# Patient Record
Sex: FEMALE | Race: WHITE | NOT HISPANIC OR LATINO | Employment: FULL TIME | URBAN - METROPOLITAN AREA
[De-identification: names, ages, dates, MRNs, and addresses within clinical notes are randomized per-mention and may not be internally consistent; named-entity substitution may affect disease eponyms.]

---

## 2018-12-20 ENCOUNTER — TRANSCRIBE ORDERS (OUTPATIENT)
Dept: ADMINISTRATIVE | Facility: HOSPITAL | Age: 48
End: 2018-12-20

## 2018-12-20 DIAGNOSIS — M54.5 LOW BACK PAIN, UNSPECIFIED BACK PAIN LATERALITY, UNSPECIFIED CHRONICITY, WITH SCIATICA PRESENCE UNSPECIFIED: Primary | ICD-10-CM

## 2018-12-25 ENCOUNTER — HOSPITAL ENCOUNTER (OUTPATIENT)
Facility: HOSPITAL | Age: 48
Setting detail: OBSERVATION
Discharge: HOME | End: 2018-12-26
Attending: EMERGENCY MEDICINE | Admitting: EMERGENCY MEDICINE

## 2018-12-25 ENCOUNTER — APPOINTMENT (OUTPATIENT)
Dept: GENERAL RADIOLOGY | Facility: HOSPITAL | Age: 48
End: 2018-12-25

## 2018-12-25 DIAGNOSIS — R07.2 PRECORDIAL CHEST PAIN: Primary | ICD-10-CM

## 2018-12-25 PROBLEM — R07.9 CHEST PAIN: Status: ACTIVE | Noted: 2018-12-25

## 2018-12-25 LAB
ALBUMIN SERPL-MCNC: 4.4 G/DL (ref 3.5–5.2)
ALBUMIN/GLOB SERPL: 1.2 G/DL
ALP SERPL-CCNC: 87 U/L (ref 40–129)
ALT SERPL W P-5'-P-CCNC: 24 U/L (ref 5–33)
ANION GAP SERPL CALCULATED.3IONS-SCNC: 14.3 MMOL/L
APTT PPP: 30.8 SECONDS (ref 24.3–38.1)
AST SERPL-CCNC: 21 U/L (ref 5–32)
BASOPHILS # BLD AUTO: 0.04 10*3/MM3 (ref 0–0.2)
BASOPHILS NFR BLD AUTO: 0.7 % (ref 0–2)
BILIRUB SERPL-MCNC: 0.2 MG/DL (ref 0.2–1.2)
BUN BLD-MCNC: 14 MG/DL (ref 6–20)
BUN/CREAT SERPL: 14.9 (ref 7–25)
CALCIUM SPEC-SCNC: 9.6 MG/DL (ref 8.6–10.5)
CHLORIDE SERPL-SCNC: 103 MMOL/L (ref 98–107)
CO2 SERPL-SCNC: 24.7 MMOL/L (ref 22–29)
CREAT BLD-MCNC: 0.94 MG/DL (ref 0.57–1)
CRP SERPL-MCNC: 0.19 MG/DL (ref 0–0.5)
DEPRECATED RDW RBC AUTO: 43.2 FL (ref 37–54)
EOSINOPHIL # BLD AUTO: 0.11 10*3/MM3 (ref 0.1–0.3)
EOSINOPHIL NFR BLD AUTO: 1.9 % (ref 0–4)
ERYTHROCYTE [DISTWIDTH] IN BLOOD BY AUTOMATED COUNT: 13.7 % (ref 11.5–14.5)
ERYTHROCYTE [SEDIMENTATION RATE] IN BLOOD: 25 MM/HR (ref 0–20)
GFR SERPL CREATININE-BSD FRML MDRD: 77 ML/MIN/1.73
GLOBULIN UR ELPH-MCNC: 3.8 GM/DL
GLUCOSE BLD-MCNC: 83 MG/DL (ref 65–99)
HCT VFR BLD AUTO: 39.6 % (ref 37–47)
HGB BLD-MCNC: 12.7 G/DL (ref 12–16)
IMM GRANULOCYTES # BLD AUTO: 0.02 10*3/MM3 (ref 0–0.03)
IMM GRANULOCYTES NFR BLD AUTO: 0.3 % (ref 0–0.5)
INR PPP: 0.92 (ref 0.9–1.1)
LIPASE SERPL-CCNC: 21 U/L (ref 13–60)
LYMPHOCYTES # BLD AUTO: 2.33 10*3/MM3 (ref 0.6–4.8)
LYMPHOCYTES NFR BLD AUTO: 40.7 % (ref 20–45)
MCH RBC QN AUTO: 27.7 PG (ref 27–31)
MCHC RBC AUTO-ENTMCNC: 32.1 G/DL (ref 31–37)
MCV RBC AUTO: 86.5 FL (ref 81–99)
MONOCYTES # BLD AUTO: 0.67 10*3/MM3 (ref 0–1)
MONOCYTES NFR BLD AUTO: 11.7 % (ref 3–8)
NEUTROPHILS # BLD AUTO: 2.55 10*3/MM3 (ref 1.5–8.3)
NEUTROPHILS NFR BLD AUTO: 44.7 % (ref 45–70)
NRBC BLD AUTO-RTO: 0 /100 WBC (ref 0–0)
PLATELET # BLD AUTO: 271 10*3/MM3 (ref 140–500)
PMV BLD AUTO: 10.1 FL (ref 7.4–10.4)
POTASSIUM BLD-SCNC: 4 MMOL/L (ref 3.5–5.2)
PROT SERPL-MCNC: 8.2 G/DL (ref 6–8.5)
PROTHROMBIN TIME: 12.3 SECONDS (ref 12.1–15)
RBC # BLD AUTO: 4.58 10*6/MM3 (ref 4.2–5.4)
SODIUM BLD-SCNC: 142 MMOL/L (ref 136–145)
TROPONIN T SERPL-MCNC: 0.04 NG/ML (ref 0–0.03)
TROPONIN T SERPL-MCNC: 0.05 NG/ML (ref 0–0.03)
WBC NRBC COR # BLD: 5.72 10*3/MM3 (ref 4.8–10.8)

## 2018-12-25 PROCEDURE — G0378 HOSPITAL OBSERVATION PER HR: HCPCS

## 2018-12-25 PROCEDURE — 96374 THER/PROPH/DIAG INJ IV PUSH: CPT

## 2018-12-25 PROCEDURE — 86140 C-REACTIVE PROTEIN: CPT | Performed by: INTERNAL MEDICINE

## 2018-12-25 PROCEDURE — 80053 COMPREHEN METABOLIC PANEL: CPT | Performed by: EMERGENCY MEDICINE

## 2018-12-25 PROCEDURE — 96372 THER/PROPH/DIAG INJ SC/IM: CPT

## 2018-12-25 PROCEDURE — 83690 ASSAY OF LIPASE: CPT | Performed by: EMERGENCY MEDICINE

## 2018-12-25 PROCEDURE — 71046 X-RAY EXAM CHEST 2 VIEWS: CPT

## 2018-12-25 PROCEDURE — 96375 TX/PRO/DX INJ NEW DRUG ADDON: CPT

## 2018-12-25 PROCEDURE — 25010000002 ENOXAPARIN PER 10 MG: Performed by: EMERGENCY MEDICINE

## 2018-12-25 PROCEDURE — 99284 EMERGENCY DEPT VISIT MOD MDM: CPT

## 2018-12-25 PROCEDURE — 25010000002 LORAZEPAM PER 2 MG: Performed by: EMERGENCY MEDICINE

## 2018-12-25 PROCEDURE — 85610 PROTHROMBIN TIME: CPT | Performed by: EMERGENCY MEDICINE

## 2018-12-25 PROCEDURE — 85730 THROMBOPLASTIN TIME PARTIAL: CPT | Performed by: EMERGENCY MEDICINE

## 2018-12-25 PROCEDURE — 93010 ELECTROCARDIOGRAM REPORT: CPT | Performed by: INTERNAL MEDICINE

## 2018-12-25 PROCEDURE — 99285 EMERGENCY DEPT VISIT HI MDM: CPT | Performed by: EMERGENCY MEDICINE

## 2018-12-25 PROCEDURE — 84484 ASSAY OF TROPONIN QUANT: CPT | Performed by: EMERGENCY MEDICINE

## 2018-12-25 PROCEDURE — 85652 RBC SED RATE AUTOMATED: CPT | Performed by: INTERNAL MEDICINE

## 2018-12-25 PROCEDURE — 85025 COMPLETE CBC W/AUTO DIFF WBC: CPT | Performed by: EMERGENCY MEDICINE

## 2018-12-25 PROCEDURE — 25010000002 MORPHINE PER 10 MG: Performed by: EMERGENCY MEDICINE

## 2018-12-25 PROCEDURE — 93005 ELECTROCARDIOGRAM TRACING: CPT | Performed by: EMERGENCY MEDICINE

## 2018-12-25 PROCEDURE — 99220 PR INITIAL OBSERVATION CARE/DAY 70 MINUTES: CPT | Performed by: INTERNAL MEDICINE

## 2018-12-25 RX ORDER — ONDANSETRON 4 MG/1
4 TABLET, FILM COATED ORAL EVERY 6 HOURS PRN
Status: DISCONTINUED | OUTPATIENT
Start: 2018-12-25 | End: 2018-12-26

## 2018-12-25 RX ORDER — ASPIRIN 325 MG
325 TABLET ORAL ONCE
Status: COMPLETED | OUTPATIENT
Start: 2018-12-25 | End: 2018-12-25

## 2018-12-25 RX ORDER — ONDANSETRON 2 MG/ML
4 INJECTION INTRAMUSCULAR; INTRAVENOUS EVERY 6 HOURS PRN
Status: DISCONTINUED | OUTPATIENT
Start: 2018-12-25 | End: 2018-12-26

## 2018-12-25 RX ORDER — SODIUM CHLORIDE 9 MG/ML
75 INJECTION, SOLUTION INTRAVENOUS CONTINUOUS
Status: DISCONTINUED | OUTPATIENT
Start: 2018-12-26 | End: 2018-12-26

## 2018-12-25 RX ORDER — SENNA AND DOCUSATE SODIUM 50; 8.6 MG/1; MG/1
2 TABLET, FILM COATED ORAL NIGHTLY PRN
Status: DISCONTINUED | OUTPATIENT
Start: 2018-12-25 | End: 2018-12-26

## 2018-12-25 RX ORDER — ONDANSETRON 4 MG/1
4 TABLET, ORALLY DISINTEGRATING ORAL EVERY 6 HOURS PRN
Status: DISCONTINUED | OUTPATIENT
Start: 2018-12-25 | End: 2018-12-26

## 2018-12-25 RX ORDER — SODIUM CHLORIDE 0.9 % (FLUSH) 0.9 %
10 SYRINGE (ML) INJECTION AS NEEDED
Status: DISCONTINUED | OUTPATIENT
Start: 2018-12-25 | End: 2018-12-26

## 2018-12-25 RX ORDER — CHOLECALCIFEROL (VITAMIN D3) 125 MCG
5 CAPSULE ORAL NIGHTLY PRN
Status: DISCONTINUED | OUTPATIENT
Start: 2018-12-25 | End: 2018-12-26

## 2018-12-25 RX ORDER — ACETAMINOPHEN 325 MG/1
650 TABLET ORAL EVERY 4 HOURS PRN
Status: DISCONTINUED | OUTPATIENT
Start: 2018-12-25 | End: 2018-12-26

## 2018-12-25 RX ORDER — CALCIUM CARBONATE 200(500)MG
1 TABLET,CHEWABLE ORAL 2 TIMES DAILY PRN
Status: DISCONTINUED | OUTPATIENT
Start: 2018-12-25 | End: 2018-12-26

## 2018-12-25 RX ORDER — NALOXONE HCL 0.4 MG/ML
0.4 VIAL (ML) INJECTION
Status: DISCONTINUED | OUTPATIENT
Start: 2018-12-25 | End: 2018-12-26

## 2018-12-25 RX ORDER — LORAZEPAM 2 MG/ML
0.5 INJECTION INTRAMUSCULAR ONCE
Status: COMPLETED | OUTPATIENT
Start: 2018-12-25 | End: 2018-12-25

## 2018-12-25 RX ADMIN — MORPHINE SULFATE 2 MG: 4 INJECTION INTRAVENOUS at 20:06

## 2018-12-25 RX ADMIN — ENOXAPARIN SODIUM 80 MG: 80 INJECTION SUBCUTANEOUS at 20:38

## 2018-12-25 RX ADMIN — NITROGLYCERIN 0.5 INCH: 20 OINTMENT TOPICAL at 20:03

## 2018-12-25 RX ADMIN — ASPIRIN 325 MG: 325 TABLET, COATED ORAL at 20:03

## 2018-12-25 RX ADMIN — LORAZEPAM 0.5 MG: 2 INJECTION, SOLUTION INTRAMUSCULAR; INTRAVENOUS at 19:17

## 2018-12-26 ENCOUNTER — APPOINTMENT (OUTPATIENT)
Dept: MRI IMAGING | Facility: HOSPITAL | Age: 48
End: 2018-12-26

## 2018-12-26 ENCOUNTER — HOSPITAL ENCOUNTER (OUTPATIENT)
Facility: HOSPITAL | Age: 48
Discharge: HOME | End: 2018-12-27
Attending: INTERNAL MEDICINE | Admitting: INTERNAL MEDICINE

## 2018-12-26 ENCOUNTER — APPOINTMENT (OUTPATIENT)
Dept: CARDIOLOGY | Facility: HOSPITAL | Age: 48
End: 2018-12-26
Attending: INTERNAL MEDICINE

## 2018-12-26 VITALS
HEIGHT: 63 IN | HEART RATE: 82 BPM | WEIGHT: 185 LBS | OXYGEN SATURATION: 100 % | SYSTOLIC BLOOD PRESSURE: 127 MMHG | DIASTOLIC BLOOD PRESSURE: 89 MMHG | TEMPERATURE: 97.8 F | BODY MASS INDEX: 32.78 KG/M2 | RESPIRATION RATE: 18 BRPM

## 2018-12-26 DIAGNOSIS — I20.8 ANGINA AT REST (HCC): Primary | ICD-10-CM

## 2018-12-26 DIAGNOSIS — I20.8 ANGINA AT REST (HCC): ICD-10-CM

## 2018-12-26 PROBLEM — I20.89 ANGINA AT REST: Status: ACTIVE | Noted: 2018-12-26

## 2018-12-26 PROBLEM — I21.4 NSTEMI (NON-ST ELEVATED MYOCARDIAL INFARCTION): Status: ACTIVE | Noted: 2018-12-26

## 2018-12-26 LAB
ANION GAP SERPL CALCULATED.3IONS-SCNC: 11.2 MMOL/L
AORTIC DIMENSIONLESS INDEX: 0.8 (DI)
BASOPHILS # BLD AUTO: 0.03 10*3/MM3 (ref 0–0.2)
BASOPHILS NFR BLD AUTO: 0.5 % (ref 0–2)
BH CV ECHO MEAS - ACS: 1.6 CM
BH CV ECHO MEAS - AO MAX PG (FULL): 1.8 MMHG
BH CV ECHO MEAS - AO MAX PG: 9.6 MMHG
BH CV ECHO MEAS - AO MEAN PG (FULL): 1 MMHG
BH CV ECHO MEAS - AO MEAN PG: 5 MMHG
BH CV ECHO MEAS - AO ROOT AREA (BSA CORRECTED): 1.5
BH CV ECHO MEAS - AO ROOT AREA: 5.7 CM^2
BH CV ECHO MEAS - AO ROOT DIAM: 2.7 CM
BH CV ECHO MEAS - AO V2 MAX: 155 CM/SEC
BH CV ECHO MEAS - AO V2 MEAN: 108 CM/SEC
BH CV ECHO MEAS - AO V2 VTI: 36.3 CM
BH CV ECHO MEAS - AVA(I,A): 2.5 CM^2
BH CV ECHO MEAS - AVA(I,D): 2.5 CM^2
BH CV ECHO MEAS - AVA(V,A): 2.3 CM^2
BH CV ECHO MEAS - AVA(V,D): 2.3 CM^2
BH CV ECHO MEAS - BSA(HAYCOCK): 1.9 M^2
BH CV ECHO MEAS - BSA: 1.8 M^2
BH CV ECHO MEAS - BZI_BMI: 30.1 KILOGRAMS/M^2
BH CV ECHO MEAS - BZI_METRIC_HEIGHT: 160 CM
BH CV ECHO MEAS - BZI_METRIC_WEIGHT: 77.1 KG
BH CV ECHO MEAS - EDV(CUBED): 73.6 ML
BH CV ECHO MEAS - EDV(MOD-SP2): 71.5 ML
BH CV ECHO MEAS - EDV(MOD-SP4): 85.2 ML
BH CV ECHO MEAS - EDV(TEICH): 78.1 ML
BH CV ECHO MEAS - EF(CUBED): 67.9 %
BH CV ECHO MEAS - EF(MOD-BP): 69 %
BH CV ECHO MEAS - EF(MOD-SP2): 66.4 %
BH CV ECHO MEAS - EF(MOD-SP4): 70.4 %
BH CV ECHO MEAS - EF(TEICH): 59.8 %
BH CV ECHO MEAS - ESV(CUBED): 23.6 ML
BH CV ECHO MEAS - ESV(MOD-SP2): 24 ML
BH CV ECHO MEAS - ESV(MOD-SP4): 25.2 ML
BH CV ECHO MEAS - ESV(TEICH): 31.4 ML
BH CV ECHO MEAS - FS: 31.5 %
BH CV ECHO MEAS - IVS/LVPW: 0.75
BH CV ECHO MEAS - IVSD: 0.93 CM
BH CV ECHO MEAS - LA DIMENSION: 3.3 CM
BH CV ECHO MEAS - LA/AO: 1.2
BH CV ECHO MEAS - LAT PEAK E' VEL: 10 CM/SEC
BH CV ECHO MEAS - LV DIASTOLIC VOL/BSA (35-75): 47.2 ML/M^2
BH CV ECHO MEAS - LV MASS(C)D: 153.8 GRAMS
BH CV ECHO MEAS - LV MASS(C)DI: 85.2 GRAMS/M^2
BH CV ECHO MEAS - LV MAX PG: 7.8 MMHG
BH CV ECHO MEAS - LV MEAN PG: 4 MMHG
BH CV ECHO MEAS - LV SYSTOLIC VOL/BSA (12-30): 14 ML/M^2
BH CV ECHO MEAS - LV V1 MAX: 139.5 CM/SEC
BH CV ECHO MEAS - LV V1 MEAN: 91.8 CM/SEC
BH CV ECHO MEAS - LV V1 VTI: 35.6 CM
BH CV ECHO MEAS - LVIDD: 4.2 CM
BH CV ECHO MEAS - LVIDS: 2.9 CM
BH CV ECHO MEAS - LVLD AP2: 7.5 CM
BH CV ECHO MEAS - LVLD AP4: 7.6 CM
BH CV ECHO MEAS - LVLS AP2: 6.7 CM
BH CV ECHO MEAS - LVLS AP4: 6.5 CM
BH CV ECHO MEAS - LVOT AREA (M): 2.5 CM^2
BH CV ECHO MEAS - LVOT AREA: 2.5 CM^2
BH CV ECHO MEAS - LVOT DIAM: 1.8 CM
BH CV ECHO MEAS - LVPWD: 1.2 CM
BH CV ECHO MEAS - MED PEAK E' VEL: 7 CM/SEC
BH CV ECHO MEAS - MR MAX PG: 75.6 MMHG
BH CV ECHO MEAS - MR MAX VEL: 420.3 CM/SEC
BH CV ECHO MEAS - MV A DUR: 0.16 SEC
BH CV ECHO MEAS - MV A MAX VEL: 108 CM/SEC
BH CV ECHO MEAS - MV DEC SLOPE: 398 CM/SEC^2
BH CV ECHO MEAS - MV DEC TIME: 0.2 SEC
BH CV ECHO MEAS - MV E MAX VEL: 114 CM/SEC
BH CV ECHO MEAS - MV E/A: 1.1
BH CV ECHO MEAS - MV MAX PG: 5.4 MMHG
BH CV ECHO MEAS - MV MEAN PG: 2 MMHG
BH CV ECHO MEAS - MV P1/2T MAX VEL: 115 CM/SEC
BH CV ECHO MEAS - MV P1/2T: 84.6 MSEC
BH CV ECHO MEAS - MV V2 MAX: 116 CM/SEC
BH CV ECHO MEAS - MV V2 MEAN: 64.9 CM/SEC
BH CV ECHO MEAS - MV V2 VTI: 33 CM
BH CV ECHO MEAS - MVA P1/2T LCG: 1.9 CM^2
BH CV ECHO MEAS - MVA(P1/2T): 2.6 CM^2
BH CV ECHO MEAS - MVA(VTI): 2.7 CM^2
BH CV ECHO MEAS - PA ACC TIME: 0.1 SEC
BH CV ECHO MEAS - PA MAX PG (FULL): 1.3 MMHG
BH CV ECHO MEAS - PA MAX PG: 4.2 MMHG
BH CV ECHO MEAS - PA PR(ACCEL): 36.3 MMHG
BH CV ECHO MEAS - PA V2 MAX: 102 CM/SEC
BH CV ECHO MEAS - PVA(V,A): 4.1 CM^2
BH CV ECHO MEAS - PVA(V,D): 4.1 CM^2
BH CV ECHO MEAS - QP/QS: 1.2
BH CV ECHO MEAS - RAP SYSTOLE: 3 MMHG
BH CV ECHO MEAS - RV MAX PG: 2.9 MMHG
BH CV ECHO MEAS - RV MEAN PG: 1 MMHG
BH CV ECHO MEAS - RV V1 MAX: 85 CM/SEC
BH CV ECHO MEAS - RV V1 MEAN: 51 CM/SEC
BH CV ECHO MEAS - RV V1 VTI: 22.3 CM
BH CV ECHO MEAS - RVOT AREA: 4.9 CM^2
BH CV ECHO MEAS - RVOT DIAM: 2.5 CM
BH CV ECHO MEAS - RVSP: 21.4 MMHG
BH CV ECHO MEAS - SI(AO): 115.2 ML/M^2
BH CV ECHO MEAS - SI(CUBED): 27.7 ML/M^2
BH CV ECHO MEAS - SI(LVOT): 50.1 ML/M^2
BH CV ECHO MEAS - SI(MOD-SP2): 26.3 ML/M^2
BH CV ECHO MEAS - SI(MOD-SP4): 33.2 ML/M^2
BH CV ECHO MEAS - SI(TEICH): 25.9 ML/M^2
BH CV ECHO MEAS - SV(AO): 207.8 ML
BH CV ECHO MEAS - SV(CUBED): 49.9 ML
BH CV ECHO MEAS - SV(LVOT): 90.5 ML
BH CV ECHO MEAS - SV(MOD-SP2): 47.5 ML
BH CV ECHO MEAS - SV(MOD-SP4): 60 ML
BH CV ECHO MEAS - SV(RVOT): 109.9 ML
BH CV ECHO MEAS - SV(TEICH): 46.7 ML
BH CV ECHO MEAS - TAPSE (>1.6): 1.8 CM2
BH CV ECHO MEAS - TR MAX VEL: 214.3 CM/SEC
BH CV ECHO MEASUREMENTS AVERAGE E/E' RATIO: 13.41
BH CV VAS BP RIGHT ARM: NORMAL MMHG
BH CV XLRA - RV BASE: 2.8 CM
BH CV XLRA - TDI S': 14 CM/SEC
BUN BLD-MCNC: 16 MG/DL (ref 6–20)
BUN/CREAT SERPL: 19.3 (ref 7–25)
CALCIUM SPEC-SCNC: 8.8 MG/DL (ref 8.6–10.5)
CHLORIDE SERPL-SCNC: 107 MMOL/L (ref 98–107)
CO2 SERPL-SCNC: 23.8 MMOL/L (ref 22–29)
CREAT BLD-MCNC: 0.83 MG/DL (ref 0.57–1)
DEPRECATED RDW RBC AUTO: 43.5 FL (ref 37–54)
EOSINOPHIL # BLD AUTO: 0.11 10*3/MM3 (ref 0.1–0.3)
EOSINOPHIL NFR BLD AUTO: 2 % (ref 0–4)
ERYTHROCYTE [DISTWIDTH] IN BLOOD BY AUTOMATED COUNT: 13.8 % (ref 11.5–14.5)
GFR SERPL CREATININE-BSD FRML MDRD: 89 ML/MIN/1.73
GLUCOSE BLD-MCNC: 104 MG/DL (ref 65–99)
HCT VFR BLD AUTO: 35.8 % (ref 37–47)
HGB BLD-MCNC: 11.4 G/DL (ref 12–16)
IMM GRANULOCYTES # BLD AUTO: 0.02 10*3/MM3 (ref 0–0.03)
IMM GRANULOCYTES NFR BLD AUTO: 0.4 % (ref 0–0.5)
LEFT ATRIUM VOLUME INDEX: 24 ML/M2
LV EF 2D ECHO EST: 69 %
LYMPHOCYTES # BLD AUTO: 3.22 10*3/MM3 (ref 0.6–4.8)
LYMPHOCYTES NFR BLD AUTO: 57.8 % (ref 20–45)
MAXIMAL PREDICTED HEART RATE: 172 BPM
MCH RBC QN AUTO: 27.7 PG (ref 27–31)
MCHC RBC AUTO-ENTMCNC: 31.8 G/DL (ref 31–37)
MCV RBC AUTO: 86.9 FL (ref 81–99)
MONOCYTES # BLD AUTO: 0.53 10*3/MM3 (ref 0–1)
MONOCYTES NFR BLD AUTO: 9.5 % (ref 3–8)
NEUTROPHILS # BLD AUTO: 1.66 10*3/MM3 (ref 1.5–8.3)
NEUTROPHILS NFR BLD AUTO: 29.8 % (ref 45–70)
NRBC BLD AUTO-RTO: 0 /100 WBC (ref 0–0)
PLAT MORPH BLD: NORMAL
PLATELET # BLD AUTO: 255 10*3/MM3 (ref 140–500)
PMV BLD AUTO: 10.2 FL (ref 7.4–10.4)
POTASSIUM BLD-SCNC: 4.3 MMOL/L (ref 3.5–5.2)
RBC # BLD AUTO: 4.12 10*6/MM3 (ref 4.2–5.4)
RBC MORPH BLD: NORMAL
SODIUM BLD-SCNC: 142 MMOL/L (ref 136–145)
STRESS TARGET HR: 146 BPM
TROPONIN T SERPL-MCNC: 0.07 NG/ML (ref 0–0.03)
TROPONIN T SERPL-MCNC: 0.08 NG/ML (ref 0–0.03)
WBC MORPH BLD: NORMAL
WBC NRBC COR # BLD: 5.57 10*3/MM3 (ref 4.8–10.8)

## 2018-12-26 PROCEDURE — 85347 COAGULATION TIME ACTIVATED: CPT

## 2018-12-26 PROCEDURE — C1887 CATHETER, GUIDING: HCPCS | Performed by: INTERNAL MEDICINE

## 2018-12-26 PROCEDURE — 25010000002 FENTANYL CITRATE (PF) 100 MCG/2ML SOLUTION: Performed by: INTERNAL MEDICINE

## 2018-12-26 PROCEDURE — 84484 ASSAY OF TROPONIN QUANT: CPT | Performed by: INTERNAL MEDICINE

## 2018-12-26 PROCEDURE — C1894 INTRO/SHEATH, NON-LASER: HCPCS | Performed by: INTERNAL MEDICINE

## 2018-12-26 PROCEDURE — G0378 HOSPITAL OBSERVATION PER HR: HCPCS

## 2018-12-26 PROCEDURE — 93010 ELECTROCARDIOGRAM REPORT: CPT | Performed by: INTERNAL MEDICINE

## 2018-12-26 PROCEDURE — C1725 CATH, TRANSLUMIN NON-LASER: HCPCS | Performed by: INTERNAL MEDICINE

## 2018-12-26 PROCEDURE — 92928 PRQ TCAT PLMT NTRAC ST 1 LES: CPT | Performed by: INTERNAL MEDICINE

## 2018-12-26 PROCEDURE — C1874 STENT, COATED/COV W/DEL SYS: HCPCS | Performed by: INTERNAL MEDICINE

## 2018-12-26 PROCEDURE — 0 IOPAMIDOL PER 1 ML: Performed by: INTERNAL MEDICINE

## 2018-12-26 PROCEDURE — 93005 ELECTROCARDIOGRAM TRACING: CPT | Performed by: INTERNAL MEDICINE

## 2018-12-26 PROCEDURE — 25010000002 HEPARIN (PORCINE) PER 1000 UNITS: Performed by: INTERNAL MEDICINE

## 2018-12-26 PROCEDURE — C9600 PERC DRUG-EL COR STENT SING: HCPCS | Performed by: INTERNAL MEDICINE

## 2018-12-26 PROCEDURE — C1769 GUIDE WIRE: HCPCS | Performed by: INTERNAL MEDICINE

## 2018-12-26 PROCEDURE — 99217 PR OBSERVATION CARE DISCHARGE MANAGEMENT: CPT | Performed by: HOSPITALIST

## 2018-12-26 PROCEDURE — 93306 TTE W/DOPPLER COMPLETE: CPT | Performed by: INTERNAL MEDICINE

## 2018-12-26 PROCEDURE — 93458 L HRT ARTERY/VENTRICLE ANGIO: CPT | Performed by: INTERNAL MEDICINE

## 2018-12-26 PROCEDURE — 96376 TX/PRO/DX INJ SAME DRUG ADON: CPT

## 2018-12-26 PROCEDURE — 99152 MOD SED SAME PHYS/QHP 5/>YRS: CPT | Performed by: INTERNAL MEDICINE

## 2018-12-26 PROCEDURE — 80048 BASIC METABOLIC PNL TOTAL CA: CPT | Performed by: INTERNAL MEDICINE

## 2018-12-26 PROCEDURE — 99153 MOD SED SAME PHYS/QHP EA: CPT | Performed by: INTERNAL MEDICINE

## 2018-12-26 PROCEDURE — 25010000002 MIDAZOLAM PER 1 MG: Performed by: INTERNAL MEDICINE

## 2018-12-26 PROCEDURE — 85007 BL SMEAR W/DIFF WBC COUNT: CPT | Performed by: INTERNAL MEDICINE

## 2018-12-26 PROCEDURE — 99244 OFF/OP CNSLTJ NEW/EST MOD 40: CPT | Performed by: INTERNAL MEDICINE

## 2018-12-26 PROCEDURE — 25010000002 MORPHINE PER 10 MG: Performed by: INTERNAL MEDICINE

## 2018-12-26 PROCEDURE — 93306 TTE W/DOPPLER COMPLETE: CPT

## 2018-12-26 PROCEDURE — 85025 COMPLETE CBC W/AUTO DIFF WBC: CPT | Performed by: INTERNAL MEDICINE

## 2018-12-26 DEVICE — XIENCE SIERRA™ EVEROLIMUS ELUTING CORONARY STENT SYSTEM 3.50 MM X 28 MM / RAPID-EXCHANGE
Type: IMPLANTABLE DEVICE | Status: FUNCTIONAL
Brand: XIENCE SIERRA™

## 2018-12-26 RX ORDER — SODIUM CHLORIDE 0.9 % (FLUSH) 0.9 %
3 SYRINGE (ML) INJECTION EVERY 12 HOURS SCHEDULED
Status: DISCONTINUED | OUTPATIENT
Start: 2018-12-26 | End: 2018-12-26

## 2018-12-26 RX ORDER — SODIUM CHLORIDE 9 MG/ML
75 INJECTION, SOLUTION INTRAVENOUS CONTINUOUS
Status: DISCONTINUED | OUTPATIENT
Start: 2018-12-26 | End: 2018-12-26

## 2018-12-26 RX ORDER — ONDANSETRON 2 MG/ML
4 INJECTION INTRAMUSCULAR; INTRAVENOUS EVERY 6 HOURS PRN
Status: DISCONTINUED | OUTPATIENT
Start: 2018-12-26 | End: 2018-12-27 | Stop reason: HOSPADM

## 2018-12-26 RX ORDER — HEPARIN SODIUM 1000 [USP'U]/ML
INJECTION, SOLUTION INTRAVENOUS; SUBCUTANEOUS AS NEEDED
Status: DISCONTINUED | OUTPATIENT
Start: 2018-12-26 | End: 2018-12-26 | Stop reason: HOSPADM

## 2018-12-26 RX ORDER — CALCIUM CARBONATE 200(500)MG
1 TABLET,CHEWABLE ORAL 2 TIMES DAILY PRN
Status: DISCONTINUED | OUTPATIENT
Start: 2018-12-26 | End: 2018-12-27 | Stop reason: HOSPADM

## 2018-12-26 RX ORDER — ASPIRIN 325 MG
325 TABLET, DELAYED RELEASE (ENTERIC COATED) ORAL DAILY
Status: DISCONTINUED | OUTPATIENT
Start: 2018-12-27 | End: 2018-12-26 | Stop reason: HOSPADM

## 2018-12-26 RX ORDER — LIDOCAINE HYDROCHLORIDE 20 MG/ML
INJECTION, SOLUTION INFILTRATION; PERINEURAL AS NEEDED
Status: DISCONTINUED | OUTPATIENT
Start: 2018-12-26 | End: 2018-12-26 | Stop reason: HOSPADM

## 2018-12-26 RX ORDER — ASPIRIN 325 MG
325 TABLET, DELAYED RELEASE (ENTERIC COATED) ORAL ONCE
Status: DISCONTINUED | OUTPATIENT
Start: 2018-12-26 | End: 2018-12-26

## 2018-12-26 RX ORDER — ASPIRIN 325 MG
325 TABLET, DELAYED RELEASE (ENTERIC COATED) ORAL ONCE
Status: DISCONTINUED | OUTPATIENT
Start: 2018-12-26 | End: 2018-12-27 | Stop reason: HOSPADM

## 2018-12-26 RX ORDER — SODIUM CHLORIDE 0.9 % (FLUSH) 0.9 %
10 SYRINGE (ML) INJECTION AS NEEDED
Status: DISCONTINUED | OUTPATIENT
Start: 2018-12-26 | End: 2018-12-27 | Stop reason: HOSPADM

## 2018-12-26 RX ORDER — SODIUM CHLORIDE 9 MG/ML
100 INJECTION, SOLUTION INTRAVENOUS CONTINUOUS
Status: DISCONTINUED | OUTPATIENT
Start: 2018-12-26 | End: 2018-12-27 | Stop reason: HOSPADM

## 2018-12-26 RX ORDER — ONDANSETRON 4 MG/1
4 TABLET, FILM COATED ORAL EVERY 6 HOURS PRN
Status: DISCONTINUED | OUTPATIENT
Start: 2018-12-26 | End: 2018-12-27 | Stop reason: HOSPADM

## 2018-12-26 RX ORDER — ONDANSETRON 4 MG/1
4 TABLET, ORALLY DISINTEGRATING ORAL EVERY 6 HOURS PRN
Status: DISCONTINUED | OUTPATIENT
Start: 2018-12-26 | End: 2018-12-27 | Stop reason: HOSPADM

## 2018-12-26 RX ORDER — ACETAMINOPHEN 325 MG/1
650 TABLET ORAL EVERY 4 HOURS PRN
Status: CANCELLED | OUTPATIENT
Start: 2018-12-26

## 2018-12-26 RX ORDER — SODIUM CHLORIDE 0.9 % (FLUSH) 0.9 %
10 SYRINGE (ML) INJECTION AS NEEDED
Status: CANCELLED | OUTPATIENT
Start: 2018-12-26

## 2018-12-26 RX ORDER — ASPIRIN 325 MG
325 TABLET, DELAYED RELEASE (ENTERIC COATED) ORAL ONCE
Status: CANCELLED | OUTPATIENT
Start: 2018-12-26

## 2018-12-26 RX ORDER — NALOXONE HCL 0.4 MG/ML
0.4 VIAL (ML) INJECTION
Status: CANCELLED | OUTPATIENT
Start: 2018-12-26

## 2018-12-26 RX ORDER — NITROGLYCERIN 5 MG/ML
INJECTION, SOLUTION INTRAVENOUS AS NEEDED
Status: DISCONTINUED | OUTPATIENT
Start: 2018-12-26 | End: 2018-12-26 | Stop reason: HOSPADM

## 2018-12-26 RX ORDER — FENTANYL CITRATE 50 UG/ML
INJECTION, SOLUTION INTRAMUSCULAR; INTRAVENOUS AS NEEDED
Status: DISCONTINUED | OUTPATIENT
Start: 2018-12-26 | End: 2018-12-26 | Stop reason: HOSPADM

## 2018-12-26 RX ORDER — ASPIRIN 81 MG/1
81 TABLET, CHEWABLE ORAL DAILY
Status: DISCONTINUED | OUTPATIENT
Start: 2018-12-26 | End: 2018-12-27 | Stop reason: HOSPADM

## 2018-12-26 RX ORDER — SENNA AND DOCUSATE SODIUM 50; 8.6 MG/1; MG/1
2 TABLET, FILM COATED ORAL NIGHTLY PRN
Status: CANCELLED | OUTPATIENT
Start: 2018-12-26

## 2018-12-26 RX ORDER — ACETAMINOPHEN 325 MG/1
650 TABLET ORAL EVERY 4 HOURS PRN
Status: DISCONTINUED | OUTPATIENT
Start: 2018-12-26 | End: 2018-12-27 | Stop reason: HOSPADM

## 2018-12-26 RX ORDER — SODIUM CHLORIDE 9 MG/ML
75 INJECTION, SOLUTION INTRAVENOUS CONTINUOUS
Status: CANCELLED | OUTPATIENT
Start: 2018-12-26 | End: 2018-12-26

## 2018-12-26 RX ORDER — ASPIRIN 325 MG
325 TABLET, DELAYED RELEASE (ENTERIC COATED) ORAL DAILY
Status: CANCELLED | OUTPATIENT
Start: 2018-12-27

## 2018-12-26 RX ORDER — LIDOCAINE HYDROCHLORIDE 10 MG/ML
0.1 INJECTION, SOLUTION EPIDURAL; INFILTRATION; INTRACAUDAL; PERINEURAL ONCE AS NEEDED
Status: DISCONTINUED | OUTPATIENT
Start: 2018-12-26 | End: 2018-12-26

## 2018-12-26 RX ORDER — MIDAZOLAM HYDROCHLORIDE 1 MG/ML
INJECTION INTRAMUSCULAR; INTRAVENOUS AS NEEDED
Status: DISCONTINUED | OUTPATIENT
Start: 2018-12-26 | End: 2018-12-26 | Stop reason: HOSPADM

## 2018-12-26 RX ORDER — NALOXONE HCL 0.4 MG/ML
0.4 VIAL (ML) INJECTION
Status: DISCONTINUED | OUTPATIENT
Start: 2018-12-26 | End: 2018-12-27 | Stop reason: HOSPADM

## 2018-12-26 RX ORDER — TEMAZEPAM 15 MG/1
15 CAPSULE ORAL NIGHTLY PRN
Status: DISCONTINUED | OUTPATIENT
Start: 2018-12-26 | End: 2018-12-27 | Stop reason: HOSPADM

## 2018-12-26 RX ORDER — CHOLECALCIFEROL (VITAMIN D3) 125 MCG
5 CAPSULE ORAL NIGHTLY PRN
Status: CANCELLED | OUTPATIENT
Start: 2018-12-26

## 2018-12-26 RX ORDER — ONDANSETRON 4 MG/1
4 TABLET, FILM COATED ORAL EVERY 6 HOURS PRN
Status: CANCELLED | OUTPATIENT
Start: 2018-12-26

## 2018-12-26 RX ORDER — CALCIUM CARBONATE 200(500)MG
1 TABLET,CHEWABLE ORAL 2 TIMES DAILY PRN
Status: CANCELLED | OUTPATIENT
Start: 2018-12-26

## 2018-12-26 RX ORDER — ONDANSETRON 4 MG/1
4 TABLET, ORALLY DISINTEGRATING ORAL EVERY 6 HOURS PRN
Status: CANCELLED | OUTPATIENT
Start: 2018-12-26

## 2018-12-26 RX ORDER — ASPIRIN 325 MG
325 TABLET, DELAYED RELEASE (ENTERIC COATED) ORAL DAILY
Status: DISCONTINUED | OUTPATIENT
Start: 2018-12-27 | End: 2018-12-26

## 2018-12-26 RX ORDER — ONDANSETRON 2 MG/ML
4 INJECTION INTRAMUSCULAR; INTRAVENOUS EVERY 6 HOURS PRN
Status: CANCELLED | OUTPATIENT
Start: 2018-12-26

## 2018-12-26 RX ORDER — SENNA AND DOCUSATE SODIUM 50; 8.6 MG/1; MG/1
2 TABLET, FILM COATED ORAL NIGHTLY PRN
Status: DISCONTINUED | OUTPATIENT
Start: 2018-12-26 | End: 2018-12-27 | Stop reason: HOSPADM

## 2018-12-26 RX ORDER — MORPHINE SULFATE 2 MG/ML
2 INJECTION, SOLUTION INTRAMUSCULAR; INTRAVENOUS EVERY 4 HOURS PRN
Status: DISCONTINUED | OUTPATIENT
Start: 2018-12-26 | End: 2018-12-27 | Stop reason: HOSPADM

## 2018-12-26 RX ORDER — SODIUM CHLORIDE 0.9 % (FLUSH) 0.9 %
3-10 SYRINGE (ML) INJECTION AS NEEDED
Status: DISCONTINUED | OUTPATIENT
Start: 2018-12-26 | End: 2018-12-26

## 2018-12-26 RX ORDER — MORPHINE SULFATE 2 MG/ML
1 INJECTION, SOLUTION INTRAMUSCULAR; INTRAVENOUS EVERY 4 HOURS PRN
Status: DISCONTINUED | OUTPATIENT
Start: 2018-12-26 | End: 2018-12-27 | Stop reason: HOSPADM

## 2018-12-26 RX ORDER — ATORVASTATIN CALCIUM 20 MG/1
40 TABLET, FILM COATED ORAL NIGHTLY
Status: DISCONTINUED | OUTPATIENT
Start: 2018-12-26 | End: 2018-12-27 | Stop reason: HOSPADM

## 2018-12-26 RX ORDER — HYDROCODONE BITARTRATE AND ACETAMINOPHEN 5; 325 MG/1; MG/1
1 TABLET ORAL EVERY 4 HOURS PRN
Status: DISCONTINUED | OUTPATIENT
Start: 2018-12-26 | End: 2018-12-27 | Stop reason: HOSPADM

## 2018-12-26 RX ORDER — CHOLECALCIFEROL (VITAMIN D3) 125 MCG
5 CAPSULE ORAL NIGHTLY PRN
Status: DISCONTINUED | OUTPATIENT
Start: 2018-12-26 | End: 2018-12-27 | Stop reason: HOSPADM

## 2018-12-26 RX ADMIN — TICAGRELOR 90 MG: 90 TABLET ORAL at 20:49

## 2018-12-26 RX ADMIN — ACETAMINOPHEN 650 MG: 325 TABLET, FILM COATED ORAL at 15:15

## 2018-12-26 RX ADMIN — SODIUM CHLORIDE 100 ML/HR: 9 INJECTION, SOLUTION INTRAVENOUS at 17:55

## 2018-12-26 RX ADMIN — MORPHINE SULFATE 2 MG: 4 INJECTION, SOLUTION INTRAMUSCULAR; INTRAVENOUS at 10:23

## 2018-12-26 RX ADMIN — HYDROCODONE BITARTRATE AND ACETAMINOPHEN 1 TABLET: 5; 325 TABLET ORAL at 19:37

## 2018-12-26 RX ADMIN — NITROGLYCERIN 0.5 INCH: 20 OINTMENT TOPICAL at 11:25

## 2018-12-26 RX ADMIN — ATORVASTATIN CALCIUM 40 MG: 20 TABLET, FILM COATED ORAL at 20:49

## 2018-12-26 RX ADMIN — SODIUM CHLORIDE 75 ML/HR: 9 INJECTION, SOLUTION INTRAVENOUS at 12:55

## 2018-12-26 RX ADMIN — ASPIRIN 325 MG: 325 TABLET, DELAYED RELEASE ORAL at 10:31

## 2018-12-26 RX ADMIN — SODIUM CHLORIDE 75 ML/HR: 9 INJECTION, SOLUTION INTRAVENOUS at 00:30

## 2018-12-26 RX ADMIN — MORPHINE SULFATE 2 MG: 4 INJECTION, SOLUTION INTRAMUSCULAR; INTRAVENOUS at 00:45

## 2018-12-26 NOTE — H&P (VIEW-ONLY)
Patient Name: Ortiz Salamanca  :1970  48 y.o.    Date of Admission: 2018  Date of Consultation:  18  Encounter Provider: Ag Santiago III, MD  Place of Service: [facility] CARDIOLOGY  Referring Provider: Myron Murphy MD  Patient Care Team:  Morris Chandler MD as PCP - General (General Practice)      Chief complaint: chest pain    Consulted for: chest pain    History of Present Illness:   Ortiz Salamanca is a 49 y/o who has a significant family h/o CAD-one brother  in his 40s of a MI and another has stents. She has a history of hyperlipidemia and is a daily smoker. She presented to the ED last night with c/o radiating chest pain x 2 days. Labs showed a troponin of 0.040 initially. Additional labs were unremarkable. EKG showed NSR. She was admitted for further observation.    We have been asked to see for chest pain. Repeat troponin's have been 0.046 and now 0.072.     She has been having fairly persistent pain for about 3 days.  Sometimes it is worse and sometimes better.  It seems to be worse when she is laying down.  She sits up it does seem to be somewhat better.  At times the discomfort will get worse and will radiate into her left arm.  The point of maximal discomfort is left inframammary.  She has not had any chills or fevers.  No chest wall trauma.  She has had a little bit of shortness of breath.  She doesn't notice that it particularly changes with a deep breath or cough except sometimes with a deep breath it does seem a little bit heavier.  There is no sharp component to the pain.  She has not had any nausea or vomiting.    Initially she thought it was indigestion and first took Tums and then Nexium that did not do anything to help.  In the emergency room they gave her nitroglycerin and it seemed that might help the pain a little bit.    She had an echocardiogram performed this morning that I reviewed personally.  This shows normal biventricular  "size and function, no wall motion abnormalties, but no pericardial effusion at all was present.    We obtained another EKG this morning that does not show any change from EKG of yesterday.        Past Medical History:   Diagnosis Date   • Hyperlipidemia    • Migraine headache        Past Surgical History:   Procedure Laterality Date   •  SECTION     • CYST REMOVAL Left     knee   • HYSTERECTOMY           Prior to Admission medications    Medication Sig Start Date End Date Taking? Authorizing Provider   NON FORMULARY 1 tablet Daily As Needed (for migraine). unk name/dose of migraine medication   Yes Provider, Historical, MD       Allergies   Allergen Reactions   • Keflex [Cephalexin] Anaphylaxis       Social History     Socioeconomic History   • Marital status:      Spouse name: Not on file   • Number of children: Not on file   • Years of education: Not on file   • Highest education level: Not on file   Tobacco Use   • Smoking status: Former Smoker     Last attempt to quit: 2018     Years since quittin.0   • Smokeless tobacco: Never Used   Substance and Sexual Activity   • Alcohol use: Yes     Comment: rare   • Drug use: No   • Sexual activity: Defer       Family History   Problem Relation Age of Onset   • Cancer Mother    • Heart disease Brother    • Heart disease Maternal Grandmother        REVIEW OF SYSTEMS:   All systems reviewed.  Pertinent positives identified in HPI.  All other systems are negative.      Objective:     Vitals:    18 0045 18 0317 18 0635 18 0837   BP: 104/71 113/75 127/76 127/76   BP Location: Left arm Left arm Left arm    Patient Position: Lying Lying Lying    Pulse: 89 79 78 78   Resp: 16 18 18    Temp:  98.2 °F (36.8 °C) 97.8 °F (36.6 °C)    TempSrc:  Oral Oral    SpO2:  98% 98%    Weight:    83.9 kg (185 lb)   Height:    160 cm (63\")     Body mass index is 32.77 kg/m².    Physical Exam:  General Appearance:    Alert, cooperative, in no acute " distress   Head:    Normocephalic, without obvious abnormality, atraumatic   Eyes:            Lids and lashes normal, conjunctivae and sclerae normal, no   icterus, no pallor, corneas clear, PERRLA   Ears:    Ears appear intact with no abnormalities noted   Throat:   No oral lesions, no thrush, oral mucosa moist   Neck:   No adenopathy, supple, trachea midline, no thyromegaly, no   carotid bruit, no JVD   Back:     No kyphosis present, no scoliosis present, no skin lesions, erythema or scars, no tenderness to percussion or palpation, range of motion normal   Lungs:     Clear to auscultation,respirations regular, even and unlabored    Heart:    Regular rhythm and normal rate, normal S1 and S2, no murmur, no gallop, no rub, no click   Chest Wall:    No abnormalities observed   Abdomen:     Normal bowel sounds, no masses, no organomegaly, soft        non-tender, non-distended, no guarding, no rebound  tenderness   Extremities:   Moves all extremities well, no edema, no cyanosis, no redness   Pulses:   Pulses palpable and equal bilaterally. Normal radial, carotid, femoral, dorsalis pedis and posterior tibial pulses bilaterally. Normal abdominal aorta   Skin:  Psychiatric:   No bleeding, bruising or rash    Alert and oriented x 3, normal mood and affect         Lab Review:     Results from last 7 days   Lab Units  12/26/18   0327  12/25/18   1905   SODIUM mmol/L  142  142   POTASSIUM mmol/L  4.3  4.0   CHLORIDE mmol/L  107  103   CO2 mmol/L  23.8  24.7   BUN mg/dL  16  14   CREATININE mg/dL  0.83  0.94   CALCIUM mg/dL  8.8  9.6   BILIRUBIN mg/dL   --   0.2   ALK PHOS U/L   --   87   ALT (SGPT) U/L   --   24   AST (SGOT) U/L   --   21   GLUCOSE mg/dL  104*  83     Results from last 7 days   Lab Units  12/26/18   0848  12/26/18   0327  12/25/18   2107   TROPONIN T ng/mL  0.083*  0.072*  0.046*     Results from last 7 days   Lab Units  12/26/18   0327   WBC 10*3/mm3  5.57   HEMOGLOBIN g/dL  11.4*   HEMATOCRIT %  35.8*      PLATELETS 10*3/mm3  255     Results from last 7 days   Lab Units  12/25/18   1905   INR   0.92   APTT seconds  30.8                        I personally viewed and interpreted the patient's EKG/Telemetry data.      Current Facility-Administered Medications:   •  acetaminophen (TYLENOL) tablet 650 mg, 650 mg, Oral, Q4H PRN, Myron Murphy MD  •  aspirin EC tablet 325 mg, 325 mg, Oral, Once, Ag Santiago III, MD  •  calcium carbonate (TUMS) chewable tablet 500 mg (200 mg elemental), 1 tablet, Oral, BID PRN, Myron Murphy MD  •  melatonin tablet 5 mg, 5 mg, Oral, Nightly PRN, Myron Murphy MD  •  morphine injection 2 mg, 2 mg, Intravenous, Q4H PRN, 2 mg at 12/26/18 0045 **AND** naloxone (NARCAN) injection 0.4 mg, 0.4 mg, Intravenous, Q5 Min PRN, Myron Murphy MD  •  ondansetron (ZOFRAN) tablet 4 mg, 4 mg, Oral, Q6H PRN **OR** ondansetron ODT (ZOFRAN-ODT) disintegrating tablet 4 mg, 4 mg, Oral, Q6H PRN **OR** ondansetron (ZOFRAN) injection 4 mg, 4 mg, Intravenous, Q6H PRN, Myron Murphy MD  •  sennosides-docusate sodium (SENOKOT-S) 8.6-50 MG tablet 2 tablet, 2 tablet, Oral, Nightly PRN, Myron Murphy MD  •  [COMPLETED] Insert peripheral IV, , , Once **AND** sodium chloride 0.9 % flush 10 mL, 10 mL, Intravenous, PRN, Joe Joyce MD  •  Sodium chloride 0.9 % infusion, 75 mL/hr, Intravenous, Continuous, Myron Murphy MD, Last Rate: 75 mL/hr at 12/26/18 0457, 75 mL/hr at 12/26/18 0457    Assessment and Plan:       Active Hospital Problems    Diagnosis Date Noted   • Chest pain [R07.9] 12/25/2018      Resolved Hospital Problems   No resolved problems to display.     1.  Chest discomfort–the patient has discomfort this been fairly persistent, which is somewhat worse supine and feels better sitting up, so that is suggestive of pericarditis.  However, she has no pericardial friction rub, no diagnostic changes on serial EKGs, no evidence of pericardial effusion  on her echocardiogram, and her troponin level is increasing.  Given her cardiac risk factors including siblings with premature CAD, we will proceed with a diagnostic cardiac catheterization.  Further evaluation and treatment will then be predicated on the results.    Thank you very much for allowing us to dissipate in the care of this very pleasant woman.    Ag Santiago III, MD  12/26/18  10:00 AM

## 2018-12-26 NOTE — NURSING NOTE
Case Management Discharge Note         Destination      No service has been selected for the patient.      Durable Medical Equipment      No service has been selected for the patient.      Dialysis/Infusion      No service has been selected for the patient.      Home Medical Care      No service has been selected for the patient.      Community Resources      No service has been selected for the patient.             Final Discharge Disposition Code: 02 - [facility]

## 2018-12-26 NOTE — PLAN OF CARE
Problem: Patient Care Overview  Goal: Plan of Care Review  Outcome: Ongoing (interventions implemented as appropriate)   12/26/18 2169   Coping/Psychosocial   Plan of Care Reviewed With patient   Plan of Care Review   Progress no change   OTHER   Outcome Summary New admit, VSS, c/o chest pain over night, Morphine given, quickly relieved, NPO awaiting [facility] consult this AM      Goal: Individualization and Mutuality  Outcome: Ongoing (interventions implemented as appropriate)      Problem: Cardiac: ACS (Acute Coronary Syndrome) (Adult)  Goal: Signs and Symptoms of Listed Potential Problems Will be Absent, Minimized or Managed (Cardiac: ACS)  Outcome: Ongoing (interventions implemented as appropriate)

## 2018-12-26 NOTE — CONSULTS
Patient Name: Ortiz Salamanca  :1970  48 y.o.    Date of Admission: 2018  Date of Consultation:  18  Encounter Provider: Ag Santiago III, MD  Place of Service: [facility] CARDIOLOGY  Referring Provider: Myron Murphy MD  Patient Care Team:  Morris Chandler MD as PCP - General (General Practice)      Chief complaint: chest pain    Consulted for: chest pain    History of Present Illness:   Ortiz Salamanca is a 47 y/o who has a significant family h/o CAD-one brother  in his 40s of a MI and another has stents. She has a history of hyperlipidemia and is a daily smoker. She presented to the ED last night with c/o radiating chest pain x 2 days. Labs showed a troponin of 0.040 initially. Additional labs were unremarkable. EKG showed NSR. She was admitted for further observation.    We have been asked to see for chest pain. Repeat troponin's have been 0.046 and now 0.072.     She has been having fairly persistent pain for about 3 days.  Sometimes it is worse and sometimes better.  It seems to be worse when she is laying down.  She sits up it does seem to be somewhat better.  At times the discomfort will get worse and will radiate into her left arm.  The point of maximal discomfort is left inframammary.  She has not had any chills or fevers.  No chest wall trauma.  She has had a little bit of shortness of breath.  She doesn't notice that it particularly changes with a deep breath or cough except sometimes with a deep breath it does seem a little bit heavier.  There is no sharp component to the pain.  She has not had any nausea or vomiting.    Initially she thought it was indigestion and first took Tums and then Nexium that did not do anything to help.  In the emergency room they gave her nitroglycerin and it seemed that might help the pain a little bit.    She had an echocardiogram performed this morning that I reviewed personally.  This shows normal biventricular  "size and function, no wall motion abnormalties, but no pericardial effusion at all was present.    We obtained another EKG this morning that does not show any change from EKG of yesterday.        Past Medical History:   Diagnosis Date   • Hyperlipidemia    • Migraine headache        Past Surgical History:   Procedure Laterality Date   •  SECTION     • CYST REMOVAL Left     knee   • HYSTERECTOMY           Prior to Admission medications    Medication Sig Start Date End Date Taking? Authorizing Provider   NON FORMULARY 1 tablet Daily As Needed (for migraine). unk name/dose of migraine medication   Yes Provider, Historical, MD       Allergies   Allergen Reactions   • Keflex [Cephalexin] Anaphylaxis       Social History     Socioeconomic History   • Marital status:      Spouse name: Not on file   • Number of children: Not on file   • Years of education: Not on file   • Highest education level: Not on file   Tobacco Use   • Smoking status: Former Smoker     Last attempt to quit: 2018     Years since quittin.0   • Smokeless tobacco: Never Used   Substance and Sexual Activity   • Alcohol use: Yes     Comment: rare   • Drug use: No   • Sexual activity: Defer       Family History   Problem Relation Age of Onset   • Cancer Mother    • Heart disease Brother    • Heart disease Maternal Grandmother        REVIEW OF SYSTEMS:   All systems reviewed.  Pertinent positives identified in HPI.  All other systems are negative.      Objective:     Vitals:    18 0045 18 0317 18 0635 18 0837   BP: 104/71 113/75 127/76 127/76   BP Location: Left arm Left arm Left arm    Patient Position: Lying Lying Lying    Pulse: 89 79 78 78   Resp: 16 18 18    Temp:  98.2 °F (36.8 °C) 97.8 °F (36.6 °C)    TempSrc:  Oral Oral    SpO2:  98% 98%    Weight:    83.9 kg (185 lb)   Height:    160 cm (63\")     Body mass index is 32.77 kg/m².    Physical Exam:  General Appearance:    Alert, cooperative, in no acute " distress   Head:    Normocephalic, without obvious abnormality, atraumatic   Eyes:            Lids and lashes normal, conjunctivae and sclerae normal, no   icterus, no pallor, corneas clear, PERRLA   Ears:    Ears appear intact with no abnormalities noted   Throat:   No oral lesions, no thrush, oral mucosa moist   Neck:   No adenopathy, supple, trachea midline, no thyromegaly, no   carotid bruit, no JVD   Back:     No kyphosis present, no scoliosis present, no skin lesions, erythema or scars, no tenderness to percussion or palpation, range of motion normal   Lungs:     Clear to auscultation,respirations regular, even and unlabored    Heart:    Regular rhythm and normal rate, normal S1 and S2, no murmur, no gallop, no rub, no click   Chest Wall:    No abnormalities observed   Abdomen:     Normal bowel sounds, no masses, no organomegaly, soft        non-tender, non-distended, no guarding, no rebound  tenderness   Extremities:   Moves all extremities well, no edema, no cyanosis, no redness   Pulses:   Pulses palpable and equal bilaterally. Normal radial, carotid, femoral, dorsalis pedis and posterior tibial pulses bilaterally. Normal abdominal aorta   Skin:  Psychiatric:   No bleeding, bruising or rash    Alert and oriented x 3, normal mood and affect         Lab Review:     Results from last 7 days   Lab Units  12/26/18   0327  12/25/18   1905   SODIUM mmol/L  142  142   POTASSIUM mmol/L  4.3  4.0   CHLORIDE mmol/L  107  103   CO2 mmol/L  23.8  24.7   BUN mg/dL  16  14   CREATININE mg/dL  0.83  0.94   CALCIUM mg/dL  8.8  9.6   BILIRUBIN mg/dL   --   0.2   ALK PHOS U/L   --   87   ALT (SGPT) U/L   --   24   AST (SGOT) U/L   --   21   GLUCOSE mg/dL  104*  83     Results from last 7 days   Lab Units  12/26/18   0848  12/26/18   0327  12/25/18   2107   TROPONIN T ng/mL  0.083*  0.072*  0.046*     Results from last 7 days   Lab Units  12/26/18   0327   WBC 10*3/mm3  5.57   HEMOGLOBIN g/dL  11.4*   HEMATOCRIT %  35.8*    PLATELETS 10*3/mm3  255     Results from last 7 days   Lab Units  12/25/18   1905   INR   0.92   APTT seconds  30.8                        I personally viewed and interpreted the patient's EKG/Telemetry data.      Current Facility-Administered Medications:   •  acetaminophen (TYLENOL) tablet 650 mg, 650 mg, Oral, Q4H PRN, Myron Murphy MD  •  aspirin EC tablet 325 mg, 325 mg, Oral, Once, Ag Santiago III, MD  •  calcium carbonate (TUMS) chewable tablet 500 mg (200 mg elemental), 1 tablet, Oral, BID PRN, Myron Murphy MD  •  melatonin tablet 5 mg, 5 mg, Oral, Nightly PRN, Myron Murphy MD  •  morphine injection 2 mg, 2 mg, Intravenous, Q4H PRN, 2 mg at 12/26/18 0045 **AND** naloxone (NARCAN) injection 0.4 mg, 0.4 mg, Intravenous, Q5 Min PRN, Myron Murphy MD  •  ondansetron (ZOFRAN) tablet 4 mg, 4 mg, Oral, Q6H PRN **OR** ondansetron ODT (ZOFRAN-ODT) disintegrating tablet 4 mg, 4 mg, Oral, Q6H PRN **OR** ondansetron (ZOFRAN) injection 4 mg, 4 mg, Intravenous, Q6H PRN, Myron Murphy MD  •  sennosides-docusate sodium (SENOKOT-S) 8.6-50 MG tablet 2 tablet, 2 tablet, Oral, Nightly PRN, Myron Murphy MD  •  [COMPLETED] Insert peripheral IV, , , Once **AND** sodium chloride 0.9 % flush 10 mL, 10 mL, Intravenous, PRN, Joe Joyce MD  •  Sodium chloride 0.9 % infusion, 75 mL/hr, Intravenous, Continuous, Myron Murphy MD, Last Rate: 75 mL/hr at 12/26/18 0457, 75 mL/hr at 12/26/18 0457    Assessment and Plan:       Active Hospital Problems    Diagnosis Date Noted   • Chest pain [R07.9] 12/25/2018      Resolved Hospital Problems   No resolved problems to display.     1.  Chest discomfort-the patient has discomfort this been fairly persistent, which is somewhat worse supine and feels better sitting up, so that is suggestive of pericarditis.  However, she has no pericardial friction rub, no diagnostic changes on serial EKGs, no evidence of pericardial effusion  on her echocardiogram, and her troponin level is increasing.  Given her cardiac risk factors including siblings with premature CAD, we will proceed with a diagnostic cardiac catheterization.  Further evaluation and treatment will then be predicated on the results.    Thank you very much for allowing us to participate in the care of this very pleasant woman.    Ag Santiago III, MD  12/26/18  10:00 AM

## 2018-12-26 NOTE — H&P
[facility] HOSPITALIST     Morris Chandler MD    CHIEF COMPLAINT: Chest Pain    HISTORY OF PRESENT ILLNESS:    47 yo AAF w/ PMH of HLD and migraines, who p/w Left side chest pressure and tightness that has been slowly worsening over the past three days, with intermittent sudden episodes of worse pain that are associated with nausea and diaphoresis that happen at random and are not associated with exertion. Pain and tightness also worse with laying flat and pt has been needing to sleep upright for the last few nights, though the pain is worsened by leaning forward. No association with palpitations or abnormal heart beats.     Pain was mostly relieved in the ER 'by that shot they gave me.' Was either Nitro ointment, Asprin or morphine, though had gotten Ativan about an hour earlier in the ER, and LOvenox about 20 minutes after the nitro and morphine.     Has had two brothers who begun treatment for heart disease in their early 40's and had MI's in their late 40's.     ROS positive for Sciatica worsened by pt's job standing on her feet working at the [employer]. Denies other joint pain or swelling.     Pt states that her activity has been limited recently by her back pain, but her son says that she has been moving about normal for her. Which is decent activity.       Past Medical History:   Diagnosis Date   • Hyperlipidemia    • Migraine headache      Past Surgical History:   Procedure Laterality Date   •  SECTION     • CYST REMOVAL Left     knee   • HYSTERECTOMY       Family History   Problem Relation Age of Onset   • Cancer Mother    • Heart disease Brother    • Heart disease Maternal Grandmother      Social History     Tobacco Use   • Smoking status: Former Smoker     Last attempt to quit: 2018     Years since quittin.0   • Smokeless tobacco: Never Used   Substance Use Topics   • Alcohol use: Yes     Comment: rare   • Drug use: No     Medications Prior to Admission  "  Medication Sig Dispense Refill Last Dose   • NON FORMULARY 1 tablet Daily As Needed (for migraine). unk name/dose of migraine medication   Past Month at Unknown time     Allergies:  Keflex [cephalexin]  Debilities: As per HPI and Physical Exam.     REVIEW OF SYSTEMS:  Please see the above history of present illness for pertinent positives and negatives.  The remainder of the patient's systems have been reviewed and are negative.    Vital Signs  Temp:  [97.6 °F (36.4 °C)-97.9 °F (36.6 °C)] 97.9 °F (36.6 °C)  Heart Rate:  [81-91] 81  Resp:  [20] 20  BP: (115-125)/(79-82) 115/82    Flowsheet Rows      First Filed Value   Admission Height  160 cm (63\") Documented at 12/25/2018 1843   Admission Weight  77.1 kg (170 lb) Documented at 12/25/2018 1843           Physical Exam:  Physical Exam   Constitutional: She is oriented to person, place, and time. She appears well-developed and well-nourished. No distress.   HENT:   Head: Normocephalic and atraumatic.   Right Ear: External ear normal.   Left Ear: External ear normal.   Nose: Nose normal.   Mouth/Throat: Oropharynx is clear and moist. No oropharyngeal exudate.   Eyes: Conjunctivae and EOM are normal. Pupils are equal, round, and reactive to light. No scleral icterus.   Neck: Normal range of motion. Neck supple. No JVD present. No tracheal deviation present.   Cardiovascular: Normal rate, regular rhythm and normal heart sounds. Exam reveals no friction rub.   No murmur heard.  Pulmonary/Chest: Effort normal and breath sounds normal. No stridor. No respiratory distress. She has no wheezes. She has no rales.   Abdominal: Soft. Bowel sounds are normal. She exhibits no distension. There is no tenderness. There is no guarding.   Musculoskeletal: Normal range of motion. She exhibits no edema, tenderness or deformity.   Lymphadenopathy:     She has no cervical adenopathy.   Neurological: She is alert and oriented to person, place, and time. She displays normal reflexes. No " cranial nerve deficit. She exhibits normal muscle tone.   SLR b/l Neg   Skin: Skin is warm and dry. No rash noted. She is not diaphoretic. No erythema.   Psychiatric: She has a normal mood and affect. Her behavior is normal.   Nursing note and vitals reviewed.       Results Review:    I reviewed the patient's new clinical results.  Lab Results (most recent)     Procedure Component Value Units Date/Time    Troponin [[id number]]  (Abnormal) Collected:  12/25/18 2107    Specimen:  Blood Updated:  12/25/18 2141     Troponin T 0.046 ng/mL     Narrative:       Troponin T Reference Ranges:  Less than 0.03 ng/mL:    Negative for AMI  0.03 to 0.09 ng/mL:      Indeterminant for AMI  Greater than 0.09 ng/mL: Positive for AMI    Troponin [[id number]]  (Abnormal) Collected:  12/25/18 1905    Specimen:  Blood Updated:  12/25/18 1931     Troponin T 0.040 ng/mL     Narrative:       Troponin T Reference Ranges:  Less than 0.03 ng/mL:    Negative for AMI  0.03 to 0.09 ng/mL:      Indeterminant for AMI  Greater than 0.09 ng/mL: Positive for AMI    Comprehensive Metabolic Panel [[id number]] Collected:  12/25/18 1905    Specimen:  Blood Updated:  12/25/18 1929     Glucose 83 mg/dL      BUN 14 mg/dL      Creatinine 0.94 mg/dL      Sodium 142 mmol/L      Potassium 4.0 mmol/L      Chloride 103 mmol/L      CO2 24.7 mmol/L      Calcium 9.6 mg/dL      Total Protein 8.2 g/dL      Albumin 4.40 g/dL      ALT (SGPT) 24 U/L      AST (SGOT) 21 U/L      Alkaline Phosphatase 87 U/L      Total Bilirubin 0.2 mg/dL      eGFR   Amer 77 mL/min/1.73      Globulin 3.8 gm/dL      A/G Ratio 1.2 g/dL      BUN/Creatinine Ratio 14.9     Anion Gap 14.3 mmol/L     Lipase [[id number]]  (Normal) Collected:  12/25/18 1905    Specimen:  Blood Updated:  12/25/18 1929     Lipase 21 U/L     aPTT [[id number]]  (Normal) Collected:  12/25/18 1905    Specimen:  Blood Updated:  12/25/18 1923     PTT 30.8 seconds     Narrative:       PTT = The equivalent PTT values for the  therapeutic range of heparin levels at 0.1 to 0.7 U/ml are 53 to 110 seconds.    Protime-INR [[id number]]  (Normal) Collected:  12/25/18 1905    Specimen:  Blood Updated:  12/25/18 1923     Protime 12.3 Seconds      INR 0.92    Narrative:       Therapeutic Ranges for INR: 2.0-3.0 (PT 20-30)                              2.5-3.5 (PT 25-34)    CBC & Differential [[id number]] Collected:  12/25/18 1905    Specimen:  Blood Updated:  12/25/18 1912    Narrative:       The following orders were created for panel order CBC & Differential.  Procedure                               Abnormality         Status                     ---------                               -----------         ------                     CBC Auto Differential[[id number]]        Abnormal            Final result                 Please view results for these tests on the individual orders.    CBC Auto Differential [[id number]]  (Abnormal) Collected:  12/25/18 1905    Specimen:  Blood Updated:  12/25/18 1912     WBC 5.72 10*3/mm3      RBC 4.58 10*6/mm3      Hemoglobin 12.7 g/dL      Hematocrit 39.6 %      MCV 86.5 fL      MCH 27.7 pg      MCHC 32.1 g/dL      RDW 13.7 %      RDW-SD 43.2 fl      MPV 10.1 fL      Platelets 271 10*3/mm3      Neutrophil % 44.7 %      Lymphocyte % 40.7 %      Monocyte % 11.7 %      Eosinophil % 1.9 %      Basophil % 0.7 %      Immature Grans % 0.3 %      Neutrophils, Absolute 2.55 10*3/mm3      Lymphocytes, Absolute 2.33 10*3/mm3      Monocytes, Absolute 0.67 10*3/mm3      Eosinophils, Absolute 0.11 10*3/mm3      Basophils, Absolute 0.04 10*3/mm3      Immature Grans, Absolute 0.02 10*3/mm3      nRBC 0.0 /100 WBC           Imaging Results (most recent)     Procedure Component Value Units Date/Time    XR Chest 2 View [[id number]] Resulted:  12/25/18 1959     Updated:  12/25/18 1959        Reviewed  Independent read of CXR image: Clear, no signs of acute disease    ECG/EMG Results (most recent)     Procedure Component Value Units  Date/Time    ECG 12 Lead [[id number]] Collected:  12/25/18 1843     Updated:  12/25/18 1846        Reviewed: VT depressions of 1mm evident in II, III. Not seen in other leads. No obvious signs of ischemia    Assessment/Plan     Chest Pain with Mostly typical features, but with some features of Pericardial pain  - Trop borderline, will trend  - VT depressions in 2 contiguous leads, Check sed and CRP, no other symptoms of rheumatologic disease    - EKG prn with pain  - Got shot of therapeutic Lovenox in ER, will not reorder in case intervention is undertaken in AM   - NPO at midnight  - Echo  - Consult Cardiology    Sciatica  - Has an otpt MRI scheduled for tomorrow at 15:45.   - SLR b/l Negative    I discussed the patients findings and my recommendations with patient.     Myron Murphy MD  12/25/18  10:05 PM

## 2018-12-26 NOTE — PLAN OF CARE
Problem: Patient Care Overview  Goal: Plan of Care Review  Outcome: Ongoing (interventions implemented as appropriate)   12/26/18 5926   Coping/Psychosocial   Plan of Care Reviewed With patient   Plan of Care Review   Progress improving   OTHER   Outcome Summary Pt R radial heart cath - stent to RCA. NS @75ml/hr. VSS. Will continue to monitor.

## 2018-12-26 NOTE — ED PROVIDER NOTES
Subjective   History of Present Illness  History of Present Illness    Chief complaint: Left-sided chest pain    Location: Left mid chest with radiation to left shoulder    Quality/Severity:  Moderate    Timing/Duration: Symptoms began 2 days ago    Modifying Factors: Antacids and Pepto-Bismol not helping    Associated Symptoms: ache in left shoulder and arm along with shortness of breath    Narrative: The patient is a 48-year-old, black female who presents as noted above.  The patient relates that 2 days ago she had the rather abrupt onset of what she thought was heartburn.  The patient has been taking antacids and Pepto-Bismol without relief.  The patient is in the left central chest with radiation to the left shoulder and left upper arm.  No personal history of coronary artery disease but the patient does state that one of her brothers did die in his 40s of a myocardial infarction and a another brother has stents.    Review of Systems   Constitutional: Negative for activity change, appetite change, fatigue and fever.   HENT: Negative for congestion.    Respiratory: Positive for shortness of breath. Negative for cough and wheezing.    Cardiovascular: Positive for chest pain. Negative for palpitations and leg swelling.   Gastrointestinal: Negative for abdominal pain, diarrhea, nausea and vomiting.   Endocrine: Negative for polydipsia.   Genitourinary: Negative for difficulty urinating, dysuria, flank pain, frequency and urgency.   Musculoskeletal: Negative for back pain.   Skin: Negative for rash.   Neurological: Negative for dizziness, weakness and headaches.   Psychiatric/Behavioral: Negative for confusion.       Past Medical History:   Diagnosis Date   • Hyperlipidemia    • Migraine headache        Allergies   Allergen Reactions   • Keflex [Cephalexin] Anaphylaxis       Past Surgical History:   Procedure Laterality Date   •  SECTION     • CYST REMOVAL Left     knee   • HYSTERECTOMY         History  reviewed. No pertinent family history.    Social History     Socioeconomic History   • Marital status:      Spouse name: Not on file   • Number of children: Not on file   • Years of education: Not on file   • Highest education level: Not on file   Tobacco Use   • Smoking status: Former Smoker     Last attempt to quit: 2018     Years since quittin.0   • Smokeless tobacco: Never Used   Substance and Sexual Activity   • Alcohol use: Yes     Comment: rare   • Drug use: No   • Sexual activity: Defer           Objective   Physical Exam   Constitutional: She is oriented to person, place, and time. She appears well-developed and well-nourished.   HENT:   Head: Normocephalic and atraumatic.   Eyes: Conjunctivae and EOM are normal.   Neck: Normal range of motion. Neck supple. No thyromegaly present.   No carotid bruits   Cardiovascular: Normal rate, regular rhythm and normal heart sounds.   No murmur heard.  Pulmonary/Chest: Effort normal and breath sounds normal. No respiratory distress. She has no wheezes. She has no rales.   Abdominal: Soft. Bowel sounds are normal. She exhibits no distension. There is no tenderness.   Musculoskeletal: Normal range of motion. She exhibits no edema or tenderness.   Neurological: She is alert and oriented to person, place, and time.   Skin: Skin is warm and dry. No rash noted.   Psychiatric: She has a normal mood and affect. Her behavior is normal.   Nursing note and vitals reviewed.      Procedures           ED Course  ED Course as of Dec 25 2022   Tue Dec 25, 2018   1847 EKG was reviewed at 1846 hrs. and showed a normal sinus rhythm with a rate of 85 bpm.  P waves, QRS complexes, ST segments and T waves all unremarkable.  No ectopy.  [ML]   2003 Troponin noted to be in the indeterminate range and this finding in conjunction with the patient's bad family history for coronary artery disease it was felt that observation was warranted.  Patient is agreeable to treatment plan  and the hospitalist has been paged.  [ML]   2017 Case and findings discussed with the hospitalist, Dr. Murphy, who did agree that the patient's condition warranted observation and a cardiology consultation.  [ML]      ED Course User Index  [ML] Joe Joyce MD                  MDM  Number of Diagnoses or Management Options  Precordial chest pain: new and requires workup     Amount and/or Complexity of Data Reviewed  Clinical lab tests: ordered and reviewed  Tests in the radiology section of CPT®: ordered and reviewed  Discuss the patient with other providers: yes  Independent visualization of images, tracings, or specimens: yes    Risk of Complications, Morbidity, and/or Mortality  Presenting problems: high  Diagnostic procedures: high  Management options: high    Critical Care  Total time providing critical care: 30-74 minutes    Patient Progress  Patient progress: stable    My differential diagnosis for chest pain includes but is not limited to:  Muscle strain, costochondritis, myositis, pleurisy, rib fracture, intercostal neuritis, herpes zoster, tumor, myocardial infarction, coronary syndrome, unstable angina, angina, aortic dissection, mitral valve prolapse, pericarditis, palpitations, pulmonary embolus, pneumonia, pneumothorax, lung cancer, GERD, esophagitis, esophageal spasm    Labs this visit  Lab Results (last 24 hours)     Procedure Component Value Units Date/Time    CBC & Differential [[id number]] Collected:  12/25/18 1905    Specimen:  Blood Updated:  12/25/18 1912    Narrative:       The following orders were created for panel order CBC & Differential.  Procedure                               Abnormality         Status                     ---------                               -----------         ------                     CBC Auto Differential[[id number]]        Abnormal            Final result                 Please view results for these tests on the individual orders.    Comprehensive  Metabolic Panel [[id number]] Collected:  12/25/18 1905    Specimen:  Blood Updated:  12/25/18 1929     Glucose 83 mg/dL      BUN 14 mg/dL      Creatinine 0.94 mg/dL      Sodium 142 mmol/L      Potassium 4.0 mmol/L      Chloride 103 mmol/L      CO2 24.7 mmol/L      Calcium 9.6 mg/dL      Total Protein 8.2 g/dL      Albumin 4.40 g/dL      ALT (SGPT) 24 U/L      AST (SGOT) 21 U/L      Alkaline Phosphatase 87 U/L      Total Bilirubin 0.2 mg/dL      eGFR   Amer 77 mL/min/1.73      Globulin 3.8 gm/dL      A/G Ratio 1.2 g/dL      BUN/Creatinine Ratio 14.9     Anion Gap 14.3 mmol/L     aPTT [[id number]]  (Normal) Collected:  12/25/18 1905    Specimen:  Blood Updated:  12/25/18 1923     PTT 30.8 seconds     Narrative:       PTT = The equivalent PTT values for the therapeutic range of heparin levels at 0.1 to 0.7 U/ml are 53 to 110 seconds.    Protime-INR [[id number]]  (Normal) Collected:  12/25/18 1905    Specimen:  Blood Updated:  12/25/18 1923     Protime 12.3 Seconds      INR 0.92    Narrative:       Therapeutic Ranges for INR: 2.0-3.0 (PT 20-30)                              2.5-3.5 (PT 25-34)    Lipase [[id number]]  (Normal) Collected:  12/25/18 1905    Specimen:  Blood Updated:  12/25/18 1929     Lipase 21 U/L     Troponin [[id number]]  (Abnormal) Collected:  12/25/18 1905    Specimen:  Blood Updated:  12/25/18 1931     Troponin T 0.040 ng/mL     Narrative:       Troponin T Reference Ranges:  Less than 0.03 ng/mL:    Negative for AMI  0.03 to 0.09 ng/mL:      Indeterminant for AMI  Greater than 0.09 ng/mL: Positive for AMI    CBC Auto Differential [[id number]]  (Abnormal) Collected:  12/25/18 1905    Specimen:  Blood Updated:  12/25/18 1912     WBC 5.72 10*3/mm3      RBC 4.58 10*6/mm3      Hemoglobin 12.7 g/dL      Hematocrit 39.6 %      MCV 86.5 fL      MCH 27.7 pg      MCHC 32.1 g/dL      RDW 13.7 %      RDW-SD 43.2 fl      MPV 10.1 fL      Platelets 271 10*3/mm3      Neutrophil % 44.7 %      Lymphocyte % 40.7 %       Monocyte % 11.7 %      Eosinophil % 1.9 %      Basophil % 0.7 %      Immature Grans % 0.3 %      Neutrophils, Absolute 2.55 10*3/mm3      Lymphocytes, Absolute 2.33 10*3/mm3      Monocytes, Absolute 0.67 10*3/mm3      Eosinophils, Absolute 0.11 10*3/mm3      Basophils, Absolute 0.04 10*3/mm3      Immature Grans, Absolute 0.02 10*3/mm3      nRBC 0.0 /100 WBC         Prescribed on discharge             Medication List      No changes were made to your prescriptions during this visit.       All lab results, imaging results and other tests were reviewed by Joe Joyce MD and unless otherwise specified were found to be unremarkable.      Final diagnoses:   Precordial chest pain            Joe Joyce MD  12/25/18 2022

## 2018-12-26 NOTE — PLAN OF CARE
Problem: Patient Care Overview  Goal: Plan of Care Review  Outcome: Ongoing (interventions implemented as appropriate)   12/26/18 8030   Coping/Psychosocial   Plan of Care Reviewed With patient   Plan of Care Review   Progress improving   OTHER   Outcome Summary R radial cath comfort band off - CDI. VSS. NS @ 100ml/hr. Will continue to monitor.

## 2018-12-26 NOTE — DISCHARGE SUMMARY
Ortiz Salamanca  1970  [id number]        Hospitalists Discharge Summary    Date of Admission: 12/25/2018  Date of Discharge:  12/26/2018    Primary Discharge Diagnoses:   1. Chest pain    Secondary Discharge Diagnoses:   2. Sciatica  3. Migraine HAs    PCP  Patient Care Team:  Morris Chandler MD as PCP - General (General Practice)    Consults:   Consults     Date and Time Order Name Status Description    12/25/2018 2303 Inpatient Cardiology Consult Completed           Operations and Procedures Performed:       Xr Chest 2 View    Result Date: 12/26/2018  Narrative: CHEST X-RAY, 12/25/2018     HISTORY: 48-year-old female in the ED with 3-4 day history of chest pain radiating down left arm. Some shortness of air.  TECHNIQUE: PA and lateral upright chest x-ray.  FINDINGS: Heart size and vascularity are normal. The lungs are expanded and clear. No visible pulmonary edema, focal infiltrate or pleural effusion.      Impression: Negative chest.  This report was finalized on 12/26/2018 8:15 AM by Dr. Jesus Escalante MD.        Allergies:  is allergic to keflex [cephalexin].    [place]  reviewed    Discharge Medications:    Current Facility-Administered Medications:   •  acetaminophen (TYLENOL) tablet 650 mg, 650 mg, Oral, Q4H PRN, Myron Murphy MD  •  aspirin EC tablet 325 mg, 325 mg, Oral, Once, Ag Santiago III, MD  •  [START ON 12/27/2018] aspirin EC tablet 325 mg, 325 mg, Oral, Daily, Haylie Gardner MD  •  calcium carbonate (TUMS) chewable tablet 500 mg (200 mg elemental), 1 tablet, Oral, BID PRN, Myron Murphy MD  •  melatonin tablet 5 mg, 5 mg, Oral, Nightly PRN, Myron Murphy MD  •  morphine injection 2 mg, 2 mg, Intravenous, Q4H PRN, 2 mg at 12/26/18 1023 **AND** naloxone (NARCAN) injection 0.4 mg, 0.4 mg, Intravenous, Q5 Min PRN, Myron Murphy MD  •  nitroglycerin (NITROSTAT) ointment 0.5 inch, 0.5 inch, Topical, Q6H, Haylie Gardner MD  •   ondansetron (ZOFRAN) tablet 4 mg, 4 mg, Oral, Q6H PRN **OR** ondansetron ODT (ZOFRAN-ODT) disintegrating tablet 4 mg, 4 mg, Oral, Q6H PRN **OR** ondansetron (ZOFRAN) injection 4 mg, 4 mg, Intravenous, Q6H PRN, Myron Murphy MD  •  sennosides-docusate sodium (SENOKOT-S) 8.6-50 MG tablet 2 tablet, 2 tablet, Oral, Nightly PRN, Myron Murphy MD  •  [COMPLETED] Insert peripheral IV, , , Once **AND** sodium chloride 0.9 % flush 10 mL, 10 mL, Intravenous, PRN, Joe Joyce MD  •  Sodium chloride 0.9 % infusion, 75 mL/hr, Intravenous, Continuous, Myron Murphy MD, Last Rate: 75 mL/hr at 12/26/18 0457, 75 mL/hr at 12/26/18 0457    History of Present Illness: (from H&P)  47 yo AAF w/ PMH of HLD and migraines, who p/w Left side chest pressure and tightness that has been slowly worsening over the past three days, with intermittent sudden episodes of worse pain that are associated with nausea and diaphoresis that happen at random and are not associated with exertion. Pain and tightness also worse with laying flat and pt has been needing to sleep upright for the last few nights, though the pain is worsened by leaning forward. No association with palpitations or abnormal heart beats.      Pain was mostly relieved in the ER 'by that shot they gave me.' Was either Nitro ointment, Asprin or morphine, though had gotten Ativan about an hour earlier in the ER, and LOvenox about 20 minutes after the nitro and morphine.      Has had two brothers who begun treatment for heart disease in their early 40's and had MI's in their late 40's.      ROS positive for Sciatica worsened by pt's job standing on her feet working at the [employer]. Denies other joint pain or swelling.      Pt states that her activity has been limited recently by her back pain, but her son says that she has been moving about normal for her. Which is decent activity.       Hospital Course  1. Chest pain: Some typical features but  also some features suggestive of pericarditis. Although here patient with no diagnostic changes on EKG here, echo NL with no pericardial effusion and no rub on exam. Troponins at trending up here although still in the indeterminate range. Cardiology saw patient this AM and given family Hx of premature CAD and increasing troponin patient will be transferred to [facility] today for cardiac catheterization.  Patient has received ASA here and a dose of therapeutic Lovenox in ER last PM. Nitro paste also placed in ER, will continue.     2. Sciatica: Patient has an outpatient MRI scheduled here for later today. No acute issues currently. I have called and cancelled MRI per patient request and she is aware she will need to reschedule after she is discharged from [facility].    3. Migraine HAs: No acute issues here. Patient on medication at home PRN (unsure of name of medication at this time).       Last Lab Results:   Lab Results (most recent)     Procedure Component Value Units Date/Time    Troponin [[id number]]  (Abnormal) Collected:  12/26/18 0848    Specimen:  Blood Updated:  12/26/18 0911     Troponin T 0.083 ng/mL     Narrative:       Troponin T Reference Ranges:  Less than 0.03 ng/mL:    Negative for AMI  0.03 to 0.09 ng/mL:      Indeterminant for AMI  Greater than 0.09 ng/mL: Positive for AMI    Basic Metabolic Panel [[id number]]  (Abnormal) Collected:  12/26/18 0327    Specimen:  Blood Updated:  12/26/18 0802     Glucose 104 mg/dL      BUN 16 mg/dL      Creatinine 0.83 mg/dL      Sodium 142 mmol/L      Potassium 4.3 mmol/L      Chloride 107 mmol/L      CO2 23.8 mmol/L      Calcium 8.8 mg/dL      eGFR  African Amer 89 mL/min/1.73      BUN/Creatinine Ratio 19.3     Anion Gap 11.2 mmol/L     Narrative:       GFR Normal >60  Chronic Kidney Disease <60  Kidney Failure <15    Scan Slide [[id number]]  (Normal) Collected:  12/26/18 0327    Specimen:  Blood Updated:  12/26/18 0514     RBC Morphology Normal     WBC Morphology Normal      Platelet Morphology Normal    Troponin [[id number]]  (Abnormal) Collected:  12/26/18 0327    Specimen:  Blood Updated:  12/26/18 0420     Troponin T 0.072 ng/mL     Narrative:       Troponin T Reference Ranges:  Less than 0.03 ng/mL:    Negative for AMI  0.03 to 0.09 ng/mL:      Indeterminant for AMI  Greater than 0.09 ng/mL: Positive for AMI    CBC Auto Differential [[id number]]  (Abnormal) Collected:  12/26/18 0327    Specimen:  Blood Updated:  12/26/18 0416     WBC 5.57 10*3/mm3      RBC 4.12 10*6/mm3      Hemoglobin 11.4 g/dL      Hematocrit 35.8 %      MCV 86.9 fL      MCH 27.7 pg      MCHC 31.8 g/dL      RDW 13.8 %      RDW-SD 43.5 fl      MPV 10.2 fL      Platelets 255 10*3/mm3      Neutrophil % 29.8 %      Lymphocyte % 57.8 %      Monocyte % 9.5 %      Eosinophil % 2.0 %      Basophil % 0.5 %      Immature Grans % 0.4 %      Neutrophils, Absolute 1.66 10*3/mm3      Lymphocytes, Absolute 3.22 10*3/mm3      Monocytes, Absolute 0.53 10*3/mm3      Eosinophils, Absolute 0.11 10*3/mm3      Basophils, Absolute 0.03 10*3/mm3      Immature Grans, Absolute 0.02 10*3/mm3      nRBC 0.0 /100 WBC     C-reactive Protein [[id number]]  (Normal) Collected:  12/25/18 2107    Specimen:  Blood Updated:  12/25/18 2348     C-Reactive Protein 0.19 mg/dL     Sedimentation Rate [[id number]]  (Abnormal) Collected:  12/25/18 1905    Specimen:  Blood Updated:  12/25/18 2334     Sed Rate 25 mm/hr     Troponin [[id number]]  (Abnormal) Collected:  12/25/18 2107    Specimen:  Blood Updated:  12/25/18 2141     Troponin T 0.046 ng/mL     Narrative:       Troponin T Reference Ranges:  Less than 0.03 ng/mL:    Negative for AMI  0.03 to 0.09 ng/mL:      Indeterminant for AMI  Greater than 0.09 ng/mL: Positive for AMI    Troponin [[id number]]  (Abnormal) Collected:  12/25/18 1905    Specimen:  Blood Updated:  12/25/18 1931     Troponin T 0.040 ng/mL     Narrative:       Troponin T Reference Ranges:  Less than 0.03 ng/mL:    Negative for AMI  0.03 to  0.09 ng/mL:      Indeterminant for AMI  Greater than 0.09 ng/mL: Positive for AMI    Comprehensive Metabolic Panel [[id number]] Collected:  12/25/18 1905    Specimen:  Blood Updated:  12/25/18 1929     Glucose 83 mg/dL      BUN 14 mg/dL      Creatinine 0.94 mg/dL      Sodium 142 mmol/L      Potassium 4.0 mmol/L      Chloride 103 mmol/L      CO2 24.7 mmol/L      Calcium 9.6 mg/dL      Total Protein 8.2 g/dL      Albumin 4.40 g/dL      ALT (SGPT) 24 U/L      AST (SGOT) 21 U/L      Alkaline Phosphatase 87 U/L      Total Bilirubin 0.2 mg/dL      eGFR   Amer 77 mL/min/1.73      Globulin 3.8 gm/dL      A/G Ratio 1.2 g/dL      BUN/Creatinine Ratio 14.9     Anion Gap 14.3 mmol/L     Lipase [[id number]]  (Normal) Collected:  12/25/18 1905    Specimen:  Blood Updated:  12/25/18 1929     Lipase 21 U/L     aPTT [[id number]]  (Normal) Collected:  12/25/18 1905    Specimen:  Blood Updated:  12/25/18 1923     PTT 30.8 seconds     Narrative:       PTT = The equivalent PTT values for the therapeutic range of heparin levels at 0.1 to 0.7 U/ml are 53 to 110 seconds.    Protime-INR [[id number]]  (Normal) Collected:  12/25/18 1905    Specimen:  Blood Updated:  12/25/18 1923     Protime 12.3 Seconds      INR 0.92    Narrative:       Therapeutic Ranges for INR: 2.0-3.0 (PT 20-30)                              2.5-3.5 (PT 25-34)    CBC & Differential [[id number]] Collected:  12/25/18 1905    Specimen:  Blood Updated:  12/25/18 1912    Narrative:       The following orders were created for panel order CBC & Differential.  Procedure                               Abnormality         Status                     ---------                               -----------         ------                     CBC Auto Differential[[id number]]        Abnormal            Final result                 Please view results for these tests on the individual orders.    CBC Auto Differential [[id number]]  (Abnormal) Collected:  12/25/18 1905    Specimen:  Blood  Updated:  12/25/18 1912     WBC 5.72 10*3/mm3      RBC 4.58 10*6/mm3      Hemoglobin 12.7 g/dL      Hematocrit 39.6 %      MCV 86.5 fL      MCH 27.7 pg      MCHC 32.1 g/dL      RDW 13.7 %      RDW-SD 43.2 fl      MPV 10.1 fL      Platelets 271 10*3/mm3      Neutrophil % 44.7 %      Lymphocyte % 40.7 %      Monocyte % 11.7 %      Eosinophil % 1.9 %      Basophil % 0.7 %      Immature Grans % 0.3 %      Neutrophils, Absolute 2.55 10*3/mm3      Lymphocytes, Absolute 2.33 10*3/mm3      Monocytes, Absolute 0.67 10*3/mm3      Eosinophils, Absolute 0.11 10*3/mm3      Basophils, Absolute 0.04 10*3/mm3      Immature Grans, Absolute 0.02 10*3/mm3      nRBC 0.0 /100 WBC         Imaging Results (most recent)     Procedure Component Value Units Date/Time    XR Chest 2 View [[id number]] Collected:  12/26/18 0814     Updated:  12/26/18 0817    Narrative:       CHEST X-RAY, 12/25/2018         HISTORY:  48-year-old female in the ED with 3-4 day history of chest pain  radiating down left arm. Some shortness of air.     TECHNIQUE:  PA and lateral upright chest x-ray.     FINDINGS:  Heart size and vascularity are normal. The lungs are expanded and clear.  No visible pulmonary edema, focal infiltrate or pleural effusion.       Impression:       Negative chest.     This report was finalized on 12/26/2018 8:15 AM by Dr. Jesus Escalante MD.             PROCEDURES      Condition on Discharge:  Stable    Physical Exam at Discharge  Vital Signs  Temp:  [97.5 °F (36.4 °C)-98.2 °F (36.8 °C)] 97.8 °F (36.6 °C)  Heart Rate:  [78-91] 78  Resp:  [16-20] 18  BP: ()/(65-82) 127/76   Body mass index is 32.77 kg/m².    Physical Exam:  Physical Exam   Constitutional: Patient appears well-developed and well-nourished and in no acute distress   HEENT:   Head: Normocephalic and atraumatic.   Eyes:  Pupils are equal, round, and reactive to light.   Mouth and Throat: Patient has moist mucous membranes.     Neck: Neck supple. No JVD present.  No  lymphadenopathy present.  Cardiovascular: Regular rate, regular rhythm, S1 normal and S2 normal.  Exam reveals no gallop and no friction rub.  No murmur heard.  Pulmonary/Chest: Lungs are clear to auscultation bilaterally. No respiratory distress. No wheezes. No rhonchi. No rales.   Abdominal: Soft. Bowel sounds are normal. There is no tenderness.   Musculoskeletal: Normal Muscle tone  Extremities: No edema. Pulses are palpable in all 4 extremities.  Neurological: Patient is alert and oriented to person, place, and time.   Skin: Skin is warm. No cyanosis or erythema.    Discharge Disposition  [facility] for Cardiac catherterization    Visiting Nurse:    No     Home PT/OT:  No     Home Safety Evaluation:  No     DME  None    Discharge Diet:      Dietary Orders (From admission, onward)    Start     Ordered    12/26/18 0001  NPO Diet NPO Except: Ice Chips, Sips With Meds  Diet Effective Midnight     Question Answer Comment   NPO Except: Ice Chips    NPO Except: Sips With Meds        12/25/18 2019          Activity at Discharge:  As tolerated    Pre-discharge education  Will receive upon discharge from [facility]      Follow-up Appointments  Future Appointments   Date Time Provider Department Center   12/26/2018  4:00 PM [facility] MRI 1 [facility] MRI [facility]         Test Results Pending at Discharge       Haylie Gardner MD  12/26/18  10:07 AM    Time: Discharge <30 min

## 2018-12-27 ENCOUNTER — NURSE TRIAGE (OUTPATIENT)
Dept: CALL CENTER | Facility: HOSPITAL | Age: 48
End: 2018-12-27

## 2018-12-27 VITALS
TEMPERATURE: 98.1 F | HEART RATE: 87 BPM | HEIGHT: 63 IN | RESPIRATION RATE: 18 BRPM | BODY MASS INDEX: 32.85 KG/M2 | SYSTOLIC BLOOD PRESSURE: 122 MMHG | DIASTOLIC BLOOD PRESSURE: 92 MMHG | OXYGEN SATURATION: 100 % | WEIGHT: 185.4 LBS

## 2018-12-27 LAB
ACT BLD: 323 SECONDS (ref 82–152)
ACT BLD: 351 SECONDS (ref 82–152)
ANION GAP SERPL CALCULATED.3IONS-SCNC: 8.7 MMOL/L
BUN BLD-MCNC: 12 MG/DL (ref 6–20)
BUN/CREAT SERPL: 17.6 (ref 7–25)
CALCIUM SPEC-SCNC: 8.8 MG/DL (ref 8.6–10.5)
CHLORIDE SERPL-SCNC: 108 MMOL/L (ref 98–107)
CHOLEST SERPL-MCNC: 212 MG/DL (ref 0–200)
CO2 SERPL-SCNC: 24.3 MMOL/L (ref 22–29)
CREAT BLD-MCNC: 0.68 MG/DL (ref 0.57–1)
DEPRECATED RDW RBC AUTO: 45.6 FL (ref 37–54)
ERYTHROCYTE [DISTWIDTH] IN BLOOD BY AUTOMATED COUNT: 14.1 % (ref 11.7–13)
GFR SERPL CREATININE-BSD FRML MDRD: 112 ML/MIN/1.73
GLUCOSE BLD-MCNC: 98 MG/DL (ref 65–99)
HBA1C MFR BLD: 5.1 % (ref 4.8–5.6)
HCT VFR BLD AUTO: 33.5 % (ref 35.6–45.5)
HDLC SERPL-MCNC: 50 MG/DL (ref 40–60)
HGB BLD-MCNC: 10.7 G/DL (ref 11.9–15.5)
LDLC SERPL CALC-MCNC: 144 MG/DL (ref 0–100)
LDLC/HDLC SERPL: 2.88 {RATIO}
MCH RBC QN AUTO: 28.2 PG (ref 26.9–32)
MCHC RBC AUTO-ENTMCNC: 31.9 G/DL (ref 32.4–36.3)
MCV RBC AUTO: 88.4 FL (ref 80.5–98.2)
PLATELET # BLD AUTO: 244 10*3/MM3 (ref 140–500)
PMV BLD AUTO: 9.7 FL (ref 6–12)
POTASSIUM BLD-SCNC: 4 MMOL/L (ref 3.5–5.2)
RBC # BLD AUTO: 3.79 10*6/MM3 (ref 3.9–5.2)
SODIUM BLD-SCNC: 141 MMOL/L (ref 136–145)
TRIGL SERPL-MCNC: 89 MG/DL (ref 0–150)
VLDLC SERPL-MCNC: 17.8 MG/DL (ref 5–40)
WBC NRBC COR # BLD: 5.41 10*3/MM3 (ref 4.5–10.7)

## 2018-12-27 PROCEDURE — G0378 HOSPITAL OBSERVATION PER HR: HCPCS

## 2018-12-27 PROCEDURE — 93010 ELECTROCARDIOGRAM REPORT: CPT | Performed by: INTERNAL MEDICINE

## 2018-12-27 PROCEDURE — 80061 LIPID PANEL: CPT | Performed by: INTERNAL MEDICINE

## 2018-12-27 PROCEDURE — 85027 COMPLETE CBC AUTOMATED: CPT | Performed by: INTERNAL MEDICINE

## 2018-12-27 PROCEDURE — 83036 HEMOGLOBIN GLYCOSYLATED A1C: CPT | Performed by: INTERNAL MEDICINE

## 2018-12-27 PROCEDURE — 80048 BASIC METABOLIC PNL TOTAL CA: CPT | Performed by: HOSPITALIST

## 2018-12-27 PROCEDURE — 99217 PR OBSERVATION CARE DISCHARGE MANAGEMENT: CPT | Performed by: NURSE PRACTITIONER

## 2018-12-27 PROCEDURE — 93005 ELECTROCARDIOGRAM TRACING: CPT | Performed by: INTERNAL MEDICINE

## 2018-12-27 RX ORDER — ASPIRIN 81 MG/1
81 TABLET, CHEWABLE ORAL DAILY
Qty: 30 TABLET | Refills: 11 | Status: SHIPPED | OUTPATIENT
Start: 2018-12-28 | End: 2019-01-15 | Stop reason: SDUPTHER

## 2018-12-27 RX ORDER — ATORVASTATIN CALCIUM 40 MG/1
40 TABLET, FILM COATED ORAL NIGHTLY
Qty: 30 TABLET | Refills: 11 | Status: SHIPPED | OUTPATIENT
Start: 2018-12-27 | End: 2019-01-15 | Stop reason: SDUPTHER

## 2018-12-27 RX ADMIN — Medication 81 MG: at 08:14

## 2018-12-27 RX ADMIN — TICAGRELOR 90 MG: 90 TABLET ORAL at 08:14

## 2018-12-27 NOTE — PLAN OF CARE
Problem: Patient Care Overview  Goal: Plan of Care Review   12/27/18 0207   Coping/Psychosocial   Plan of Care Reviewed With patient   Plan of Care Review   Progress improving   OTHER   Outcome Summary Patient VSS, NSR, RA. S/P heart cath site CDI. No complaints of pain or discomfort. Will continue to monitor.

## 2018-12-27 NOTE — DISCHARGE SUMMARY
Hospital Discharge    Patient Name: Ortiz Salamanca  Age/Sex: 48 y.o. female  : 1970  MRN: [id number]    Encounter Provider: ROMELIA Torres  Referring Provider: Hilario Coronado MD  Place of Service: [facility] CARDIOLOGY  Patient Care Team:  Morris Chandler MD as PCP - General (General Practice)         Date of Discharge:  2018   Date of Admit: 2018    Discharge Condition: Stable  Discharge Diagnosis:    Angina at rest (CMS/Formerly [facility])    NSTEMI (non-ST elevated myocardial infarction) (CMS/Formerly [facility])      Hospital Course:   Ortiz Salamanca is a 48 y.o. female with history of tobacco abuse and family history of premature coronary artery disease. Over the past week or so, patient has been having what she thought was indigestion. It felt better with sitting up. It became fairly persistent however did not get better with Tums or nexium. She presented to the emergency room and was found to have an elevated troponin (0.04 then 0.046 and 0.072). She had an echocardiogram that showed normal biventricular size and function, no wall motion abnormalities, and no pericardial effusion. She subsequently underwent cardiac catheterization and was found to have 99% stenosis of the mid LAD which was treated with angioplasty and drug eluting stent placement (3.5 X 28 mm Xience Divina). She was monitored over night and is pain free. She is stable for discharge. She plans to stop smoking. Diet and exercise were discussed. She will follow up with Eddie Pascual in one week and Dr. Santiago in one month.     Objective:  Temp:  [97.5 °F (36.4 °C)-98.1 °F (36.7 °C)] 98.1 °F (36.7 °C)  Heart Rate:  [63-87] 87  Resp:  [16-18] 18  BP: (104-152)/(63-99) 122/92    Intake/Output Summary (Last 24 hours) at 2018 1108  Last data filed at 2018 0325  Gross per 24 hour   Intake 1840 ml   Output 500 ml   Net 1340 ml     Body mass index is 32.84 kg/m².      18  1240 18  1626 18  0500    Weight: 77.1 kg (170 lb) 77.1 kg (169 lb 15.6 oz) 84.1 kg (185 lb 6.4 oz)     Weight change:     Physical Exam:  Constitutional: She is oriented to person, place, and time. She appears well-developed. She does not appear ill.   Neck: No JVD present. Carotid bruit is not present.   Cardiovascular: Normal rate, regular rhythm and normal heart sounds.    Pulses:       Posterior tibial pulses are 2+ on the right side, and 2+ on the left side.   Pulmonary/Chest: Effort normal and breath sounds normal.   Abdominal: Soft. Normal appearance and bowel sounds are normal. There is no tenderness.   Musculoskeletal: Normal range of motion.        Right shoulder: She exhibits no deformity.        Left shoulder: She exhibits no deformity.   Neurological: She is alert and oriented to person, place, and time. She has normal strength.   Skin: Skin is warm, dry and intact. No rash noted.   Psychiatric: She has a normal mood and affect. Her behavior is normal. Thought content normal.   Vitals reviewed  Right radial cath- soft no hematoma. Pulses intact.     Procedures Performed  Procedure(s):  Left Heart Cath  Left ventriculography  Stent EVARISTO coronary  Coronary angiography    Procedure findings:  Left main: Large-caliber vessel that bifurcates to an LAD and circumflex.  The left main is normal  LAD: Medium to large caliber vessel that gives rise to a small caliber diagonal branch.  The proximal LAD has a 10-20% stenosis.  The mid LAD has a discrete 99 percent stenosis with RASHID 2 flow distally  LCX: Medium caliber vessel that gives rise to a medium caliber OM1 and small caliber OM 2.  20% proximal OM1 stenosis.  RCA: Large caliber, dominant vessel that gives rise to a small caliber PDA and RPL branch.  The RCA has a 30% proximal stenosis.     Left ventricular angiography: Normal left ventricular size and systolic function.  Left ventricular ejection fraction 55%     Percutaneous intervention report: Unfractionated heparin was used  for anticoagulation and confirm therapeutic by ACT.  A 6 Estonian XB 3.0, a AL-1, and JL 35 guide catheter were unable to engage the left main.  A Voda 3.0 guide catheter was used to engage the left main. A 0.014 in. Runthrough guidewire was then used to cross the lesion and seated distally.  Following this a 3.0 x 15 mm trek balloon was used to predilate the lesion.  A 3.5 x 28 mm Xience Divina drug-eluting stent was deployed across the lesion at 16 alma.  This was postdilated with a 3.75 x 20 mm noncompliant trek balloon to 20 alma.  Wire and balloon were removed.  No comp occasions.     Hemodynamics:   LV: 107/2  AO: 107/70        PCI CORONARY SEGMENT: Mid LAD  PRE-STENOSIS:99  POST-STENOSIS: 0%  LESION TYPE: B2  RASHID FLOW PRE/POST: 2/3  CULPRIT LESION: Yes     Estimated blood loss: Minimal  Complications: None     Conclusions:   1. Left main: Normal  2. LAD: 99% mid vessel stenosis with RASHID 2 flow distally.  3. LCX: 20% proximal OM1 stenosis.  4. RCA: 30% proximal stenosis  5.  Normal left ventricular size and systolic function  6.  Successful PCI of the mid LAD with a 3.5 x 28 mm Xience Divina drug-eluting stent, postdilated to high pressure with a 3.75 mm noncompliant trek balloon.     Conditions: Dual antiplatelet therapy for 1 year.       Consults:  Consults     Date and Time Order Name Status Description    12/25/2018 2303 Inpatient Cardiology Consult Completed           Pertinent Test Results:  Results from last 7 days   Lab Units  12/27/18   0624  12/26/18   0327  12/25/18   1905   SODIUM mmol/L  141  142  142   POTASSIUM mmol/L  4.0  4.3  4.0   CHLORIDE mmol/L  108*  107  103   CO2 mmol/L  24.3  23.8  24.7   BUN mg/dL  12  16  14   CREATININE mg/dL  0.68  0.83  0.94   GLUCOSE mg/dL  98  104*  83   CALCIUM mg/dL  8.8  8.8  9.6   AST (SGOT) U/L   --    --   21   ALT (SGPT) U/L   --    --   24     Results from last 7 days   Lab Units  12/26/18   0848  12/26/18   0327  12/25/18   2107  12/25/18   1907    TROPONIN T ng/mL  0.083*  0.072*  0.046*  0.040*     Results from last 7 days   Lab Units  12/27/18   0624  12/26/18   0327  12/25/18   1905   WBC 10*3/mm3  5.41  5.57  5.72   HEMOGLOBIN g/dL  10.7*  11.4*  12.7   HEMATOCRIT %  33.5*  35.8*  39.6   PLATELETS 10*3/mm3  244  255  271     Results from last 7 days   Lab Units  12/25/18   1905   INR   0.92   APTT seconds  30.8         Results from last 7 days   Lab Units  12/27/18   0624   CHOLESTEROL mg/dL  212*   TRIGLYCERIDES mg/dL  89   HDL CHOL mg/dL  50               Discharge Medications     Discharge Medications      New Medications      Instructions Start Date   aspirin 81 MG chewable tablet  Notes to patient:  12/28/2018  This medication will be due tomorrow morning   81 mg, Oral, Daily      atorvastatin 40 MG tablet  Commonly known as:  LIPITOR  Notes to patient:  12/27/2018  This medication will be due this evening at bedtime   40 mg, Oral, Nightly      metoprolol tartrate 25 MG tablet  Commonly known as:  LOPRESSOR  Notes to patient:  12/27/2018  This medication will be due this evening at bedtime   25 mg, Oral, 2 Times Daily      ticagrelor 90 MG tablet tablet  Commonly known as:  BRILINTA  Notes to patient:  12/27/2018  This medication will be due this evening at bedtime   90 mg, Oral, 2 Times Daily         Continue These Medications      Instructions Start Date   NON FORMULARY   1 tablet, Daily PRN, unk name/dose of migraine medication             Discharge Diet:    Dietary Orders (From admission, onward)    Start     Ordered    12/26/18 1535  Diet Regular; Consistent Carbohydrate, Cardiac  Diet Effective Now     Question Answer Comment   Diet Texture / Consistency Regular    Common Modifiers Consistent Carbohydrate    Common Modifiers Cardiac        12/26/18 1534          Activity at Discharge:    Activity Instructions     Discharge Activity Restrictions      Routine post cardiac catheterization/PCI discharge home care instructions:    1. No submerging  procedure site below water for 7-10 days.  2. No lifting objects greater than 1 lbs for 3 days.  3. If groin site used, avoid climbing several flights of stairs or sitting for longer than 2 hours at a time for the next 24 hours.   4. Monitor puncture site for bleeding and/or knots;. If bleeding should occur at the groin site: lie flat, apply pressure and return to the ER. If bleeding should occur at the wrist site, apply pressure and return to the ER.  5.  You may apply a DRY Band-Aid over the puncture site if needed. Do not apply any lotions, salves or ointments to site.  6. No driving for 3 days.  7. Return to ER for recurrent symptoms.  8. No smoking.  9. Take all medications as prescribed.  10. Off work one week and cleared by APRN.           Discharge disposition: home     Discharge Instructions and Follow ups:  No future appointments.  Additional Instructions for the Follow-ups that You Need to Schedule     Discharge Follow-up with Specified Provider: Dr. Santiago; 1 Month   As directed      To:  Dr. Santiago    Follow Up:  1 Month         Discharge Follow-up with Specified Provider: ROMELIA Ocampo; 1 Week   As directed      To:  ROMELIA Ocampo    Follow Up:  1 Week           Follow-up Information     Morris Chandler MD .    Specialty:  General Practice  Contact information:  [address] [address] [phone]                   Test Results Pending at Discharge: none     ROMELIA Torres  12/27/18  11:08 AM

## 2018-12-27 NOTE — CONSULTS
"Met with patient, discussed benefits of cardiac rehab. Provided phase II information packet, which includes; general information about cardiac rehab, [facility] Cardiac Rehab Programs handout and [facility] letter article entitled “Cardiac Rehab is often the Best Medicine for Recovery\", stresses the importance of cardiac rehab after a heart event.   I provided the contact information for cardiac rehab here at [facility]  and encouraged her to call when discharged.   Explained if receiving home health would not be able to attend cardiac rehab until finished with home health. Instructed to bring a copy of AVS to initial assessment.Verbalized understanding and expressed interest in attending the program.      "

## 2018-12-27 NOTE — PROGRESS NOTES
Discharge Planning Assessment  [facility]     Patient Name: Ortiz Salamanca  MRN: [id number]  Today's Date: 12/27/2018    Admit Date: 12/26/2018    Discharge Needs Assessment     Row Name 12/27/18 1129       Living Environment    Lives With  child(jose luis), adult    Name(s) of Who Lives With Patient  ingrid Phillips    Unique Family Situation  Mother Shaila You will be staying with pt for some time after hospital stay.    Current Living Arrangements  home/apartment/condo    Primary Care Provided by  self    Provides Primary Care For  no one    Family Caregiver if Needed  none    Quality of Family Relationships  helpful;involved;supportive    Able to Return to Prior Arrangements  yes       Resource/Environmental Concerns    Resource/Environmental Concerns  none    Transportation Concerns  car, none       Transition Planning    Patient/Family Anticipates Transition to  home with family    Patient/Family Anticipated Services at Transition  none    Transportation Anticipated  family or friend will provide       Discharge Needs Assessment    Readmission Within the Last 30 Days  no previous admission in last 30 days    Concerns to be Addressed  denies needs/concerns at this time    Equipment Currently Used at Home  none    Anticipated Changes Related to Illness  none    Equipment Needed After Discharge  none    Outpatient/Agency/Support Group Needs  -- n/a    Discharge Facility/Level of Care Needs  -- n/a    Offered/Gave Vendor List  no    Current Discharge Risk  -- n/a        Discharge Plan     Row Name 12/27/18 1130       Plan    Plan  Home later today via private auto with no anticipated needs    Patient/Family in Agreement with Plan  yes    Plan Comments  Introduced self/explained role of CCP. Face sheet data reviewed. Pt IADLs and lives at home with her son Christiano. Pt states her mom came to town for the holidays and plans to stay for some time after pt's hospital stay to help as needed. Denies previous use of  DME/HH/SNF. Plans home via private auto with no anticipated needs.............JW        Destination      No service coordination in this encounter.      Durable Medical Equipment      No service coordination in this encounter.      Dialysis/Infusion      No service coordination in this encounter.      Home Medical Care      No service coordination in this encounter.      Community Resources      No service coordination in this encounter.        Expected Discharge Date and Time     Expected Discharge Date Expected Discharge Time    Dec 27, 2018         Demographic Summary     Row Name 12/27/18 1128       General Information    Admission Type  observation    Arrived From  home    Referral Source  admission list    Reason for Consult  discharge planning    Preferred Language  English     Used During This Interaction  no       Contact Information    Permission Granted to Share Info With  ;family/designee        Functional Status     Row Name 12/27/18 1129       Functional Status    Usual Activity Tolerance  excellent    Current Activity Tolerance  fair       Functional Status, IADL    Medications  independent    Meal Preparation  independent    Housekeeping  independent    Laundry  independent    Shopping  independent       Mental Status    General Appearance WDL  WDL        Psychosocial    No documentation.       Abuse/Neglect    No documentation.       Legal    No documentation.       Substance Abuse    No documentation.       Patient Forms    No documentation.           Cece Paul RN

## 2018-12-28 ENCOUNTER — READMISSION MANAGEMENT (OUTPATIENT)
Dept: CALL CENTER | Facility: HOSPITAL | Age: 48
End: 2018-12-28

## 2018-12-28 NOTE — TELEPHONE ENCOUNTER
"Reviewed guideline with caller, recommends she be seen within 24 hours. Also recommends Miralax or MOM, marianela states she will send her son out to get some Miralax.    Reason for Disposition  • Last bowel movement (BM) > 4 days ago    Additional Information  • Negative: [1] Abdomen pain is main symptom AND [2] adult male  • Negative: [1] Abdomen pain is main symptom AND [2] adult female  • Negative: Rectal bleeding or blood in stool is main symptom  • Negative: Rectal pain or itching is main symptom  • Negative: Constipation in a cancer patient who is currently (or recently) receiving chemotherapy or radiation therapy, or cancer patient who has metastatic or end-stage cancer and is receiving palliative care  • Negative: Patient sounds very sick or weak to the triager  • Negative: [1] Vomiting AND [2] abdomen looks much more swollen than usual  • Negative: [1] Vomiting AND [2] contains bile (green color)  • Negative: [1] Constant abdominal pain AND [2] present > 2 hours  • Negative: [1] Rectal pain or fullness from fecal impaction (rectum full of stool) AND [2] NOT better after SITZ bath, suppository or enema  • Negative: [1] Intermittent mild abdominal pain AND [2] fever  • Negative: Abdomen is more swollen than usual    Answer Assessment - Initial Assessment Questions  1. STOOL PATTERN OR FREQUENCY: \"How often do you pass bowel movements (BMs)?\"  (Normal range: tid to q 3 days)  \"When was the last BM passed?\"        Every other day, one day last week  2. STRAINING: \"Do you have to strain to have a BM?\"       no  3. RECTAL PAIN: \"Does your rectum hurt when the stool comes out?\" If so, ask: \"Do you have hemorrhoids? How bad is the pain?\"  (Scale 1-10; or mild, moderate, severe)      Sometimes, no hemorrhoids, 10/10  4. STOOL COMPOSITION: \"Are the stools hard?\"       sometimes  5. BLOOD ON STOOLS: \"Has there been any blood on the toilet tissue or on the surface of the BM?\" If so, ask: \"When was the last time?\"       " "no  6. CHRONIC CONSTIPATION: \"Is this a new problem for you?\"  If no, ask: How long have you had this problem?\" (days, weeks, months)       no  7. CHANGES IN DIET: \"Have there been any recent changes in your diet?\"       no  8. MEDICATIONS: \"Have you been taking any new medications?\"      yes  9. LAXATIVES: \"Have you been using any laxatives or enemas?\"  If yes, ask \"What, how often, and when was the last time?\"      no  10. CAUSE: \"What do you think is causing the constipation?\"         unknown  11. OTHER SYMPTOMS: \"Do you have any other symptoms?\" (e.g., abdominal pain, fever, vomiting)        no  12. PREGNANCY: \"Is there any chance you are pregnant?\" \"When was your last menstrual period?\"         no    Protocols used: CONSTIPATION-ADULT-AH      "

## 2018-12-28 NOTE — OUTREACH NOTE
Prep Survey      Responses   Facility patient discharged from?  [facility] [facility]   Is patient eligible?  Yes   Discharge diagnosis   Angina at rest    Does the patient have one of the following disease processes/diagnoses(primary or secondary)?  Other   Does the patient have Home health ordered?  No   Is there a DME ordered?  No   Prep survey completed?  Yes          Nehal Rubio RN

## 2018-12-29 ENCOUNTER — APPOINTMENT (OUTPATIENT)
Dept: GENERAL RADIOLOGY | Facility: HOSPITAL | Age: 48
End: 2018-12-29

## 2018-12-29 ENCOUNTER — NURSE TRIAGE (OUTPATIENT)
Dept: CALL CENTER | Facility: HOSPITAL | Age: 48
End: 2018-12-29

## 2018-12-29 ENCOUNTER — READMISSION MANAGEMENT (OUTPATIENT)
Dept: CALL CENTER | Facility: HOSPITAL | Age: 48
End: 2018-12-29

## 2018-12-29 ENCOUNTER — HOSPITAL ENCOUNTER (OUTPATIENT)
Facility: HOSPITAL | Age: 48
Setting detail: OBSERVATION
Discharge: HOME | End: 2018-12-29
Attending: EMERGENCY MEDICINE | Admitting: HOSPITALIST

## 2018-12-29 VITALS
WEIGHT: 180 LBS | RESPIRATION RATE: 18 BRPM | BODY MASS INDEX: 30.73 KG/M2 | TEMPERATURE: 97.2 F | OXYGEN SATURATION: 99 % | SYSTOLIC BLOOD PRESSURE: 101 MMHG | HEIGHT: 64 IN | DIASTOLIC BLOOD PRESSURE: 64 MMHG | HEART RATE: 82 BPM

## 2018-12-29 DIAGNOSIS — R07.9 CHEST PAIN, UNSPECIFIED TYPE: Primary | ICD-10-CM

## 2018-12-29 PROBLEM — R55 SYNCOPE: Status: ACTIVE | Noted: 2017-03-30

## 2018-12-29 PROBLEM — R42 DIZZINESS, NONSPECIFIC: Status: ACTIVE | Noted: 2018-12-29

## 2018-12-29 PROBLEM — F41.9 ANXIETY: Status: ACTIVE | Noted: 2018-02-06

## 2018-12-29 PROBLEM — Z72.0 TOBACCO ABUSE: Status: ACTIVE | Noted: 2018-02-06

## 2018-12-29 LAB
ALBUMIN SERPL-MCNC: 4.1 G/DL (ref 3.5–5.2)
ALBUMIN/GLOB SERPL: 1.1 G/DL
ALP SERPL-CCNC: 81 U/L (ref 40–129)
ALT SERPL W P-5'-P-CCNC: 22 U/L (ref 5–33)
ANION GAP SERPL CALCULATED.3IONS-SCNC: 14.1 MMOL/L
AST SERPL-CCNC: 20 U/L (ref 5–32)
BASOPHILS # BLD AUTO: 0.03 10*3/MM3 (ref 0–0.2)
BASOPHILS NFR BLD AUTO: 0.3 % (ref 0–2)
BILIRUB SERPL-MCNC: 0.3 MG/DL (ref 0.2–1.2)
BUN BLD-MCNC: 8 MG/DL (ref 6–20)
BUN/CREAT SERPL: 9.3 (ref 7–25)
CALCIUM SPEC-SCNC: 9.7 MG/DL (ref 8.6–10.5)
CHLORIDE SERPL-SCNC: 102 MMOL/L (ref 98–107)
CO2 SERPL-SCNC: 22.9 MMOL/L (ref 22–29)
CREAT BLD-MCNC: 0.86 MG/DL (ref 0.57–1)
DEPRECATED RDW RBC AUTO: 42.1 FL (ref 37–54)
EOSINOPHIL # BLD AUTO: 0.23 10*3/MM3 (ref 0.1–0.3)
EOSINOPHIL NFR BLD AUTO: 2.6 % (ref 0–4)
ERYTHROCYTE [DISTWIDTH] IN BLOOD BY AUTOMATED COUNT: 13.4 % (ref 11.5–14.5)
GFR SERPL CREATININE-BSD FRML MDRD: 85 ML/MIN/1.73
GLOBULIN UR ELPH-MCNC: 3.8 GM/DL
GLUCOSE BLD-MCNC: 124 MG/DL (ref 65–99)
HCT VFR BLD AUTO: 39.9 % (ref 37–47)
HGB BLD-MCNC: 12.9 G/DL (ref 12–16)
IMM GRANULOCYTES # BLD AUTO: 0.02 10*3/MM3 (ref 0–0.03)
IMM GRANULOCYTES NFR BLD AUTO: 0.2 % (ref 0–0.5)
LYMPHOCYTES # BLD AUTO: 3.33 10*3/MM3 (ref 0.6–4.8)
LYMPHOCYTES NFR BLD AUTO: 37 % (ref 20–45)
MCH RBC QN AUTO: 27.4 PG (ref 27–31)
MCHC RBC AUTO-ENTMCNC: 32.3 G/DL (ref 31–37)
MCV RBC AUTO: 84.9 FL (ref 81–99)
MONOCYTES # BLD AUTO: 0.37 10*3/MM3 (ref 0–1)
MONOCYTES NFR BLD AUTO: 4.1 % (ref 3–8)
NEUTROPHILS # BLD AUTO: 5.03 10*3/MM3 (ref 1.5–8.3)
NEUTROPHILS NFR BLD AUTO: 55.8 % (ref 45–70)
NRBC BLD AUTO-RTO: 0 /100 WBC (ref 0–0)
PLATELET # BLD AUTO: 290 10*3/MM3 (ref 140–500)
PMV BLD AUTO: 9.9 FL (ref 7.4–10.4)
POTASSIUM BLD-SCNC: 3.9 MMOL/L (ref 3.5–5.2)
PROT SERPL-MCNC: 7.9 G/DL (ref 6–8.5)
RBC # BLD AUTO: 4.7 10*6/MM3 (ref 4.2–5.4)
SODIUM BLD-SCNC: 139 MMOL/L (ref 136–145)
TROPONIN T SERPL-MCNC: 0.07 NG/ML (ref 0–0.03)
TROPONIN T SERPL-MCNC: 0.08 NG/ML (ref 0–0.03)
TROPONIN T SERPL-MCNC: 0.08 NG/ML (ref 0–0.03)
WBC NRBC COR # BLD: 9.01 10*3/MM3 (ref 4.8–10.8)

## 2018-12-29 PROCEDURE — 84484 ASSAY OF TROPONIN QUANT: CPT | Performed by: FAMILY MEDICINE

## 2018-12-29 PROCEDURE — 93005 ELECTROCARDIOGRAM TRACING: CPT | Performed by: EMERGENCY MEDICINE

## 2018-12-29 PROCEDURE — 85025 COMPLETE CBC W/AUTO DIFF WBC: CPT | Performed by: EMERGENCY MEDICINE

## 2018-12-29 PROCEDURE — G0378 HOSPITAL OBSERVATION PER HR: HCPCS

## 2018-12-29 PROCEDURE — 25010000002 ENOXAPARIN PER 10 MG: Performed by: FAMILY MEDICINE

## 2018-12-29 PROCEDURE — 93010 ELECTROCARDIOGRAM REPORT: CPT | Performed by: INTERNAL MEDICINE

## 2018-12-29 PROCEDURE — 99284 EMERGENCY DEPT VISIT MOD MDM: CPT

## 2018-12-29 PROCEDURE — 80053 COMPREHEN METABOLIC PANEL: CPT | Performed by: EMERGENCY MEDICINE

## 2018-12-29 PROCEDURE — 99234 HOSP IP/OBS SM DT SF/LOW 45: CPT | Performed by: HOSPITALIST

## 2018-12-29 PROCEDURE — 99244 OFF/OP CNSLTJ NEW/EST MOD 40: CPT | Performed by: INTERNAL MEDICINE

## 2018-12-29 PROCEDURE — 99285 EMERGENCY DEPT VISIT HI MDM: CPT | Performed by: EMERGENCY MEDICINE

## 2018-12-29 PROCEDURE — 71045 X-RAY EXAM CHEST 1 VIEW: CPT

## 2018-12-29 PROCEDURE — 84484 ASSAY OF TROPONIN QUANT: CPT | Performed by: EMERGENCY MEDICINE

## 2018-12-29 RX ORDER — SODIUM CHLORIDE 9 MG/ML
40 INJECTION, SOLUTION INTRAVENOUS AS NEEDED
Status: DISCONTINUED | OUTPATIENT
Start: 2018-12-29 | End: 2018-12-29 | Stop reason: HOSPADM

## 2018-12-29 RX ORDER — SODIUM CHLORIDE 9 MG/ML
40 INJECTION, SOLUTION INTRAVENOUS AS NEEDED
Status: CANCELLED | OUTPATIENT
Start: 2018-12-29

## 2018-12-29 RX ORDER — ASPIRIN 81 MG/1
81 TABLET, CHEWABLE ORAL DAILY
Status: CANCELLED | OUTPATIENT
Start: 2018-12-30

## 2018-12-29 RX ORDER — NITROGLYCERIN 0.4 MG/1
0.4 TABLET SUBLINGUAL
Status: DISCONTINUED | OUTPATIENT
Start: 2018-12-29 | End: 2018-12-29 | Stop reason: SDUPTHER

## 2018-12-29 RX ORDER — SODIUM CHLORIDE 0.9 % (FLUSH) 0.9 %
3-10 SYRINGE (ML) INJECTION AS NEEDED
Status: DISCONTINUED | OUTPATIENT
Start: 2018-12-29 | End: 2018-12-29 | Stop reason: HOSPADM

## 2018-12-29 RX ORDER — ASPIRIN 81 MG/1
243 TABLET, CHEWABLE ORAL ONCE
Status: COMPLETED | OUTPATIENT
Start: 2018-12-29 | End: 2018-12-29

## 2018-12-29 RX ORDER — SODIUM CHLORIDE 0.9 % (FLUSH) 0.9 %
3-10 SYRINGE (ML) INJECTION AS NEEDED
Status: CANCELLED | OUTPATIENT
Start: 2018-12-29

## 2018-12-29 RX ORDER — SODIUM CHLORIDE 0.9 % (FLUSH) 0.9 %
3 SYRINGE (ML) INJECTION EVERY 12 HOURS SCHEDULED
Status: DISCONTINUED | OUTPATIENT
Start: 2018-12-29 | End: 2018-12-29 | Stop reason: HOSPADM

## 2018-12-29 RX ORDER — SODIUM CHLORIDE 0.9 % (FLUSH) 0.9 %
3 SYRINGE (ML) INJECTION EVERY 12 HOURS SCHEDULED
Status: CANCELLED | OUTPATIENT
Start: 2018-12-29

## 2018-12-29 RX ORDER — ATORVASTATIN CALCIUM 40 MG/1
40 TABLET, FILM COATED ORAL NIGHTLY
Status: DISCONTINUED | OUTPATIENT
Start: 2018-12-29 | End: 2018-12-29 | Stop reason: HOSPADM

## 2018-12-29 RX ORDER — ASPIRIN 81 MG/1
81 TABLET, CHEWABLE ORAL DAILY
Status: DISCONTINUED | OUTPATIENT
Start: 2018-12-29 | End: 2018-12-29 | Stop reason: HOSPADM

## 2018-12-29 RX ORDER — NITROGLYCERIN 0.4 MG/1
0.4 TABLET SUBLINGUAL
Status: CANCELLED | OUTPATIENT
Start: 2018-12-29

## 2018-12-29 RX ORDER — NITROGLYCERIN 0.4 MG/1
0.4 TABLET SUBLINGUAL
Status: DISCONTINUED | OUTPATIENT
Start: 2018-12-29 | End: 2018-12-29 | Stop reason: HOSPADM

## 2018-12-29 RX ORDER — ATORVASTATIN CALCIUM 40 MG/1
40 TABLET, FILM COATED ORAL NIGHTLY
Status: CANCELLED | OUTPATIENT
Start: 2018-12-29

## 2018-12-29 RX ADMIN — ASPIRIN 81 MG 81 MG: 81 TABLET ORAL at 09:40

## 2018-12-29 RX ADMIN — ENOXAPARIN SODIUM 80 MG: 80 INJECTION SUBCUTANEOUS at 08:03

## 2018-12-29 RX ADMIN — NITROGLYCERIN 0.4 MG: 0.4 TABLET SUBLINGUAL at 06:43

## 2018-12-29 RX ADMIN — ASPIRIN 81 MG 243 MG: 81 TABLET ORAL at 06:42

## 2018-12-29 RX ADMIN — TICAGRELOR 90 MG: 90 TABLET ORAL at 09:40

## 2018-12-29 RX ADMIN — METOPROLOL TARTRATE 25 MG: 25 TABLET ORAL at 09:40

## 2018-12-29 RX ADMIN — SODIUM CHLORIDE, PRESERVATIVE FREE 3 ML: 5 INJECTION INTRAVENOUS at 09:41

## 2018-12-29 RX ADMIN — SODIUM CHLORIDE, PRESERVATIVE FREE 3 ML: 5 INJECTION INTRAVENOUS at 09:40

## 2018-12-29 NOTE — TELEPHONE ENCOUNTER
"    Reason for Disposition  • [1] Chest pain lasts > 5 minutes AND [2] history of heart disease  (i.e., heart attack, bypass surgery, angina, angioplasty, CHF; not just a heart murmur)    Additional Information  • Negative: Severe difficulty breathing (e.g., struggling for each breath, speaks in single words)  • Negative: Difficult to awaken or acting confused (e.g., disoriented, slurred speech)  • Negative: Shock suspected (e.g., cold/pale/clammy skin, too weak to stand, low BP, rapid pulse)    Answer Assessment - Initial Assessment Questions  1. LOCATION: \"Where does it hurt?\"        Tightness in left chest  2. RADIATION: \"Does the pain go anywhere else?\" (e.g., into neck, jaw, arms, back)      Into lower neck  3. ONSET: \"When did the chest pain begin?\" (Minutes, hours or days)       midnight  4. PATTERN \"Does the pain come and go, or has it been constant since it started?\"  \"Does it get worse with exertion?\"       Constant since it started tonight  5. DURATION: \"How long does it last\" (e.g., seconds, minutes, hours)      hours  6. SEVERITY: \"How bad is the pain?\"  (e.g., Scale 1-10; mild, moderate, or severe)     - MILD (1-3): doesn't interfere with normal activities      - MODERATE (4-7): interferes with normal activities or awakens from sleep     - SEVERE (8-10): excruciating pain, unable to do any normal activities        moderate  7. CARDIAC RISK FACTORS: \"Do you have any history of heart problems or risk factors for heart disease?\" (e.g., prior heart attack, angina; high blood pressure, diabetes, being overweight, high cholesterol, smoking, or strong family history of heart disease)      Stent placed 12/26/18  8. PULMONARY RISK FACTORS: \"Do you have any history of lung disease?\"  (e.g., blood clots in lung, asthma, emphysema, birth control pills)      denies  9. CAUSE: \"What do you think is causing the chest pain?\"      Heart related  10. OTHER SYMPTOMS: \"Do you have any other symptoms?\" (e.g., dizziness, " "nausea, vomiting, sweating, fever, difficulty breathing, cough)        flushing  11. PREGNANCY: \"Is there any chance you are pregnant?\" \"When was your last menstrual period?\"        na    Protocols used: CHEST PAIN-ADULT-AH      "

## 2018-12-29 NOTE — OUTREACH NOTE
Medical Week 1 Survey      Responses   Facility patient discharged from?  [facility]   Does the patient have one of the following disease processes/diagnoses(primary or secondary)?  Other   Is there a successful TCM telephone encounter documented?  No   Week 1 attempt successful?  No   Revoke  Readmitted          Cece Jara RN

## 2018-12-30 ENCOUNTER — READMISSION MANAGEMENT (OUTPATIENT)
Dept: CALL CENTER | Facility: HOSPITAL | Age: 48
End: 2018-12-30

## 2018-12-30 NOTE — OUTREACH NOTE
Prep Survey      Responses   Facility patient discharged from?  [place]   Is patient eligible?  Yes   Discharge diagnosis  Chest pain    Does the patient have one of the following disease processes/diagnoses(primary or secondary)?  Other   Does the patient have Home health ordered?  No   Is there a DME ordered?  No   Prep survey completed?  Yes          Nehal Rubio RN

## 2019-01-03 ENCOUNTER — OFFICE VISIT (OUTPATIENT)
Dept: CARDIOLOGY | Facility: CLINIC | Age: 49
End: 2019-01-03

## 2019-01-03 VITALS
DIASTOLIC BLOOD PRESSURE: 70 MMHG | BODY MASS INDEX: 31.07 KG/M2 | SYSTOLIC BLOOD PRESSURE: 108 MMHG | WEIGHT: 182 LBS | HEIGHT: 64 IN | HEART RATE: 83 BPM

## 2019-01-03 DIAGNOSIS — I25.10 CORONARY ARTERY DISEASE INVOLVING NATIVE CORONARY ARTERY OF NATIVE HEART WITHOUT ANGINA PECTORIS: ICD-10-CM

## 2019-01-03 DIAGNOSIS — I21.4 NSTEMI (NON-ST ELEVATED MYOCARDIAL INFARCTION) (HCC): Primary | ICD-10-CM

## 2019-01-03 PROCEDURE — 93000 ELECTROCARDIOGRAM COMPLETE: CPT | Performed by: NURSE PRACTITIONER

## 2019-01-03 PROCEDURE — 99214 OFFICE O/P EST MOD 30 MIN: CPT | Performed by: NURSE PRACTITIONER

## 2019-01-03 RX ORDER — NITROGLYCERIN 80 MG/1
1 PATCH TRANSDERMAL DAILY
COMMUNITY
End: 2019-05-23 | Stop reason: SDUPTHER

## 2019-01-03 NOTE — PROGRESS NOTES
Subjective:     Encounter Date:2019      Patient ID: Ortiz Salamanca is a 48 y.o. female.    Chief Complaint: f/u NSTEMI and stent placement    History of Present Illness   Patient is new to me  and I have reviewed her past medical records.  She is new to our practice and was seen by Dr. Santiago in consult for chest pain and increased troponin at [facility].    She presented to the ER in [place] on 18 with chest pain and mild SOA. She had the chest pain for 2 days and it was increasing. At times it was worse when lying down and at other times it would radiate into her left arm. It was not worse with cough or deep breathing.  She stated the pain was not sharp and there was no nausea or vomiting. She thought it was indigestion for the first day or so and took Tums and Nexium which did not help.  Her ECG was unremarkable but her initial troponin was indeterminate at 0.040.  She has a strong family history of CAD.  One of her brothers  from a MI in his 40s and another brother has stents.  She has a history of hyperlipidemia and was a daily smoker.  It was felt she needed to be admitted and cardiology was consulted.      She had an echocardiogram that showed normal biventricular size and function, no wall motion abnormalities, and no pericardial effusion.  Her serial troponin continued to increase to 0.046 and 0.072.  She was then transferred to [facility] for a cardiac catheterization.  She had a 99% stenosis of the mid LAD which was treated with angioplasty and EVAIRSTO 3.5 x 28MM Xience Divina.  She was discharged home on DAPT.      She presented to the [facility] ER again on 18 with complaints of left sided heart pain that started the previous night around 11 pm.  She did not take anything for the pain.  Her ECG was essentially unchanged from her last ECG on 18 at discharge. Her troponin was 0.079 which had decreased from 0.083 on previous admission.  She was given the  "option to be discharged home and to follow-up in the office this coming week or to stay and have repeat cardiac catheterization.  She initially chose to be transferred to [facility] for elective cardiac catheterization.  While waiting for bed to be available, she decided to go home as she was chest pain-free and will follow-up in our office on January 3, 2019 as originally scheduled.       She presents today with concerns and many questions.  She denies any palpitations, shortness of air, or edema.  She denies any fatigue.  She states occasionally she will feel 8 \"twinge\" in her chest and some mild lightheadedness with quick positional changes.  She denies any leg pain, numbness or tingling in her extremities.  She denies any PND, cough, orthopnea.  She states that she does snore sometimes and has had a sleep study several years ago.  She feels that it is been greater than 5 years.    She has stopped smoking as of 12/11/18.    Her right wrist catheterization site has mild bruising.  There is no redness, swelling, drainage.  She has a good strong pulse.  She states that she had symptoms vaginal spotting the night of her catheterization and again yesterday.  She states she has had a hysterectomy and no longer has monthly menstrual cycles.  She last saw her GYN in the fall.  I discussed with her that since she has had a lot of blood thinning medications, that it is most likely a result of her recent procedure.  I advised her, to be on the safe side, to call her GYN for further evaluation and workup as needed.    She has a very physical and demanding job and is hesitant about returning to work.  We also discussed cardiac rehabilitation, she has been in touch with them.  She is anxious to enroll and get started.  She has appointment with Dr. Santiago in January, we have written her a note to be off work until seen at that time.    The following portions of the patient's history were reviewed and updated as " appropriate: allergies, current medications, past family history, past medical history, past social history, past surgical history and problem list.    Review of Systems   Constitution: Negative for weakness and malaise/fatigue.   HENT: Negative for congestion, hoarse voice and sore throat.    Eyes: Negative for blurred vision, double vision, photophobia, vision loss in left eye and vision loss in right eye.   Cardiovascular: Negative for chest pain, dyspnea on exertion, irregular heartbeat, leg swelling, near-syncope, orthopnea, palpitations, paroxysmal nocturnal dyspnea and syncope.   Respiratory: Negative for cough, hemoptysis, shortness of breath, sleep disturbances due to breathing, snoring, sputum production and wheezing.    Endocrine: Negative.    Hematologic/Lymphatic: Does not bruise/bleed easily.   Skin: Negative for color change, dry skin, poor wound healing and rash.   Musculoskeletal: Negative for back pain, falls, gout, joint pain, joint swelling, muscle cramps and muscle weakness.   Gastrointestinal: Negative for abdominal pain, constipation, diarrhea, dysphagia, melena, nausea and vomiting.   Neurological: Negative for excessive daytime sleepiness, dizziness, headaches, light-headedness, loss of balance, numbness, paresthesias, seizures and vertigo.   Psychiatric/Behavioral: Negative for depression and substance abuse. The patient is not nervous/anxious.        ECG 12 Lead  Date/Time: 1/3/2019 4:23 PM  Performed by: Rona Pascual APRN  Authorized by: Rona Pascual APRN   Comparison: compared with previous ECG from 12/29/2018  Similar to previous ECG  Rhythm: sinus rhythm  Rate: normal  Conduction: conduction normal  ST Elevation: II, III and aVF  T Waves: T waves normal  QRS axis: normal  Clinical impression: abnormal ECG               Objective:         Physical Exam   Constitutional: He is oriented to person, place, and time. Vital signs are normal. He appears well-developed and well-nourished.  "No distress.   HENT:   Head: Normocephalic and atraumatic.   Right Ear: Hearing normal.   Left Ear: Hearing normal.   Eyes: Conjunctivae and lids are normal.   Neck: Normal range of motion. Neck supple. No JVD present. Carotid bruit is not present. No thyromegaly present.   Cardiovascular: Normal rate, regular rhythm, S1 normal, S2 normal, normal heart sounds and intact distal pulses.  PMI is not displaced.  Exam reveals no gallop.    No murmur heard.  Pulses:       Carotid pulses are 2+ on the right side, and 2+ on the left side.       Radial pulses are 2+ on the right side, and 2+ on the left side.        Dorsalis pedis pulses are 2+ on the right side, and 2+ on the left side.        Posterior tibial pulses are 2+ on the right side, and 2+ on the left side.   Pulmonary/Chest: Effort normal and breath sounds normal. No respiratory distress. He has no wheezes. He has no rhonchi. He has no rales. He exhibits no tenderness.   Abdominal: Soft. Normal appearance and bowel sounds are normal. He exhibits no distension, no abdominal bruit and no mass. There is no tenderness.   Musculoskeletal: Normal range of motion.   Exhibits no edema or deformity   Lymphadenopathy:     He has no cervical adenopathy.   Neurological: He is alert and oriented to person, place, and time. No cranial nerve deficit. Coordination and gait normal.   Oriented to person, place and time.   Skin: Skin is warm, dry and intact. No rash noted. He is not diaphoretic. No cyanosis. Nails show no clubbing.   Psychiatric: He has a normal mood and affect. His speech is normal and behavior is normal. Judgment and thought content normal. Cognition and memory are normal.     Vitals:    01/03/19 1242   BP: 108/70   Pulse: 83   Weight: 82.6 kg (182 lb)   Height: 162.6 cm (64\")           Lab Review:       Assessment:          Diagnosis Plan   1. NSTEMI (non-ST elevated myocardial infarction) (CMS/HCC)     2. Coronary artery disease involving native coronary artery " of native heart without angina pectoris            Plan:       1./2.  NSTEMI/CAD- s/p angioplasty and EVARISTO 3.5 x 28MM Xience Divina 12/27/18.  She is on DAPT.  No angina.  Continue current regimen.    Coronary Artery Disease  Assessment  • The patient has no angina    Plan  • Lifestyle modifications discussed include adhering to a heart healthy diet, avoidance of tobacco products, maintenance of a healthy weight, medication compliance, regular exercise and regular monitoring of cholesterol and blood pressure    Subjective - Objective  • There is a history of past MI  • There has been a previous stent procedure using EVARISTO  • Current antiplatelet therapy includes aspirin 81 mg and ticagrelor 90 mg    3.  Vaginal bleeding - most likely secondary to heparin, DAPT s/p cardiac catheterization.  F/u up with GYN for further evaluation and treatment      RTO 3 weeks with ROMELIA Joyner      Current Outpatient Medications:   •  aspirin 81 MG chewable tablet, Chew 1 tablet Daily., Disp: 30 tablet, Rfl: 11  •  atorvastatin (LIPITOR) 40 MG tablet, Take 1 tablet by mouth Every Night., Disp: 30 tablet, Rfl: 11  •  metoprolol tartrate (LOPRESSOR) 25 MG tablet, Take 1 tablet by mouth 2 (Two) Times a Day., Disp: 60 tablet, Rfl: 11  •  nitroglycerin (NITRODUR) 0.4 MG/HR patch, Place 1 patch on the skin as directed by provider Daily., Disp: , Rfl:   •  ticagrelor (BRILINTA) 90 MG tablet tablet, Take 1 tablet by mouth 2 (Two) Times a Day., Disp: 60 tablet, Rfl: 11

## 2019-01-04 DIAGNOSIS — I25.10 CORONARY ARTERY DISEASE INVOLVING NATIVE CORONARY ARTERY OF NATIVE HEART WITHOUT ANGINA PECTORIS: Primary | ICD-10-CM

## 2019-01-09 ENCOUNTER — TELEPHONE (OUTPATIENT)
Dept: CARDIOLOGY | Facility: CLINIC | Age: 49
End: 2019-01-09

## 2019-01-09 NOTE — TELEPHONE ENCOUNTER
PT CALLED AND WANTED TO KNOW WHAT SHE WOULD BE ABLE TO TAKE FOR HER SCIATIC NEVER PAIN. SHE WANTED TO CHECK BECAUSE OF HER HEART MEDS.

## 2019-01-09 NOTE — TELEPHONE ENCOUNTER
She can take anything that has been recommended for her by her primary or anything over the counter

## 2019-01-14 ENCOUNTER — HOSPITAL ENCOUNTER (OUTPATIENT)
Dept: MRI IMAGING | Facility: HOSPITAL | Age: 49
Discharge: HOME | End: 2019-01-14
Admitting: NURSE PRACTITIONER

## 2019-01-14 DIAGNOSIS — M54.5 LOW BACK PAIN, UNSPECIFIED BACK PAIN LATERALITY, UNSPECIFIED CHRONICITY, WITH SCIATICA PRESENCE UNSPECIFIED: ICD-10-CM

## 2019-01-14 PROCEDURE — 72148 MRI LUMBAR SPINE W/O DYE: CPT

## 2019-01-15 RX ORDER — ATORVASTATIN CALCIUM 40 MG/1
40 TABLET, FILM COATED ORAL NIGHTLY
Qty: 30 TABLET | Refills: 11 | Status: SHIPPED | OUTPATIENT
Start: 2019-01-15 | End: 2019-05-23 | Stop reason: SDUPTHER

## 2019-01-15 RX ORDER — ASPIRIN 81 MG/1
81 TABLET, CHEWABLE ORAL DAILY
Qty: 30 TABLET | Refills: 11 | Status: SHIPPED | OUTPATIENT
Start: 2019-01-15 | End: 2020-03-30

## 2019-01-21 ENCOUNTER — TREATMENT (OUTPATIENT)
Dept: CARDIAC REHAB | Facility: HOSPITAL | Age: 49
End: 2019-01-21

## 2019-01-21 DIAGNOSIS — Z95.5 S/P CORONARY ARTERY STENT PLACEMENT: Primary | ICD-10-CM

## 2019-01-21 PROCEDURE — 93798 PHYS/QHP OP CAR RHAB W/ECG: CPT

## 2019-01-21 PROCEDURE — 93797 PHYS/QHP OP CAR RHAB WO ECG: CPT

## 2019-01-23 ENCOUNTER — TREATMENT (OUTPATIENT)
Dept: CARDIAC REHAB | Facility: HOSPITAL | Age: 49
End: 2019-01-23

## 2019-01-23 DIAGNOSIS — Z95.5 S/P CORONARY ARTERY STENT PLACEMENT: Primary | ICD-10-CM

## 2019-01-23 PROCEDURE — 93798 PHYS/QHP OP CAR RHAB W/ECG: CPT

## 2019-01-28 ENCOUNTER — TREATMENT (OUTPATIENT)
Dept: CARDIAC REHAB | Facility: HOSPITAL | Age: 49
End: 2019-01-28

## 2019-01-28 DIAGNOSIS — Z95.5 S/P CORONARY ARTERY STENT PLACEMENT: Primary | ICD-10-CM

## 2019-01-28 PROCEDURE — 93798 PHYS/QHP OP CAR RHAB W/ECG: CPT

## 2019-01-31 ENCOUNTER — OFFICE VISIT (OUTPATIENT)
Dept: CARDIOLOGY | Facility: CLINIC | Age: 49
End: 2019-01-31

## 2019-01-31 VITALS
WEIGHT: 193 LBS | SYSTOLIC BLOOD PRESSURE: 120 MMHG | BODY MASS INDEX: 32.95 KG/M2 | HEIGHT: 64 IN | HEART RATE: 69 BPM | DIASTOLIC BLOOD PRESSURE: 86 MMHG

## 2019-01-31 DIAGNOSIS — I25.10 CORONARY ARTERY DISEASE INVOLVING NATIVE CORONARY ARTERY OF NATIVE HEART WITHOUT ANGINA PECTORIS: Primary | Chronic | ICD-10-CM

## 2019-01-31 DIAGNOSIS — Z95.5 PRESENCE OF DRUG COATED STENT IN LAD CORONARY ARTERY: Chronic | ICD-10-CM

## 2019-01-31 DIAGNOSIS — I21.4 NSTEMI (NON-ST ELEVATED MYOCARDIAL INFARCTION) (HCC): ICD-10-CM

## 2019-01-31 PROBLEM — I20.8 ANGINA AT REST: Status: RESOLVED | Noted: 2018-12-26 | Resolved: 2019-01-31

## 2019-01-31 PROBLEM — I20.89 ANGINA AT REST: Status: RESOLVED | Noted: 2018-12-26 | Resolved: 2019-01-31

## 2019-01-31 PROCEDURE — 99214 OFFICE O/P EST MOD 30 MIN: CPT | Performed by: INTERNAL MEDICINE

## 2019-01-31 NOTE — PROGRESS NOTES
Subjective:     Encounter Date:2019      Patient ID: Ortiz Salamanca is a 48 y.o. female.    Chief Complaint: CAD  History of Present Illness    Dear Dr. Chandler,    I had the pleasure of seeing this patient in the office today for follow-up on her cardiac status.  She has a history of CAD and prior drug-eluting stent.     She presented to the ER in [place] on 18 with chest pain and mild SOA. She had the chest pain for 2 days and it was increasing. At times it was worse when lying down and at other times it would radiate into her left arm. It was not worse with cough or deep breathing.  She stated the pain was not sharp and there was no nausea or vomiting. She thought it was indigestion for the first day or so and took Tums and Nexium which did not help.  Her ECG was unremarkable but her initial troponin was indeterminate at 0.040.  She has a strong family history of CAD.  One of her brothers  from a MI in his 40s and another brother has stents.  She has a history of hyperlipidemia and was a daily smoker.  It was felt she needed to be admitted and cardiology was consulted.       She had an echocardiogram that showed normal biventricular size and function, no wall motion abnormalities, and no pericardial effusion.  Her serial troponin continued to increase to 0.046 and 0.072.  She was then transferred to [facility] for a cardiac catheterization.  She had a 99% stenosis of the mid LAD which was treated with angioplasty and EVARISTO 3.5 x 28MM Xience Divina.  She was discharged home on DAPT.   She presented to the [facility] ER again on 18 with complaints of left sided heart pain that started the previous night around 11 pm.  She did not take anything for the pain.  Her ECG was essentially unchanged from her last ECG on 18 at discharge. Her troponin was 0.079 which had decreased from 0.083 on previous admission.  She was given the option to be discharged home and to follow-up in  the office this coming week or to stay and have repeat cardiac catheterization.  She initially chose to be transferred to [facility] for elective cardiac catheterization.     Now is involved in cardiac rehabilitation and is doing extremely well.  She's not have any further episodes of chest pain or chest discomfort.  She's active now without any difficulty.  She feels extremely good.  She's pleased with how she's doing.  She still has some fatigue, but that's getting better.    The following portions of the patient's history were reviewed and updated as appropriate: allergies, current medications, past family history, past medical history, past social history, past surgical history and problem list.    Past Medical History:   Diagnosis Date   • Coronary artery disease involving native coronary artery of native heart without angina pectoris 2019   • Hyperlipidemia    • Migraine headache        Past Surgical History:   Procedure Laterality Date   • CARDIAC CATHETERIZATION N/A 2018    Procedure: Left Heart Cath;  Surgeon: Hilario Coronado MD;  Location: [facility] CATH INVASIVE LOCATION;  Service: Cardiovascular   • CARDIAC CATHETERIZATION N/A 2018    Procedure: Left ventriculography;  Surgeon: Hilario Coronado MD;  Location: [facility] CATH INVASIVE LOCATION;  Service: Cardiovascular   • CARDIAC CATHETERIZATION N/A 2018    Procedure: Stent EVARISTO coronary;  Surgeon: Hilario Coronado MD;  Location:  [facility] CATH INVASIVE LOCATION;  Service: Cardiovascular   • CARDIAC CATHETERIZATION N/A 2018    Procedure: Coronary angiography;  Surgeon: Hilario Coronado MD;  Location:  [facility] CATH INVASIVE LOCATION;  Service: Cardiovascular   •  SECTION     • CORONARY STENT PLACEMENT     • CYST REMOVAL Left     knee   • HYSTERECTOMY         Social History     Socioeconomic History   • Marital status:      Spouse name: Not on file   • Number of children: Not on file   • Years  of education: Not on file   • Highest education level: Not on file   Social Needs   • Financial resource strain: Not on file   • Food insecurity - worry: Not on file   • Food insecurity - inability: Not on file   • Transportation needs - medical: Not on file   • Transportation needs - non-medical: Not on file   Occupational History   • Not on file   Tobacco Use   • Smoking status: Former Smoker     Last attempt to quit: 2018     Years since quittin.1   • Smokeless tobacco: Never Used   Substance and Sexual Activity   • Alcohol use: Yes     Comment: rare   • Drug use: No   • Sexual activity: Defer   Other Topics Concern   • Not on file   Social History Narrative   • Not on file       Review of Systems   Constitution: Negative for chills, decreased appetite, fever and night sweats.   HENT: Negative for ear discharge, ear pain, hearing loss, nosebleeds and sore throat.    Eyes: Negative for blurred vision, double vision and pain.   Cardiovascular: Negative for cyanosis.   Respiratory: Negative for hemoptysis and sputum production.    Endocrine: Negative for cold intolerance and heat intolerance.   Hematologic/Lymphatic: Negative for adenopathy.   Skin: Negative for dry skin, itching, nail changes, rash and suspicious lesions.   Musculoskeletal: Negative for arthritis, gout, muscle cramps, muscle weakness, myalgias and neck pain.   Gastrointestinal: Negative for anorexia, bowel incontinence, constipation, diarrhea, dysphagia, hematemesis and jaundice.   Genitourinary: Negative for bladder incontinence, dysuria, flank pain, frequency, hematuria and nocturia.   Neurological: Negative for focal weakness, numbness, paresthesias and seizures.   Psychiatric/Behavioral: Negative for altered mental status, hallucinations, hypervigilance, suicidal ideas and thoughts of violence.   Allergic/Immunologic: Negative for persistent infections.       Procedures       Objective:     Vitals:    19 1248   BP: 120/86  "  Pulse: 69   Weight: 87.5 kg (193 lb)   Height: 162.6 cm (64\")         Physical Exam   Constitutional: She is oriented to person, place, and time. She appears well-developed and well-nourished. No distress.   HENT:   Head: Normocephalic and atraumatic.   Nose: Nose normal.   Mouth/Throat: Oropharynx is clear and moist.   Eyes: Conjunctivae and EOM are normal. Pupils are equal, round, and reactive to light. Right eye exhibits no discharge. Left eye exhibits no discharge.   Neck: Normal range of motion. Neck supple. No tracheal deviation present. No thyromegaly present.   Cardiovascular: Normal rate, regular rhythm, S1 normal, S2 normal, normal heart sounds and normal pulses. Exam reveals no S3.   Pulmonary/Chest: Effort normal and breath sounds normal. No stridor. No respiratory distress. She exhibits no tenderness.   Abdominal: Soft. Bowel sounds are normal. She exhibits no distension and no mass. There is no tenderness. There is no rebound and no guarding.   Musculoskeletal: Normal range of motion. She exhibits no tenderness or deformity.   Lymphadenopathy:     She has no cervical adenopathy.   Neurological: She is alert and oriented to person, place, and time. She has normal reflexes.   Skin: Skin is warm and dry. No rash noted. She is not diaphoretic. No erythema.   Psychiatric: She has a normal mood and affect. Thought content normal.       Lab Review:             Performed        Assessment:          Diagnosis Plan   1. Coronary artery disease involving native coronary artery of native heart without angina pectoris     2. NSTEMI (non-ST elevated myocardial infarction) (CMS/HCC)     3. Presence of drug coated stent in LAD coronary artery            Plan:       1.  CAD status post non-ST segment elevation myocardial infarction a drug-coated stent.  Patient is participate in cardiac rehabilitation and doing well.  We will continue current guideline directed medical therapy.  I will see her back in the office in " 10 months; I anticipate we will discontinue her ticagrelor at that point.  2.  Tobacco abuse-patient stopped smoking at the time of her myocardial infarction and has not resumed.  Thank you very much for allowing us to participate in the care of this pleasant patient.  Please don't hesitate to call if I can be of assistance in any way.      Current Outpatient Medications:   •  Acetaminophen (TYLENOL 8 HOUR PO), Take  by mouth., Disp: , Rfl:   •  aspirin 81 MG chewable tablet, Chew 1 tablet Daily., Disp: 30 tablet, Rfl: 11  •  atorvastatin (LIPITOR) 40 MG tablet, Take 1 tablet by mouth Every Night., Disp: 30 tablet, Rfl: 11  •  metoprolol tartrate (LOPRESSOR) 25 MG tablet, Take 1 tablet by mouth 2 (Two) Times a Day., Disp: 60 tablet, Rfl: 11  •  nitroglycerin (NITRODUR) 0.4 MG/HR patch, Place 1 patch on the skin as directed by provider Daily., Disp: , Rfl:   •  ticagrelor (BRILINTA) 90 MG tablet tablet, Take 1 tablet by mouth 2 (Two) Times a Day., Disp: 60 tablet, Rfl: 11

## 2019-02-13 ENCOUNTER — TELEPHONE (OUTPATIENT)
Dept: CARDIOLOGY | Facility: CLINIC | Age: 49
End: 2019-02-13

## 2019-02-13 NOTE — TELEPHONE ENCOUNTER
Pt called and wants to know if she can have epidural shot for her back. Pt is taking brillinta and asa does pt need to hold it and how long prior to her procedure. Her procedure is not scheduled yet.....Haylie MONET

## 2019-02-14 NOTE — TELEPHONE ENCOUNTER
Pt returned the call. Called and informed pt that its ok to have the shots but can not hold her ASA and brilinta be cause she just got her EVARISTO. Verbally understood...MT

## 2019-03-06 ENCOUNTER — APPOINTMENT (OUTPATIENT)
Dept: CARDIAC REHAB | Facility: HOSPITAL | Age: 49
End: 2019-03-06

## 2019-03-08 ENCOUNTER — APPOINTMENT (OUTPATIENT)
Dept: CARDIAC REHAB | Facility: HOSPITAL | Age: 49
End: 2019-03-08

## 2019-03-11 ENCOUNTER — APPOINTMENT (OUTPATIENT)
Dept: CARDIAC REHAB | Facility: HOSPITAL | Age: 49
End: 2019-03-11

## 2019-03-13 ENCOUNTER — APPOINTMENT (OUTPATIENT)
Dept: CARDIAC REHAB | Facility: HOSPITAL | Age: 49
End: 2019-03-13

## 2019-03-15 ENCOUNTER — APPOINTMENT (OUTPATIENT)
Dept: CARDIAC REHAB | Facility: HOSPITAL | Age: 49
End: 2019-03-15

## 2019-03-18 ENCOUNTER — APPOINTMENT (OUTPATIENT)
Dept: CARDIAC REHAB | Facility: HOSPITAL | Age: 49
End: 2019-03-18

## 2019-03-20 ENCOUNTER — APPOINTMENT (OUTPATIENT)
Dept: CARDIAC REHAB | Facility: HOSPITAL | Age: 49
End: 2019-03-20

## 2019-03-22 ENCOUNTER — APPOINTMENT (OUTPATIENT)
Dept: CARDIAC REHAB | Facility: HOSPITAL | Age: 49
End: 2019-03-22

## 2019-03-25 ENCOUNTER — APPOINTMENT (OUTPATIENT)
Dept: CARDIAC REHAB | Facility: HOSPITAL | Age: 49
End: 2019-03-25

## 2019-03-27 ENCOUNTER — APPOINTMENT (OUTPATIENT)
Dept: CARDIAC REHAB | Facility: HOSPITAL | Age: 49
End: 2019-03-27

## 2019-03-29 ENCOUNTER — APPOINTMENT (OUTPATIENT)
Dept: CARDIAC REHAB | Facility: HOSPITAL | Age: 49
End: 2019-03-29

## 2019-04-01 ENCOUNTER — APPOINTMENT (OUTPATIENT)
Dept: CARDIAC REHAB | Facility: HOSPITAL | Age: 49
End: 2019-04-01

## 2019-04-03 ENCOUNTER — APPOINTMENT (OUTPATIENT)
Dept: CARDIAC REHAB | Facility: HOSPITAL | Age: 49
End: 2019-04-03

## 2019-04-05 ENCOUNTER — APPOINTMENT (OUTPATIENT)
Dept: CARDIAC REHAB | Facility: HOSPITAL | Age: 49
End: 2019-04-05

## 2019-04-08 ENCOUNTER — APPOINTMENT (OUTPATIENT)
Dept: CARDIAC REHAB | Facility: HOSPITAL | Age: 49
End: 2019-04-08

## 2019-04-10 ENCOUNTER — TELEPHONE (OUTPATIENT)
Dept: CARDIOLOGY | Facility: CLINIC | Age: 49
End: 2019-04-10

## 2019-04-10 NOTE — TELEPHONE ENCOUNTER
Pt is needing clearance for Lumbar steroid injection. TBD with Dr.Jason Brooks. Is it ok for the pt to hold brilinta 5 days prior?    PT was last seen by  on 1/31/19. Pt last seen you on 1/3/19.     #: [phone]  Fax #: [phone]

## 2019-04-10 NOTE — TELEPHONE ENCOUNTER
Patient had PCI and stent placement in 12/18.  She is to be on DAPT for one year.  She cannot stop her Brilinta at this time.

## 2019-05-13 RX ORDER — NITROGLYCERIN 0.4 MG/1
TABLET SUBLINGUAL
Qty: 25 TABLET | Refills: 0 | Status: SHIPPED | OUTPATIENT
Start: 2019-05-13 | End: 2019-05-23 | Stop reason: SDUPTHER

## 2019-05-23 ENCOUNTER — TELEPHONE (OUTPATIENT)
Dept: CARDIOLOGY | Facility: CLINIC | Age: 49
End: 2019-05-23

## 2019-05-23 ENCOUNTER — OFFICE VISIT (OUTPATIENT)
Dept: OBSTETRICS AND GYNECOLOGY | Facility: CLINIC | Age: 49
End: 2019-05-23

## 2019-05-23 VITALS
SYSTOLIC BLOOD PRESSURE: 120 MMHG | DIASTOLIC BLOOD PRESSURE: 72 MMHG | HEIGHT: 64 IN | BODY MASS INDEX: 33.63 KG/M2 | WEIGHT: 197 LBS

## 2019-05-23 DIAGNOSIS — Z01.419 PAP SMEAR, LOW-RISK: ICD-10-CM

## 2019-05-23 DIAGNOSIS — Z11.51 SPECIAL SCREENING EXAMINATION FOR HUMAN PAPILLOMAVIRUS (HPV): ICD-10-CM

## 2019-05-23 DIAGNOSIS — Z13.9 SCREENING FOR CONDITION: ICD-10-CM

## 2019-05-23 DIAGNOSIS — Z01.419 ENCOUNTER FOR GYNECOLOGICAL EXAMINATION WITHOUT ABNORMAL FINDING: Primary | ICD-10-CM

## 2019-05-23 LAB
BILIRUB BLD-MCNC: NEGATIVE MG/DL
CLARITY, POC: CLEAR
COLOR UR: YELLOW
GLUCOSE UR STRIP-MCNC: NEGATIVE MG/DL
KETONES UR QL: NEGATIVE
LEUKOCYTE EST, POC: NEGATIVE
NITRITE UR-MCNC: NEGATIVE MG/ML
PH UR: 6.5 [PH] (ref 5–8)
PROT UR STRIP-MCNC: NEGATIVE MG/DL
RBC # UR STRIP: ABNORMAL /UL
SP GR UR: 1.02 (ref 1–1.03)
UROBILINOGEN UR QL: NORMAL

## 2019-05-23 PROCEDURE — 99386 PREV VISIT NEW AGE 40-64: CPT | Performed by: OBSTETRICS & GYNECOLOGY

## 2019-05-23 PROCEDURE — 81002 URINALYSIS NONAUTO W/O SCOPE: CPT | Performed by: OBSTETRICS & GYNECOLOGY

## 2019-05-23 RX ORDER — BENZONATATE 200 MG/1
CAPSULE ORAL
COMMUNITY
End: 2019-05-23

## 2019-05-23 RX ORDER — TRAMADOL HYDROCHLORIDE 50 MG/1
TABLET ORAL
COMMUNITY
End: 2019-05-23

## 2019-05-23 RX ORDER — NITROGLYCERIN 0.4 MG/1
TABLET SUBLINGUAL
COMMUNITY
Start: 2018-12-29 | End: 2020-04-21

## 2019-05-23 RX ORDER — GABAPENTIN 300 MG/1
300 CAPSULE ORAL EVERY 12 HOURS SCHEDULED
COMMUNITY
End: 2020-11-20

## 2019-05-23 RX ORDER — HYDROCODONE BITARTRATE AND ACETAMINOPHEN 10; 325 MG/1; MG/1
1 TABLET ORAL EVERY 12 HOURS SCHEDULED
COMMUNITY
End: 2020-08-13 | Stop reason: HOSPADM

## 2019-05-23 RX ORDER — BACLOFEN 20 MG/1
TABLET ORAL
COMMUNITY
End: 2019-05-23

## 2019-05-23 RX ORDER — METOPROLOL SUCCINATE AND HYDROCHLOROTHIAZIDE 12.5; 5 MG/1; MG/1
TABLET ORAL
COMMUNITY
End: 2019-11-20 | Stop reason: SDUPTHER

## 2019-05-23 RX ORDER — HYDROCODONE BITARTRATE AND ACETAMINOPHEN 5; 325 MG/1; MG/1
TABLET ORAL EVERY 12 HOURS SCHEDULED
COMMUNITY
End: 2019-05-23

## 2019-05-23 RX ORDER — ATORVASTATIN CALCIUM 40 MG/1
TABLET, FILM COATED ORAL
COMMUNITY
Start: 2019-01-15 | End: 2019-11-18 | Stop reason: SDUPTHER

## 2019-05-23 NOTE — TELEPHONE ENCOUNTER
Pt says since having her stent placed in December, in her left arm and in her arm pit it feels numb and just doesn't feel right. She thought the feeling would go away. Pt says it never goes away the feeling is always there.    She is wondering if you can give her some insight to her problem, if this feeling will ever go away or if it will always be there.     Pt can be reached at     [phone]

## 2019-05-23 NOTE — PROGRESS NOTES
GYN Annual Exam     CC- Here for annual exam.     Ortiz Salamanca is a 48 y.o. female who presents for annual well woman exam. Pt is a new pt to me. Pt had a TLH/BS in  due to AUB. LPS was 2 years ago. Pt had a heart attack in 2018 and she had a stent placed and is now on blood thinner until 2019.    OB History     No data available          Current contraception: status post hysterectomy  History of abnormal Pap smear: no  History of abnormal mammogram: no  Family history of uterine, colon or ovarian cancer: no  Family history of breast cancer: no    Health Maintenance   Topic Date Due   • ANNUAL PHYSICAL  1973   • TDAP/TD VACCINES (1 - Tdap) 1989   • PAP SMEAR  2018   • INFLUENZA VACCINE  2019   • LIPID PANEL  2019   • ANNUAL GYN EXAM  2020       Past Medical History:   Diagnosis Date   • Coronary artery disease involving native coronary artery of native heart without angina pectoris 2019   • Hyperlipidemia    • Migraine headache    • Presence of drug coated stent in LAD coronary artery 2019       Past Surgical History:   Procedure Laterality Date   • CARDIAC CATHETERIZATION N/A 2018    Procedure: Left Heart Cath;  Surgeon: Hilario Coronado MD;  Location:  [facility] CATH INVASIVE LOCATION;  Service: Cardiovascular   • CARDIAC CATHETERIZATION N/A 2018    Procedure: Left ventriculography;  Surgeon: Hilario Coronado MD;  Location:  [facility] CATH INVASIVE LOCATION;  Service: Cardiovascular   • CARDIAC CATHETERIZATION N/A 2018    Procedure: Stent EVARISTO coronary;  Surgeon: Hilario Coronado MD;  Location:  [facility] CATH INVASIVE LOCATION;  Service: Cardiovascular   • CARDIAC CATHETERIZATION N/A 2018    Procedure: Coronary angiography;  Surgeon: Hilario Coronado MD;  Location:  [facility] CATH INVASIVE LOCATION;  Service: Cardiovascular   •  SECTION     • CORONARY STENT PLACEMENT     • CYST REMOVAL Left     knee   •  "HYSTERECTOMY           Current Outpatient Medications:   •  atorvastatin (LIPITOR) 40 MG tablet, atorvastatin 40 mg tablet, Disp: , Rfl:   •  nitroglycerin (NITROSTAT) 0.4 MG SL tablet, nitroglycerin 0.4 mg sublingual tablet, Disp: , Rfl:   •  ticagrelor (BRILINTA) 90 MG tablet tablet, Brilinta 90 mg tablet, Disp: , Rfl:   •  aspirin 81 MG chewable tablet, Chew 1 tablet Daily., Disp: 30 tablet, Rfl: 11  •  gabapentin (NEURONTIN) 300 MG capsule, Every 12 (Twelve) Hours., Disp: , Rfl:   •  HYDROcodone-acetaminophen (NORCO)  MG per tablet, Every 12 (Twelve) Hours., Disp: , Rfl:   •  Metoprolol-HCTZ ER 50-12.5 MG tablet sustained-release 24 hour, metoprolol succ 50 mg-hydrochlorothiazide 12.5 mg tablet,ext.rel 24 hr  Take 1 tablet every day by oral route., Disp: , Rfl:   No current facility-administered medications for this visit.     Allergies   Allergen Reactions   • Cephalexin Anaphylaxis     Other reaction(s): Vomiting       Social History     Tobacco Use   • Smoking status: Former Smoker     Last attempt to quit: 2018     Years since quittin.4   • Smokeless tobacco: Never Used   Substance Use Topics   • Alcohol use: Yes     Comment: rare   • Drug use: No       Family History   Problem Relation Age of Onset   • Cancer Mother    • Heart disease Brother    • Heart disease Maternal Grandmother        Review of Systems   Gastrointestinal: Negative for abdominal distention, abdominal pain, anal bleeding and blood in stool.   Genitourinary: Positive for dyspareunia. Negative for pelvic pain, vaginal bleeding, vaginal discharge and vaginal pain.       /72   Ht 162.6 cm (64\")   Wt 89.4 kg (197 lb)   BMI 33.81 kg/m²     Physical Exam   Constitutional: She is oriented to person, place, and time. She appears well-developed and well-nourished.   HENT:   Mouth/Throat: Normal dentition. No dental caries.   Cardiovascular: Normal rate and regular rhythm.   Pulmonary/Chest: Effort normal and breath sounds " normal. Right breast exhibits no inverted nipple, no mass, no nipple discharge, no skin change and no tenderness. Left breast exhibits no inverted nipple, no mass, no nipple discharge, no skin change and no tenderness.   Abdominal: Soft. She exhibits no distension and no mass. There is no tenderness.   Genitourinary: There is no rash, tenderness or lesion on the right labia. There is no rash, tenderness or lesion on the left labia. Right adnexum displays no mass, no tenderness and no fullness. Left adnexum displays no mass, no tenderness and no fullness. No tenderness or bleeding in the vagina. No vaginal discharge found.   Neurological: She is alert and oriented to person, place, and time.   Psychiatric: She has a normal mood and affect. Her behavior is normal. Judgment and thought content normal.   Vitals reviewed.         Assessment/Plan    1) GYN HM: check pap smear. Needs MMG- never had one. Pt has had a colonoscopy but over 10 years ago.  Will send referral. Should wait until off anticoagulation in 11/2019 before proceeding with colonoscopy. SBE demonstrated and encouraged.  2) FHx of PE: mother had PE on HRT. Discussed having pt's mother tested for a thrombophilia. If her mother will not get tested then pt should be tested. Not a good candidate for HRT.   3) Contraception: s/p TLH for menorrhagia 2008.  4) hx CAD: had a heart attack with stent placed 12/25/2018. Pt is on blood thinner until 11/2019.  5) Diet and Exercise discussed  6) Smoking Status: nonsmoker  7) Dyspareunia: not a good candidate for estrogen. Gave samples of Replens. Discussed using lubricant for intercourse  8) Follow up prn and 1 year       Diagnoses and all orders for this visit:    Encounter for gynecological examination without abnormal finding    Screening for condition  -     POC Urinalysis Dipstick  -     Ambulatory Referral to Gastroenterology  -     Mammo Screening Bilateral With CAD; Future    Special screening examination for  human papillomavirus (HPV)  -     Pap IG, HPV-hr    Pap smear, low-risk  -     Pap IG, HPV-hr    Other orders  -     Discontinue: ASPIRIN 81 PO; Aspir-81 81 mg tablet,delayed release   Take 1 tablet every day by oral route.  -     Discontinue: benzonatate (TESSALON) 200 MG capsule; benzonatate 200 mg capsule   Take by oral route for 13 days.  -     gabapentin (NEURONTIN) 300 MG capsule; Every 12 (Twelve) Hours.  -     HYDROcodone-acetaminophen (NORCO)  MG per tablet; Every 12 (Twelve) Hours.  -     Discontinue: HYDROcodone-acetaminophen (NORCO) 5-325 MG per tablet; Every 12 (Twelve) Hours.  -     Metoprolol-HCTZ ER 50-12.5 MG tablet sustained-release 24 hour; metoprolol succ 50 mg-hydrochlorothiazide 12.5 mg tablet,ext.rel 24 hr   Take 1 tablet every day by oral route.  -     atorvastatin (LIPITOR) 40 MG tablet; atorvastatin 40 mg tablet  -     Discontinue: metoprolol tartrate (LOPRESSOR) 25 MG tablet; metoprolol tartrate 25 mg tablet  -     nitroglycerin (NITROSTAT) 0.4 MG SL tablet; nitroglycerin 0.4 mg sublingual tablet  -     ticagrelor (BRILINTA) 90 MG tablet tablet; Brilinta 90 mg tablet  -     Discontinue: traMADol (ULTRAM) 50 MG tablet; tramadol 50 mg tablet  -     Discontinue: baclofen (LIORESAL) 20 MG tablet; baclofen 20 mg tablet   Take by oral route for 10 days.          Cadence Golden DO  5/26/2019  2:08 PM

## 2019-05-26 ENCOUNTER — HOSPITAL ENCOUNTER (EMERGENCY)
Facility: HOSPITAL | Age: 49
Discharge: HOME | End: 2019-05-26
Attending: EMERGENCY MEDICINE | Admitting: EMERGENCY MEDICINE

## 2019-05-26 ENCOUNTER — APPOINTMENT (OUTPATIENT)
Dept: GENERAL RADIOLOGY | Facility: HOSPITAL | Age: 49
End: 2019-05-26

## 2019-05-26 VITALS
SYSTOLIC BLOOD PRESSURE: 119 MMHG | WEIGHT: 197 LBS | DIASTOLIC BLOOD PRESSURE: 89 MMHG | OXYGEN SATURATION: 98 % | BODY MASS INDEX: 34.91 KG/M2 | HEIGHT: 63 IN | HEART RATE: 52 BPM | RESPIRATION RATE: 16 BRPM | TEMPERATURE: 96 F

## 2019-05-26 DIAGNOSIS — Z86.79 HISTORY OF CORONARY ARTERY DISEASE: ICD-10-CM

## 2019-05-26 DIAGNOSIS — R07.9 CHEST PAIN, UNSPECIFIED TYPE: Primary | ICD-10-CM

## 2019-05-26 LAB
ALBUMIN SERPL-MCNC: 3.9 G/DL (ref 3.5–5.2)
ALBUMIN/GLOB SERPL: 1.1 G/DL
ALP SERPL-CCNC: 78 U/L (ref 39–117)
ALT SERPL W P-5'-P-CCNC: 17 U/L (ref 1–33)
ANION GAP SERPL CALCULATED.3IONS-SCNC: 10 MMOL/L
AST SERPL-CCNC: 22 U/L (ref 1–32)
BASOPHILS # BLD AUTO: 0.04 10*3/MM3 (ref 0–0.2)
BASOPHILS NFR BLD AUTO: 0.6 % (ref 0–1.5)
BILIRUB SERPL-MCNC: 0.3 MG/DL (ref 0.2–1.2)
BUN BLD-MCNC: 7 MG/DL (ref 6–20)
BUN/CREAT SERPL: 9.2 (ref 7–25)
CALCIUM SPEC-SCNC: 9.1 MG/DL (ref 8.6–10.5)
CHLORIDE SERPL-SCNC: 100 MMOL/L (ref 98–107)
CO2 SERPL-SCNC: 25 MMOL/L (ref 22–29)
CREAT BLD-MCNC: 0.76 MG/DL (ref 0.57–1)
DEPRECATED RDW RBC AUTO: 46.2 FL (ref 37–54)
EOSINOPHIL # BLD AUTO: 0.08 10*3/MM3 (ref 0–0.4)
EOSINOPHIL NFR BLD AUTO: 1.2 % (ref 0.3–6.2)
ERYTHROCYTE [DISTWIDTH] IN BLOOD BY AUTOMATED COUNT: 14.5 % (ref 12.3–15.4)
GFR SERPL CREATININE-BSD FRML MDRD: 98 ML/MIN/1.73
GLOBULIN UR ELPH-MCNC: 3.5 GM/DL
GLUCOSE BLD-MCNC: 110 MG/DL (ref 65–99)
HCT VFR BLD AUTO: 37.7 % (ref 34–46.6)
HGB BLD-MCNC: 11.7 G/DL (ref 12–15.9)
HOLD SPECIMEN: NORMAL
HOLD SPECIMEN: NORMAL
IMM GRANULOCYTES # BLD AUTO: 0.02 10*3/MM3 (ref 0–0.05)
IMM GRANULOCYTES NFR BLD AUTO: 0.3 % (ref 0–0.5)
LYMPHOCYTES # BLD AUTO: 2.33 10*3/MM3 (ref 0.7–3.1)
LYMPHOCYTES NFR BLD AUTO: 35.6 % (ref 19.6–45.3)
MCH RBC QN AUTO: 26.7 PG (ref 26.6–33)
MCHC RBC AUTO-ENTMCNC: 31 G/DL (ref 31.5–35.7)
MCV RBC AUTO: 86.1 FL (ref 79–97)
MONOCYTES # BLD AUTO: 0.46 10*3/MM3 (ref 0.1–0.9)
MONOCYTES NFR BLD AUTO: 7 % (ref 5–12)
NEUTROPHILS # BLD AUTO: 3.61 10*3/MM3 (ref 1.7–7)
NEUTROPHILS NFR BLD AUTO: 55.3 % (ref 42.7–76)
NRBC BLD AUTO-RTO: 0 /100 WBC (ref 0–0.2)
PLATELET # BLD AUTO: 284 10*3/MM3 (ref 140–450)
PMV BLD AUTO: 10.3 FL (ref 6–12)
POTASSIUM BLD-SCNC: 4 MMOL/L (ref 3.5–5.2)
PROT SERPL-MCNC: 7.4 G/DL (ref 6–8.5)
RBC # BLD AUTO: 4.38 10*6/MM3 (ref 3.77–5.28)
SODIUM BLD-SCNC: 135 MMOL/L (ref 136–145)
TROPONIN T SERPL-MCNC: <0.01 NG/ML (ref 0–0.03)
TROPONIN T SERPL-MCNC: <0.01 NG/ML (ref 0–0.03)
WBC NRBC COR # BLD: 6.54 10*3/MM3 (ref 3.4–10.8)
WHOLE BLOOD HOLD SPECIMEN: NORMAL
WHOLE BLOOD HOLD SPECIMEN: NORMAL

## 2019-05-26 PROCEDURE — 80053 COMPREHEN METABOLIC PANEL: CPT

## 2019-05-26 PROCEDURE — 93010 ELECTROCARDIOGRAM REPORT: CPT | Performed by: INTERNAL MEDICINE

## 2019-05-26 PROCEDURE — 99283 EMERGENCY DEPT VISIT LOW MDM: CPT

## 2019-05-26 PROCEDURE — 93005 ELECTROCARDIOGRAM TRACING: CPT | Performed by: EMERGENCY MEDICINE

## 2019-05-26 PROCEDURE — 71046 X-RAY EXAM CHEST 2 VIEWS: CPT

## 2019-05-26 PROCEDURE — 84484 ASSAY OF TROPONIN QUANT: CPT

## 2019-05-26 PROCEDURE — 84484 ASSAY OF TROPONIN QUANT: CPT | Performed by: EMERGENCY MEDICINE

## 2019-05-26 PROCEDURE — 93005 ELECTROCARDIOGRAM TRACING: CPT

## 2019-05-26 PROCEDURE — 85025 COMPLETE CBC W/AUTO DIFF WBC: CPT

## 2019-05-26 RX ORDER — ASPIRIN 325 MG
325 TABLET ORAL ONCE
Status: COMPLETED | OUTPATIENT
Start: 2019-05-26 | End: 2019-05-26

## 2019-05-26 RX ORDER — SODIUM CHLORIDE 0.9 % (FLUSH) 0.9 %
10 SYRINGE (ML) INJECTION AS NEEDED
Status: DISCONTINUED | OUTPATIENT
Start: 2019-05-26 | End: 2019-05-26 | Stop reason: HOSPADM

## 2019-05-26 RX ADMIN — ASPIRIN 325 MG: 325 TABLET ORAL at 06:47

## 2019-05-29 LAB
CYTOLOGIST CVX/VAG CYTO: NORMAL
CYTOLOGY CVX/VAG DOC CYTO: NORMAL
CYTOLOGY CVX/VAG DOC THIN PREP: NORMAL
DX ICD CODE: NORMAL
HIV 1 & 2 AB SER-IMP: NORMAL
HPV I/H RISK 1 DNA CVX QL PROBE+SIG AMP: NORMAL
HPV I/H RISK 4 DNA CVX QL PROBE+SIG AMP: NEGATIVE
Lab: NORMAL
OTHER STN SPEC: NORMAL
STAT OF ADQ CVX/VAG CYTO-IMP: NORMAL

## 2019-05-30 ENCOUNTER — OFFICE VISIT (OUTPATIENT)
Dept: CARDIOLOGY | Facility: CLINIC | Age: 49
End: 2019-05-30

## 2019-05-30 VITALS
SYSTOLIC BLOOD PRESSURE: 126 MMHG | BODY MASS INDEX: 35.1 KG/M2 | OXYGEN SATURATION: 98 % | HEIGHT: 63 IN | HEART RATE: 75 BPM | WEIGHT: 198.1 LBS | DIASTOLIC BLOOD PRESSURE: 88 MMHG

## 2019-05-30 DIAGNOSIS — I25.10 CORONARY ARTERY DISEASE INVOLVING NATIVE CORONARY ARTERY OF NATIVE HEART WITHOUT ANGINA PECTORIS: Chronic | ICD-10-CM

## 2019-05-30 DIAGNOSIS — R20.0 NUMBNESS AND TINGLING IN LEFT ARM: Primary | ICD-10-CM

## 2019-05-30 DIAGNOSIS — Z95.5 PRESENCE OF DRUG COATED STENT IN LAD CORONARY ARTERY: Chronic | ICD-10-CM

## 2019-05-30 DIAGNOSIS — R20.2 NUMBNESS AND TINGLING IN LEFT ARM: Primary | ICD-10-CM

## 2019-05-30 PROCEDURE — 99213 OFFICE O/P EST LOW 20 MIN: CPT | Performed by: INTERNAL MEDICINE

## 2019-05-30 NOTE — PROGRESS NOTES
Subjective:     Encounter Date:2019      Patient ID: Ortiz Salamanca is a 48 y.o. female.    Chief Complaint: CAD  History of Present Illness    Dear Dr. Chandler,    I had the pleasure of seeing this patient in the office today for follow-up on her cardiac status.  She has a history of CAD and prior drug-eluting stent.    And she came here for follow-up from the emergency room.  She was seen there because she had some tingling in her left upper shoulder and left upper arm.  A little bit of discomfort just adjacent to that as well.  She was reassured in the emergency room but she came in here because she went to be sure that everything was okay from her heart.  Evaluation in the emergency room was unremarkable.  She does have issues with her spine and back.  She has pretty significant lumbar spinal disease.  She is followed by neurosurgery, Dr. Wahl, at [facility].  She states that the tingling occurs most of the time when she first wakes up in the morning.  He will wake her up at night but when she wakes up she will have the tingling.  She will have it off and on sometimes during the day as well.  It is not associated with activity or exercise.     She presented to the ER in [place] on 18 with chest pain and mild SOA. She had the chest pain for 2 days and it was increasing. At times it was worse when lying down and at other times it would radiate into her left arm. It was not worse with cough or deep breathing.  She stated the pain was not sharp and there was no nausea or vomiting. She thought it was indigestion for the first day or so and took Tums and Nexium which did not help.  Her ECG was unremarkable but her initial troponin was indeterminate at 0.040.  She has a strong family history of CAD.  One of her brothers  from a MI in his 40s and another brother has stents.  She has a history of hyperlipidemia and was a daily smoker.  It was felt she needed to be admitted and cardiology was consulted.        She had an echocardiogram that showed normal biventricular size and function, no wall motion abnormalities, and no pericardial effusion.  Her serial troponin continued to increase to 0.046 and 0.072.  She was then transferred to [facility] for a cardiac catheterization.  She had a 99% stenosis of the mid LAD which was treated with angioplasty and EVARISTO 3.5 x 28MM Xience Divina.  She was discharged home on DAPT.   She presented to the [facility] ER again on 12/29/18 with complaints of left sided heart pain that started the previous night around 11 pm.  She did not take anything for the pain.  Her ECG was essentially unchanged from her last ECG on 12/27/18 at discharge. Her troponin was 0.079 which had decreased from 0.083 on previous admission.  She was given the option to be discharged home and to follow-up in the office this coming week or to stay and have repeat cardiac catheterization.  She initially chose to be transferred to [facility] for elective cardiac catheterization.       The following portions of the patient's history were reviewed and updated as appropriate: allergies, current medications, past family history, past medical history, past social history, past surgical history and problem list.    Past Medical History:   Diagnosis Date   • Coronary artery disease involving native coronary artery of native heart without angina pectoris 1/31/2019   • Hyperlipidemia    • Migraine headache    • Presence of drug coated stent in LAD coronary artery 1/31/2019       Past Surgical History:   Procedure Laterality Date   • CARDIAC CATHETERIZATION N/A 12/26/2018    Procedure: Left Heart Cath;  Surgeon: Hilario Coronado MD;  Location:  [facility] CATH INVASIVE LOCATION;  Service: Cardiovascular   • CARDIAC CATHETERIZATION N/A 12/26/2018    Procedure: Left ventriculography;  Surgeon: Hilario Coronado MD;  Location:  [facility] CATH INVASIVE LOCATION;  Service: Cardiovascular   • CARDIAC  CATHETERIZATION N/A 2018    Procedure: Stent EVARISTO coronary;  Surgeon: Hilario Coronado MD;  Location:  [facility] CATH INVASIVE LOCATION;  Service: Cardiovascular   • CARDIAC CATHETERIZATION N/A 2018    Procedure: Coronary angiography;  Surgeon: Hilario Coronado MD;  Location:  [facility] CATH INVASIVE LOCATION;  Service: Cardiovascular   •  SECTION     • CORONARY STENT PLACEMENT     • CYST REMOVAL Left     knee   • HYSTERECTOMY         Social History     Socioeconomic History   • Marital status:      Spouse name: Not on file   • Number of children: Not on file   • Years of education: Not on file   • Highest education level: Not on file   Tobacco Use   • Smoking status: Former Smoker     Last attempt to quit: 2018     Years since quittin.4   • Smokeless tobacco: Never Used   Substance and Sexual Activity   • Alcohol use: Yes     Comment: rare   • Drug use: No   • Sexual activity: Defer       Review of Systems   Constitution: Negative for chills, decreased appetite, fever and night sweats.   HENT: Negative for ear discharge, ear pain, hearing loss, nosebleeds and sore throat.    Eyes: Negative for blurred vision, double vision and pain.   Cardiovascular: Negative for cyanosis.   Respiratory: Negative for hemoptysis and sputum production.    Endocrine: Negative for cold intolerance and heat intolerance.   Hematologic/Lymphatic: Negative for adenopathy.   Skin: Negative for dry skin, itching, nail changes, rash and suspicious lesions.   Musculoskeletal: Negative for arthritis, gout, muscle cramps, muscle weakness, myalgias and neck pain.   Gastrointestinal: Negative for anorexia, bowel incontinence, constipation, diarrhea, dysphagia, hematemesis and jaundice.   Genitourinary: Negative for bladder incontinence, dysuria, flank pain, frequency, hematuria and nocturia.   Neurological: Negative for focal weakness, numbness, paresthesias and seizures.   Psychiatric/Behavioral: Negative  "for altered mental status, hallucinations, hypervigilance, suicidal ideas and thoughts of violence.   Allergic/Immunologic: Negative for persistent infections.       Procedures       Objective:     Vitals:    05/30/19 1335   BP: 126/88   BP Location: Left arm   Patient Position: Sitting   Cuff Size: Adult   Pulse: 75   SpO2: 98%   Weight: 89.9 kg (198 lb 1.6 oz)   Height: 160 cm (63\")         Physical Exam   Constitutional: She is oriented to person, place, and time. She appears well-developed and well-nourished. No distress.   HENT:   Head: Normocephalic and atraumatic.   Nose: Nose normal.   Mouth/Throat: Oropharynx is clear and moist.   Eyes: Conjunctivae and EOM are normal. Pupils are equal, round, and reactive to light. Right eye exhibits no discharge. Left eye exhibits no discharge.   Neck: Normal range of motion. Neck supple. No tracheal deviation present. No thyromegaly present.   Cardiovascular: Normal rate, regular rhythm, S1 normal, S2 normal, normal heart sounds and normal pulses. Exam reveals no S3.   Pulmonary/Chest: Effort normal and breath sounds normal. No stridor. No respiratory distress. She exhibits no tenderness.   Abdominal: Soft. Bowel sounds are normal. She exhibits no distension and no mass. There is no tenderness. There is no rebound and no guarding.   Musculoskeletal: Normal range of motion. She exhibits no tenderness or deformity.   Lymphadenopathy:     She has no cervical adenopathy.   Neurological: She is alert and oriented to person, place, and time. She has normal reflexes.   Skin: Skin is warm and dry. No rash noted. She is not diaphoretic. No erythema.   Psychiatric: She has a normal mood and affect. Thought content normal.       Lab Review:   Results from last 7 days   Lab Units 05/26/19  0640   SODIUM mmol/L 135*   POTASSIUM mmol/L 4.0   CHLORIDE mmol/L 100   CO2 mmol/L 25.0   BUN mg/dL 7   CREATININE mg/dL 0.76   GLUCOSE mg/dL 110*   CALCIUM mg/dL 9.1           Performed      "   Assessment:          Diagnosis Plan   1. Numbness and tingling in left arm     2. Coronary artery disease involving native coronary artery of native heart without angina pectoris     3. Presence of drug coated stent in LAD coronary artery            Plan:       1.  CAD status post non-ST segment elevation myocardial infarction a drug-coated stent.  Patient is participate in cardiac rehabilitation and doing well.  We will continue current guideline directed medical therapy.  I will see her back in the office in 10 months; I anticipate we will discontinue her ticagrelor at that point.  2.  Tobacco abuse-patient stopped smoking at the time of her myocardial infarction and has not resumed.  3.  Left shoulder and upper arm tingling-no evidence that this has anything to do with any cardiac pathology.  This may well be due to her previously known spinal pathology-in the past she mainly had lumbar symptoms.  She was going to check with Dr. Wahl.  Thank you very much for allowing us to participate in the care of this pleasant patient.  Please don't hesitate to call if I can be of assistance in any way.      Current Outpatient Medications:   •  aspirin 81 MG chewable tablet, Chew 1 tablet Daily., Disp: 30 tablet, Rfl: 11  •  atorvastatin (LIPITOR) 40 MG tablet, atorvastatin 40 mg tablet, Disp: , Rfl:   •  gabapentin (NEURONTIN) 300 MG capsule, Every 12 (Twelve) Hours., Disp: , Rfl:   •  HYDROcodone-acetaminophen (NORCO)  MG per tablet, Every 12 (Twelve) Hours., Disp: , Rfl:   •  Metoprolol-HCTZ ER 50-12.5 MG tablet sustained-release 24 hour, metoprolol succ 50 mg-hydrochlorothiazide 12.5 mg tablet,ext.rel 24 hr  Take 1 tablet every day by oral route., Disp: , Rfl:   •  nitroglycerin (NITROSTAT) 0.4 MG SL tablet, nitroglycerin 0.4 mg sublingual tablet, Disp: , Rfl:   •  ticagrelor (BRILINTA) 90 MG tablet tablet, Brilinta 90 mg tablet, Disp: , Rfl:

## 2019-07-15 ENCOUNTER — TELEPHONE (OUTPATIENT)
Dept: GASTROENTEROLOGY | Facility: CLINIC | Age: 49
End: 2019-07-15

## 2019-07-25 ENCOUNTER — PREP FOR SURGERY (OUTPATIENT)
Dept: OTHER | Facility: HOSPITAL | Age: 49
End: 2019-07-25

## 2019-07-25 DIAGNOSIS — Z12.11 SCREEN FOR COLON CANCER: Primary | ICD-10-CM

## 2019-07-26 ENCOUNTER — TELEPHONE (OUTPATIENT)
Dept: GASTROENTEROLOGY | Facility: CLINIC | Age: 49
End: 2019-07-26

## 2019-08-02 PROBLEM — Z12.11 SCREEN FOR COLON CANCER: Status: ACTIVE | Noted: 2019-08-02

## 2019-10-02 ENCOUNTER — TELEPHONE (OUTPATIENT)
Dept: GASTROENTEROLOGY | Facility: CLINIC | Age: 49
End: 2019-10-02

## 2019-10-02 NOTE — TELEPHONE ENCOUNTER
SPOKE WIT PATIENT.  SHE STATES HAVE NOT RECEIVED HER INSTRUCTIONS FOR HER PROCEDURE ON 10/09/2019.  EXPLAINED MAILED 08/29/2019, VERIFY ADDRESS WHICH IS CORRECT.  E-MAIL TO HER [email or web address] TODAY.

## 2019-10-08 ENCOUNTER — ANESTHESIA EVENT (OUTPATIENT)
Dept: PERIOP | Facility: HOSPITAL | Age: 49
End: 2019-10-08

## 2019-10-09 ENCOUNTER — ANESTHESIA (OUTPATIENT)
Dept: PERIOP | Facility: HOSPITAL | Age: 49
End: 2019-10-09

## 2019-10-09 ENCOUNTER — HOSPITAL ENCOUNTER (OUTPATIENT)
Facility: HOSPITAL | Age: 49
Setting detail: HOSPITAL OUTPATIENT SURGERY
Discharge: HOME | End: 2019-10-09
Attending: INTERNAL MEDICINE | Admitting: INTERNAL MEDICINE

## 2019-10-09 VITALS
WEIGHT: 200.6 LBS | HEART RATE: 61 BPM | RESPIRATION RATE: 14 BRPM | DIASTOLIC BLOOD PRESSURE: 63 MMHG | BODY MASS INDEX: 35.53 KG/M2 | SYSTOLIC BLOOD PRESSURE: 105 MMHG | OXYGEN SATURATION: 100 % | TEMPERATURE: 97.5 F

## 2019-10-09 DIAGNOSIS — Z12.11 SCREEN FOR COLON CANCER: ICD-10-CM

## 2019-10-09 PROCEDURE — 45380 COLONOSCOPY AND BIOPSY: CPT | Performed by: INTERNAL MEDICINE

## 2019-10-09 PROCEDURE — 25010000002 PROPOFOL 10 MG/ML EMULSION: Performed by: NURSE ANESTHETIST, CERTIFIED REGISTERED

## 2019-10-09 PROCEDURE — 88305 TISSUE EXAM BY PATHOLOGIST: CPT | Performed by: INTERNAL MEDICINE

## 2019-10-09 RX ORDER — SODIUM CHLORIDE 0.9 % (FLUSH) 0.9 %
3 SYRINGE (ML) INJECTION EVERY 12 HOURS SCHEDULED
Status: DISCONTINUED | OUTPATIENT
Start: 2019-10-09 | End: 2019-10-09 | Stop reason: HOSPADM

## 2019-10-09 RX ORDER — SODIUM CHLORIDE, SODIUM LACTATE, POTASSIUM CHLORIDE, CALCIUM CHLORIDE 600; 310; 30; 20 MG/100ML; MG/100ML; MG/100ML; MG/100ML
100 INJECTION, SOLUTION INTRAVENOUS CONTINUOUS
Status: DISCONTINUED | OUTPATIENT
Start: 2019-10-09 | End: 2019-10-09 | Stop reason: HOSPADM

## 2019-10-09 RX ORDER — LIDOCAINE HYDROCHLORIDE 10 MG/ML
0.5 INJECTION, SOLUTION EPIDURAL; INFILTRATION; INTRACAUDAL; PERINEURAL ONCE AS NEEDED
Status: DISCONTINUED | OUTPATIENT
Start: 2019-10-09 | End: 2019-10-09 | Stop reason: HOSPADM

## 2019-10-09 RX ORDER — SODIUM CHLORIDE, SODIUM LACTATE, POTASSIUM CHLORIDE, CALCIUM CHLORIDE 600; 310; 30; 20 MG/100ML; MG/100ML; MG/100ML; MG/100ML
9 INJECTION, SOLUTION INTRAVENOUS CONTINUOUS
Status: DISCONTINUED | OUTPATIENT
Start: 2019-10-09 | End: 2019-10-09 | Stop reason: HOSPADM

## 2019-10-09 RX ORDER — LIDOCAINE HYDROCHLORIDE 20 MG/ML
INJECTION, SOLUTION INFILTRATION; PERINEURAL AS NEEDED
Status: DISCONTINUED | OUTPATIENT
Start: 2019-10-09 | End: 2019-10-09 | Stop reason: SURG

## 2019-10-09 RX ORDER — ONDANSETRON 2 MG/ML
4 INJECTION INTRAMUSCULAR; INTRAVENOUS ONCE AS NEEDED
Status: DISCONTINUED | OUTPATIENT
Start: 2019-10-09 | End: 2019-10-09 | Stop reason: HOSPADM

## 2019-10-09 RX ORDER — SODIUM CHLORIDE 0.9 % (FLUSH) 0.9 %
1-10 SYRINGE (ML) INJECTION AS NEEDED
Status: DISCONTINUED | OUTPATIENT
Start: 2019-10-09 | End: 2019-10-09 | Stop reason: HOSPADM

## 2019-10-09 RX ORDER — PROPOFOL 10 MG/ML
VIAL (ML) INTRAVENOUS AS NEEDED
Status: DISCONTINUED | OUTPATIENT
Start: 2019-10-09 | End: 2019-10-09 | Stop reason: SURG

## 2019-10-09 RX ORDER — MAGNESIUM HYDROXIDE 1200 MG/15ML
LIQUID ORAL AS NEEDED
Status: DISCONTINUED | OUTPATIENT
Start: 2019-10-09 | End: 2019-10-09 | Stop reason: HOSPADM

## 2019-10-09 RX ORDER — SODIUM CHLORIDE 9 MG/ML
40 INJECTION, SOLUTION INTRAVENOUS AS NEEDED
Status: DISCONTINUED | OUTPATIENT
Start: 2019-10-09 | End: 2019-10-09 | Stop reason: HOSPADM

## 2019-10-09 RX ADMIN — PROPOFOL 40 MG: 10 INJECTION, EMULSION INTRAVENOUS at 15:52

## 2019-10-09 RX ADMIN — PROPOFOL 20 MG: 10 INJECTION, EMULSION INTRAVENOUS at 15:59

## 2019-10-09 RX ADMIN — LIDOCAINE HYDROCHLORIDE 100 MG: 20 INJECTION, SOLUTION INFILTRATION; PERINEURAL at 15:49

## 2019-10-09 RX ADMIN — PROPOFOL 20 MG: 10 INJECTION, EMULSION INTRAVENOUS at 15:55

## 2019-10-09 RX ADMIN — PROPOFOL 20 MG: 10 INJECTION, EMULSION INTRAVENOUS at 16:09

## 2019-10-09 RX ADMIN — PROPOFOL 20 MG: 10 INJECTION, EMULSION INTRAVENOUS at 16:07

## 2019-10-09 RX ADMIN — PROPOFOL 30 MG: 10 INJECTION, EMULSION INTRAVENOUS at 15:57

## 2019-10-09 RX ADMIN — PROPOFOL 20 MG: 10 INJECTION, EMULSION INTRAVENOUS at 16:05

## 2019-10-09 RX ADMIN — SODIUM CHLORIDE, POTASSIUM CHLORIDE, SODIUM LACTATE AND CALCIUM CHLORIDE 9 ML/HR: 600; 310; 30; 20 INJECTION, SOLUTION INTRAVENOUS at 15:19

## 2019-10-09 RX ADMIN — PROPOFOL 20 MG: 10 INJECTION, EMULSION INTRAVENOUS at 16:01

## 2019-10-09 RX ADMIN — PROPOFOL 20 MG: 10 INJECTION, EMULSION INTRAVENOUS at 16:03

## 2019-10-09 RX ADMIN — PROPOFOL 50 MG: 10 INJECTION, EMULSION INTRAVENOUS at 15:49

## 2019-10-09 NOTE — ANESTHESIA POSTPROCEDURE EVALUATION
Patient: Sunday JACQUES Salamanca    Procedure Summary     Date:  10/09/19 Room / Location:  [facility] ENDOSCOPY 1 /  [facility] OR    Anesthesia Start:  1547 Anesthesia Stop:  1615    Procedure:  COLONOSCOPY with polypectomy (N/A ) Diagnosis:       Screen for colon cancer      Colon polyp      (Screen for colon cancer [Z12.11])    Surgeon:  Dung Hutchison MD Provider:  Clover Ingram MD    Anesthesia Type:  MAC ASA Status:  2          Anesthesia Type: MAC  Last vitals  BP   103/57 (10/09/19 1616)   Temp   97.5 °F (36.4 °C) (10/09/19 1616)   Pulse   63 (10/09/19 1616)   Resp   12 (10/09/19 1616)     SpO2   100 % (10/09/19 1616)     Post Anesthesia Care and Evaluation    Patient location during evaluation: PHASE II  Patient participation: complete - patient participated  Level of consciousness: awake and alert  Pain score: 0  Pain management: adequate  Airway patency: patent  Anesthetic complications: No anesthetic complications  PONV Status: none  Cardiovascular status: acceptable  Respiratory status: acceptable  Hydration status: acceptable

## 2019-10-09 NOTE — ANESTHESIA POSTPROCEDURE EVALUATION
Patient: Sunday JACQUES Salamanca    Procedure Summary     Date:  10/09/19 Room / Location:  [facility] ENDOSCOPY 1 /  [facility]    Anesthesia Start:  1547 Anesthesia Stop:  1615    Procedure:  COLONOSCOPY with polypectomy (N/A ) Diagnosis:       Screen for colon cancer      Colon polyp      (Screen for colon cancer [Z12.11])    Surgeon:  Dung Hutchison MD Provider:  Clover Ingram MD    Anesthesia Type:  MAC ASA Status:  2          Anesthesia Type: MAC  Last vitals  BP   105/63 (10/09/19 1640)   Temp   97.5 °F (36.4 °C) (10/09/19 1616)   Pulse   61 (10/09/19 1640)   Resp   14 (10/09/19 1640)     SpO2   100 % (10/09/19 1640)     Post Anesthesia Care and Evaluation    Patient location during evaluation: PHASE II  Patient participation: complete - patient participated  Level of consciousness: awake  Pain management: adequate  Airway patency: patent  Anesthetic complications: No anesthetic complications  PONV Status: none  Cardiovascular status: acceptable  Respiratory status: acceptable  Hydration status: acceptable

## 2019-10-09 NOTE — BRIEF OP NOTE
COLONOSCOPY  Progress Note    Sunday JACQUES Salamanca  10/9/2019    Pre-op Diagnosis:   Screen for colon cancer [Z12.11]       Post-Op Diagnosis Codes:     * Screen for colon cancer [Z12.11]     * Colon polyp [K63.5]    Procedure/CPT® Codes:      Procedure(s):  COLONOSCOPY with polypectomy    Surgeon(s):  Dung Hutchison MD    Anesthesia: Monitored Anesthesia Care    Staff:   Circulator: Maria Elena Ferro RN  Scrub Person: Mady Barajas    Estimated Blood Loss: none    Urine Voided: * No values recorded between 10/9/2019  3:44 PM and 10/9/2019  4:11 PM *    Specimens:                Specimens     ID Source Type Tests Collected By Collected At Frozen?      A Large Intestine Polyp · TISSUE PATHOLOGY EXAM   Dung Hutchison MD 10/9/19 9563 No     Description: ILEOCECAL VALVE POLYP                Drains:      Findings: Colon to TI good Prep  Dim polyp -Cold Bioipsy    Complications: None      Dung Hutchison MD     Date: 10/9/2019  Time: 4:14 PM

## 2019-10-09 NOTE — OP NOTE
COLONOSCOPY  Procedure Report    Patient Name:  Ortiz Salamanca  YOB: 1970    Date of Surgery:  10/9/2019     Indications:  Screening for CRC    Pre-op Diagnosis:   Screen for colon cancer [Z12.11]    Post-Op Diagnosis Codes:     * Screen for colon cancer [Z12.11]     * Colon polyp [K63.5]         Procedure/CPT® Codes:      Procedure(s):  COLONOSCOPY with polypectomy    Staff:  Surgeon(s):  Dung Hutchison MD         Anesthesia: Monitored Anesthesia Care    Estimated Blood Loss: none    Specimens:   ID Type Source Tests Collected by Time   A : ILEOCECAL VALVE POLYP Polyp Large Intestine TISSUE PATHOLOGY EXAM Dung Hutchison MD 10/9/2019 1608       Implants:    Nothing was implanted during the procedure      Description of Procedure: After having signed informed consent, she was brought to the endoscopy suite, placed in left lateral decubitus position and given her IV sedation. Rectal exam revealed no external lesions, normal anal tone, no rectal mass. The scope was introduced in the rectum; advanced under direct visualization through the rectum, sigmoid colon, descending colon, to and around the splenic flexure; reduced; advanced through the transverse colon with some looping. The scope was reduced using variable resistance. It was able to be advanced to and around the hepatic flexure, reduced in the ascending colon and then advanced into the cecum. The cecum was identified by the appendiceal orifice and the ileocecal valve. It was well prepped and normal-appearing. The ileocecal valve was intubated. The terminal ileum was normal-appearing, examined at greater than 10 cm. The scope was withdrawn back in the ascending colon. On the fold of the ileocecal valve was a 4 x 5 mm polyp that was biopsied, felt to be completely removed, sent separately as ileocecal valve fold polyp. As the scope was withdrawn, no further mucosal lesions were noted throughout the remainder of the  well-prepped exam. No diverticular openings were encountered. Within the rectum, the scope was retroflexed revealing an intact dentate line and no mucosal lesions. The scope was deretroflexed and withdrawn. The patient tolerated the procedure very well.           Findings: Colon to TI good Prep  Dim polyp -Cold Bioipsy      Complications: None    Recommendations: results to be called      Dung Hutchison MD     Date: 10/9/2019  Time: 4:15 PM

## 2019-10-09 NOTE — H&P
Patient Care Team:  Morris Chandler MD as PCP - General (General Practice)    CHIEF COMPLAINT: Screening for Colon Cancer    HISTORY OF PRESENT ILLNESS:    No previous exam; No family history      Past Medical History:   Diagnosis Date   • Coronary artery disease involving native coronary artery of native heart without angina pectoris 2019   • Heart attack (CMS/HCC)     2018 dec 25   • Hyperlipidemia    • Migraine headache    • Presence of drug coated stent in LAD coronary artery 2019     Past Surgical History:   Procedure Laterality Date   • CARDIAC CATHETERIZATION N/A 2018    Procedure: Left Heart Cath;  Surgeon: Hilario Coronado MD;  Location:  [facility] CATH INVASIVE LOCATION;  Service: Cardiovascular   • CARDIAC CATHETERIZATION N/A 2018    Procedure: Left ventriculography;  Surgeon: Hilario Coronado MD;  Location:  [facility] CATH INVASIVE LOCATION;  Service: Cardiovascular   • CARDIAC CATHETERIZATION N/A 2018    Procedure: Stent EVARISTO coronary;  Surgeon: Hilario Coronado MD;  Location:  [facility] CATH INVASIVE LOCATION;  Service: Cardiovascular   • CARDIAC CATHETERIZATION N/A 2018    Procedure: Coronary angiography;  Surgeon: Hilario Coronado MD;  Location:  [facility] CATH INVASIVE LOCATION;  Service: Cardiovascular   •  SECTION     • CORONARY STENT PLACEMENT     • CYST REMOVAL Left     knee   • HYSTERECTOMY       Family History   Problem Relation Age of Onset   • Cancer Mother    • Heart disease Brother    • Heart disease Maternal Grandmother      Social History     Tobacco Use   • Smoking status: Former Smoker     Last attempt to quit: 2018     Years since quittin.8   • Smokeless tobacco: Never Used   Substance Use Topics   • Alcohol use: Yes     Comment: rare   • Drug use: No     Medications Prior to Admission   Medication Sig Dispense Refill Last Dose   • atorvastatin (LIPITOR) 40 MG tablet atorvastatin 40 mg tablet   10/8/2019 at Unknown time   •  HYDROcodone-acetaminophen (NORCO)  MG per tablet Every 12 (Twelve) Hours.   10/8/2019 at Unknown time   • Metoprolol-HCTZ ER 50-12.5 MG tablet sustained-release 24 hour metoprolol succ 50 mg-hydrochlorothiazide 12.5 mg tablet,ext.rel 24 hr   Take 1 tablet every day by oral route.   10/9/2019 at 0900   • aspirin 81 MG chewable tablet Chew 1 tablet Daily. 30 tablet 11 10/2/2019   • gabapentin (NEURONTIN) 300 MG capsule Every 12 (Twelve) Hours.   10/6/2019   • nitroglycerin (NITROSTAT) 0.4 MG SL tablet nitroglycerin 0.4 mg sublingual tablet   More than a month at Unknown time   • ticagrelor (BRILINTA) 90 MG tablet tablet Brilinta 90 mg tablet   10/2/2019     Allergies:  Cephalexin    REVIEW OF SYSTEMS:  Please see the above history of present illness for pertinent positives and negatives.  The remainder of the patient's systems have been reviewed and are negative.     Vital Signs  Temp:  [98.1 °F (36.7 °C)] 98.1 °F (36.7 °C)  Heart Rate:  [55] 55  BP: (110)/(65) 110/65    Flowsheet Rows      First Filed Value   Admission Height  --   Admission Weight  91 kg (200 lb 9.6 oz) Documented at 10/09/2019 1456           Physical Exam:  Physical Exam   Constitutional: Patient appears well-developed and well-nourished and in no acute distress   HEENT:   Head: Normocephalic and atraumatic.   Eyes:  Pupils are equal, round, and reactive to light. EOM are intact. Sclerae are anicteric and non-injected.  Mouth and Throat: Patient has moist mucous membranes. Oropharynx is clear of any erythema or exudate.     Neck: Neck supple. No JVD present. No thyromegaly present. No lymphadenopathy present.  Cardiovascular: Regular rate, regular rhythm, S1 normal and S2 normal.  Exam reveals no gallop and no friction rub.  No murmur heard.  Pulmonary/Chest: Lungs are clear to auscultation bilaterally. No respiratory distress. No wheezes. No rhonchi. No rales.   Abdominal: Soft. Bowel sounds are normal. No distension and no mass. There is no  hepatosplenomegaly. There is no tenderness.   Musculoskeletal: Normal Muscle tone  Extremities: No edema. Pulses are palpable in all 4 extremities.  Neurological: Patient is alert and oriented to person, place, and time. Cranial nerves II-XII are grossly intact with no focal deficits.  Skin: Skin is warm. No rash noted. Nails show no clubbing.  No cyanosis or erythema.    Debilities/Disabilities Identified: None  Emotional Behavior: Appropriate     Results Review:    I reviewed the patient's new clinical results.  Lab Results (most recent)     None          Imaging Results (most recent)     None        reviewed    ECG/EMG Results (most recent)     None        reviewed    Assessment/Plan     Screening for Colon cancer / Colonoscopy    I discussed the patients findings and my recommendations with patient.     Dung Hutchison MD  10/09/19  4:13 PM    Time: 10 min prior to procedure.

## 2019-10-09 NOTE — ANESTHESIA PREPROCEDURE EVALUATION
Anesthesia Evaluation     Patient summary reviewed and Nursing notes reviewed   no history of anesthetic complications:  NPO Solid Status: > 8 hours  NPO Liquid Status: > 8 hours           Airway   Mallampati: I  TM distance: >3 FB  Neck ROM: full  No difficulty expected  Dental      Pulmonary     breath sounds clear to auscultation  (+) a smoker (quit 20yrs ago) Former,   Cardiovascular   Exercise tolerance: good (4-7 METS)    ECG reviewed  PT is on anticoagulation therapy  Patient on routine beta blocker and Beta blocker given within 24 hours of surgery  Rhythm: regular  Rate: normal    (+) past MI (2018 Dec)  6-12 months, CAD, cardiac stents hyperlipidemia,     ROS comment: Narrative     HEART RATE= 55  bpm  RR Interval= 1084  ms  CO Interval= 156  ms  P Horizontal Axis= -5  deg  P Front Axis= 57  deg  QRSD Interval= 84  ms  QT Interval= 443  ms  QRS Axis= 38  deg  T Wave Axis= 26  deg  - OTHERWISE NORMAL ECG -  Sinus rhythm  Low voltage, precordial leads  ST elev, probable normal early repol pattern  NO SIGNIFICANT CHANGE FROM PREVIOUS ECG  Electronically Signed By: Andrew Kang ([facility]) 26-May-2019 13:34:52  Date and Time of Study: 2019-05-26 05:58:32        Neuro/Psych  (+) headaches, psychiatric history Anxiety,     GI/Hepatic/Renal/Endo    (+) obesity,       Musculoskeletal     (+) back pain, chronic pain,   Abdominal   (+) obese,    Substance History - negative use     OB/GYN          Other   (+) arthritis                     Anesthesia Plan    ASA 2     MAC     intravenous induction   Anesthetic plan, all risks, benefits, and alternatives have been provided, discussed and informed consent has been obtained with: patient.  Use of blood products discussed with patient  Consented to blood products.

## 2019-10-11 LAB
CYTO UR: NORMAL
LAB AP CASE REPORT: NORMAL
PATH REPORT.FINAL DX SPEC: NORMAL
PATH REPORT.GROSS SPEC: NORMAL

## 2019-10-17 ENCOUNTER — HOSPITAL ENCOUNTER (EMERGENCY)
Facility: HOSPITAL | Age: 49
Discharge: HOME | End: 2019-10-17
Attending: EMERGENCY MEDICINE | Admitting: EMERGENCY MEDICINE

## 2019-10-17 VITALS
TEMPERATURE: 98.4 F | SYSTOLIC BLOOD PRESSURE: 113 MMHG | HEART RATE: 54 BPM | DIASTOLIC BLOOD PRESSURE: 98 MMHG | BODY MASS INDEX: 38.27 KG/M2 | RESPIRATION RATE: 14 BRPM | OXYGEN SATURATION: 100 % | HEIGHT: 63 IN | WEIGHT: 216 LBS

## 2019-10-17 DIAGNOSIS — R07.89 ATYPICAL CHEST PAIN: Primary | ICD-10-CM

## 2019-10-17 LAB
ALBUMIN SERPL-MCNC: 3.7 G/DL (ref 3.5–5.2)
ALBUMIN/GLOB SERPL: 1.2 G/DL
ALP SERPL-CCNC: 85 U/L (ref 39–117)
ALT SERPL W P-5'-P-CCNC: 27 U/L (ref 1–33)
ANION GAP SERPL CALCULATED.3IONS-SCNC: 8.2 MMOL/L (ref 5–15)
AST SERPL-CCNC: 23 U/L (ref 1–32)
BASOPHILS # BLD AUTO: 0.03 10*3/MM3 (ref 0–0.2)
BASOPHILS NFR BLD AUTO: 0.4 % (ref 0–1.5)
BILIRUB SERPL-MCNC: 0.2 MG/DL (ref 0.2–1.2)
BUN BLD-MCNC: 11 MG/DL (ref 6–20)
BUN/CREAT SERPL: 14.9 (ref 7–25)
CALCIUM SPEC-SCNC: 9.2 MG/DL (ref 8.6–10.5)
CHLORIDE SERPL-SCNC: 109 MMOL/L (ref 98–107)
CO2 SERPL-SCNC: 21.8 MMOL/L (ref 22–29)
CREAT BLD-MCNC: 0.74 MG/DL (ref 0.57–1)
DEPRECATED RDW RBC AUTO: 50.4 FL (ref 37–54)
EOSINOPHIL # BLD AUTO: 0.02 10*3/MM3 (ref 0–0.4)
EOSINOPHIL NFR BLD AUTO: 0.3 % (ref 0.3–6.2)
ERYTHROCYTE [DISTWIDTH] IN BLOOD BY AUTOMATED COUNT: 15.5 % (ref 12.3–15.4)
GFR SERPL CREATININE-BSD FRML MDRD: 101 ML/MIN/1.73
GLOBULIN UR ELPH-MCNC: 3.1 GM/DL
GLUCOSE BLD-MCNC: 95 MG/DL (ref 65–99)
HCT VFR BLD AUTO: 37.6 % (ref 34–46.6)
HGB BLD-MCNC: 11.5 G/DL (ref 12–15.9)
IMM GRANULOCYTES # BLD AUTO: 0.02 10*3/MM3 (ref 0–0.05)
IMM GRANULOCYTES NFR BLD AUTO: 0.3 % (ref 0–0.5)
LYMPHOCYTES # BLD AUTO: 1.66 10*3/MM3 (ref 0.7–3.1)
LYMPHOCYTES NFR BLD AUTO: 24.6 % (ref 19.6–45.3)
MCH RBC QN AUTO: 27.3 PG (ref 26.6–33)
MCHC RBC AUTO-ENTMCNC: 30.6 G/DL (ref 31.5–35.7)
MCV RBC AUTO: 89.3 FL (ref 79–97)
MONOCYTES # BLD AUTO: 0.55 10*3/MM3 (ref 0.1–0.9)
MONOCYTES NFR BLD AUTO: 8.1 % (ref 5–12)
NEUTROPHILS # BLD AUTO: 4.47 10*3/MM3 (ref 1.7–7)
NEUTROPHILS NFR BLD AUTO: 66.3 % (ref 42.7–76)
NRBC BLD AUTO-RTO: 0 /100 WBC (ref 0–0.2)
PLATELET # BLD AUTO: 263 10*3/MM3 (ref 140–450)
PMV BLD AUTO: 10.6 FL (ref 6–12)
POTASSIUM BLD-SCNC: 4.6 MMOL/L (ref 3.5–5.2)
PROT SERPL-MCNC: 6.8 G/DL (ref 6–8.5)
RBC # BLD AUTO: 4.21 10*6/MM3 (ref 3.77–5.28)
SODIUM BLD-SCNC: 139 MMOL/L (ref 136–145)
TROPONIN T SERPL-MCNC: <0.01 NG/ML (ref 0–0.03)
WBC NRBC COR # BLD: 6.75 10*3/MM3 (ref 3.4–10.8)

## 2019-10-17 PROCEDURE — 93010 ELECTROCARDIOGRAM REPORT: CPT | Performed by: INTERNAL MEDICINE

## 2019-10-17 PROCEDURE — 84484 ASSAY OF TROPONIN QUANT: CPT | Performed by: EMERGENCY MEDICINE

## 2019-10-17 PROCEDURE — 80053 COMPREHEN METABOLIC PANEL: CPT | Performed by: EMERGENCY MEDICINE

## 2019-10-17 PROCEDURE — 93005 ELECTROCARDIOGRAM TRACING: CPT | Performed by: EMERGENCY MEDICINE

## 2019-10-17 PROCEDURE — 99283 EMERGENCY DEPT VISIT LOW MDM: CPT

## 2019-10-17 PROCEDURE — 85025 COMPLETE CBC W/AUTO DIFF WBC: CPT | Performed by: EMERGENCY MEDICINE

## 2019-10-17 PROCEDURE — 99284 EMERGENCY DEPT VISIT MOD MDM: CPT | Performed by: EMERGENCY MEDICINE

## 2019-10-17 RX ORDER — ASPIRIN 81 MG/1
324 TABLET, CHEWABLE ORAL ONCE
Status: COMPLETED | OUTPATIENT
Start: 2019-10-17 | End: 2019-10-17

## 2019-10-17 RX ADMIN — ASPIRIN 81 MG 324 MG: 81 TABLET ORAL at 07:56

## 2019-10-17 NOTE — ED PROVIDER NOTES
Subjective     Chest Pain   Pain location:  L chest  Pain quality: dull    Pain radiates to:  Does not radiate  Pain severity:  Mild  Onset quality:  Gradual  Duration:  2 days  Timing:  Constant  Chronicity:  New  Context comment:  Spont onset  Relieved by:  Nothing  Worsened by:  Movement and certain positions  Ineffective treatments:  None tried  Associated symptoms: no abdominal pain, no back pain, no cough, no diaphoresis, no nausea and no shortness of breath        Review of Systems   Constitutional: Negative for diaphoresis.   Respiratory: Negative for cough and shortness of breath.    Cardiovascular: Positive for chest pain.   Gastrointestinal: Negative for abdominal pain and nausea.   Musculoskeletal: Negative for back pain.   All other systems reviewed and are negative.      Past Medical History:   Diagnosis Date   • Colon polyp    • Coronary artery disease involving native coronary artery of native heart without angina pectoris 1/31/2019   • Heart attack (CMS/HCC)     2018 dec 25   • Hyperlipidemia    • Migraine headache    • Presence of drug coated stent in LAD coronary artery 1/31/2019       Allergies   Allergen Reactions   • Cephalexin Anaphylaxis     Other reaction(s): Vomiting       Past Surgical History:   Procedure Laterality Date   • CARDIAC CATHETERIZATION N/A 12/26/2018    Procedure: Left Heart Cath;  Surgeon: Hilario Coronado MD;  Location: [facility] CATH INVASIVE LOCATION;  Service: Cardiovascular   • CARDIAC CATHETERIZATION N/A 12/26/2018    Procedure: Left ventriculography;  Surgeon: Hilario Coronado MD;  Location: [facility] CATH INVASIVE LOCATION;  Service: Cardiovascular   • CARDIAC CATHETERIZATION N/A 12/26/2018    Procedure: Stent EVARISTO coronary;  Surgeon: Hilario Coronado MD;  Location: [facility] CATH INVASIVE LOCATION;  Service: Cardiovascular   • CARDIAC CATHETERIZATION N/A 12/26/2018    Procedure: Coronary angiography;  Surgeon: Hilario Coronado MD;  Location: [facility]  CATH INVASIVE LOCATION;  Service: Cardiovascular   •  SECTION     • COLONOSCOPY N/A 10/9/2019    Procedure: COLONOSCOPY with polypectomy;  Surgeon: Dung Hutchison MD;  Location: [facility] OR;  Service: Gastroenterology   • CORONARY STENT PLACEMENT     • CYST REMOVAL Left     knee   • HYSTERECTOMY         Family History   Problem Relation Age of Onset   • Cancer Mother    • Heart disease Brother    • Heart disease Maternal Grandmother        Social History     Socioeconomic History   • Marital status:      Spouse name: Not on file   • Number of children: Not on file   • Years of education: Not on file   • Highest education level: Not on file   Tobacco Use   • Smoking status: Former Smoker     Last attempt to quit: 2018     Years since quittin.8   • Smokeless tobacco: Never Used   Substance and Sexual Activity   • Alcohol use: Yes     Comment: rare   • Drug use: No   • Sexual activity: Defer           Objective   Physical Exam   Constitutional: She is oriented to person, place, and time. She appears well-developed and well-nourished. She does not appear ill.   HENT:   Head: Normocephalic.   Mouth/Throat: Oropharynx is clear and moist. No oropharyngeal exudate.   Eyes: EOM are normal. Pupils are equal, round, and reactive to light.   Neck: Normal range of motion. Neck supple. No JVD present.   Cardiovascular: Normal rate, regular rhythm, normal heart sounds and intact distal pulses. Exam reveals no gallop.   No murmur heard.  Pulmonary/Chest: Effort normal and breath sounds normal. She has no decreased breath sounds. She has no wheezes. She has no rales. She exhibits tenderness. She exhibits no mass, no crepitus, no edema and no swelling.   Abdominal: Soft. Bowel sounds are normal. She exhibits no distension. There is no tenderness.   Musculoskeletal: Normal range of motion.        Right lower leg: Normal. She exhibits no edema.        Left lower leg: Normal. She exhibits no edema.    Neurological: She is alert and oriented to person, place, and time.   Skin: Skin is warm. Capillary refill takes less than 2 seconds. No rash noted. No erythema.   Psychiatric: She has a normal mood and affect.   Nursing note and vitals reviewed.      Procedures           ED Course  ED Course as of Oct 17 0844   Thu Oct 17, 2019   0746 Ekg by me nsr, qrs nml, no st lev  [BC]      ED Course User Index  [BC] Isaiah Manzano MD      Reeval, appears well, vss, ok with plan to f/u cards            MDM  Number of Diagnoses or Management Options  Atypical chest pain:   Risk of Complications, Morbidity, and/or Mortality  Presenting problems: moderate    Patient Progress  Patient progress: stable      Final diagnoses:   Atypical chest pain              Isaiah Manzano MD  10/17/19 8007

## 2019-11-18 RX ORDER — ATORVASTATIN CALCIUM 40 MG/1
TABLET, FILM COATED ORAL
Qty: 90 TABLET | Refills: 0 | Status: SHIPPED | OUTPATIENT
Start: 2019-11-18 | End: 2020-02-17

## 2019-11-20 ENCOUNTER — TRANSCRIBE ORDERS (OUTPATIENT)
Dept: ADMINISTRATIVE | Facility: HOSPITAL | Age: 49
End: 2019-11-20

## 2019-11-20 ENCOUNTER — HOSPITAL ENCOUNTER (OUTPATIENT)
Dept: GENERAL RADIOLOGY | Facility: HOSPITAL | Age: 49
Discharge: HOME | End: 2019-11-20
Admitting: NURSE PRACTITIONER

## 2019-11-20 DIAGNOSIS — M25.562 PAIN AND SWELLING OF LEFT KNEE: ICD-10-CM

## 2019-11-20 DIAGNOSIS — M25.461 PAIN AND SWELLING OF RIGHT KNEE: ICD-10-CM

## 2019-11-20 DIAGNOSIS — M25.562 ACUTE PAIN OF LEFT KNEE: Primary | ICD-10-CM

## 2019-11-20 DIAGNOSIS — M25.462 PAIN AND SWELLING OF LEFT KNEE: ICD-10-CM

## 2019-11-20 DIAGNOSIS — M25.561 PAIN AND SWELLING OF RIGHT KNEE: ICD-10-CM

## 2019-11-20 PROCEDURE — 73560 X-RAY EXAM OF KNEE 1 OR 2: CPT

## 2019-11-20 RX ORDER — METOPROLOL SUCCINATE AND HYDROCHLOROTHIAZIDE 12.5; 5 MG/1; MG/1
TABLET ORAL
Qty: 90 TABLET | Refills: 1 | Status: SHIPPED | OUTPATIENT
Start: 2019-11-20 | End: 2020-02-07 | Stop reason: SDUPTHER

## 2019-12-03 ENCOUNTER — OFFICE VISIT (OUTPATIENT)
Dept: ORTHOPEDIC SURGERY | Facility: CLINIC | Age: 49
End: 2019-12-03

## 2019-12-03 VITALS
HEART RATE: 69 BPM | HEIGHT: 63 IN | BODY MASS INDEX: 34.91 KG/M2 | WEIGHT: 197 LBS | SYSTOLIC BLOOD PRESSURE: 124 MMHG | DIASTOLIC BLOOD PRESSURE: 83 MMHG

## 2019-12-03 DIAGNOSIS — M17.12 PRIMARY OSTEOARTHRITIS OF LEFT KNEE: Primary | ICD-10-CM

## 2019-12-03 PROCEDURE — 99203 OFFICE O/P NEW LOW 30 MIN: CPT | Performed by: NURSE PRACTITIONER

## 2019-12-03 PROCEDURE — 20610 DRAIN/INJ JOINT/BURSA W/O US: CPT | Performed by: NURSE PRACTITIONER

## 2019-12-03 RX ORDER — LIDOCAINE HYDROCHLORIDE 10 MG/ML
8 INJECTION, SOLUTION INFILTRATION; PERINEURAL
Status: COMPLETED | OUTPATIENT
Start: 2019-12-03 | End: 2019-12-03

## 2019-12-03 RX ORDER — TRIAMCINOLONE ACETONIDE 40 MG/ML
80 INJECTION, SUSPENSION INTRA-ARTICULAR; INTRAMUSCULAR
Status: COMPLETED | OUTPATIENT
Start: 2019-12-03 | End: 2019-12-03

## 2019-12-03 RX ADMIN — TRIAMCINOLONE ACETONIDE 80 MG: 40 INJECTION, SUSPENSION INTRA-ARTICULAR; INTRAMUSCULAR at 14:52

## 2019-12-03 RX ADMIN — LIDOCAINE HYDROCHLORIDE 8 ML: 10 INJECTION, SOLUTION INFILTRATION; PERINEURAL at 14:52

## 2019-12-03 NOTE — PROGRESS NOTES
Subjective:     Patient ID: Ortiz Salamanca is a 49 y.o. female.    Chief Complaint:  Left knee pain  History of Present Illness  Ortiz Salamanca 49-year-old who presents to clinic for evaluation of left knee.  Maximal tenderness present anterior aspect, compartment compartment as well as the posterior joint line of the left lower extremity. She has had surgery in  for ganglion cyst removal outside facility.  Presents today with a 3-week onset of more significant pain at the left knee, medial joint line, lateral joint line, anterior aspect as well as the posterior aspect.  She has had x-ray imaging completed which is reviewed available for viewing in chart.  Positive for feeling as if the left lower extremity for the give out on her from time to time.  Positive for grinding sensation of the anterior aspect of the knee.  At worst rates discomfort a 10 out of the 10 aching sharp throbbing in nature.  Increased pain noted when she attempts to lift of the left upper extremity such as when she is walking, ascending descending steps, transitional activities on a daily basis again worse over the last 15 days.  She is unable to tolerate all oral NSAIDs secondary to significant history of cardiac disease stent placement approximately a year ago is currently taking Tylenol as needed for symptom relief.  Denies any recent corticosteroid injections, Visco supplementation injections to the left knee.  She has again received corticosteroid injections and Visco supplementation injections a few years ago outside facility and denies any recent MRI or CT lower extremity.  Pain is not radiating into the groin nor to the lateral aspect of the.  Denies other concerns present this time.       Social History     Occupational History   • Not on file   Tobacco Use   • Smoking status: Former Smoker     Last attempt to quit: 2018     Years since quittin.9   • Smokeless tobacco: Never Used   Substance and Sexual Activity   •  Alcohol use: Yes     Comment: rare   • Drug use: No   • Sexual activity: Defer      Past Medical History:   Diagnosis Date   • Colon polyp    • Coronary artery disease involving native coronary artery of native heart without angina pectoris 2019   • Heart attack (CMS/HCC)     2018 dec 25   • Hyperlipidemia    • Migraine headache    • Presence of drug coated stent in LAD coronary artery 2019     Past Surgical History:   Procedure Laterality Date   • CARDIAC CATHETERIZATION N/A 2018    Procedure: Left Heart Cath;  Surgeon: Hilario Coronado MD;  Location:  [facility] CATH INVASIVE LOCATION;  Service: Cardiovascular   • CARDIAC CATHETERIZATION N/A 2018    Procedure: Left ventriculography;  Surgeon: Hilario Coronado MD;  Location:  [facility] CATH INVASIVE LOCATION;  Service: Cardiovascular   • CARDIAC CATHETERIZATION N/A 2018    Procedure: Stent EVARISTO coronary;  Surgeon: Hilario Coronado MD;  Location:  [facility] CATH INVASIVE LOCATION;  Service: Cardiovascular   • CARDIAC CATHETERIZATION N/A 2018    Procedure: Coronary angiography;  Surgeon: Hilario Coronado MD;  Location:  [facility] CATH INVASIVE LOCATION;  Service: Cardiovascular   •  SECTION     • COLONOSCOPY N/A 10/9/2019    Procedure: COLONOSCOPY with polypectomy;  Surgeon: Dung Hutchison MD;  Location: [facility] OR;  Service: Gastroenterology   • CORONARY STENT PLACEMENT     • CYST REMOVAL Left     knee   • HYSTERECTOMY         Family History   Problem Relation Age of Onset   • Cancer Mother    • Heart disease Brother    • Heart disease Maternal Grandmother          Review of Systems   Constitutional: Negative for chills, diaphoresis, fever and unexpected weight change.   HENT: Negative for hearing loss, nosebleeds, sore throat and tinnitus.    Eyes: Negative for pain and visual disturbance.   Respiratory: Negative for cough, shortness of breath and wheezing.    Cardiovascular: Negative for chest pain and  "palpitations.   Gastrointestinal: Negative for abdominal pain, diarrhea, nausea and vomiting.   Endocrine: Negative for cold intolerance, heat intolerance and polydipsia.   Genitourinary: Negative for difficulty urinating, dysuria and hematuria.   Musculoskeletal: Positive for arthralgias and myalgias. Negative for joint swelling.   Skin: Negative for rash and wound.   Allergic/Immunologic: Negative for environmental allergies.   Neurological: Negative for dizziness, syncope and numbness.   Hematological: Does not bruise/bleed easily.   Psychiatric/Behavioral: Negative for dysphoric mood and sleep disturbance. The patient is not nervous/anxious.            Objective:  Physical Exam    Vital signs reviewed.   General: No acute distress.  Eyes: conjunctiva clear; pupils equally round and reactive  ENT: external ears and nose atraumatic; oropharynx clear  CV: no peripheral edema  Resp: normal respiratory effort  Skin: no rashes or wounds; normal turgor  Psych: mood and affect appropriate; recent and remote memory intact    Vitals:    19 1401   BP: 124/83   BP Location: Right arm   Pulse: 69   Weight: 89.4 kg (197 lb)   Height: 160 cm (63\")         19  1401   Weight: 89.4 kg (197 lb)     Body mass index is 34.9 kg/m².      Left Knee Exam     Tenderness   The patient is experiencing tenderness in the medial joint line and patella.    Range of Motion   Extension: 0   Flexion: 130     Tests   Елена:  Medial - negative Lateral - negative  Varus: negative Valgus: negative  Lachman:  Anterior - 1+    Posterior - negative  Drawer:  Anterior - negative     Posterior - negative  Patellar apprehension: positive    Other   Erythema: absent  Scars: present  Sensation: normal  Pulse: present  Swelling: moderate  Effusion: no effusion present    Comments:  Positive crepitus throughout arc of motion  Positive active patellar compression test  Negative logroll exam  Negative stinchfield exam                 Imagin " view x-ray imaging left knee nonweightbearing completed outside facility mild to moderate osteoarthritic changes present at the medial compartment as well as the patellofemoral joint, osteophytes present the medial femoral condyle, inferior superior patellar pole  Assessment:        1. Primary osteoarthritis of left knee           Plan:  1.  Discussed plan of care with patient.  I do recommend home strengthening exercises which were provided to her today.  Plan to keep her off work for the next 24 hours wishes to proceed with corticosteroid injection left knee.  Encouraged to rest, ice lateral aspect of the knee where injection was given by do recommend moist heat for range of motion symptom relief.  She is encouraged to continue wearing knee sleeves she previously purchased over-the-counter and we will plan to see her back in clinic in approximately 6 weeks to reevaluate.  She verbalized understanding of all information agrees with plan of care.  Denies other concerns present this time.  Large Joint Arthrocentesis: L knee  Date/Time: 12/3/2019 2:52 PM  Consent given by: patient  Site marked: site marked  Timeout: Immediately prior to procedure a time out was called to verify the correct patient, procedure, equipment, support staff and site/side marked as required   Supporting Documentation  Indications: pain   Procedure Details  Location: knee - L knee  Preparation: Patient was prepped and draped in the usual sterile fashion  Needle size: 22 G  Approach: superior (LATERAL)  Medications administered: 8 mL lidocaine 1 %; 80 mg triamcinolone acetonide 40 MG/ML  Patient tolerance: patient tolerated the procedure well with no immediate complications          Orders:  Orders Placed This Encounter   Procedures   • Large Joint Arthrocentesis: L knee         I ordered and reviewed the MEL today.     Dictated utilizing Dragon dictation

## 2019-12-05 ENCOUNTER — OFFICE VISIT (OUTPATIENT)
Dept: CARDIOLOGY | Facility: CLINIC | Age: 49
End: 2019-12-05

## 2019-12-05 VITALS
SYSTOLIC BLOOD PRESSURE: 124 MMHG | BODY MASS INDEX: 35.56 KG/M2 | HEART RATE: 61 BPM | HEIGHT: 63 IN | WEIGHT: 200.7 LBS | DIASTOLIC BLOOD PRESSURE: 82 MMHG

## 2019-12-05 DIAGNOSIS — I21.4 NSTEMI (NON-ST ELEVATED MYOCARDIAL INFARCTION) (HCC): ICD-10-CM

## 2019-12-05 DIAGNOSIS — Z95.5 PRESENCE OF DRUG COATED STENT IN LAD CORONARY ARTERY: Chronic | ICD-10-CM

## 2019-12-05 DIAGNOSIS — I25.10 CORONARY ARTERY DISEASE INVOLVING NATIVE CORONARY ARTERY OF NATIVE HEART WITHOUT ANGINA PECTORIS: Primary | Chronic | ICD-10-CM

## 2019-12-05 PROCEDURE — 99213 OFFICE O/P EST LOW 20 MIN: CPT | Performed by: INTERNAL MEDICINE

## 2019-12-05 PROCEDURE — 93000 ELECTROCARDIOGRAM COMPLETE: CPT | Performed by: INTERNAL MEDICINE

## 2019-12-05 NOTE — PROGRESS NOTES
Subjective:     Encounter Date:19        Patient ID:  JACQUES Salamanca is a 49 y.o. female.    Chief Complaint: CAD  Coronary Artery Disease   Pertinent negatives include no muscle weakness.       Dear Dr. Chandler,    I had the pleasure of seeing this patient in the office today for follow-up on her cardiac status.  She has a history of CAD and prior drug-eluting stent.    In for a one-year follow-up.  She has been doing great with no symptoms.  She denies any chest pain, pressure, tightness, squeezing, or heartburn.  She has not experienced any feeling of palpitations, tachycardia or heart racing and no presyncope or syncope.  There have not been any problems with dizziness or lightheadedness.  There have not been any orthopnea or PND, and no problems with lower extremity edema.  She denies any shortness of breath at rest or with activity and has not had any wheezing.  She has not had any problems with unexplained nausea or vomiting. She has continued to perform daily activities of living without any specific problem or change in the level of activity.  She has not been recently hospitalized for any reason.     She presented to the ER in [place] on 18 with chest pain and mild SOA. She had the chest pain for 2 days and it was increasing. At times it was worse when lying down and at other times it would radiate into her left arm. It was not worse with cough or deep breathing.  She stated the pain was not sharp and there was no nausea or vomiting. She thought it was indigestion for the first day or so and took Tums and Nexium which did not help.  Her ECG was unremarkable but her initial troponin was indeterminate at 0.040.  She has a strong family history of CAD.  One of her brothers  from a MI in his 40s and another brother has stents.  She has a history of hyperlipidemia and was a daily smoker.  It was felt she needed to be admitted and cardiology was consulted.       She had an echocardiogram that  showed normal biventricular size and function, no wall motion abnormalities, and no pericardial effusion.  Her serial troponin continued to increase to 0.046 and 0.072.  She was then transferred to [facility] for a cardiac catheterization.  She had a 99% stenosis of the mid LAD which was treated with angioplasty and EVARISTO 3.5 x 28MM Xience Divina.  She was discharged home on DAPT.   She presented to the [facility] ER again on 12/29/18 with complaints of left sided heart pain that started the previous night around 11 pm.  She did not take anything for the pain.  Her ECG was essentially unchanged from her last ECG on 12/27/18 at discharge. Her troponin was 0.079 which had decreased from 0.083 on previous admission.  She was given the option to be discharged home and to follow-up in the office this coming week or to stay and have repeat cardiac catheterization.  She initially chose to be transferred to [facility] for elective cardiac catheterization.       The following portions of the patient's history were reviewed and updated as appropriate: allergies, current medications, past family history, past medical history, past social history, past surgical history and problem list.    Past Medical History:   Diagnosis Date   • Colon polyp    • Coronary artery disease involving native coronary artery of native heart without angina pectoris 1/31/2019   • Heart attack (CMS/[facility])     2018 dec 25   • Hyperlipidemia    • Migraine headache    • Presence of drug coated stent in LAD coronary artery 1/31/2019       Past Surgical History:   Procedure Laterality Date   • CARDIAC CATHETERIZATION N/A 12/26/2018    Procedure: Left Heart Cath;  Surgeon: Hilario Coronado MD;  Location: [facility] CATH INVASIVE LOCATION;  Service: Cardiovascular   • CARDIAC CATHETERIZATION N/A 12/26/2018    Procedure: Left ventriculography;  Surgeon: Hilario Coronado MD;  Location: [facility] CATH INVASIVE LOCATION;  Service:  Cardiovascular   • CARDIAC CATHETERIZATION N/A 2018    Procedure: Stent EVARISTO coronary;  Surgeon: Hilario Coronado MD;  Location:  [facility] CATH INVASIVE LOCATION;  Service: Cardiovascular   • CARDIAC CATHETERIZATION N/A 2018    Procedure: Coronary angiography;  Surgeon: Hilario Coronado MD;  Location:  [facility] CATH INVASIVE LOCATION;  Service: Cardiovascular   •  SECTION     • COLONOSCOPY N/A 10/9/2019    Procedure: COLONOSCOPY with polypectomy;  Surgeon: Dung Hutchison MD;  Location:  [facility] OR;  Service: Gastroenterology   • CORONARY STENT PLACEMENT     • CYST REMOVAL Left     knee   • HYSTERECTOMY         Social History     Socioeconomic History   • Marital status:      Spouse name: Not on file   • Number of children: Not on file   • Years of education: Not on file   • Highest education level: Not on file   Tobacco Use   • Smoking status: Former Smoker     Last attempt to quit: 2018     Years since quittin.9   • Smokeless tobacco: Never Used   Substance and Sexual Activity   • Alcohol use: Yes     Comment: rare   • Drug use: No   • Sexual activity: Defer       Review of Systems   Constitution: Negative for chills, decreased appetite, fever and night sweats.   HENT: Negative for ear discharge, ear pain, hearing loss, nosebleeds and sore throat.    Eyes: Negative for blurred vision, double vision and pain.   Cardiovascular: Negative for cyanosis.   Respiratory: Negative for hemoptysis and sputum production.    Endocrine: Negative for cold intolerance and heat intolerance.   Hematologic/Lymphatic: Negative for adenopathy.   Skin: Negative for dry skin, itching, nail changes, rash and suspicious lesions.   Musculoskeletal: Negative for arthritis, gout, muscle cramps, muscle weakness, myalgias and neck pain.   Gastrointestinal: Negative for anorexia, bowel incontinence, constipation, diarrhea, dysphagia, hematemesis and jaundice.   Genitourinary: Negative for  "bladder incontinence, dysuria, flank pain, frequency, hematuria and nocturia.   Neurological: Negative for focal weakness, numbness, paresthesias and seizures.   Psychiatric/Behavioral: Negative for altered mental status, hallucinations, hypervigilance, suicidal ideas and thoughts of violence.   Allergic/Immunologic: Negative for persistent infections.         ECG 12 Lead  Date/Time: 12/5/2019 3:21 PM  Performed by: Ag Santiago III, MD  Authorized by: Ag Santiago III, MD   Comparison: compared with previous ECG   Similar to previous ECG  Rhythm: sinus rhythm  Rate: normal  Conduction: conduction normal  ST Segments: ST segments normal  T Waves: T waves normal  QRS axis: normal  Other: no other findings    Clinical impression: normal ECG               Objective:     Vitals:    12/05/19 1412   BP: 124/82   BP Location: Left arm   Patient Position: Sitting   Pulse: 61   Weight: 91 kg (200 lb 11.2 oz)   Height: 160 cm (63\")         Physical Exam   Constitutional: She is oriented to person, place, and time. She appears well-developed and well-nourished. No distress.   HENT:   Head: Normocephalic and atraumatic.   Nose: Nose normal.   Mouth/Throat: Oropharynx is clear and moist.   Eyes: Conjunctivae and EOM are normal. Pupils are equal, round, and reactive to light. Right eye exhibits no discharge. Left eye exhibits no discharge.   Neck: Normal range of motion. Neck supple. No tracheal deviation present. No thyromegaly present.   Cardiovascular: Normal rate, regular rhythm, S1 normal, S2 normal, normal heart sounds and normal pulses. Exam reveals no S3.   Pulmonary/Chest: Effort normal and breath sounds normal. No stridor. No respiratory distress. She exhibits no tenderness.   Abdominal: Soft. Bowel sounds are normal. She exhibits no distension and no mass. There is no tenderness. There is no rebound and no guarding.   Musculoskeletal: Normal range of motion. She exhibits no tenderness or deformity. "   Lymphadenopathy:     She has no cervical adenopathy.   Neurological: She is alert and oriented to person, place, and time. She has normal reflexes.   Skin: Skin is warm and dry. No rash noted. She is not diaphoretic. No erythema.   Psychiatric: She has a normal mood and affect. Thought content normal.       Lab Review:             Performed        Assessment:          Diagnosis Plan   1. Coronary artery disease involving native coronary artery of native heart without angina pectoris  ECG 12 Lead   2. NSTEMI (non-ST elevated myocardial infarction) (CMS/HCC)  ECG 12 Lead   3. Presence of drug coated stent in LAD coronary artery  ECG 12 Lead          Plan:       1.  CAD status post non-ST segment elevation myocardial infarction a drug-coated stent.  Patient is participate in cardiac rehabilitation and doing well.  We will continue current guideline directed medical therapy. we will discontinue her ticagrelor at this point.  2.  Tobacco abuse-patient stopped smoking at the time of her myocardial infarction and has not resumed.      Thank you very much for allowing us to participate in the care of this pleasant patient.  Please don't hesitate to call if I can be of assistance in any way.      Current Outpatient Medications:   •  aspirin 81 MG chewable tablet, Chew 1 tablet Daily., Disp: 30 tablet, Rfl: 11  •  atorvastatin (LIPITOR) 40 MG tablet, TAKE 1 TABLET EVERY NIGHT, Disp: 90 tablet, Rfl: 0  •  gabapentin (NEURONTIN) 300 MG capsule, Every 12 (Twelve) Hours., Disp: , Rfl:   •  HYDROcodone-acetaminophen (NORCO)  MG per tablet, Every 12 (Twelve) Hours., Disp: , Rfl:   •  Metoprolol-HCTZ ER 50-12.5 MG tablet sustained-release 24 hour, Take 1 tablet by mouth daily, Disp: 90 tablet, Rfl: 1  •  nitroglycerin (NITROSTAT) 0.4 MG SL tablet, nitroglycerin 0.4 mg sublingual tablet, Disp: , Rfl:   •  ticagrelor (BRILINTA) 90 MG tablet tablet, Brilinta 90 mg tablet, Disp: , Rfl:

## 2020-01-27 ENCOUNTER — TELEPHONE (OUTPATIENT)
Dept: OBSTETRICS AND GYNECOLOGY | Facility: CLINIC | Age: 50
End: 2020-01-27

## 2020-01-27 ENCOUNTER — OFFICE VISIT (OUTPATIENT)
Dept: ORTHOPEDIC SURGERY | Facility: CLINIC | Age: 50
End: 2020-01-27

## 2020-01-27 VITALS — WEIGHT: 191 LBS | BODY MASS INDEX: 33.84 KG/M2 | HEIGHT: 63 IN

## 2020-01-27 DIAGNOSIS — M25.562 MECHANICAL KNEE PAIN, LEFT: ICD-10-CM

## 2020-01-27 DIAGNOSIS — M17.12 PRIMARY OSTEOARTHRITIS OF LEFT KNEE: Primary | ICD-10-CM

## 2020-01-27 PROCEDURE — 99213 OFFICE O/P EST LOW 20 MIN: CPT | Performed by: NURSE PRACTITIONER

## 2020-01-27 RX ORDER — DIAZEPAM 5 MG/1
TABLET ORAL
Qty: 2 TABLET | Refills: 0 | Status: SHIPPED | OUTPATIENT
Start: 2020-01-27 | End: 2020-02-05

## 2020-01-27 NOTE — PROGRESS NOTES
Subjective:     Patient ID: Ortiz Salamanca is a 49 y.o. female.    Chief Complaint:  Follow-up primary osteoarthritis left knee  History of Present Illness  Ortiz Salamanca returns to clinic for follow-up left knee.  Received corticosteroid injection on 12/3/2019 with significant symptom relief up until the last 1 week.  Maximal tenderness present today medial and lateral joint line as well as the anterior aspect of the knee.  Increased pain noted with all ambulatory activities mild symptom relief with rest.  She is very pleased with the relief that she achieved with corticosteroid injection.  She is not currently taking any oral anti-inflammatory secondary to significant history of cardiac disease.  She has received Visco supplementation injections in the past however not within the last 2 or so years.  Feels as if the knee is locking and is going to give away on her and she is unable to trust the left knee.  She has started at the gym has lost approximately 9 pounds walking on treadmill and other low impact activities and again tolerating well up into the last week when the pain returned.  Denies presence of numbness or tingling to left lower extremity.  Denies other concerns present at this time.    Social History     Occupational History   • Not on file   Tobacco Use   • Smoking status: Former Smoker     Last attempt to quit: 2018     Years since quittin.1   • Smokeless tobacco: Never Used   Substance and Sexual Activity   • Alcohol use: Yes     Comment: rare   • Drug use: No   • Sexual activity: Defer      Past Medical History:   Diagnosis Date   • Colon polyp    • Coronary artery disease involving native coronary artery of native heart without angina pectoris 2019   • Heart attack (CMS/HCC)     2018 dec 25   • Hyperlipidemia    • Migraine headache    • Presence of drug coated stent in LAD coronary artery 2019     Past Surgical History:   Procedure Laterality Date   • CARDIAC  CATHETERIZATION N/A 2018    Procedure: Left Heart Cath;  Surgeon: Hilario Coronado MD;  Location:  [facility] CATH INVASIVE LOCATION;  Service: Cardiovascular   • CARDIAC CATHETERIZATION N/A 2018    Procedure: Left ventriculography;  Surgeon: Hilario Coronado MD;  Location:  [facility] CATH INVASIVE LOCATION;  Service: Cardiovascular   • CARDIAC CATHETERIZATION N/A 2018    Procedure: Stent EVARISTO coronary;  Surgeon: Hilario Coronado MD;  Location:  [facility] CATH INVASIVE LOC[facility];  Service: Cardiovascular   • CARDIAC CATHETERIZATION N/A 2018    Procedure: Coronary angiography;  Surgeon: Hilario Coronado MD;  Location:  [facility] CATH INVASIVE LOC[facility];  Service: Cardiovascular   •  SECTION     • COLONOSCOPY N/A 10/9/2019    Procedure: COLONOSCOPY with polypectomy;  Surgeon: Dung Hutchison MD;  Location:  [facility] OR;  Service: Gastroenterology   • CORONARY STENT PLACEMENT     • CYST REMOVAL Left     knee   • HYSTERECTOMY         Family History   Problem Relation Age of Onset   • Cancer Mother    • Heart disease Brother    • Heart disease Maternal Grandmother          Review of Systems   Constitutional: Negative for chills, diaphoresis, fever and unexpected weight change.   HENT: Negative for hearing loss, nosebleeds, sore throat and tinnitus.    Eyes: Negative for pain and visual disturbance.   Respiratory: Negative for cough, shortness of breath and wheezing.    Cardiovascular: Negative for chest pain and palpitations.   Gastrointestinal: Negative for abdominal pain, diarrhea, nausea and vomiting.   Endocrine: Negative for cold intolerance, heat intolerance and polydipsia.   Genitourinary: Negative for difficulty urinating, dysuria and hematuria.   Musculoskeletal: Positive for arthralgias and myalgias. Negative for joint swelling.   Skin: Negative for rash and wound.   Allergic/Immunologic: Negative for environmental allergies.   Neurological: Negative for dizziness,  "syncope and numbness.   Hematological: Does not bruise/bleed easily.   Psychiatric/Behavioral: Negative for dysphoric mood and sleep disturbance. The patient is not nervous/anxious.            Objective:  Physical Exam    General: No acute distress.  Eyes: conjunctiva clear; pupils equally round and reactive  ENT: external ears and nose atraumatic; oropharynx clear  CV: no peripheral edema  Resp: normal respiratory effort  Skin: no rashes or wounds; normal turgor  Psych: mood and affect appropriate; recent and remote memory intact    Vitals:    01/27/20 0842   Weight: 86.6 kg (191 lb)   Height: 160 cm (63\")         01/27/20  0842   Weight: 86.6 kg (191 lb)     Body mass index is 33.83 kg/m².      Left Knee Exam     Tenderness   The patient is experiencing tenderness in the medial joint line, patella, pes anserinus and lateral joint line.    Range of Motion   Extension: 0   Flexion: 130     Tests   Елена:  Medial - positive Lateral - positive  Varus: negative Valgus: negative  Lachman:  Anterior - 1+    Posterior - negative  Drawer:  Anterior - negative       Patellar apprehension: positive    Other   Erythema: absent  Sensation: normal  Pulse: present  Swelling: moderate  Effusion: no effusion present    Comments:  Positive crepitus throughout arc of motion  Positive active patellar compression test  Negative logroll exam  Negative Stinchfield exam           Assessment:        1. Primary osteoarthritis of left knee    2. Mechanical knee pain, left           Plan:  1.  Discussed plan of care with patient.  Given the fact that she feels like the left knee is unstable do recommend we proceed with MRI at this time to evaluate for any instability.  We will plan to see her back in clinic after completion of testing to discuss results.  I discussed with patient corticosteroid injections can be completed as frequently as once every 3 months, I do recommend topical application of diclofenac as well up to 4 times a day.  " Continue with home strengthening exercises previously provided as well as workout at the gym.  She verbalized understanding of all information agrees with plan of care.  Denies other concerns present this time.  Orders:  Orders Placed This Encounter   Procedures   • MRI Knee Left Without Contrast       Dictated utilizing Dragon dictation

## 2020-01-28 NOTE — TELEPHONE ENCOUNTER
Pt has had a hysterectomy- is she actually bleeding from the vagina or rectum? And she is on a blood thinner so her spotting can be related to this. It is not PMPB bc she does not have a uterus but she can get appt but it is not something that needs to be done this week- she can see me in 2-3 weeks and it is fine- idania if only spotting

## 2020-01-31 ENCOUNTER — HOSPITAL ENCOUNTER (OUTPATIENT)
Dept: MRI IMAGING | Facility: HOSPITAL | Age: 50
Discharge: HOME | End: 2020-01-31
Admitting: NURSE PRACTITIONER

## 2020-01-31 DIAGNOSIS — M25.562 MECHANICAL KNEE PAIN, LEFT: ICD-10-CM

## 2020-01-31 DIAGNOSIS — M17.12 PRIMARY OSTEOARTHRITIS OF LEFT KNEE: ICD-10-CM

## 2020-01-31 PROCEDURE — 73721 MRI JNT OF LWR EXTRE W/O DYE: CPT

## 2020-02-05 ENCOUNTER — OFFICE VISIT (OUTPATIENT)
Dept: ORTHOPEDIC SURGERY | Facility: CLINIC | Age: 50
End: 2020-02-05

## 2020-02-05 DIAGNOSIS — M17.12 PRIMARY OSTEOARTHRITIS OF LEFT KNEE: Primary | ICD-10-CM

## 2020-02-05 PROCEDURE — 99213 OFFICE O/P EST LOW 20 MIN: CPT | Performed by: NURSE PRACTITIONER

## 2020-02-05 PROCEDURE — 73562 X-RAY EXAM OF KNEE 3: CPT | Performed by: NURSE PRACTITIONER

## 2020-02-05 RX ORDER — METHYLPREDNISOLONE 4 MG/1
TABLET ORAL
Qty: 21 TABLET | Refills: 0 | OUTPATIENT
Start: 2020-02-05 | End: 2020-02-07

## 2020-02-05 NOTE — PROGRESS NOTES
Subjective:     Patient ID: Ortiz Salamanca is a 49 y.o. female.    Chief Complaint:  Follow-up primary osteoarthritis left knee  History of Present Illness  Ortiz Salamanca returns to clinic for follow-up left lower extremity.  She has completed MRI left knee presents to discuss results. Continues to experience maximal tenderness medial compartment.  Increased pain noted with all weightbearing activities minimal symptom relief with rest.  Again unable to tolerate all oral NSAIDs secondary to cardiac history.  She has tried topical diclofenac which does help with use has received corticosteroid injection last on 12/3/2019 which helped for approximately 3 to 4 weeks.  She did present to urgent care outside facility for left knee pain presented to them requesting an injection.  She received Depo-Medrol IM injection on 12/27/2019 and was encouraged to follow-up with orthopedic.  She has had ganglion cysts removed in 2014 by outside provider.  Ever since that time she is continued to experience pain at the medial compartment but worse within the last 1 year which is why she presented for evaluation.  She is also received corticosteroid injections in the past as well as Visco supplementation injections in the past without any significant symptom relief.  She has completed physical therapy in the past also and is currently completing home strengthening exercises.  She participates in pain management [facility] has been seen in past outside provider for lumbar spine was sent for physical therapy and continues home strengthening exercises.  She does continue to wear LSO brace and is taking Norco 10 mg tablets as well as gabapentin daily.  She denies that she is experiencing numbness or tingling radiating down the left upper extremity.  Again maximal tenderness present the medial compartment of the knee increased pain with all weightbearing activities mild symptom relief with rest.  She has tried low impact  activities such as walking on treadmill and attempted workout in the gym however was unable to secondary the pain she is experiencing.  At this point time she is currently experiencing more pain she is willing to live with at this time.  Pain is not radiating into the groin or to the lateral aspect of the hip.  Denies other concerns she has at this time.     Social History     Occupational History   • Not on file   Tobacco Use   • Smoking status: Former Smoker     Last attempt to quit: 2018     Years since quittin.1   • Smokeless tobacco: Never Used   Substance and Sexual Activity   • Alcohol use: Yes     Comment: rare   • Drug use: No   • Sexual activity: Defer      Past Medical History:   Diagnosis Date   • Colon polyp    • Coronary artery disease involving native coronary artery of native heart without angina pectoris 2019   • Heart attack (CMS/HCC)     2018 dec 25   • Hyperlipidemia    • Migraine headache    • Presence of drug coated stent in LAD coronary artery 2019     Past Surgical History:   Procedure Laterality Date   • CARDIAC CATHETERIZATION N/A 2018    Procedure: Left Heart Cath;  Surgeon: Hilario Coronado MD;  Location: [facility] CATH INVASIVE LOCATION;  Service: Cardiovascular   • CARDIAC CATHETERIZATION N/A 2018    Procedure: Left ventriculography;  Surgeon: Hilario Coronado MD;  Location: [facility] CATH INVASIVE LOCATION;  Service: Cardiovascular   • CARDIAC CATHETERIZATION N/A 2018    Procedure: Stent EVARISTO coronary;  Surgeon: Hilario Coronado MD;  Location: [facility] CATH INVASIVE LOCATION;  Service: Cardiovascular   • CARDIAC CATHETERIZATION N/A 2018    Procedure: Coronary angiography;  Surgeon: Hilario Coronado MD;  Location: [facility] CATH INVASIVE LOCATION;  Service: Cardiovascular   •  SECTION     • COLONOSCOPY N/A 10/9/2019    Procedure: COLONOSCOPY with polypectomy;  Surgeon: Dung Hutchison MD;  Location: [facility] OR;   Service: Gastroenterology   • CORONARY STENT PLACEMENT     • CYST REMOVAL Left     knee   • HYSTERECTOMY         Family History   Problem Relation Age of Onset   • Cancer Mother    • Heart disease Brother    • Heart disease Maternal Grandmother          Review of Systems   Constitutional: Negative for chills, diaphoresis, fever and unexpected weight change.   HENT: Negative for hearing loss, nosebleeds, sore throat and tinnitus.    Eyes: Negative for pain and visual disturbance.   Respiratory: Negative for cough, shortness of breath and wheezing.    Cardiovascular: Negative for chest pain and palpitations.   Gastrointestinal: Negative for abdominal pain, diarrhea, nausea and vomiting.   Endocrine: Negative for cold intolerance, heat intolerance and polydipsia.   Genitourinary: Negative for difficulty urinating, dysuria and hematuria.   Musculoskeletal: Positive for arthralgias. Negative for joint swelling and myalgias.   Skin: Negative for rash and wound.   Allergic/Immunologic: Negative for environmental allergies.   Neurological: Negative for dizziness, syncope and numbness.   Hematological: Does not bruise/bleed easily.   Psychiatric/Behavioral: Negative for dysphoric mood and sleep disturbance. The patient is not nervous/anxious.    All other systems reviewed and are negative.          Objective:  Physical Exam    General: No acute distress.  Eyes: conjunctiva clear; pupils equally round and reactive  ENT: external ears and nose atraumatic; oropharynx clear  CV: no peripheral edema  Resp: normal respiratory effort  Skin: no rashes or wounds; normal turgor  Psych: mood and affect appropriate; recent and remote memory intact    There were no vitals filed for this visit.  There were no vitals filed for this visit.  There is no height or weight on file to calculate BMI.      Ortho Exam         Left Knee Exam      Tenderness   The patient is experiencing tenderness in the medial joint line, patella, pes anserinus and  lateral joint line.     Range of Motion   Extension: 0   Flexion: 130      Tests   Елена:  Medial - positive Lateral - positive  Varus: negative Valgus: negative  Lachman:  Anterior - 1+    Posterior - negative  Drawer:  Anterior - negative       Patellar apprehension: positive     Other   Erythema: absent  Sensation: normal  Pulse: present  Swelling: moderate  Effusion: no effusion present     Comments:    Positive crepitus throughout arc of motion  Positive active patellar compression test  Negative logroll exam  Negative Stinchfield exam    Imaging:  Reviewed MRI results with patient left knee:  MRI Knee LT WO     INDICATION:    49-year-old female with left knee pain. Evaluate for internal derangement or osteoarthritis.     TECHNIQUE:   MRI of the  left knee without contrast. T1 and T2-weighted images were acquired in multiple imaging planes.     COMPARISON:   Plain films of the left knee dated 11/20/2019.     FINDINGS:  Ligaments: The anterior and posterior cruciate ligaments are intact. The medial and lateral collateral ligaments are intact.     The extensor mechanism is intact.     Menisci: The lateral meniscus is intact.     Intrasubstance degenerative signal in the posterior horn, body and anterior horn of the medial meniscus without definite tear. Mild blunting of the free edge of the body suggests fraying.     Articular cartilage and joint space: Small to moderate-sized joint effusion. Full-thickness chondral defect in the posterior weightbearing lateral femoral condyle articular cartilage, well seen on series 9 image 6, measuring approximately 5 mm in  transverse diameter by 7 mm in anterior to posterior diameter. The lateral tibial plateau articular cartilage appears intact.     Full-thickness chondral loss in the weightbearing medial femoral condyle articular cartilage, involving an area measuring approximately 1.9 cm in anterior to posterior diameter by 1 cm in transverse diameter. Milder thinning  of the remaining medial  compartment articular cartilage.     In the extended position, the patella is seated in the trochlear groove. Mild thinning and surface irregularity of the mid and lower pole apical and periapical medial facet patellar articular cartilage is compatible with mild chondral degeneration and  chondromalacia. There are also mild degenerative changes in the medial trochlear groove articular cartilage.     Bones and soft tissues: The osseous structures are normal in morphology. Marrow edema in the medial tibial plateau at the level of the body of the medial meniscus, and milder marrow edema in the medial femoral condyle. This may reflect an area stress  response, reactive edema or bone contusion. Marrow signal intensity is otherwise within normal limits.     The surrounding soft tissues are significant for a popliteal cyst that measures approximately 4 cm in length. Mild medial subcutaneous edema, and edema anterior to the patellar tendon and tibial tuberosity, which may reflect  inflammation.     IMPRESSION:     1. Blunting of the free edge of the body of the medial meniscus  suggesting fraying. No evidence of meniscal tear.  2. Full-thickness chondral loss in the majority of the weightbearing medial femoral condyle, with milder diffuse thinning of the remainder of the medial femoral condyle articular cartilage.  3. Full-thickness chondral defect in the posterior weightbearing lateral femoral condyle articular cartilage, measuring approximately 7 mm x 5 mm.  4. Mild patellofemoral joint degenerative changes and chondromalacia most significantly involving the apical and periapical medial facet patellar articular cartilage and the medial trochlear groove articular cartilage.  5. Small to moderate-sized joint effusion. Popliteal cyst as described above.  6. Regions of marrow edema in the subchondral bone of the medial tibial plateau and in the adjacent medial femoral condyle. This may reflect areas  of stress response, bone contusion or reactive edema.     Signer Name: Marjorie Bradshaw MD   Signed: 1/31/2020 11:27 AM   Workstation Name: [id number]    [facility]  Left Knee X-Ray  Indication: Pain    AP, Lateral, and [place] views    Findings:  No fracture  Normal soft tissues  Moderate narrowing medial compartment with minimal osteophyte medial tibial plateau, osteophyte lateral femoral condyle; mild patellofemoral joint narrowing    Prior studies were available for comparison.     Assessment:        1. Primary osteoarthritis of left knee           Plan:  1.  Discussed plan of care with patient.  We will proceed with submission for Visco supplementation injections.  At this point she has tried corticosteroid injections which did help initially but pain returned within 4 weeks.  She has tried Visco supplementation injections in the past without any significant symptom relief.  She is experiencing pain on a daily basis she has been using the topical diclofenac as well as home strengthening exercises which helps minimally however continues to struggle with all ambulatory activities.  She is experiencing more pain on a daily basis and she is willing to live with and at this point time wishes to proceed with discussion for joint replacement surgery.  We will have her follow-up with Dr. Peters in 2 to 3 weeks for evaluation.  She verbalized understanding of information agrees with plan of care.  Denies other concerns present this time.  Orders:  Orders Placed This Encounter   Procedures   • XR Knee 3 View Left   • Visco Treatment       Dictated utilizing Dragon dictation

## 2020-02-17 DIAGNOSIS — E78.2 MIXED HYPERLIPIDEMIA: Primary | ICD-10-CM

## 2020-02-17 RX ORDER — ATORVASTATIN CALCIUM 40 MG/1
TABLET, FILM COATED ORAL
Qty: 30 TABLET | Refills: 0 | Status: SHIPPED | OUTPATIENT
Start: 2020-02-17 | End: 2020-03-30

## 2020-02-21 ENCOUNTER — OFFICE VISIT (OUTPATIENT)
Dept: ORTHOPEDIC SURGERY | Facility: CLINIC | Age: 50
End: 2020-02-21

## 2020-02-21 VITALS
WEIGHT: 198 LBS | BODY MASS INDEX: 35.08 KG/M2 | SYSTOLIC BLOOD PRESSURE: 132 MMHG | DIASTOLIC BLOOD PRESSURE: 85 MMHG | HEIGHT: 63 IN | HEART RATE: 65 BPM

## 2020-02-21 DIAGNOSIS — M84.469A INSUFFICIENCY FRACTURE OF TIBIA, INITIAL ENCOUNTER: ICD-10-CM

## 2020-02-21 DIAGNOSIS — M17.12 PRIMARY OSTEOARTHRITIS OF LEFT KNEE: Primary | ICD-10-CM

## 2020-02-21 DIAGNOSIS — M70.50 PES ANSERINE BURSITIS: ICD-10-CM

## 2020-02-21 PROCEDURE — 20610 DRAIN/INJ JOINT/BURSA W/O US: CPT | Performed by: ORTHOPAEDIC SURGERY

## 2020-02-21 PROCEDURE — 99214 OFFICE O/P EST MOD 30 MIN: CPT | Performed by: ORTHOPAEDIC SURGERY

## 2020-02-21 RX ADMIN — LIDOCAINE HYDROCHLORIDE 2 ML: 10 INJECTION, SOLUTION EPIDURAL; INFILTRATION; INTRACAUDAL; PERINEURAL at 12:33

## 2020-02-21 RX ADMIN — TRIAMCINOLONE ACETONIDE 40 MG: 40 INJECTION, SUSPENSION INTRA-ARTICULAR; INTRAMUSCULAR at 12:33

## 2020-02-21 NOTE — PROGRESS NOTES
Subjective:     Patient ID: Ortiz Salamanca is a 49 y.o. female.    Chief Complaint: left knee pain, new issue    History of Present Illness  Ortiz Salamanca returns to clinic today for evaluation of her left knee. She has prior medical history of surgery on her left knee to remove a cyst in .   The patient had prior cortisone injection to the left knee on 12/3/19. She notes approximately 80-90% improvement of the pain with the injection, lasting 1 months.  Today, she rates the pain as 10/10 at its worst, describes it as aching, sharp and throbbing in nature. Localizes pain to the medial aspect. She is not currently taking anti-inflammatory medications due to her history of cardiac issues. Symptoms are exacerbated with walking and exercising. She has difficulty going up stairs. She complains of catching, locking, and buckling sensations. Denies radiation of pain, denies associated numbness or tingling.    Social History     Occupational History   • Not on file   Tobacco Use   • Smoking status: Former Smoker     Last attempt to quit: 2018     Years since quittin.2   • Smokeless tobacco: Never Used   Substance and Sexual Activity   • Alcohol use: Yes     Comment: rare   • Drug use: No   • Sexual activity: Defer      Past Medical History:   Diagnosis Date   • Colon polyp    • Coronary artery disease involving native coronary artery of native heart without angina pectoris 2019   • Heart attack (CMS/[facility])     2018 dec 25   • Hyperlipidemia    • Migraine headache    • Presence of drug coated stent in LAD coronary artery 2019     Past Surgical History:   Procedure Laterality Date   • CARDIAC CATHETERIZATION N/A 2018    Procedure: Left Heart Cath;  Surgeon: Hilario Coronado MD;  Location: [facility] CATH INVASIVE LOCATION;  Service: Cardiovascular   • CARDIAC CATHETERIZATION N/A 2018    Procedure: Left ventriculography;  Surgeon: Hilario Coronado MD;  Location: [facility] CATH INVASIVE  LOCATION;  Service: Cardiovascular   • CARDIAC CATHETERIZATION N/A 2018    Procedure: Stent EVARISTO coronary;  Surgeon: Hilario Coronado MD;  Location:  [facility] CATH INVASIVE LOCATION;  Service: Cardiovascular   • CARDIAC CATHETERIZATION N/A 2018    Procedure: Coronary angiography;  Surgeon: Hilario Coronado MD;  Location:  [facility] CATH INVASIVE LOCATION;  Service: Cardiovascular   •  SECTION     • COLONOSCOPY N/A 10/9/2019    Procedure: COLONOSCOPY with polypectomy;  Surgeon: Dung Hutchison MD;  Location:  [facility] OR;  Service: Gastroenterology   • CORONARY STENT PLACEMENT     • CYST REMOVAL Left     knee   • HYSTERECTOMY         Family History   Problem Relation Age of Onset   • Cancer Mother    • Heart disease Brother    • Heart disease Maternal Grandmother          Review of Systems   Constitutional: Negative for chills, diaphoresis, fever and unexpected weight change.   HENT: Negative for hearing loss, nosebleeds, sore throat and tinnitus.    Eyes: Negative for pain and visual disturbance.   Respiratory: Negative for cough, shortness of breath and wheezing.    Cardiovascular: Negative for chest pain and palpitations.   Gastrointestinal: Negative for abdominal pain, diarrhea, nausea and vomiting.   Endocrine: Negative for cold intolerance, heat intolerance and polydipsia.   Genitourinary: Negative for difficulty urinating, dysuria and hematuria.   Musculoskeletal: Positive for arthralgias and myalgias. Negative for joint swelling.   Skin: Negative for rash and wound.   Allergic/Immunologic: Negative for environmental allergies.   Neurological: Negative for dizziness, syncope and numbness.   Hematological: Does not bruise/bleed easily.   Psychiatric/Behavioral: Negative for dysphoric mood and sleep disturbance. The patient is not nervous/anxious.            Objective:  Vitals:    20 1129   BP: 132/85   BP Location: Right arm   Patient Position: Sitting   Cuff Size: Adult  "  Pulse: 65   Weight: 89.8 kg (198 lb)   Height: 160 cm (63\")         02/21/20  1129   Weight: 89.8 kg (198 lb)     Body mass index is 35.07 kg/m².  Physical Exam    Vital signs reviewed.   General: No acute distress, alert and oriented  Eyes: conjunctiva clear; pupils equally round and reactive  ENT: external ears and nose atraumatic; oropharynx clear  CV: no peripheral edema  Resp: normal respiratory effort  Skin: no rashes or wounds; normal turgor  Psych: mood and affect appropriate; recent and remote memory intact        Ortho Exam     Left Knee-   Prior anterolateral vertical incision that is well-healed  ROM 0-145 degrees  4+/5 on flexion  4+/5 on extension  Maximal tenderness medial joint line and pes bursa  Positive  Елена exam medial joint line   Effusion- mild   Anterior drawer- negative  Posterior drawer- negative   Grade 1A Lachman  No opening on varus and valgus stress at 0 and 30  Active patellar compression test- positive   Log roll-  negative  Stinchfield-  negative  Positive sensation light tough all distributions symmetric to contralateral side  Brisk cap refill all digits  2+ dorsalis pedis pulse    Imaging:  Review of prior x-rays left knee from office including AP, lateral, patellar axial views indicate no evidence of fracture, dislocation, or subluxation, mild medial compartment joint space narrowing with slight varus alignment noted.    Review of MRI left knee from January 31, 2020 including review of images as well as radiology report indicates moderate chondral wear particular the medial femoral condyle with reactive bone marrow edema most notable in the medial tibial plateau is slightly appreciated in the medial femoral condyle, blunting of the free edge of the body of the medial meniscus with no evidence of discrete tear.  Cruciate and collateral ligaments grossly intact.    Assessment:        1. Primary osteoarthritis of left knee    2. Insufficiency fracture of tibia, initial " encounter    3. Pes anserine bursitis           Plan:      Large Joint Arthrocentesis: L knee  Date/Time: 2/21/2020 12:33 PM  Consent given by: patient  Site marked: site marked  Timeout: Immediately prior to procedure a time out was called to verify the correct patient, procedure, equipment, support staff and site/side marked as required   Supporting Documentation  Indications: pain   Procedure Details  Location: knee - L knee (pes bursa)  Preparation: Patient was prepped and draped in the usual sterile fashion  Needle size: 22 G  Approach: anterior  Medications administered: 2 mL lidocaine PF 1% 1 %; 40 mg triamcinolone acetonide 40 MG/ML  Patient tolerance: patient tolerated the procedure well with no immediate complications           1. Discussed treatment options at length with patient at today's visit.   2. Patient would like to proceed with cortisone injection today to the left knee pes bursa given localization of pain to that site. Recommended limited use of affected extremity for the next 24 hours to only essential activites other than work on general active and passive motion. Recommended supplementing with ice and soft tissue massage. Discussed with patient that they should see results in 5-7 days, if no improvement in 5-6 weeks I have asked them to call the office to review other options. Patient should call office immediately if they notice redness, warmth, fevers, chills, or residual numbness or tingling for greater than 6 hours after injection.   3. Follow-up in 2 weeks.       Sunday JACQUES Salamanca was in agreement with plan and had all questions answered.     Orders:  Orders Placed This Encounter   Procedures   • Large Joint Arthrocentesis: L knee       Medications:  No orders of the defined types were placed in this encounter.      Followup:  Return in about 2 weeks (around 3/6/2020).    Sunday was seen today for pain, edema and numbness.    Diagnoses and all orders for this visit:    Primary  osteoarthritis of left knee  -     Large Joint Arthrocentesis: L knee    Insufficiency fracture of tibia, initial encounter  -     Large Joint Arthrocentesis: L knee    Pes anserine bursitis      By signing my name here, I Tona Gustafson, attest that all documentation on 02/24/20 at 11:05 AM has been prepared under the direction and in the presence of Dr. Jai Peters.    I, Dr. Jai Peters, personally performed the services described in this documentation, as scribed by Tona Gustafson, in my presence, and it is both accurate and complete.      Dictated utilizing Dragon dictation

## 2020-02-24 PROBLEM — M70.50 PES ANSERINE BURSITIS: Status: ACTIVE | Noted: 2020-02-24

## 2020-02-24 RX ORDER — TRIAMCINOLONE ACETONIDE 40 MG/ML
40 INJECTION, SUSPENSION INTRA-ARTICULAR; INTRAMUSCULAR
Status: COMPLETED | OUTPATIENT
Start: 2020-02-21 | End: 2020-02-21

## 2020-02-24 RX ORDER — LIDOCAINE HYDROCHLORIDE 10 MG/ML
2 INJECTION, SOLUTION EPIDURAL; INFILTRATION; INTRACAUDAL; PERINEURAL
Status: COMPLETED | OUTPATIENT
Start: 2020-02-21 | End: 2020-02-21

## 2020-02-27 ENCOUNTER — TELEPHONE (OUTPATIENT)
Dept: CARDIOLOGY | Facility: CLINIC | Age: 50
End: 2020-02-27

## 2020-03-06 ENCOUNTER — OFFICE VISIT (OUTPATIENT)
Dept: ORTHOPEDIC SURGERY | Facility: CLINIC | Age: 50
End: 2020-03-06

## 2020-03-06 DIAGNOSIS — M17.12 PRIMARY OSTEOARTHRITIS OF LEFT KNEE: Primary | ICD-10-CM

## 2020-03-06 DIAGNOSIS — M70.50 PES ANSERINE BURSITIS: ICD-10-CM

## 2020-03-06 DIAGNOSIS — M84.469A INSUFFICIENCY FRACTURE OF TIBIA, INITIAL ENCOUNTER: ICD-10-CM

## 2020-03-06 PROCEDURE — 99213 OFFICE O/P EST LOW 20 MIN: CPT | Performed by: ORTHOPAEDIC SURGERY

## 2020-03-06 NOTE — PROGRESS NOTES
Subjective:     Patient ID: Ortiz Salamanca is a 49 y.o. female.    Chief Complaint: follow-up left knee pain    History of Present Illness  Ortiz Salamanca returns to clinic today for evaluation of her left knee.  The patient had prior cortisone injection to the left knee on 2020. She notes approximately % improvement of the pain with the injection, lasting 1 week.   She had prior gel injections in  which did not provide improvement of her pain. She has prior medical history of surgery on her left knee to remove a cyst in .   Today, she rates the pain as 4-5/10, describes it as aching in nature. Localizes pain to distal femur and medial proximal tibia as well as medial soft tissues. Symptoms are exacerbated with going up and down stairs. Denies radiation of pain, denies associated numbness or tingling.      Social History     Occupational History   • Not on file   Tobacco Use   • Smoking status: Former Smoker     Last attempt to quit: 2018     Years since quittin.2   • Smokeless tobacco: Never Used   Substance and Sexual Activity   • Alcohol use: Yes     Comment: rare   • Drug use: No   • Sexual activity: Defer      Past Medical History:   Diagnosis Date   • Colon polyp    • Coronary artery disease involving native coronary artery of native heart without angina pectoris 2019   • Heart attack (CMS/HCC)     2018 dec 25   • Hyperlipidemia    • Migraine headache    • Presence of drug coated stent in LAD coronary artery 2019     Past Surgical History:   Procedure Laterality Date   • CARDIAC CATHETERIZATION N/A 2018    Procedure: Left Heart Cath;  Surgeon: Hilario Coronado MD;  Location:  [facility] CATH INVASIVE LOCATION;  Service: Cardiovascular   • CARDIAC CATHETERIZATION N/A 2018    Procedure: Left ventriculography;  Surgeon: Hilario Coronado MD;  Location:  [facility] CATH INVASIVE LOCATION;  Service: Cardiovascular   • CARDIAC CATHETERIZATION N/A 2018     Procedure: Stent EVARISTO coronary;  Surgeon: Hilario Coronado MD;  Location:  [facility] CATH INVASIVE LOCATION;  Service: Cardiovascular   • CARDIAC CATHETERIZATION N/A 2018    Procedure: Coronary angiography;  Surgeon: Hilario Coronado MD;  Location:  [facility] CATH INVASIVE LOCATION;  Service: Cardiovascular   •  SECTION     • COLONOSCOPY N/A 10/9/2019    Procedure: COLONOSCOPY with polypectomy;  Surgeon: Dung Hutchison MD;  Location:  [facility] OR;  Service: Gastroenterology   • CORONARY STENT PLACEMENT     • CYST REMOVAL Left     knee   • HYSTERECTOMY         Family History   Problem Relation Age of Onset   • Cancer Mother    • Heart disease Brother    • Heart disease Maternal Grandmother          Review of Systems   Constitutional: Negative for chills, diaphoresis, fever and unexpected weight change.   HENT: Negative for hearing loss, nosebleeds, sore throat and tinnitus.    Eyes: Negative for pain and visual disturbance.   Respiratory: Negative for cough, shortness of breath and wheezing.    Cardiovascular: Negative for chest pain and palpitations.   Gastrointestinal: Negative for abdominal pain, diarrhea, nausea and vomiting.   Endocrine: Negative for cold intolerance, heat intolerance and polydipsia.   Genitourinary: Negative for difficulty urinating, dysuria and hematuria.   Musculoskeletal: Positive for arthralgias. Negative for joint swelling and myalgias.   Skin: Negative for rash and wound.   Allergic/Immunologic: Negative for environmental allergies.   Neurological: Negative for dizziness, syncope and numbness.   Hematological: Does not bruise/bleed easily.   Psychiatric/Behavioral: Negative for dysphoric mood and sleep disturbance. The patient is not nervous/anxious.            Objective:  There were no vitals filed for this visit.  There were no vitals filed for this visit.  There is no height or weight on file to calculate BMI.  General: No acute distress.  Resp: normal  respiratory effort  Skin: no rashes or wounds; normal turgor  Psych: mood and affect appropriate; recent and remote memory intact      Ortho Exam     Left Knee-              Prior anterolateral vertical incision that is well-healed  ROM 0-145 degrees  4+/5 on flexion  4+/5 on extension  Maximal tenderness medial joint line  Positive  Елена exam medial joint line   Effusion- mild   Anterior drawer- negative  Posterior drawer- negative   Grade 1A Lachman  No opening on varus and valgus stress at 0 and 30  Active patellar compression test- positive   Log roll-  negative  Stinchfield-  negative  Positive sensation light tough all distributions symmetric to contralateral side  Brisk cap refill all digits  2+ dorsalis pedis pulse  Decreased pain over pes bursa    Imaging:  None  Assessment:        1. Primary osteoarthritis of left knee    2. Insufficiency fracture of tibia, initial encounter    3. Pes anserine bursitis           Plan:          1. Discussed treatment options at length with patient at today's visit.   2. She is a  at [employer] and does not want to take time off work.  3. Referred patient to pain management for consideration of genicular nerve block.  4. Follow-up after she has completed the genicular nerve block.  If this fails to improve her symptoms we may consider arthroscopy treatment with subchondroplasty given her areas of subchondral bone edema on imaging.      Sunday JACQUES Salamanca was in agreement with plan and had all questions answered.     Orders:  Orders Placed This Encounter   Procedures   • Ambulatory Referral to Pain Management       Medications:  No orders of the defined types were placed in this encounter.      Followup:  Return in about 3 months (around 6/6/2020).    Sunday was seen today for follow-up and pain.    Diagnoses and all orders for this visit:    Primary osteoarthritis of left knee  -     Ambulatory Referral to Pain Management    Insufficiency fracture of tibia,  initial encounter  -     Ambulatory Referral to Pain Management    Pes anserine bursitis  -     Ambulatory Referral to Pain Management        By signing my name here, I Tona Gustafson, attest that all documentation on 03/10/20 at 09:01 has been prepared under the direction and in the presence of Dr. Jai Peters.    I, Dr. Jai Peters, personally performed the services described in this documentation, as scribed by Tona Gustafson, in my presence, and it is both accurate and complete.    Dictated utilizing Dragon dictation

## 2020-03-30 ENCOUNTER — TELEPHONE (OUTPATIENT)
Dept: CARDIOLOGY | Facility: CLINIC | Age: 50
End: 2020-03-30

## 2020-03-30 RX ORDER — ATORVASTATIN CALCIUM 40 MG/1
TABLET, FILM COATED ORAL
Qty: 30 TABLET | Refills: 11 | Status: SHIPPED | OUTPATIENT
Start: 2020-03-30 | End: 2021-03-22

## 2020-03-30 RX ORDER — ASPIRIN 81 MG/1
81 TABLET, CHEWABLE ORAL DAILY
Qty: 30 TABLET | Refills: 11 | Status: SHIPPED | OUTPATIENT
Start: 2020-03-30 | End: 2020-04-09 | Stop reason: SDUPTHER

## 2020-03-30 NOTE — TELEPHONE ENCOUNTER
Thank you for the clarification.  I have never seen this patient so I do not know her.      As far as having a history of CAD, she is really not it anymore risk for COVID-19 versus the next person.  I do not see that she has any immunocompression or respiratory disease.  You may want to discuss with Dr. Santiago as well.

## 2020-03-30 NOTE — TELEPHONE ENCOUNTER
Pt is wanting to know if it is safe for her to work due to the COVID19 from a heart standpoint?     PT #: [phone]    Thanks Anna

## 2020-03-30 NOTE — TELEPHONE ENCOUNTER
See Dr. Santiago response below.  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

## 2020-04-01 PROBLEM — R53.82 CHRONIC FATIGUE: Status: ACTIVE | Noted: 2020-04-01

## 2020-04-01 PROBLEM — M25.50 MULTIPLE JOINT PAIN: Status: ACTIVE | Noted: 2020-04-01

## 2020-04-01 PROBLEM — M25.473 ANKLE SWELLING: Status: ACTIVE | Noted: 2020-04-01

## 2020-04-09 RX ORDER — ASPIRIN 81 MG/1
81 TABLET, CHEWABLE ORAL DAILY
Qty: 90 TABLET | Refills: 3 | Status: SHIPPED | OUTPATIENT
Start: 2020-04-09 | End: 2020-08-13 | Stop reason: HOSPADM

## 2020-04-20 ENCOUNTER — TELEPHONE (OUTPATIENT)
Dept: CARDIOLOGY | Facility: CLINIC | Age: 50
End: 2020-04-20

## 2020-04-20 ENCOUNTER — HOSPITAL ENCOUNTER (EMERGENCY)
Facility: HOSPITAL | Age: 50
Discharge: LEFT BEFORE DISCHARGE | End: 2020-04-20
Attending: EMERGENCY MEDICINE | Admitting: EMERGENCY MEDICINE

## 2020-04-20 ENCOUNTER — APPOINTMENT (OUTPATIENT)
Dept: GENERAL RADIOLOGY | Facility: HOSPITAL | Age: 50
End: 2020-04-20

## 2020-04-20 VITALS
HEIGHT: 63 IN | SYSTOLIC BLOOD PRESSURE: 129 MMHG | RESPIRATION RATE: 18 BRPM | OXYGEN SATURATION: 100 % | BODY MASS INDEX: 39.11 KG/M2 | WEIGHT: 220.7 LBS | HEART RATE: 88 BPM | TEMPERATURE: 98.4 F | DIASTOLIC BLOOD PRESSURE: 76 MMHG

## 2020-04-20 DIAGNOSIS — R07.9 CHEST PAIN, UNSPECIFIED TYPE: Primary | ICD-10-CM

## 2020-04-20 LAB
ALBUMIN SERPL-MCNC: 3.8 G/DL (ref 3.5–5.2)
ALBUMIN/GLOB SERPL: 1.1 G/DL
ALP SERPL-CCNC: 77 U/L (ref 39–117)
ALT SERPL W P-5'-P-CCNC: 30 U/L (ref 1–33)
ANION GAP SERPL CALCULATED.3IONS-SCNC: 11.2 MMOL/L (ref 5–15)
AST SERPL-CCNC: 25 U/L (ref 1–32)
BASOPHILS # BLD AUTO: 0.02 10*3/MM3 (ref 0–0.2)
BASOPHILS NFR BLD AUTO: 0.3 % (ref 0–1.5)
BILIRUB SERPL-MCNC: 0.2 MG/DL (ref 0.2–1.2)
BUN BLD-MCNC: 9 MG/DL (ref 6–20)
BUN/CREAT SERPL: 12.5 (ref 7–25)
CALCIUM SPEC-SCNC: 8.9 MG/DL (ref 8.6–10.5)
CHLORIDE SERPL-SCNC: 106 MMOL/L (ref 98–107)
CO2 SERPL-SCNC: 23.8 MMOL/L (ref 22–29)
CREAT BLD-MCNC: 0.72 MG/DL (ref 0.57–1)
DEPRECATED RDW RBC AUTO: 51.6 FL (ref 37–54)
EOSINOPHIL # BLD AUTO: 0.06 10*3/MM3 (ref 0–0.4)
EOSINOPHIL NFR BLD AUTO: 1 % (ref 0.3–6.2)
ERYTHROCYTE [DISTWIDTH] IN BLOOD BY AUTOMATED COUNT: 15 % (ref 12.3–15.4)
GFR SERPL CREATININE-BSD FRML MDRD: 104 ML/MIN/1.73
GLOBULIN UR ELPH-MCNC: 3.5 GM/DL
GLUCOSE BLD-MCNC: 107 MG/DL (ref 65–99)
HCT VFR BLD AUTO: 38.2 % (ref 34–46.6)
HGB BLD-MCNC: 12.1 G/DL (ref 12–15.9)
IMM GRANULOCYTES # BLD AUTO: 0.03 10*3/MM3 (ref 0–0.05)
IMM GRANULOCYTES NFR BLD AUTO: 0.5 % (ref 0–0.5)
LYMPHOCYTES # BLD AUTO: 3 10*3/MM3 (ref 0.7–3.1)
LYMPHOCYTES NFR BLD AUTO: 47.7 % (ref 19.6–45.3)
MCH RBC QN AUTO: 29.2 PG (ref 26.6–33)
MCHC RBC AUTO-ENTMCNC: 31.7 G/DL (ref 31.5–35.7)
MCV RBC AUTO: 92.3 FL (ref 79–97)
MONOCYTES # BLD AUTO: 0.38 10*3/MM3 (ref 0.1–0.9)
MONOCYTES NFR BLD AUTO: 6 % (ref 5–12)
NEUTROPHILS # BLD AUTO: 2.8 10*3/MM3 (ref 1.7–7)
NEUTROPHILS NFR BLD AUTO: 44.5 % (ref 42.7–76)
PLATELET # BLD AUTO: 242 10*3/MM3 (ref 140–450)
PMV BLD AUTO: 9.9 FL (ref 6–12)
POTASSIUM BLD-SCNC: 3.8 MMOL/L (ref 3.5–5.2)
PROT SERPL-MCNC: 7.3 G/DL (ref 6–8.5)
RBC # BLD AUTO: 4.14 10*6/MM3 (ref 3.77–5.28)
SODIUM BLD-SCNC: 141 MMOL/L (ref 136–145)
TROPONIN T SERPL-MCNC: <0.01 NG/ML (ref 0–0.03)
WBC NRBC COR # BLD: 6.29 10*3/MM3 (ref 3.4–10.8)

## 2020-04-20 PROCEDURE — 93005 ELECTROCARDIOGRAM TRACING: CPT | Performed by: EMERGENCY MEDICINE

## 2020-04-20 PROCEDURE — 84484 ASSAY OF TROPONIN QUANT: CPT | Performed by: EMERGENCY MEDICINE

## 2020-04-20 PROCEDURE — 99284 EMERGENCY DEPT VISIT MOD MDM: CPT | Performed by: EMERGENCY MEDICINE

## 2020-04-20 PROCEDURE — 85025 COMPLETE CBC W/AUTO DIFF WBC: CPT | Performed by: EMERGENCY MEDICINE

## 2020-04-20 PROCEDURE — 80053 COMPREHEN METABOLIC PANEL: CPT | Performed by: EMERGENCY MEDICINE

## 2020-04-20 PROCEDURE — 93005 ELECTROCARDIOGRAM TRACING: CPT

## 2020-04-20 PROCEDURE — 99283 EMERGENCY DEPT VISIT LOW MDM: CPT

## 2020-04-20 PROCEDURE — 71045 X-RAY EXAM CHEST 1 VIEW: CPT

## 2020-04-20 PROCEDURE — 93010 ELECTROCARDIOGRAM REPORT: CPT | Performed by: INTERNAL MEDICINE

## 2020-04-20 RX ORDER — SODIUM CHLORIDE 0.9 % (FLUSH) 0.9 %
10 SYRINGE (ML) INJECTION AS NEEDED
Status: DISCONTINUED | OUTPATIENT
Start: 2020-04-20 | End: 2020-04-20 | Stop reason: HOSPADM

## 2020-04-20 RX ORDER — ASPIRIN 81 MG/1
324 TABLET, CHEWABLE ORAL ONCE
Status: COMPLETED | OUTPATIENT
Start: 2020-04-20 | End: 2020-04-20

## 2020-04-20 RX ORDER — NITROGLYCERIN 0.4 MG/1
0.4 TABLET SUBLINGUAL
Status: DISCONTINUED | OUTPATIENT
Start: 2020-04-20 | End: 2020-04-20 | Stop reason: HOSPADM

## 2020-04-20 RX ORDER — ONDANSETRON 2 MG/ML
4 INJECTION INTRAMUSCULAR; INTRAVENOUS ONCE
Status: DISCONTINUED | OUTPATIENT
Start: 2020-04-20 | End: 2020-04-20 | Stop reason: HOSPADM

## 2020-04-20 RX ADMIN — ASPIRIN 81 MG 324 MG: 81 TABLET ORAL at 00:25

## 2020-04-20 RX ADMIN — NITROGLYCERIN 0.4 MG: 0.4 TABLET SUBLINGUAL at 00:26

## 2020-04-20 RX ADMIN — NITROGLYCERIN 0.4 MG: 0.4 TABLET SUBLINGUAL at 00:38

## 2020-04-20 NOTE — TELEPHONE ENCOUNTER
Pt return phone call.     I lm on pt vm to call back regarding an appointment..     Thanks Bartolome

## 2020-04-20 NOTE — ED PROVIDER NOTES
Subjective   History of Present Illness  History of Present Illness    Chief complaint: Chest pain    Location: Left-sided, nonradiating    Quality/Severity: Tight, 10/10 at its worst, 10/10 currently    Timing/Onset/Duration: Acute onset at 9 PM tonight while sitting at a desk    Modifying Factors: Nothing seems to make it better    Associated Symptoms: No headache.  No fever chills or cough.  No sore throat earache or nasal congestion.  The patient complains of some shortness of breath.  No abdominal pain.  No nausea or vomiting.  No diaphoresis.  No burning on urination.    Narrative: This 49-year-old -American female with a history of stent placed in 2018, presents with chest pain that is exactly like her anginal equivalent.    PCP: Ramesh    Cardio:  Jack      Review of Systems   Constitutional: Negative for chills and fever.   HENT: Negative for ear pain and sore throat.    Eyes: Negative for discharge and redness.   Respiratory: Positive for shortness of breath. Negative for cough and chest tightness.    Cardiovascular: Positive for chest pain. Negative for palpitations and leg swelling.   Gastrointestinal: Negative for abdominal pain, diarrhea, nausea and vomiting.   Genitourinary: Negative for difficulty urinating, dysuria and urgency.   Musculoskeletal: Negative for back pain and neck pain.   Skin: Negative for rash.   Neurological: Negative for headaches.   Hematological: Negative for adenopathy.   Psychiatric/Behavioral: Negative.  Negative for confusion.        Medication List      ASK your doctor about these medications    aspirin 81 MG chewable tablet  Chew 1 tablet Daily.     atorvastatin 40 MG tablet  Commonly known as:  LIPITOR  TAKE 1 TABLET EVERY NIGHT. NEED LABS FOR FURTHER REFILLS, [phone]     baclofen 20 MG tablet  Commonly known as:  LIORESAL     diazePAM 5 MG tablet  Commonly known as:  VALIUM     diclofenac 1 % gel gel  Commonly known as:  VOLTAREN  Apply 4 g topically to the  appropriate area as directed 4 (Four) Times a   Day.     gabapentin 300 MG capsule  Commonly known as:  NEURONTIN     metoprolol tartrate 25 MG tablet  Commonly known as:  LOPRESSOR  Take 1 tablet by mouth 2 (Two) Times a Day.     nitroglycerin 0.4 MG SL tablet  Commonly known as:  NITROSTAT     Norco  MG per tablet  Generic drug:  HYDROcodone-acetaminophen     rizatriptan MLT 10 MG disintegrating tablet  Commonly known as:  MAXALT-MLT          Past Medical History:   Diagnosis Date   • Colon polyp    • Coronary artery disease involving native coronary artery of native heart without angina pectoris 2019   • Heart attack (CMS/HCC)     2018 dec 25   • Hyperlipidemia    • Migraine headache    • Presence of drug coated stent in LAD coronary artery 2019       Allergies   Allergen Reactions   • Cephalexin Anaphylaxis     Other reaction(s): Vomiting   • Nsaids Other (See Comments)     Per cardiologist       Past Surgical History:   Procedure Laterality Date   • CARDIAC CATHETERIZATION N/A 2018    Procedure: Left Heart Cath;  Surgeon: Hilario Coronado MD;  Location: [facility] INVASIVE LOCATION;  Service: Cardiovascular   • CARDIAC CATHETERIZATION N/A 2018    Procedure: Left ventriculography;  Surgeon: Hilario Coronado MD;  Location: [facility] CATH INVASIVE LOCATION;  Service: Cardiovascular   • CARDIAC CATHETERIZATION N/A 2018    Procedure: Stent EVARISTO coronary;  Surgeon: Hilario Coronado MD;  Location: [facility] CATH INVASIVE LOCATION;  Service: Cardiovascular   • CARDIAC CATHETERIZATION N/A 2018    Procedure: Coronary angiography;  Surgeon: Hilario Coronado MD;  Location: [facility] CATH INVASIVE LOCATION;  Service: Cardiovascular   •  SECTION     • COLONOSCOPY N/A 10/9/2019    Procedure: COLONOSCOPY with polypectomy;  Surgeon: Dung Hutchison MD;  Location: [facility];  Service: Gastroenterology   • CORONARY STENT PLACEMENT     • CYST REMOVAL  Left     knee   • HYSTERECTOMY     • KNEE SURGERY Left 2014       Family History   Problem Relation Age of Onset   • Cancer Mother    • Hypertension Mother    • Sleep apnea Mother    • Heart disease Brother    • Heart attack Brother    • Heart disease Maternal Grandmother    • Hypertension Maternal Grandmother    • Heart attack Maternal Grandmother    • Hypertension Father    • Hypertension Paternal Grandmother    • Heart disease Paternal Grandmother        Social History     Socioeconomic History   • Marital status:      Spouse name: Not on file   • Number of children: Not on file   • Years of education: Not on file   • Highest education level: Not on file   Tobacco Use   • Smoking status: Former Smoker     Years: 5.00     Last attempt to quit: 2018     Years since quittin.3   • Smokeless tobacco: Never Used   Substance and Sexual Activity   • Alcohol use: Yes     Comment: rare   • Drug use: No   • Sexual activity: Defer           Objective   Physical Exam   Constitutional: She is oriented to person, place, and time. She appears well-developed and well-nourished. She does not appear ill.   ED Triage Vitals  Temp: 98.4 °F (36.9 °C) (20 0002)  Heart Rate: 84 (20 0002)  Resp: 18 (20)  BP: 168/99 (20 0005)  SpO2: 100 % (20)  Temp src: n/a  Heart Rate Source: n/a  Patient Position: n/a  BP Location: n/a  FiO2 (%): n/a    The patient's vitals were reviewed by me.  Unless otherwise noted they are within normal limits.  The patient's blood pressure is elevated at 168/99.  She has a history of hypertension.     Neck: Normal range of motion. Neck supple. No JVD present. No tracheal deviation present.   Cardiovascular: Normal rate, regular rhythm and normal pulses. Exam reveals no gallop and no friction rub.   No murmur heard.  Pulmonary/Chest: Effort normal and breath sounds normal. No accessory muscle usage or stridor. No tachypnea. No respiratory distress. She  has no decreased breath sounds.   Abdominal: Soft. Bowel sounds are normal. She exhibits no distension and no mass. There is no tenderness. There is no rebound and no guarding.   Musculoskeletal: Normal range of motion.        Right lower leg: Normal. She exhibits no tenderness and no edema.        Left lower leg: Normal. She exhibits no tenderness and no edema.   Neurological: She is alert and oriented to person, place, and time.   Skin: Skin is warm and dry. No rash noted.   Psychiatric: She has a normal mood and affect.   Nursing note and vitals reviewed.      Procedures           ED Course  ED Course as of Apr 20 0101 Mon Apr 20, 2020 0051 The laboratory values were reviewed by me.  The serum glucose is 107.  The laboratory values are otherwise unremarkable    [RC]      ED Course User Index  [RC] Dayron Lyons MD            00:24, 04/20/20:  The EKG was obtained at 00 12.  The EKG was read by me at 00 13.  The EKG shows a normal sinus rhythm with rate of 83.  There is a normal axis with no hypertrophy.  The MI, QRS, QT intervals are unremarkable.  There is no ectopy.  There is no acute ST elevation or depression.        01:24, 04/20/20:  The patient was reassessed.  Her pain is 2 over 3:10 nitroglycerin.  Her vital signs were reviewed and are stable.    01:25, 04/20/20:  The patient's diagnosis of chest pain was discussed with her.  She will be admitted for further work-up and evaluation.  All the patient's questions were answered.    01:25, 04/20/20:  Spoke with Dr. Gandhi, on-call for the hospitalist, he will admit the patient.    01:43, 04/20/20:  The patient has decided she wants to leave AGAINST MEDICAL ADVICE.  She is been informed she could suffer death or permanent disability by leaving AGAINST MEDICAL ADVICE.  He should call Dr. Santiago in the morning without fail.  She understands that she should return if she is worse in any way at all.  All the patient's questions were answered she will be  leaving AMA.                                                                                                       [facility]  No orders to display     Labs Reviewed - No data to display  No results found.    Final diagnoses:   None         ED Medications:  Medications - No data to display    New Medications:     Medication List      ASK your doctor about these medications    aspirin 81 MG chewable tablet  Chew 1 tablet Daily.     atorvastatin 40 MG tablet  Commonly known as:  LIPITOR  TAKE 1 TABLET EVERY NIGHT. NEED LABS FOR FURTHER REFILLS, [phone]     baclofen 20 MG tablet  Commonly known as:  LIORESAL     diazePAM 5 MG tablet  Commonly known as:  VALIUM     diclofenac 1 % gel gel  Commonly known as:  VOLTAREN  Apply 4 g topically to the appropriate area as directed 4 (Four) Times a   Day.     gabapentin 300 MG capsule  Commonly known as:  NEURONTIN     metoprolol tartrate 25 MG tablet  Commonly known as:  LOPRESSOR  Take 1 tablet by mouth 2 (Two) Times a Day.     nitroglycerin 0.4 MG SL tablet  Commonly known as:  NITROSTAT     Norco  MG per tablet  Generic drug:  HYDROcodone-acetaminophen     rizatriptan MLT 10 MG disintegrating tablet  Commonly known as:  MAXALT-MLT          Stopped Medications:     Medication List      ASK your doctor about these medications    aspirin 81 MG chewable tablet  Chew 1 tablet Daily.     atorvastatin 40 MG tablet  Commonly known as:  LIPITOR  TAKE 1 TABLET EVERY NIGHT. NEED LABS FOR FURTHER REFILLS, [phone]     baclofen 20 MG tablet  Commonly known as:  LIORESAL     diazePAM 5 MG tablet  Commonly known as:  VALIUM     diclofenac 1 % gel gel  Commonly known as:  VOLTAREN  Apply 4 g topically to the appropriate area as directed 4 (Four) Times a   Day.     gabapentin 300 MG capsule  Commonly known as:  NEURONTIN     metoprolol tartrate 25 MG tablet  Commonly known as:  LOPRESSOR  Take 1 tablet by mouth 2 (Two) Times a Day.     nitroglycerin 0.4 MG SL tablet  Commonly known  as:  NITROSTAT     Norco  MG per tablet  Generic drug:  HYDROcodone-acetaminophen     rizatriptan MLT 10 MG disintegrating tablet  Commonly known as:  MAXALT-MLT            Final diagnoses:   None            Dayron Lyons MD  04/20/20 0144

## 2020-04-20 NOTE — TELEPHONE ENCOUNTER
Pt was seen in the ER today for CP. They was wanting to admit her but she left against medical device. The pt is wanting to know if you want her to make her an appointment this week from the ER?     She really didn't want to make the appointment but I told her I would reach out to you so you could review there ER note. She also wants a refill on Nitro. Is it ok to refill?    PT #: [phone]    Thanks Anna

## 2020-04-20 NOTE — ED NOTES
Pt states she does not want to stay. Says she feels much better and wants to go home. Verbalizes understanding of risks of leaving AMA. Says she does not want to wait any longer. Pt agreed to wait for Dr Lyons to speak to her again. Pt A&Ox4.      Christal Vasquez, RN  04/20/20 0136

## 2020-04-21 ENCOUNTER — OFFICE VISIT (OUTPATIENT)
Dept: CARDIOLOGY | Facility: CLINIC | Age: 50
End: 2020-04-21

## 2020-04-21 VITALS — HEIGHT: 63 IN | BODY MASS INDEX: 38.98 KG/M2 | WEIGHT: 220 LBS

## 2020-04-21 DIAGNOSIS — I25.118 CORONARY ARTERY DISEASE OF NATIVE ARTERY OF NATIVE HEART WITH STABLE ANGINA PECTORIS (HCC): ICD-10-CM

## 2020-04-21 DIAGNOSIS — Z95.5 PRESENCE OF DRUG COATED STENT IN LAD CORONARY ARTERY: Chronic | ICD-10-CM

## 2020-04-21 DIAGNOSIS — I21.4 NSTEMI (NON-ST ELEVATED MYOCARDIAL INFARCTION) (HCC): ICD-10-CM

## 2020-04-21 DIAGNOSIS — R07.2 PRECORDIAL PAIN: Primary | ICD-10-CM

## 2020-04-21 PROCEDURE — 99442 PR PHYS/QHP TELEPHONE EVALUATION 11-20 MIN: CPT | Performed by: INTERNAL MEDICINE

## 2020-04-21 RX ORDER — NITROGLYCERIN 0.4 MG/1
TABLET SUBLINGUAL
Qty: 25 TABLET | Refills: 0 | Status: SHIPPED | OUTPATIENT
Start: 2020-04-21 | End: 2021-12-09

## 2020-04-21 NOTE — PROGRESS NOTES
Subjective:     Encounter Date:04/21/20        Patient ID: Sunday JACQUES Salamanca is a 49 y.o. female.    Chief Complaint: CAD  Coronary Artery Disease   Pertinent negatives include no muscle weakness.       Dear Dr. Chandler,    This patient has consented to a telehealth visit via audio. The visit was scheduled as a audio visit to comply with patient safety concerns in accordance with CDC recommendations.  All vitals recorded within this visit are reported by the patient.  I spent  20 minutes in total including but not limited to the 14 minutes spent in direct conversation with this patient.     She has a history of CAD and prior drug-eluting stent.    We are having this visit today because yesterday she was in the emergency room with chest pain and shortness of breath.  She was at work and around 9 PM developed trouble breathing.  She also had some mid precordial chest heaviness.  No radiation.  No nausea.  No diaphoresis.  Emergency room doctor documented that it was just like what she had felt previously when she had her presentation with acute coronary syndrome, although she states that it felt somewhat different than before.  No tachycardia.  No palpitations.  No cough.  No chills or fevers.  No exposure to anyone known to have COVID.  Her troponin was negative on the initial assessment, EKG showed no acute change, they wanted to admit her but she signed out AMA.  She has not had any chest pain the rest of yesterday or today.     She presented to the ER in [place] on 12/25/18 with chest pain and mild SOA. She had the chest pain for 2 days and it was increasing. At times it was worse when lying down and at other times it would radiate into her left arm. It was not worse with cough or deep breathing.  She stated the pain was not sharp and there was no nausea or vomiting. She thought it was indigestion for the first day or so and took Tums and Nexium which did not help.  Her ECG was unremarkable but her initial  troponin was indeterminate at 0.040.  She has a strong family history of CAD.  One of her brothers  from a MI in his 40s and another brother has stents.  She has a history of hyperlipidemia and was a daily smoker.  It was felt she needed to be admitted and cardiology was consulted.       She had an echocardiogram that showed normal biventricular size and function, no wall motion abnormalities, and no pericardial effusion.  Her serial troponin continued to increase to 0.046 and 0.072.  She was then transferred to [facility] for a cardiac catheterization.  She had a 99% stenosis of the mid LAD which was treated with angioplasty and EVARISTO 3.5 x 28MM Xience Divina.  She was discharged home on DAPT.   She presented to the [facility] ER again on 18 with complaints of left sided heart pain that started the previous night around 11 pm.  She did not take anything for the pain.  Her ECG was essentially unchanged from her last ECG on 18 at discharge. Her troponin was 0.079 which had decreased from 0.083 on previous admission.  She was given the option to be discharged home and to follow-up in the office this coming week or to stay and have repeat cardiac catheterization.  She initially chose to be transferred to [facility] for elective cardiac catheterization.       The following portions of the patient's history were reviewed and updated as appropriate: allergies, current medications, past family history, past medical history, past social history, past surgical history and problem list.    Past Medical History:   Diagnosis Date   • Colon polyp    • Coronary artery disease involving native coronary artery of native heart without angina pectoris 2019   • Heart attack (CMS/[facility])     2018 dec 25   • Hyperlipidemia    • Migraine headache    • Presence of drug coated stent in LAD coronary artery 2019       Past Surgical History:   Procedure Laterality Date   • CARDIAC CATHETERIZATION N/A  2018    Procedure: Left Heart Cath;  Surgeon: Hilario Coronado MD;  Location:  [facility] CATH INVASIVE LOCATION;  Service: Cardiovascular   • CARDIAC CATHETERIZATION N/A 2018    Procedure: Left ventriculography;  Surgeon: Hilario Coronado MD;  Location:  [facility] CATH INVASIVE LOCATION;  Service: Cardiovascular   • CARDIAC CATHETERIZATION N/A 2018    Procedure: Stent EVARISTO coronary;  Surgeon: Hilario Coronado MD;  Location:  [facility] CATH INVASIVE LOCATION;  Service: Cardiovascular   • CARDIAC CATHETERIZATION N/A 2018    Procedure: Coronary angiography;  Surgeon: Hilario Coronado MD;  Location:  [facility] CATH INVASIVE LOCATION;  Service: Cardiovascular   •  SECTION     • COLONOSCOPY N/A 10/9/2019    Procedure: COLONOSCOPY with polypectomy;  Surgeon: Dung Hutchison MD;  Location:  [facility] OR;  Service: Gastroenterology   • CORONARY STENT PLACEMENT     • CYST REMOVAL Left     knee   • HYSTERECTOMY     • KNEE SURGERY Left        Social History     Socioeconomic History   • Marital status:      Spouse name: Not on file   • Number of children: Not on file   • Years of education: Not on file   • Highest education level: Not on file   Tobacco Use   • Smoking status: Former Smoker     Years: 5.00     Last attempt to quit: 2018     Years since quittin.3   • Smokeless tobacco: Never Used   Substance and Sexual Activity   • Alcohol use: Yes     Comment: rare   • Drug use: No   • Sexual activity: Defer       Review of Systems   Constitution: Negative for chills, decreased appetite, fever and night sweats.   HENT: Negative for ear discharge, ear pain, hearing loss, nosebleeds and sore throat.    Eyes: Negative for blurred vision, double vision and pain.   Cardiovascular: Negative for cyanosis.   Respiratory: Negative for hemoptysis and sputum production.    Endocrine: Negative for cold intolerance and heat intolerance.   Hematologic/Lymphatic:  "Negative for adenopathy.   Skin: Negative for dry skin, itching, nail changes, rash and suspicious lesions.   Musculoskeletal: Negative for arthritis, gout, muscle cramps, muscle weakness, myalgias and neck pain.   Gastrointestinal: Negative for anorexia, bowel incontinence, constipation, diarrhea, dysphagia, hematemesis and jaundice.   Genitourinary: Negative for bladder incontinence, dysuria, flank pain, frequency, hematuria and nocturia.   Neurological: Negative for focal weakness, numbness, paresthesias and seizures.   Psychiatric/Behavioral: Negative for altered mental status, hallucinations, hypervigilance, suicidal ideas and thoughts of violence.   Allergic/Immunologic: Negative for persistent infections.       Procedures       Objective:     Vitals:    04/21/20 1005   Weight: 99.8 kg (220 lb)   Height: 160 cm (63\")     Alert and oriented x3, thought processes normal, judgment normal, speaking in full sentences with no wheezing and no respiratory distress. Hearing is appropriate without difficulty.      Lab Review:   Results from last 7 days   Lab Units 04/20/20  0010   SODIUM mmol/L 141   POTASSIUM mmol/L 3.8   CHLORIDE mmol/L 106   CO2 mmol/L 23.8   BUN mg/dL 9   CREATININE mg/dL 0.72   GLUCOSE mg/dL 107*   CALCIUM mg/dL 8.9           Performed        Assessment:          Diagnosis Plan   1. Precordial pain  Stress Test With Myocardial Perfusion One Day   2. Coronary artery disease of native artery of native heart with stable angina pectoris (CMS/HCC)  Stress Test With Myocardial Perfusion One Day   3. NSTEMI (non-ST elevated myocardial infarction) (CMS/[facility])  Stress Test With Myocardial Perfusion One Day   4. Presence of drug coated stent in LAD coronary artery  Stress Test With Myocardial Perfusion One Day          Plan:       1.  CAD status post non-ST segment elevation myocardial infarction a drug-coated stent.  Patient now presented to the emergency room with recurrent chest pain shortness of breath.  " They wanted to admit her but she signed out AMA.  I agree that investigation is warranted and in spite of the current COVID pandemic believe that this needs to be done soon.  I put in an order for stress Cardiolite to be performed  2.  Tobacco abuse-patient stopped smoking at the time of her myocardial infarction and has not resumed.      Thank you very much for allowing us to participate in the care of this pleasant patient.  Please don't hesitate to call if I can be of assistance in any way.      Current Outpatient Medications:   •  aspirin 81 MG chewable tablet, Chew 1 tablet Daily., Disp: 90 tablet, Rfl: 3  •  atorvastatin (LIPITOR) 40 MG tablet, TAKE 1 TABLET EVERY NIGHT. NEED LABS FOR FURTHER REFILLS, [phone], Disp: 30 tablet, Rfl: 11  •  gabapentin (NEURONTIN) 300 MG capsule, Every 12 (Twelve) Hours., Disp: , Rfl:   •  HYDROcodone-acetaminophen (NORCO)  MG per tablet, Every 12 (Twelve) Hours., Disp: , Rfl:   •  metoprolol tartrate (LOPRESSOR) 25 MG tablet, Take 1 tablet by mouth 2 (Two) Times a Day., Disp: 180 tablet, Rfl: 2  •  nitroglycerin (NITROSTAT) 0.4 MG SL tablet, nitroglycerin 0.4 mg sublingual tablet, Disp: , Rfl:   •  rizatriptan MLT (MAXALT-MLT) 10 MG disintegrating tablet, rizatriptan 10 mg disintegrating tablet, Disp: , Rfl:

## 2020-04-22 ENCOUNTER — HOSPITAL ENCOUNTER (OUTPATIENT)
Dept: CARDIOLOGY | Facility: HOSPITAL | Age: 50
Discharge: HOME | End: 2020-04-22
Admitting: INTERNAL MEDICINE

## 2020-04-22 VITALS — HEIGHT: 63 IN | BODY MASS INDEX: 39.06 KG/M2 | WEIGHT: 220.46 LBS

## 2020-04-22 DIAGNOSIS — I25.118 CORONARY ARTERY DISEASE OF NATIVE ARTERY OF NATIVE HEART WITH STABLE ANGINA PECTORIS (HCC): ICD-10-CM

## 2020-04-22 DIAGNOSIS — Z95.5 PRESENCE OF DRUG COATED STENT IN LAD CORONARY ARTERY: ICD-10-CM

## 2020-04-22 DIAGNOSIS — R07.2 PRECORDIAL PAIN: ICD-10-CM

## 2020-04-22 DIAGNOSIS — I21.4 NSTEMI (NON-ST ELEVATED MYOCARDIAL INFARCTION) (HCC): ICD-10-CM

## 2020-04-22 LAB
BH CV NUCLEAR PRIOR STUDY: 3
BH CV STRESS BP STAGE 1: NORMAL
BH CV STRESS BP STAGE 2: NORMAL
BH CV STRESS BP STAGE 3: NORMAL
BH CV STRESS DURATION MIN STAGE 1: 3
BH CV STRESS DURATION MIN STAGE 2: 3
BH CV STRESS DURATION MIN STAGE 3: 2
BH CV STRESS DURATION SEC STAGE 1: 0
BH CV STRESS DURATION SEC STAGE 2: 0
BH CV STRESS DURATION SEC STAGE 3: 0
BH CV STRESS GRADE STAGE 1: 10
BH CV STRESS GRADE STAGE 2: 12
BH CV STRESS GRADE STAGE 3: 14
BH CV STRESS HR STAGE 1: 128
BH CV STRESS HR STAGE 2: 143
BH CV STRESS HR STAGE 3: 162
BH CV STRESS METS STAGE 1: 5
BH CV STRESS METS STAGE 2: 7.5
BH CV STRESS METS STAGE 3: 10
BH CV STRESS PROTOCOL 1: NORMAL
BH CV STRESS RECOVERY BP: NORMAL MMHG
BH CV STRESS RECOVERY HR: 94 BPM
BH CV STRESS SPEED STAGE 1: 1.7
BH CV STRESS SPEED STAGE 2: 2.5
BH CV STRESS SPEED STAGE 3: 3.4
BH CV STRESS STAGE 1: 1
BH CV STRESS STAGE 2: 2
BH CV STRESS STAGE 3: 3
LV EF NUC BP: 56 %
MAXIMAL PREDICTED HEART RATE: 171 BPM
PERCENT MAX PREDICTED HR: 94.74 %
STRESS BASELINE BP: NORMAL MMHG
STRESS BASELINE HR: 80 BPM
STRESS PERCENT HR: 111 %
STRESS POST ESTIMATED WORKLOAD: 9 METS
STRESS POST EXERCISE DUR MIN: 8 MIN
STRESS POST EXERCISE DUR SEC: 0 SEC
STRESS POST PEAK BP: NORMAL MMHG
STRESS POST PEAK HR: 162 BPM
STRESS TARGET HR: 145 BPM

## 2020-04-22 PROCEDURE — 93018 CV STRESS TEST I&R ONLY: CPT | Performed by: INTERNAL MEDICINE

## 2020-04-22 PROCEDURE — A9502 TC99M TETROFOSMIN: HCPCS | Performed by: INTERNAL MEDICINE

## 2020-04-22 PROCEDURE — 93017 CV STRESS TEST TRACING ONLY: CPT

## 2020-04-22 PROCEDURE — 93016 CV STRESS TEST SUPVJ ONLY: CPT | Performed by: INTERNAL MEDICINE

## 2020-04-22 PROCEDURE — 78452 HT MUSCLE IMAGE SPECT MULT: CPT

## 2020-04-22 PROCEDURE — 78452 HT MUSCLE IMAGE SPECT MULT: CPT | Performed by: INTERNAL MEDICINE

## 2020-04-22 PROCEDURE — 0 TECHNETIUM TETROFOSMIN KIT: Performed by: INTERNAL MEDICINE

## 2020-04-22 RX ADMIN — TETROFOSMIN 1 DOSE: 1.38 INJECTION, POWDER, LYOPHILIZED, FOR SOLUTION INTRAVENOUS at 13:06

## 2020-04-22 RX ADMIN — TETROFOSMIN 1 DOSE: 1.38 INJECTION, POWDER, LYOPHILIZED, FOR SOLUTION INTRAVENOUS at 14:02

## 2020-05-21 ENCOUNTER — CONSULT (OUTPATIENT)
Dept: BARIATRICS/WEIGHT MGMT | Facility: CLINIC | Age: 50
End: 2020-05-21

## 2020-05-21 ENCOUNTER — LAB (OUTPATIENT)
Dept: LAB | Facility: HOSPITAL | Age: 50
End: 2020-05-21

## 2020-05-21 VITALS
RESPIRATION RATE: 18 BRPM | WEIGHT: 224 LBS | TEMPERATURE: 97.3 F | SYSTOLIC BLOOD PRESSURE: 141 MMHG | HEIGHT: 62 IN | DIASTOLIC BLOOD PRESSURE: 93 MMHG | HEART RATE: 79 BPM | BODY MASS INDEX: 41.22 KG/M2

## 2020-05-21 DIAGNOSIS — E66.01 OBESITY, CLASS III, BMI 40-49.9 (MORBID OBESITY) (HCC): ICD-10-CM

## 2020-05-21 DIAGNOSIS — E66.01 OBESITY, CLASS III, BMI 40-49.9 (MORBID OBESITY) (HCC): Primary | ICD-10-CM

## 2020-05-21 DIAGNOSIS — R53.82 CHRONIC FATIGUE: ICD-10-CM

## 2020-05-21 DIAGNOSIS — M25.473 ANKLE SWELLING, UNSPECIFIED LATERALITY: ICD-10-CM

## 2020-05-21 DIAGNOSIS — I25.118 CORONARY ARTERY DISEASE OF NATIVE ARTERY OF NATIVE HEART WITH STABLE ANGINA PECTORIS (HCC): ICD-10-CM

## 2020-05-21 DIAGNOSIS — M25.50 MULTIPLE JOINT PAIN: ICD-10-CM

## 2020-05-21 DIAGNOSIS — E78.5 HYPERLIPIDEMIA, UNSPECIFIED HYPERLIPIDEMIA TYPE: ICD-10-CM

## 2020-05-21 DIAGNOSIS — F41.9 ANXIETY: ICD-10-CM

## 2020-05-21 PROBLEM — M54.50 LOW BACK PAIN: Status: ACTIVE | Noted: 2019-03-14

## 2020-05-21 PROBLEM — R60.9 EDEMA: Status: ACTIVE | Noted: 2020-05-21

## 2020-05-21 PROBLEM — E66.813 OBESITY, CLASS III, BMI 40-49.9 (MORBID OBESITY): Status: ACTIVE | Noted: 2020-05-21

## 2020-05-21 LAB
HBA1C MFR BLD: 5.26 % (ref 4.8–5.6)
TSH SERPL DL<=0.05 MIU/L-ACNC: 2.39 UIU/ML (ref 0.27–4.2)

## 2020-05-21 PROCEDURE — 83036 HEMOGLOBIN GLYCOSYLATED A1C: CPT

## 2020-05-21 PROCEDURE — 99205 OFFICE O/P NEW HI 60 MIN: CPT | Performed by: NURSE PRACTITIONER

## 2020-05-21 PROCEDURE — 36415 COLL VENOUS BLD VENIPUNCTURE: CPT

## 2020-05-21 PROCEDURE — 84443 ASSAY THYROID STIM HORMONE: CPT

## 2020-05-21 NOTE — PROGRESS NOTES
[facility]ATR[facility] BARIATRIC SURGERY  [address] [address]  [phone]  [address] [address]  [phone]  Dept: [phone]  5/21/2020      Ortiz Salamanca.  [id number]  [id number]  1970  female      Chief Complaint   Patient presents with   • Consult     Full Intake       BH Post-Op Bariatric Surgery:   Ortiz Salamanca is status post Lapband procedure APS, performed on 3/24/14.     HPI:   Today's weight is 102 kg (224 lb)  pounds, today's BMI is Body mass index is 40.5 kg/m². and she has a gain of 3 pounds since the last visit. The patient reports a portion size larger than the small plate, increased hunger and denies a loss of appetite.     Ortiz Salamanca denies nausea, dysphagia, or abdominal pain. The patientdoes not have vomitng. The patientdoes not have reflux.    Diet and Exercise: Diet history reviewed and discussed with the patient. Weight loss/gains to date discussed with the patient. She reports eating 3 meals per day, a typical portion size of greater than 1 cup, eating 1-2 snack per day, drinking 5-6 8-oz. glasses of water per day. The patient can tolerate solid protein.   The patient is eating protein first. The patient is limiting food volume. The patient is taking vitamins. The patient is limiting snacking. The patient is exercising regularly. She is drinking carbonated beverages.     The following portions of the patient's history were reviewed and updated as appropriate: allergies, current medications, past family history, past medical history, past social history, past surgical history and problem list.    Vitals:    05/21/20 1014   BP: 141/93   Pulse: 79   Resp: 18   Temp: 97.3 °F (36.3 °C)       Review of Systems   Constitutional: Negative for appetite change, fatigue and unexpected weight change.   HENT: Negative.    Eyes: Negative.    Respiratory: Negative.    Cardiovascular: Negative.  Negative  for leg swelling.   Gastrointestinal: Negative for abdominal distention, abdominal pain, constipation, diarrhea, nausea and vomiting.   Genitourinary: Negative for difficulty urinating, frequency and urgency.   Musculoskeletal: Negative for back pain.   Skin: Negative.    Psychiatric/Behavioral: Negative.    All other systems reviewed and are negative.      Physical Exam   Constitutional: She appears well-developed and well-nourished.   Neck: No thyromegaly present.   Cardiovascular: Normal rate, regular rhythm and normal heart sounds.   Pulmonary/Chest: Effort normal and breath sounds normal. No respiratory distress. She has no wheezes.   Abdominal: Soft. Bowel sounds are normal. She exhibits no distension. There is no tenderness. There is no guarding. No hernia.   Musculoskeletal: She exhibits no edema or tenderness.   Neurological: She is alert.   Skin: Skin is warm and dry. No rash noted. No erythema.   Psychiatric: She has a normal mood and affect. Her behavior is normal.   Nursing note and vitals reviewed.      Assessment: Post-operatively the patient is doing well    Encounter Diagnoses   Name Primary?   • Obesity, Class III, BMI 40-49.9 (morbid obesity) (CMS/HCC) Yes   • Coronary artery disease of native artery of native heart with stable angina pectoris (CMS/HCC)    • Hyperlipidemia, unspecified hyperlipidemia type    • Multiple joint pain    • Ankle swelling, unspecified laterality    • Anxiety    • Chronic fatigue        Plan:     I think she would benefit from additional band restriction.  Under aseptic conditions, I accessed the port and 0.2mls of fluid were added for a total of 4.9mls.  She was able to tolerate a glass of water at the conclusion of the fill.  She was advised to consume soft solids for 24 hours before advancing back to a regular diet.  RTC for n/v/d/regurg.     We discussed her protein sources and that eating dense solid protein along with plenty of vegetables and fresh fruits is  important for weight loss. Reviewed appropriate water intake- half of body weight in ounces and exercise routine- minimum of 150 minutes per with including both cardio and strength training. Instructed not to drink with meals and wait 45 minutes after each meal before drinking.    She should follow-up in 4-6 weeks       Activity restrictions: None.   Instructions / Recommendations: dietary counseling recommended, recommended a daily protein intake of  grams, patient was advised that the lap band system works best when consuming solid foods, vitamin supplement(s) recommended, recommended exercising at least 150 minutes per week, behavior modifications recommended and instructed to call the office for concerns, questions, or problems.

## 2020-05-21 NOTE — PROGRESS NOTES
[facility] BARIATRIC SURGERY  [address] [facility] [address]  [facility] [address]  [phone]  [address] [address]  [phone]  Dept: [phone]  5/21/2020      Ortiz Salamanca.  [id number]  [id number]  1970  female      Chief Complaint of weight gain; unable to maintain weight loss    History of Present Illness:   Ortiz is a 49 y.o. female who presents today for evaluation, education and consultation regarding weight loss surgery. The patient is interested in the sleeve gastrectomy.      Diet History:Ortiz has been overweight for at least 15 years, has been 35 pounds or more overweight for at least 10 years, has been 100 pounds or more overweight for 0 or more years and started dieting at age 30.  The most weight Ortiz lost was 20 pounds on Atkins/HCG and maintained the weight loss for 3 months. Ortiz describes her eating habits as snacking without portion control, overeating at mealtimes, drinking soda. Ortiz Salamanca has tried Atkins, Fasting, reduced calorie and HCG diet among others with success of losing up to 20 pounds, but in each instance regained the weight.    See dietician documentation for complete history.    Bariatric Surgery Evaluation: The patient is being seen for an initial visit for bariatric surgery evaluation.     Bariatric Co-morbidities:  sleep disturbances with possible sleep apnea, dyslipidemia, cardiovascular disease, back pain, knee pain, edema and mental health disease    Patient Active Problem List   Diagnosis   • Chest pain   • NSTEMI (non-ST elevated myocardial infarction) (CMS/HCC)   • Anxiety   • Dizziness, nonspecific   • Ganglion cyst   • Primary osteoarthritis of left knee   • Syncope   • Tobacco abuse   • Coronary artery disease of native artery of native heart with stable angina pectoris (CMS/HCC)   • Presence of drug coated stent in LAD coronary artery   • Screen for colon cancer   • Mechanical  knee pain, left   • Insufficiency fracture of tibia   • Pes anserine bursitis   • Chronic fatigue   • Ankle swelling   • Multiple joint pain   • Low back pain   • Obesity, Class III, BMI 40-49.9 (morbid obesity) (CMS/[facility])   • Edema   • Hyperlipidemia       Past Medical History:   Diagnosis Date   • Colon polyp    • Coronary artery disease involving native coronary artery of native heart without angina pectoris 2019   • Heart attack (CMS/[facility])     2018 dec 25   • Hyperlipidemia    • Migraine headache    • Presence of drug coated stent in LAD coronary artery 2019       Past Surgical History:   Procedure Laterality Date   • CARDIAC CATHETERIZATION N/A 2018    Procedure: Left Heart Cath;  Surgeon: Hilario Coronado MD;  Location:  [facility] CATH INVASIVE LOCATION;  Service: Cardiovascular   • CARDIAC CATHETERIZATION N/A 2018    Procedure: Left ventriculography;  Surgeon: Hilario Coronado MD;  Location:  [facility] CATH INVASIVE LOC[facility];  Service: Cardiovascular   • CARDIAC CATHETERIZATION N/A 2018    Procedure: Stent EVARISTO coronary;  Surgeon: Hilario Coronado MD;  Location:  [facility] CATH INVASIVE [facility];  Service: Cardiovascular   • CARDIAC CATHETERIZATION N/A 2018    Procedure: Coronary angiography;  Surgeon: Hilario Coronado MD;  Location:  [facility] CATH INVASIVE [facility];  Service: Cardiovascular   •  SECTION     • COLONOSCOPY N/A 10/9/2019    Procedure: COLONOSCOPY with polypectomy;  Surgeon: Dung Hutchison MD;  Location: [facility] OR;  Service: Gastroenterology   • CORONARY STENT PLACEMENT     • HYSTERECTOMY     • KNEE SURGERY Left     cyst removal       Allergies   Allergen Reactions   • Cephalexin Anaphylaxis     Other reaction(s): Vomiting   • Nsaids Other (See Comments)     Per cardiologist         Current Outpatient Medications:   •  aspirin 81 MG chewable tablet, Chew 1 tablet Daily., Disp: 90 tablet, Rfl: 3  •  atorvastatin  (LIPITOR) 40 MG tablet, TAKE 1 TABLET EVERY NIGHT. NEED LABS FOR FURTHER REFILLS, [phone], Disp: 30 tablet, Rfl: 11  •  gabapentin (NEURONTIN) 300 MG capsule, Every 12 (Twelve) Hours., Disp: , Rfl:   •  HYDROcodone-acetaminophen (NORCO)  MG per tablet, Every 12 (Twelve) Hours., Disp: , Rfl:   •  metoprolol tartrate (LOPRESSOR) 25 MG tablet, Take 1 tablet by mouth 2 (Two) Times a Day., Disp: 180 tablet, Rfl: 2  •  nitroglycerin (NITROSTAT) 0.4 MG SL tablet, DISSOLVE 1 TAB UNDER TONGUE FOR CHEST PAIN - IF PAIN REMAINS AFTER 5 MIN, CALL 911 AND REPEAT DOSE. MAX 3 TABS IN 15 MINUTES, Disp: 25 tablet, Rfl: 0  •  rizatriptan MLT (MAXALT-MLT) 10 MG disintegrating tablet, rizatriptan 10 mg disintegrating tablet, Disp: , Rfl:     Social History     Socioeconomic History   • Marital status:      Spouse name: Not on file   • Number of children: Not on file   • Years of education: Not on file   • Highest education level: Not on file   Tobacco Use   • Smoking status: Former Smoker     Years: 5.00     Types: Cigarettes     Last attempt to quit: 2018     Years since quittin.4   • Smokeless tobacco: Never Used   Substance and Sexual Activity   • Alcohol use: Yes     Comment: rare   • Drug use: No   • Sexual activity: Defer       Family History   Problem Relation Age of Onset   • Cancer Mother    • Hypertension Mother    • Sleep apnea Mother    • Heart disease Brother    • Heart attack Brother    • Heart disease Maternal Grandmother    • Hypertension Maternal Grandmother    • Heart attack Maternal Grandmother    • Hypertension Father    • Hypertension Paternal Grandmother    • Heart disease Paternal Grandmother          Review of Systems:  Review of Systems   Constitutional: Positive for fatigue. Negative for unexpected weight change.   HENT: Negative.    Respiratory: Negative.    Cardiovascular: Negative.    Gastrointestinal: Negative for constipation.   Endocrine: Negative.    Genitourinary: Negative.     Musculoskeletal: Negative for back pain.   Neurological: Negative.    Hematological: Negative.    Psychiatric/Behavioral: Negative.        Physical Exam:  Vital Signs:  Weight: 102 kg (224 lb)   Body mass index is 40.5 kg/m².  Temp: 97.3 °F (36.3 °C)   Heart Rate: 79   BP: 141/93     Physical Exam   Constitutional: She is oriented to person, place, and time. She appears well-developed and well-nourished.   HENT:   Head: Normocephalic and atraumatic.   Neck: Normal range of motion.   Cardiovascular: Normal rate, regular rhythm and normal heart sounds.   Pulmonary/Chest: Effort normal and breath sounds normal. No respiratory distress. She has no wheezes.   Abdominal: Soft. Bowel sounds are normal. She exhibits no distension. There is no tenderness.   Musculoskeletal: She exhibits no edema or deformity.   Neurological: She is alert and oriented to person, place, and time.   Skin: Skin is warm and dry.   Psychiatric: She has a normal mood and affect. Her behavior is normal.   Nursing note and vitals reviewed.         Assessment:         Sunday JACQUES Salamanca is a 49 y.o. year old female with medically complicated severe obesity. Weight: 102 kg (224 lb), Body mass index is 40.5 kg/m². and weight related problems including sleep disturbances with possible sleep apnea, dyslipidemia, cardiovascular disease, back pain, knee pain, edema and mental health disease.    I explained in detail the procedures that we are performing.  All of those procedures can be performed laparoscopically but there is a chance to convert to open if any technical challenges or complications do occur.  Bariatric surgery is not cosmetic surgery but rather a tool to help a patient make a life-long commitment lifestyle changes including diet, exercise, behavior changes, and taking supplemental vitamins and minerals.    Due to the patient's BMI and co-morbidities they are at a high risk for surgery and will obtain the following:  The patient has been  advised that a letter of medical support and a history and physical must be obtained from her primary care physician. A psychological evaluation will be arranged for this patient. CBC, CMP, FLP, TSH and HgbA1C will be drawn and patient will be notified with results. Ortiz Salamanca will obtain a pre-operative CXR and EKG. Ortiz Salamanca will obtain clearance from a cardiologist prior to surgery.     Ortiz Salamanca was screened for sleep apnea in our office today and based on their results she is low risk for JESS    Ortiz Salamanca will be set up for a pre-operative diagnostic esophagogastroduodenoscopy with biopsy for evaluation. The risks and benefits of the procedure were discussed with the patient in detail and all questions were answered.  Possibility of perforation, bleeding, aspiration, anoxic brain injury, respiratory and/or cardiac arrest and death were discussed.   She received handouts regarding, all questions were answered.     The risks, benefits, alternatives, and potential complications of all of the procedures were explained in detail including, but not limited to death, anesthesia and medication adverse effect/DVT, pulmonary embolism, trocar site/incisional hernia, wound infection, abdominal infection, bleeding, failure to lose weight or gain weight and change in body image, metabolic complications with calcium, thiamine, vitamin B12, folate, iron, and anemia.    The patient was advised to start a high protein, low fat and low carbohydrate diet. The patient was given individualized information by our dietician along with general group information and handouts.       The patient was given information regarding the OMERO educational video. OMERO is an internet based educational video which explains the surgical procedure and answers basic questions regarding the procedure. The patient was provided with instructions and a password to watch the video.    The patient was encouraged to start routine  exercise including but not limited to 150 minutes per week. The patient received a resistance band along with a handout of exercises.     The consultation plan was reviewed with the patient.    The patient understands the surgical procedures and the different surgical options that are available.  She understands the lifestyle changes that would be required after surgery and has agreed to participate in a pre-operative and postoperative weight management program.  She also expressed understanding of possible risks, had several questions answered and desires to proceed.    I think she is a good candidate for this surgery, and is interested in a sleeve gastrectomy.    Encounter Diagnoses   Name Primary?   • Obesity, Class III, BMI 40-49.9 (morbid obesity) (CMS/HCC) Yes   • Coronary artery disease of native artery of native heart with stable angina pectoris (CMS/[facility])    • Hyperlipidemia, unspecified hyperlipidemia type    • Multiple joint pain    • Ankle swelling, unspecified laterality    • Anxiety    • Chronic fatigue        Plan:    Patient will have evaluations and follow up with bariatric dieticians and a psychologist before undergoing a multidisciplinary review of her candidacy.  We also discussed the weight loss requirement and rationale, and other program requirements.      Tesha May, APRN  5/21/2020

## 2020-05-28 ENCOUNTER — OFFICE VISIT (OUTPATIENT)
Dept: OBSTETRICS AND GYNECOLOGY | Facility: CLINIC | Age: 50
End: 2020-05-28

## 2020-05-28 ENCOUNTER — TELEPHONE (OUTPATIENT)
Dept: CARDIOLOGY | Facility: CLINIC | Age: 50
End: 2020-05-28

## 2020-05-28 VITALS
DIASTOLIC BLOOD PRESSURE: 62 MMHG | SYSTOLIC BLOOD PRESSURE: 126 MMHG | WEIGHT: 209.3 LBS | HEIGHT: 62 IN | BODY MASS INDEX: 38.52 KG/M2

## 2020-05-28 DIAGNOSIS — Z01.419 ENCOUNTER FOR GYNECOLOGICAL EXAMINATION: ICD-10-CM

## 2020-05-28 DIAGNOSIS — N94.10 FEMALE DYSPAREUNIA: ICD-10-CM

## 2020-05-28 DIAGNOSIS — Z11.51 SPECIAL SCREENING EXAMINATION FOR HUMAN PAPILLOMAVIRUS (HPV): ICD-10-CM

## 2020-05-28 DIAGNOSIS — Z13.9 SCREENING FOR CONDITION: ICD-10-CM

## 2020-05-28 DIAGNOSIS — Z01.419 PAP SMEAR, LOW-RISK: Primary | ICD-10-CM

## 2020-05-28 LAB
BILIRUB BLD-MCNC: NEGATIVE MG/DL
CLARITY, POC: CLEAR
COLOR UR: YELLOW
GLUCOSE UR STRIP-MCNC: NEGATIVE MG/DL
KETONES UR QL: NEGATIVE
LEUKOCYTE EST, POC: NEGATIVE
NITRITE UR-MCNC: NEGATIVE MG/ML
PH UR: 5 [PH] (ref 5–8)
PROT UR STRIP-MCNC: NEGATIVE MG/DL
RBC # UR STRIP: NEGATIVE /UL
SP GR UR: 1 (ref 1–1.03)
UROBILINOGEN UR QL: NORMAL

## 2020-05-28 PROCEDURE — 99213 OFFICE O/P EST LOW 20 MIN: CPT | Performed by: OBSTETRICS & GYNECOLOGY

## 2020-05-28 PROCEDURE — 81002 URINALYSIS NONAUTO W/O SCOPE: CPT | Performed by: OBSTETRICS & GYNECOLOGY

## 2020-05-28 PROCEDURE — 99396 PREV VISIT EST AGE 40-64: CPT | Performed by: OBSTETRICS & GYNECOLOGY

## 2020-05-28 NOTE — TELEPHONE ENCOUNTER
Pt is needing clearance.    She is having gastric sleeve with . It is not schedule. Can pt hold her ASA 81 MG 5 days prior?    PT was last seen on 4/21/20.    PT #: [phone]  Office fax #: [phone]    Thanks Anna

## 2020-05-28 NOTE — PROGRESS NOTES
GYN Annual Exam     CC- Here for annual exam.     Ortiz Salamanca is a 49 y.o. female who presents for annual well woman exam. Pt is a new pt to me. Pt had a TLH/BS in 2008 due to AUB. LPS was 2 years ago. Pt had a heart attack in 12/25/2018 and she had a stent placed and was on blood thinner until 11/2019. Pt is on ASA every day. She had a colonoscopy 7/2019. Never had a MMG. Pt still complaining of dyspareunia- tried Replens with no relief. Pt having vasomotor symptoms. Pt is interested in treatment for her dyspareunia.       OB History    None         Current contraception: status post hysterectomy  History of abnormal Pap smear: no  History of abnormal mammogram: no  Family history of uterine, colon or ovarian cancer: no  Family history of breast cancer: no    Health Maintenance   Topic Date Due   • ANNUAL PHYSICAL  08/02/1973   • TDAP/TD VACCINES (1 - Tdap) 08/02/1981   • HEPATITIS C SCREENING  01/19/2018   • LIPID PANEL  12/27/2019   • Annual Gynecologic Pelvic and Breast Exam  05/24/2020   • INFLUENZA VACCINE  08/01/2020   • PAP SMEAR  05/23/2022   • COLONOSCOPY  10/09/2024       Past Medical History:   Diagnosis Date   • Colon polyp    • Coronary artery disease involving native coronary artery of native heart without angina pectoris 1/31/2019   • Heart attack (CMS/HCC)     2018 dec 25   • Hyperlipidemia    • Migraine headache    • Presence of drug coated stent in LAD coronary artery 1/31/2019       Past Surgical History:   Procedure Laterality Date   • CARDIAC CATHETERIZATION N/A 12/26/2018    Procedure: Left Heart Cath;  Surgeon: Hilario Coronado MD;  Location: [facility] CATH INVASIVE LOCATION;  Service: Cardiovascular   • CARDIAC CATHETERIZATION N/A 12/26/2018    Procedure: Left ventriculography;  Surgeon: Hilario Coronado MD;  Location:  [facility] CATH INVASIVE LOCATION;  Service: Cardiovascular   • CARDIAC CATHETERIZATION N/A 12/26/2018    Procedure: Stent EVARISTO coronary;  Surgeon: Hilario Coronado  MD EMA;  Location:  [facility] CATH INVASIVE LOCATION;  Service: Cardiovascular   • CARDIAC CATHETERIZATION N/A 2018    Procedure: Coronary angiography;  Surgeon: Hilario Coronado MD;  Location:  [facility] CATH INVASIVE LOCATION;  Service: Cardiovascular   •  SECTION     • COLONOSCOPY N/A 10/9/2019    Procedure: COLONOSCOPY with polypectomy;  Surgeon: Dung Hutchison MD;  Location:  [facility] OR;  Service: Gastroenterology   • CORONARY STENT PLACEMENT     • HYSTERECTOMY     • KNEE SURGERY Left 2014    cyst removal         Current Outpatient Medications:   •  aspirin 81 MG chewable tablet, Chew 1 tablet Daily., Disp: 90 tablet, Rfl: 3  •  atorvastatin (LIPITOR) 40 MG tablet, TAKE 1 TABLET EVERY NIGHT. NEED LABS FOR FURTHER REFILLS, [phone], Disp: 30 tablet, Rfl: 11  •  gabapentin (NEURONTIN) 300 MG capsule, Every 12 (Twelve) Hours., Disp: , Rfl:   •  HYDROcodone-acetaminophen (NORCO)  MG per tablet, Every 12 (Twelve) Hours., Disp: , Rfl:   •  metoprolol tartrate (LOPRESSOR) 25 MG tablet, Take 1 tablet by mouth 2 (Two) Times a Day., Disp: 180 tablet, Rfl: 2  •  nitroglycerin (NITROSTAT) 0.4 MG SL tablet, DISSOLVE 1 TAB UNDER TONGUE FOR CHEST PAIN - IF PAIN REMAINS AFTER 5 MIN, CALL 911 AND REPEAT DOSE. MAX 3 TABS IN 15 MINUTES, Disp: 25 tablet, Rfl: 0  •  rizatriptan MLT (MAXALT-MLT) 10 MG disintegrating tablet, rizatriptan 10 mg disintegrating tablet, Disp: , Rfl:     Allergies   Allergen Reactions   • Cephalexin Anaphylaxis     Other reaction(s): Vomiting   • Nsaids Other (See Comments)     Per cardiologist       Social History     Tobacco Use   • Smoking status: Former Smoker     Years: 5.00     Types: Cigarettes     Last attempt to quit: 2018     Years since quittin.4   • Smokeless tobacco: Never Used   Substance Use Topics   • Alcohol use: Yes     Comment: rare   • Drug use: No       Family History   Problem Relation Age of Onset   • Cancer Mother    •  "Hypertension Mother    • Sleep apnea Mother    • Heart disease Brother    • Heart attack Brother    • Heart disease Maternal Grandmother    • Hypertension Maternal Grandmother    • Heart attack Maternal Grandmother    • Hypertension Father    • Hypertension Paternal Grandmother    • Heart disease Paternal Grandmother        Review of Systems   Constitutional: Negative for appetite change, chills, fatigue, fever and unexpected weight change.   Gastrointestinal: Negative for abdominal distention, abdominal pain, anal bleeding, blood in stool, constipation, diarrhea, nausea and vomiting.   Genitourinary: Positive for dyspareunia. Negative for dysuria, menstrual problem, pelvic pain, vaginal bleeding, vaginal discharge and vaginal pain.       /62   Ht 158.4 cm (62.36\")   Wt 94.9 kg (209 lb 4.8 oz)   LMP  (LMP Unknown)   Breastfeeding No   BMI 37.84 kg/m²     Physical Exam   Constitutional: She is oriented to person, place, and time. She appears well-developed and well-nourished.   HENT:   Mouth/Throat: Normal dentition. No dental caries.   Cardiovascular: Normal rate, regular rhythm and normal heart sounds.   Pulmonary/Chest: Effort normal and breath sounds normal. No stridor. No respiratory distress. She has no wheezes. Right breast exhibits no inverted nipple, no mass, no nipple discharge, no skin change and no tenderness. Left breast exhibits no inverted nipple, no mass, no nipple discharge, no skin change and no tenderness.   Abdominal: Soft. She exhibits no distension and no mass. There is no tenderness.   Genitourinary: There is no rash, tenderness or lesion on the right labia. There is no rash, tenderness or lesion on the left labia. Right adnexum displays no mass, no tenderness and no fullness. Left adnexum displays no mass, no tenderness and no fullness. No tenderness or bleeding in the vagina. No vaginal discharge found.   Musculoskeletal: Normal range of motion. She exhibits no edema or tenderness. "   Neurological: She is alert and oriented to person, place, and time. No cranial nerve deficit. Coordination normal.   Skin: Skin is warm. No rash noted. No erythema.   Psychiatric: She has a normal mood and affect. Her behavior is normal. Judgment and thought content normal.   Vitals reviewed.         Assessment/Plan    1) GYN HM: check pap smear. Needs MMG- never had one. Last colonoscopy 7/2019- one polyp removed. Needs another in 5 years.  SBE demonstrated and encouraged.  2) FHx of PE: mother had PE on HRT. Discussed having pt's mother tested for a thrombophilia. Pt does not know if her mother has ever been tested. Check thrombophilia panel today.  3) Contraception: s/p TLH for menorrhagia 2008.  4) hx CAD: had a heart attack with stent placed 12/25/2018. Pt was on blood thinner until 11/2019.  5) Diet and Exercise discussed  6) Smoking Status: nonsmoker  7) Dyspareunia: unsure if pt can take estrogen per vagina. Waiting for thrombophilia panel to return. Pt tried Replens and did not like her results. Not a candidate for osphena  8) Follow up prn and 1 year       Diagnoses and all orders for this visit:    Encounter for gynecological examination  -     Mammo Screening Bilateral With CAD; Future  -     Follicle Stimulating Hormone  -     Estradiol    Screening for condition  -     POC Urinalysis Dipstick    Female dyspareunia          Cadence Golden DO  5/28/2020  09:25

## 2020-05-29 ENCOUNTER — TRANSCRIBE ORDERS (OUTPATIENT)
Dept: ADMINISTRATIVE | Facility: HOSPITAL | Age: 50
End: 2020-05-29

## 2020-05-29 DIAGNOSIS — Z12.31 VISIT FOR SCREENING MAMMOGRAM: Primary | ICD-10-CM

## 2020-05-29 LAB
ESTRADIOL SERPL-MCNC: 20.9 PG/ML
FSH SERPL-ACNC: 71.6 MIU/ML

## 2020-06-01 ENCOUNTER — TELEPHONE (OUTPATIENT)
Dept: CARDIOLOGY | Facility: CLINIC | Age: 50
End: 2020-06-01

## 2020-06-03 RX ORDER — METRONIDAZOLE 500 MG/1
2000 TABLET ORAL DAILY
Qty: 4 TABLET | Refills: 0 | Status: SHIPPED | OUTPATIENT
Start: 2020-06-03 | End: 2020-06-04

## 2020-06-05 ENCOUNTER — PREP FOR SURGERY (OUTPATIENT)
Dept: OTHER | Facility: HOSPITAL | Age: 50
End: 2020-06-05

## 2020-06-05 DIAGNOSIS — R10.13 DYSPEPSIA: Primary | ICD-10-CM

## 2020-06-05 RX ORDER — SODIUM CHLORIDE, SODIUM LACTATE, POTASSIUM CHLORIDE, CALCIUM CHLORIDE 600; 310; 30; 20 MG/100ML; MG/100ML; MG/100ML; MG/100ML
30 INJECTION, SOLUTION INTRAVENOUS CONTINUOUS
Status: CANCELLED | OUTPATIENT
Start: 2020-07-07

## 2020-06-18 DIAGNOSIS — Z82.49 FAMILY HISTORY OF PULMONARY EMBOLISM: Primary | ICD-10-CM

## 2020-06-19 RX ORDER — ESTRADIOL 0.1 MG/G
0.5 CREAM VAGINAL 2 TIMES WEEKLY
Qty: 45 G | Refills: 6 | Status: SHIPPED | OUTPATIENT
Start: 2020-06-22 | End: 2020-08-13 | Stop reason: HOSPADM

## 2020-06-23 ENCOUNTER — RESULTS ENCOUNTER (OUTPATIENT)
Dept: OBSTETRICS AND GYNECOLOGY | Facility: CLINIC | Age: 50
End: 2020-06-23

## 2020-06-23 DIAGNOSIS — Z82.49 FAMILY HISTORY OF PULMONARY EMBOLISM: ICD-10-CM

## 2020-06-30 ENCOUNTER — TRANSCRIBE ORDERS (OUTPATIENT)
Dept: SLEEP MEDICINE | Facility: HOSPITAL | Age: 50
End: 2020-06-30

## 2020-06-30 DIAGNOSIS — Z01.818 OTHER SPECIFIED PRE-OPERATIVE EXAMINATION: Primary | ICD-10-CM

## 2020-07-04 ENCOUNTER — LAB (OUTPATIENT)
Dept: LAB | Facility: HOSPITAL | Age: 50
End: 2020-07-04

## 2020-07-04 DIAGNOSIS — Z01.818 OTHER SPECIFIED PRE-OPERATIVE EXAMINATION: ICD-10-CM

## 2020-07-04 PROCEDURE — C9803 HOPD COVID-19 SPEC COLLECT: HCPCS

## 2020-07-04 PROCEDURE — U0004 COV-19 TEST NON-CDC HGH THRU: HCPCS

## 2020-07-06 LAB
REF LAB TEST METHOD: NORMAL
SARS-COV-2 RNA RESP QL NAA+PROBE: NOT DETECTED

## 2020-07-07 ENCOUNTER — ANESTHESIA EVENT (OUTPATIENT)
Dept: GASTROENTEROLOGY | Facility: HOSPITAL | Age: 50
End: 2020-07-07

## 2020-07-07 ENCOUNTER — HOSPITAL ENCOUNTER (OUTPATIENT)
Facility: HOSPITAL | Age: 50
Setting detail: HOSPITAL OUTPATIENT SURGERY
Discharge: HOME | End: 2020-07-07
Attending: SURGERY | Admitting: SURGERY

## 2020-07-07 ENCOUNTER — ANESTHESIA (OUTPATIENT)
Dept: GASTROENTEROLOGY | Facility: HOSPITAL | Age: 50
End: 2020-07-07

## 2020-07-07 VITALS
RESPIRATION RATE: 16 BRPM | DIASTOLIC BLOOD PRESSURE: 86 MMHG | TEMPERATURE: 98 F | OXYGEN SATURATION: 100 % | SYSTOLIC BLOOD PRESSURE: 112 MMHG | BODY MASS INDEX: 39.55 KG/M2 | HEIGHT: 63 IN | HEART RATE: 62 BPM | WEIGHT: 223.19 LBS

## 2020-07-07 DIAGNOSIS — R10.13 DYSPEPSIA: ICD-10-CM

## 2020-07-07 PROCEDURE — 43239 EGD BIOPSY SINGLE/MULTIPLE: CPT | Performed by: SURGERY

## 2020-07-07 PROCEDURE — 25010000002 PROPOFOL 10 MG/ML EMULSION: Performed by: ANESTHESIOLOGY

## 2020-07-07 PROCEDURE — 87081 CULTURE SCREEN ONLY: CPT | Performed by: SURGERY

## 2020-07-07 RX ORDER — SODIUM CHLORIDE, SODIUM LACTATE, POTASSIUM CHLORIDE, CALCIUM CHLORIDE 600; 310; 30; 20 MG/100ML; MG/100ML; MG/100ML; MG/100ML
30 INJECTION, SOLUTION INTRAVENOUS CONTINUOUS
Status: DISCONTINUED | OUTPATIENT
Start: 2020-07-07 | End: 2020-07-07 | Stop reason: HOSPADM

## 2020-07-07 RX ORDER — PROPOFOL 10 MG/ML
VIAL (ML) INTRAVENOUS AS NEEDED
Status: DISCONTINUED | OUTPATIENT
Start: 2020-07-07 | End: 2020-07-07 | Stop reason: SURG

## 2020-07-07 RX ORDER — PROPOFOL 10 MG/ML
VIAL (ML) INTRAVENOUS CONTINUOUS PRN
Status: DISCONTINUED | OUTPATIENT
Start: 2020-07-07 | End: 2020-07-07 | Stop reason: SURG

## 2020-07-07 RX ORDER — LIDOCAINE HYDROCHLORIDE 20 MG/ML
INJECTION, SOLUTION INFILTRATION; PERINEURAL AS NEEDED
Status: DISCONTINUED | OUTPATIENT
Start: 2020-07-07 | End: 2020-07-07 | Stop reason: SURG

## 2020-07-07 RX ADMIN — PROPOFOL 150 MG: 10 INJECTION, EMULSION INTRAVENOUS at 07:54

## 2020-07-07 RX ADMIN — LIDOCAINE HYDROCHLORIDE 60 MG: 20 INJECTION, SOLUTION INFILTRATION; PERINEURAL at 07:54

## 2020-07-07 RX ADMIN — SODIUM CHLORIDE, POTASSIUM CHLORIDE, SODIUM LACTATE AND CALCIUM CHLORIDE 30 ML/HR: 600; 310; 30; 20 INJECTION, SOLUTION INTRAVENOUS at 07:43

## 2020-07-07 RX ADMIN — PROPOFOL 300 MCG/KG/MIN: 10 INJECTION, EMULSION INTRAVENOUS at 07:54

## 2020-07-07 NOTE — DISCHARGE INSTRUCTIONS
Dr. Helm    [phone]    Upper Endoscopy, Adult, Care After    This sheet gives you information about how to care for yourself after your procedure. Your health care provider may also give you more specific instructions. If you have problems or questions, contact your health care provider.  What can I expect after the procedure?  After the procedure, it is common to have:  · A sore throat.  · Mild stomach pain or discomfort.  · Bloating.  · Nausea.  Follow these instructions at home:    · Follow instructions from your health care provider about what to eat or drink after your procedure.  · Return to your normal activities as told by your health care provider. Ask your health care provider what activities are safe for you.  · Take over-the-counter and prescription medicines only as told by your health care provider.  · Do not drive for 24 hours if you were given a sedative during your procedure.  · Keep all follow-up visits as told by your health care provider. This is important.  Contact a health care provider if you have:  · A sore throat that lasts longer than one day.  · Trouble swallowing.  Get help right away if:  · You vomit blood or your vomit looks like coffee grounds.  · You have:  ? A fever.  ? Bloody, black, or tarry stools.  ? A severe sore throat or you cannot swallow.  ? Difficulty breathing.  ? Severe pain in your chest or abdomen.  Summary  · After the procedure, it is common to have a sore throat, mild stomach discomfort, bloating, and nausea.  · Do not drive for 24 hours if you were given a sedative during the procedure.  · Follow instructions from your health care provider about what to eat or drink after your procedure.  · Return to your normal activities as told by your health care provider.  This information is not intended to replace advice given to you by your health care provider. Make sure you discuss any questions you have with your health care provider.  Document Released: 06/18/2013  Document Revised: 06/11/2019 Document Reviewed: 05/20/2019  [employer] Patient Education © 2020 [employer]    Gastritis, Adult    Gastritis is swelling (inflammation) of the stomach. Gastritis can develop quickly (acute). It can also develop slowly over time (chronic). It is important to get help for this condition. If you do not get help, your stomach can bleed, and you can get sores (ulcers) in your stomach.  What are the causes?  This condition may be caused by:  · Germs that get to your stomach.  · Drinking too much alcohol.  · Medicines you are taking.  · Too much acid in the stomach.  · A disease of the intestines or stomach.  · Stress.  · An allergic reaction.  · Crohn's disease.  · Some cancer treatments (radiation).  Sometimes the cause of this condition is not known.  What are the signs or symptoms?  Symptoms of this condition include:  · Pain in your stomach.  · A burning feeling in your stomach.  · Feeling sick to your stomach (nauseous).  · Throwing up (vomiting).  · Feeling too full after you eat.  · Weight loss.  · Bad breath.  · Throwing up blood.  · Blood in your poop (stool).  How is this diagnosed?  This condition may be diagnosed with:  · Your medical history and symptoms.  · A physical exam.  · Tests. These can include:  ? Blood tests.  ? Stool tests.  ? A procedure to look inside your stomach (upper endoscopy).  ? A test in which a sample of tissue is taken for testing (biopsy).  How is this treated?  Treatment for this condition depends on what caused it. You may be given:  · Antibiotic medicine, if your condition was caused by germs.  · H2 blockers and similar medicines, if your condition was caused by too much acid.  Follow these instructions at home:  Medicines  · Take over-the-counter and prescription medicines only as told by your doctor.  · If you were prescribed an antibiotic medicine, take it as told by your doctor. Do not stop taking it even if you start to feel better.  Eating and  drinking    · Eat small meals often, instead of large meals.  · Avoid foods and drinks that make your symptoms worse.  · Drink enough fluid to keep your pee (urine) pale yellow.  Alcohol use  · Do not drink alcohol if:  ? Your doctor tells you not to drink.  ? You are pregnant, may be pregnant, or are planning to become pregnant.  · If you drink alcohol:  ? Limit your use to:  § 0-1 drink a day for women.  § 0-2 drinks a day for men.  ? Be aware of how much alcohol is in your drink. In the [place] one drink equals one 12 oz bottle of beer (355 mL), one 5 oz glass of wine (148 mL), or one 1½ oz glass of hard liquor (44 mL).  General instructions  · Talk with your doctor about ways to manage stress. You can exercise or do deep breathing, meditation, or yoga.  · Do not smoke or use products that have nicotine or tobacco. If you need help quitting, ask your doctor.  · Keep all follow-up visits as told by your doctor. This is important.  Contact a doctor if:  · Your symptoms get worse.  · Your symptoms go away and then come back.  Get help right away if:  · You throw up blood or something that looks like coffee grounds.  · You have black or dark red poop.  · You throw up any time you try to drink fluids.  · Your stomach pain gets worse.  · You have a fever.  · You do not feel better after one week.  Summary  · Gastritis is swelling (inflammation) of the stomach.  · You must get help for this condition. If you do not get help, your stomach can bleed, and you can get sores (ulcers).  · This condition is diagnosed with medical history, physical exam, or tests.  · You can be treated with medicines for germs or medicines to block too much acid in your stomach.  This information is not intended to replace advice given to you by your health care provider. Make sure you discuss any questions you have with your health care provider.  Document Released: 06/05/2009 Document Revised: 05/07/2019 Document Reviewed: 05/07/2019  [employer]  Patient Education © 2020 [employer]

## 2020-07-07 NOTE — H&P
Patient Care Team:  Morris Chandler MD as PCP - General (General Practice)    Chief complaint Heartburn and in need of preoperative clearance prior to surgery    Subjective     Patient is a 49 y.o. female who is a patient of ours and has undergone our extensive initial evaluation for bariatric surgery and needs to proceed with upper endoscopy for preoperative clearance prior to proceeding with surgery.  Please see the initial history and physical for further detailed information.      Review of Systems   Pertinent items are noted in HPI and no changes since last visit.    Past Medical History:   Diagnosis Date   • Colon polyp    • Coronary artery disease involving native coronary artery of native heart without angina pectoris 2019   • Heart attack (CMS/HCC)     2018 dec 25   • Hyperlipidemia    • Migraine headache    • Presence of drug coated stent in LAD coronary artery 2019     Past Surgical History:   Procedure Laterality Date   • CARDIAC CATHETERIZATION N/A 2018    Procedure: Left Heart Cath;  Surgeon: Hilario Coronado MD;  Location: [facility] CATH INVASIVE LOCATION;  Service: Cardiovascular   • CARDIAC CATHETERIZATION N/A 2018    Procedure: Left ventriculography;  Surgeon: Hilario Coronado MD;  Location: [facility] CATH INVASIVE LOCATION;  Service: Cardiovascular   • CARDIAC CATHETERIZATION N/A 2018    Procedure: Stent EVARISTO coronary;  Surgeon: Hilario Coronado MD;  Location: [facility] CATH INVASIVE LOCATION;  Service: Cardiovascular   • CARDIAC CATHETERIZATION N/A 2018    Procedure: Coronary angiography;  Surgeon: Hilario Coronado MD;  Location: [facility] CATH INVASIVE LOCATION;  Service: Cardiovascular   •  SECTION     • COLONOSCOPY N/A 10/9/2019    Procedure: COLONOSCOPY with polypectomy;  Surgeon: Dung Hutchison MD;  Location: [facility];  Service: Gastroenterology   • CORONARY STENT PLACEMENT     • HYSTERECTOMY     • KNEE SURGERY  Left 2014    cyst removal     Family History   Problem Relation Age of Onset   • Cancer Mother    • Hypertension Mother    • Sleep apnea Mother    • Heart disease Brother    • Heart attack Brother    • Heart disease Maternal Grandmother    • Hypertension Maternal Grandmother    • Heart attack Maternal Grandmother    • Hypertension Father    • Hypertension Paternal Grandmother    • Heart disease Paternal Grandmother      Social History     Tobacco Use   • Smoking status: Former Smoker     Years: 5.00     Types: Cigarettes     Last attempt to quit: 2018     Years since quittin.5   • Smokeless tobacco: Never Used   Substance Use Topics   • Alcohol use: Yes     Comment: rare   • Drug use: No     Medications Prior to Admission   Medication Sig Dispense Refill Last Dose   • aspirin 81 MG chewable tablet Chew 1 tablet Daily. 90 tablet 3 Taking   • atorvastatin (LIPITOR) 40 MG tablet TAKE 1 TABLET EVERY NIGHT. NEED LABS FOR FURTHER REFILLS, [phone] 30 tablet 11 Taking   • estradiol (ESTRACE) 0.1 MG/GM vaginal cream Insert 0.5 g into the vagina 2 (Two) Times a Week. 45 g 6    • gabapentin (NEURONTIN) 300 MG capsule Every 12 (Twelve) Hours.   Taking   • HYDROcodone-acetaminophen (NORCO)  MG per tablet Every 12 (Twelve) Hours.   Taking   • metoprolol tartrate (LOPRESSOR) 25 MG tablet Take 1 tablet by mouth 2 (Two) Times a Day. 180 tablet 2 Taking   • nitroglycerin (NITROSTAT) 0.4 MG SL tablet DISSOLVE 1 TAB UNDER TONGUE FOR CHEST PAIN - IF PAIN REMAINS AFTER 5 MIN, CALL 911 AND REPEAT DOSE. MAX 3 TABS IN 15 MINUTES 25 tablet 0 Taking   • rizatriptan MLT (MAXALT-MLT) 10 MG disintegrating tablet rizatriptan 10 mg disintegrating tablet   Taking     Allergies:  Cephalexin and Nsaids    Vital Signs  See PreOp record            Physical Exam:   Heart: RR  Lungs: CTA B  Abd: soft and NT/ND  Ext: no clubbing, cyanosis    Results Review:    I have reviewed the patient's clinical results      Dyspepsia      The  risks and benefits of the procedure were discussed with the patient in detail and all questions were answered.  Possibility of perforation, bleeding, aspiration, anoxic brain injury, respiratory and/or cardiac arrest and death were discussed.  Consent will be signed and witnessed..     Facundo Helm MD  07/07/20  07:03  Time: Approximately 15 minutes was spent with the patient and over half that time was spent counseling.  All of the patients questions were answered.

## 2020-07-07 NOTE — ANESTHESIA PREPROCEDURE EVALUATION
Anesthesia Evaluation     Patient summary reviewed and Nursing notes reviewed                Airway   Mallampati: III  TM distance: >3 FB  Neck ROM: full  Possible difficult intubation  Dental - normal exam     Pulmonary - normal exam   (+) a smoker Former,   Cardiovascular - normal exam    (+) past MI  >12 months, CAD, cardiac stents Drug eluting stent more than 12 months ago hyperlipidemia,     ROS comment: Hx NSTEMI 12/18, stent placed in LAD    Neuro/Psych  (+) headaches, dizziness/light headedness, syncope, psychiatric history,       ROS Comment: Migraines  GI/Hepatic/Renal/Endo    (+) obesity, morbid obesity,      Musculoskeletal     (+) back pain, chronic pain,   Abdominal   (+) obese,    Substance History      OB/GYN          Other   arthritis,                      Anesthesia Plan    ASA 3     MAC     intravenous induction     Anesthetic plan, all risks, benefits, and alternatives have been provided, discussed and informed consent has been obtained with: patient.

## 2020-07-07 NOTE — OP NOTE
Surgeon: Facundo Helm Jr., M.D.    Preoperative Diagnosis: Dyspepsia    Postoperative Diagnosis: #1 gastritis #2 questionable small hiatal hernia    Procedure Performed: Transoral esophagogastroduodenoscopy with gastric antral biopsies    Indications: 49-year-old female with morbid obesity with complaints of heartburn.  Patient does not take H2 blocker PPI on regular basis.  Patient presents for preoperative evaluation    Procedure:     The procedure, indications, preparation and potential complications were explained to the patient, who indicated understanding and signed the corresponding consent forms.  The patient was identified, taken to the endoscopy suite, and placed on the left side down decubitus position.  The patient underwent a MAC anesthesia and was appropriately monitored through the case by the anesthesia personnel with continuous pulse oximetry, blood pressure, and cardiac monitoring.  A bite block was placed.  After adequate IV sedation and using a transoral technique a lubed flexible endoscope was placed in the hypopharynx and advanced to the second portion of the duodenum without difficulty. The scope was then withdrawn back into the antrum of the stomach.  Cold forcep biopsies of the antrum were taken to rule out Helicobacter pylori.  The scope was retroflexed noting the body, fundus and cardia.  The scope was then withdrawn back into the esophagus after decompressing the stomach.  The Z line was noted and GE junction measured from the incisors.  The scope was then completely withdrawn.  The patient tolerated the procedure well and left the endoscopy suite in stable condition.  The findings are listed below.    Duodenum: Unremarkable  Antrum: Mild patchy erythema  Body/Fundus: Unremarkable  Cardia: Questionable small defect  Esophagus: Z line fairly regular, no erosive esophagitis; GE junction measured approximately 38 cm from the incisors    Recommendations:     Await biopsy results and  follow-up in the office

## 2020-07-07 NOTE — ANESTHESIA POSTPROCEDURE EVALUATION
Patient: Ortiz Salamanca    Procedure Summary     Date:  07/07/20 Room / Location:   [facility] /  [facility]    Anesthesia Start:  0749 Anesthesia Stop:  0803    Procedure:  ESOPHAGOGASTRODUODENOSCOPY WITH BIOPSY (N/A Esophagus) Diagnosis:       Dyspepsia      (Dyspepsia [R10.13])    Surgeon:  Facundo Helm Jr., MD Provider:  Srikanth Dunlap MD    Anesthesia Type:  MAC ASA Status:  3          Anesthesia Type: MAC    Vitals  No vitals data found for the desired time range.          Post Anesthesia Care and Evaluation    Patient location during evaluation: PHASE II  Patient participation: complete - patient participated  Level of consciousness: awake and alert  Pain management: adequate  Airway patency: patent  Anesthetic complications: No anesthetic complications  PONV Status: none  Cardiovascular status: acceptable  Respiratory status: acceptable  Hydration status: acceptable

## 2020-07-08 ENCOUNTER — TELEPHONE (OUTPATIENT)
Dept: BARIATRICS/WEIGHT MGMT | Facility: CLINIC | Age: 50
End: 2020-07-08

## 2020-07-08 LAB — UREASE TISS QL: NEGATIVE

## 2020-07-08 NOTE — TELEPHONE ENCOUNTER
Spoke to pt regarding negative results. Pt gave a verbal understanding.         ----- Message from Facundo Helm Jr., MD sent at 7/8/2020  9:30 AM EDT -----  Please call patient with negative results.

## 2020-07-29 ENCOUNTER — PREP FOR SURGERY (OUTPATIENT)
Dept: OTHER | Facility: HOSPITAL | Age: 50
End: 2020-07-29

## 2020-07-29 DIAGNOSIS — E66.9 OBESITY, CLASS II, BMI 35-39.9: Primary | ICD-10-CM

## 2020-07-29 DIAGNOSIS — K44.9 HIATAL HERNIA: ICD-10-CM

## 2020-07-29 PROBLEM — E66.812 OBESITY, CLASS II, BMI 35-39.9: Status: ACTIVE | Noted: 2020-07-29

## 2020-07-29 RX ORDER — SODIUM CHLORIDE, SODIUM LACTATE, POTASSIUM CHLORIDE, CALCIUM CHLORIDE 600; 310; 30; 20 MG/100ML; MG/100ML; MG/100ML; MG/100ML
100 INJECTION, SOLUTION INTRAVENOUS CONTINUOUS
Status: CANCELLED | OUTPATIENT
Start: 2020-08-12

## 2020-07-29 RX ORDER — METOCLOPRAMIDE HYDROCHLORIDE 5 MG/ML
10 INJECTION INTRAMUSCULAR; INTRAVENOUS ONCE
Status: CANCELLED | OUTPATIENT
Start: 2020-08-12 | End: 2020-07-29

## 2020-07-29 RX ORDER — SODIUM CHLORIDE 0.9 % (FLUSH) 0.9 %
3-10 SYRINGE (ML) INJECTION AS NEEDED
Status: CANCELLED | OUTPATIENT
Start: 2020-08-12

## 2020-07-29 RX ORDER — ACETAMINOPHEN 160 MG/5ML
975 SOLUTION ORAL ONCE
Status: CANCELLED | OUTPATIENT
Start: 2020-08-12 | End: 2020-07-29

## 2020-07-29 RX ORDER — CHLORHEXIDINE GLUCONATE 0.12 MG/ML
15 RINSE ORAL SEE ADMIN INSTRUCTIONS
Status: CANCELLED | OUTPATIENT
Start: 2020-08-12

## 2020-07-29 RX ORDER — SODIUM CHLORIDE 0.9 % (FLUSH) 0.9 %
3 SYRINGE (ML) INJECTION EVERY 12 HOURS SCHEDULED
Status: CANCELLED | OUTPATIENT
Start: 2020-07-29

## 2020-07-29 RX ORDER — SCOLOPAMINE TRANSDERMAL SYSTEM 1 MG/1
1 PATCH, EXTENDED RELEASE TRANSDERMAL CONTINUOUS
Status: CANCELLED | OUTPATIENT
Start: 2020-08-12 | End: 2020-08-15

## 2020-07-29 RX ORDER — PANTOPRAZOLE SODIUM 40 MG/10ML
40 INJECTION, POWDER, LYOPHILIZED, FOR SOLUTION INTRAVENOUS ONCE
Status: CANCELLED | OUTPATIENT
Start: 2020-08-12 | End: 2020-07-29

## 2020-07-30 ENCOUNTER — CONSULT (OUTPATIENT)
Dept: BARIATRICS/WEIGHT MGMT | Facility: CLINIC | Age: 50
End: 2020-07-30

## 2020-07-30 VITALS
SYSTOLIC BLOOD PRESSURE: 146 MMHG | HEIGHT: 63 IN | DIASTOLIC BLOOD PRESSURE: 82 MMHG | RESPIRATION RATE: 18 BRPM | WEIGHT: 218 LBS | BODY MASS INDEX: 38.62 KG/M2 | HEART RATE: 74 BPM | TEMPERATURE: 96.9 F

## 2020-07-30 DIAGNOSIS — Z95.5 PRESENCE OF DRUG COATED STENT IN LAD CORONARY ARTERY: Chronic | ICD-10-CM

## 2020-07-30 DIAGNOSIS — M25.50 MULTIPLE JOINT PAIN: ICD-10-CM

## 2020-07-30 DIAGNOSIS — E66.01 OBESITY, CLASS III, BMI 40-49.9 (MORBID OBESITY) (HCC): Primary | ICD-10-CM

## 2020-07-30 DIAGNOSIS — K44.9 HIATAL HERNIA: ICD-10-CM

## 2020-07-30 DIAGNOSIS — R10.13 DYSPEPSIA: ICD-10-CM

## 2020-07-30 DIAGNOSIS — I25.118 CORONARY ARTERY DISEASE OF NATIVE ARTERY OF NATIVE HEART WITH STABLE ANGINA PECTORIS (HCC): ICD-10-CM

## 2020-07-30 DIAGNOSIS — E66.9 OBESITY, CLASS II, BMI 35-39.9: ICD-10-CM

## 2020-07-30 DIAGNOSIS — E78.5 HYPERLIPIDEMIA, UNSPECIFIED HYPERLIPIDEMIA TYPE: ICD-10-CM

## 2020-07-30 DIAGNOSIS — R53.82 CHRONIC FATIGUE: ICD-10-CM

## 2020-07-30 PROBLEM — E66.813 OBESITY, CLASS III, BMI 40-49.9 (MORBID OBESITY): Status: RESOLVED | Noted: 2020-05-21 | Resolved: 2020-07-30

## 2020-07-30 PROCEDURE — 99215 OFFICE O/P EST HI 40 MIN: CPT | Performed by: SURGERY

## 2020-07-30 RX ORDER — URSODIOL 300 MG/1
300 CAPSULE ORAL 2 TIMES DAILY
Qty: 60 CAPSULE | Refills: 5 | Status: SHIPPED | OUTPATIENT
Start: 2020-07-30 | End: 2020-09-15 | Stop reason: SDUPTHER

## 2020-07-30 NOTE — PATIENT INSTRUCTIONS
Bariatric Manual    You were provided a manual specific to the procedure that you have chosen.  Please refer to that with any questions or call the office at [phone]

## 2020-07-30 NOTE — H&P
Bariatric Consult:  Referred by Morris Chandler MD    Ortiz Salamanca is here today for consult on Consult (sleeve consult)      History of Present Illness:     Ortiz Salamanca is a 49 y.o. female with morbid obesity with co-morbidities including dyslipidemia, cardiovascular disease, osteoarthritis, back pain and knee pain who presents for surgical consultation for the above procedure. Ortiz has completed the initial intake visit and has been examined by our nurse practitioner, dietician, psychologist and underwent the extensive educational teaching process under the guidance of our bariatric coordinator and myself. Ortiz also has seen the educational video OMERO on the surgical procedure if available. Ortiz attended today more educational teaching from our bariatric coordinator and myself. Ortiz has had an extensive medical workup including a visit with their primary care physician, EKG, chest radiograph, blood work, EGD or UGI and possibly further testing. These have been reviewed by me and discussed with the patient. Ortiz is now ready to proceed with surgery. Ortiz presently denies nausea, vomiting, fever, chills, chest pain, shortness of air, melena, hematochezia, hemetemesis, dysuria, frequency, hematuria, jaundice or abdominal pain.       Past Medical History:   Diagnosis Date   • Colon polyp    • Coronary artery disease involving native coronary artery of native heart without angina pectoris 1/31/2019   • Heart attack (CMS/HCC)     2018 dec 25   • Hyperlipidemia    • Migraine headache    • Presence of drug coated stent in LAD coronary artery 1/31/2019       Encounter Diagnoses   Name Primary?   • Obesity, Class III, BMI 40-49.9 (morbid obesity) (CMS/HCC) Yes   • Hiatal hernia    • Coronary artery disease of native artery of native heart with stable angina pectoris (CMS/HCC)    • Chronic fatigue    • Hyperlipidemia, unspecified hyperlipidemia type    • Presence of drug coated stent in LAD coronary artery     • Dyspepsia    • Obesity, Class II, BMI 35-39.9    • Multiple joint pain        Past Surgical History:   Procedure Laterality Date   • CARDIAC CATHETERIZATION N/A 2018    Procedure: Left Heart Cath;  Surgeon: Hilario Coronado MD;  [facility];  Service: Cardiovascular   • CARDIAC CATHETERIZATION N/A 2018    Procedure: Left ventriculography;  Surgeon: Hilario Coronado MD;  Location:  [facility] CATH INVASIVE LOCATION;  Service: Cardiovascular   • CARDIAC CATHETERIZATION N/A 2018    Procedure: Stent EVARISTO coronary;  Surgeon: Hilairo Coronado MD;  Location:  [facility] CATH INVASIVE LOCATION;  Service: Cardiovascular   • CARDIAC CATHETERIZATION N/A 2018    Procedure: Coronary angiography;  Surgeon: Hilario Coronado MD;  [facility];  Service: Cardiovascular   •  SECTION     • COLONOSCOPY N/A 10/9/2019    Procedure: COLONOSCOPY with polypectomy;  Surgeon: Dung Hutchison MD;  Location: [facility];  Service: Gastroenterology   • CORONARY STENT PLACEMENT     • ENDOSCOPY N/A 2020    Procedure: ESOPHAGOGASTRODUODENOSCOPY WITH BIOPSY;  Surgeon: Facundo Helm Jr., MD;  Location: [facility] ENDOSCOPY;  Service: General;  Laterality: N/A;  PRE- DYSPEPSIA  POST- GASTRITIS   • HYSTERECTOMY     • KNEE SURGERY Left 2014    cyst removal       Patient Active Problem List   Diagnosis   • Chest pain   • NSTEMI (non-ST elevated myocardial infarction) (CMS/[facility])   • Anxiety   • Dizziness, nonspecific   • Ganglion cyst   • Primary osteoarthritis of left knee   • Syncope   • Tobacco abuse   • Coronary artery disease of native artery of native heart with stable angina pectoris (CMS/HCC)   • Presence of drug coated stent in LAD coronary artery   • Screen for colon cancer   • Mechanical knee pain, left   • Insufficiency fracture of tibia   • Pes anserine bursitis   • Chronic fatigue   • Ankle swelling   • Multiple  joint pain   • Low back pain   • Edema   • Hyperlipidemia   • Dyspepsia   • Obesity, Class II, BMI 35-39.9   • Hiatal hernia       Allergies   Allergen Reactions   • Cephalexin Anaphylaxis     Other reaction(s): Vomiting   • Nsaids Other (See Comments)     Per cardiologist         Current Outpatient Medications:   •  aspirin 81 MG chewable tablet, Chew 1 tablet Daily., Disp: 90 tablet, Rfl: 3  •  atorvastatin (LIPITOR) 40 MG tablet, TAKE 1 TABLET EVERY NIGHT. NEED LABS FOR FURTHER REFILLS, [phone], Disp: 30 tablet, Rfl: 11  •  gabapentin (NEURONTIN) 300 MG capsule, Every 12 (Twelve) Hours., Disp: , Rfl:   •  HYDROcodone-acetaminophen (NORCO)  MG per tablet, Every 12 (Twelve) Hours., Disp: , Rfl:   •  metoprolol tartrate (LOPRESSOR) 25 MG tablet, Take 1 tablet by mouth 2 (Two) Times a Day., Disp: 180 tablet, Rfl: 2  •  nitroglycerin (NITROSTAT) 0.4 MG SL tablet, DISSOLVE 1 TAB UNDER TONGUE FOR CHEST PAIN - IF PAIN REMAINS AFTER 5 MIN, CALL 911 AND REPEAT DOSE. MAX 3 TABS IN 15 MINUTES, Disp: 25 tablet, Rfl: 0  •  rizatriptan MLT (MAXALT-MLT) 10 MG disintegrating tablet, rizatriptan 10 mg disintegrating tablet, Disp: , Rfl:   •  estradiol (ESTRACE) 0.1 MG/GM vaginal cream, Insert 0.5 g into the vagina 2 (Two) Times a Week., Disp: 45 g, Rfl: 6  •  folic acid-vit B6-vit B12 (FOLBEE) 2.5-25-1 MG tablet tablet, Take 1 tablet by mouth Daily., Disp: 40 tablet, Rfl: 0  •  ursodiol (Actigall) 300 MG capsule, Take 1 capsule by mouth 2 (Two) Times a Day., Disp: 60 capsule, Rfl: 5    Social History     Socioeconomic History   • Marital status:      Spouse name: Not on file   • Number of children: Not on file   • Years of education: Not on file   • Highest education level: Not on file   Tobacco Use   • Smoking status: Former Smoker     Years: 5.00     Types: Cigarettes     Last attempt to quit: 2018     Years since quittin.6   • Smokeless tobacco: Never Used   Substance and Sexual Activity   • Alcohol  use: Yes     Comment: rare   • Drug use: No   • Sexual activity: Defer       Family History   Problem Relation Age of Onset   • Cancer Mother    • Hypertension Mother    • Sleep apnea Mother    • Heart disease Brother    • Heart attack Brother    • Heart disease Maternal Grandmother    • Hypertension Maternal Grandmother    • Heart attack Maternal Grandmother    • Hypertension Father    • Hypertension Paternal Grandmother    • Heart disease Paternal Grandmother        Review of Systems:  Review of Systems   Constitutional: Positive for fatigue.   Musculoskeletal: Positive for arthralgias and back pain.   All other systems reviewed and are negative.        Physical Exam:    Vital Signs:  Weight: 98.9 kg (218 lb)   Body mass index is 38.63 kg/m².  Temp: 96.9 °F (36.1 °C)   Heart Rate: 74   BP: 146/82       Physical Exam   Constitutional: She is oriented to person, place, and time. She appears well-nourished.   HENT:   Head: Normocephalic and atraumatic.   Mouth/Throat: Oropharynx is clear and moist.   Eyes: Pupils are equal, round, and reactive to light. Conjunctivae and EOM are normal. No scleral icterus.   Neck: Normal range of motion. Neck supple. No thyromegaly present.   Cardiovascular: Normal rate and regular rhythm.   Pulmonary/Chest: Effort normal and breath sounds normal.   Abdominal: Soft. Bowel sounds are normal. She exhibits no distension and no mass. There is no tenderness. There is no rebound and no guarding. No hernia.   Musculoskeletal: Normal range of motion.   Lymphadenopathy:     She has no cervical adenopathy.   Neurological: She is alert and oriented to person, place, and time. No cranial nerve deficit. Coordination normal.   Skin: Skin is warm and dry. No erythema.   Psychiatric: She has a normal mood and affect. Her behavior is normal.   Vitals reviewed.        Assessment:    Ortiz Salamanca is a 49 y.o. year old female with medically complicated severe obesity with a BMI of Body mass index is  38.63 kg/m². and multiple co-morbidities listed in the encounter diagnosis.    I think she is an appropriate candidate for this surgery, and is ready to proceed.      Plan/Discussion/Summary:  Questionable small hiatal hernia per me.  No PPI.  H. pylori negative    The patient has returned to the office for a surgical consultation and has requested to proceed with a laparoscopic gastric sleeve.  I have had the opportunity to obtain a history, examine the patient and review the patient's chart.    The patient understands that surgery is a tool and that weight loss is not guaranteed but only seen in the context of appropriate use, regular follow up, exercise and making appropriate food choices.     I personally discussed the potential complications of the laparoscopic gastric sleeve with this patient.  The patient is well aware of potential complications of the surgery that include but not limited to bleeding, infections, deep vein thrombosis, pulmonary embolism, pulmonary complications such as pneumonia, cardiac event, hernias, small bowel obstruction, damage to the spleen or other organs, bowel injury, disfiguring scars, failure to lose weight, need for additional surgery, conversion to an open procedure and death.  The patient is also aware of complications which apply in particular to the gastric sleeve and can include but not limited to the leakage of gastric contents at the staple line, the development of an intra-abdominal abscess, gastroesophageal reflux disease, Andrea's esophagus, ulcers, vitamin/mineral deficiencies, strictures, and the possibility of converting this procedure to a Briseida-en-Y gastric bypass. The patient also understands the possibility of requiring an acid reducer medication for the rest of their life.    The risks, benefits, potential complications and alternative therapies were discussed at great length as outlined in our extensive consent forms, online consent and educational teaching  processes.    The patient has confirmed the participation in the programs extensive educational activities.    All questions and concerns were answered to patient's satisfaction.  The patient now wishes to proceed with surgery.    Patient has declined the pre-operative insertion of an IVC filter.     The patient has declined a postoperative course of anitcoagulant therapy.      I instructed patient to start on a H2 blocker or proton pump inhibitor if not already on one of these medications.    I explained in detail the procedures that we perform.  All of these procedures have a chance to convert to open if any technical challenges or complications do occur.  Bariatric surgery is not cosmetic surgery but rather a tool to help a patient make a life-long commitment lifestyle change including diet, exercise, behavior changes, and taking supplemental vitamins and minerals.    Problems after surgery may require more operations to correct them.    The risks, benefits, alternatives, and potential complications of all of the procedures were explained in detail including, but not limited to death, anesthesia and medication adverse effect, deep venous thrombosis, pulmonary embolism, trocar site/incisional hernia, wound infection, abdominal infection, bleeding, failure to lose weight, gain weight, a change in body image, metabolic complications with vitamin deficiences and anemia.    Weight loss expectations were discussed with the patient in detail. The weight loss operations most commonly performed are the sleeve gastrectomy and the Briseida-en-Y gastric bypass. These operations result in weight losses up to approximately 25-35% of initial body weight 12 to 24 months after surgery with the gastric bypass usually the higher percent of weight loss but depends on patient using the tool.    For the gastric bypass and loop duodenal switch (BIJAN-S) the risks include but not limited to the following early complications:  Anastomotic  leak/peritonitis, Briseida/Alimentary/biliopancreatic limb obstruction, severe & minor wound infection/seroma, and nausea/vomiting.  Late complications can include but are not limited to malnutrition, vitamin deficiencies, frequent loose stools,  stomal stenosis, marginal ulcer, bowel obstruction, intussusception, internal, and incisional hernia.    Regarding the gastric sleeve, there is less long-term outcome data and higher risk of dysphagia and reflux compared to a gastric bypass, as well as risk of internal visceral/organ injury, splenectomy, bleeding, infection, leak (which could require further intervention possible conversion to Briseida-en-Y gastric bypass), stenosis and possibility of regaining weight.    Sunday was counseled regarding diagnostic results, instructions for management, risk factor reductions, prognosis, patient and family education, impressions, risks and benefits of treatment options and importance of compliance with treatment. Total face to face time of the encounter was over 45 minutes and over 30 minutes was spent counseling.     Mel Report   As part of this patient's treatment plan I am prescribing controlled substances. The patient has been made aware of appropriate use of such medications, including potential risk of somnolence, limited ability to drive and /or work safely, and potential for dependence or overdose. It has also been made clear that these medications are for use by this patient only, without concomitant use of alcohol or other substances unless prescribed.    Sunday has completed prescribing agreement detailing terms of continued prescribing of controlled substances, including monitoring MEL reports, urine drug screening, and pill counts if necessary. Sunday is aware that inappropriate use will result in cessation of prescribing such medications.    MEL report has been reviewed      History and physical exam exhibit continued safe and appropriate use of controlled  rohanVan Alcantar understands the surgical procedures and the different surgical options that are available.  She understands the lifestyle changes that are required after surgery and has agreed to follow the guidelines outlined in the weight management program.  She also expressed understanding of the risks involved and had all of female questions answered and desires to proceed.      Facundo Helm MD  7/30/2020

## 2020-08-03 ENCOUNTER — LAB (OUTPATIENT)
Dept: LAB | Facility: HOSPITAL | Age: 50
End: 2020-08-03

## 2020-08-10 ENCOUNTER — HOSPITAL ENCOUNTER (INPATIENT)
Facility: HOSPITAL | Age: 50
LOS: 1 days | Discharge: HOME | End: 2020-08-13
Attending: SURGERY | Admitting: SURGERY

## 2020-08-10 ENCOUNTER — APPOINTMENT (OUTPATIENT)
Dept: PREADMISSION TESTING | Facility: HOSPITAL | Age: 50
End: 2020-08-10

## 2020-08-10 VITALS
RESPIRATION RATE: 16 BRPM | BODY MASS INDEX: 38.62 KG/M2 | DIASTOLIC BLOOD PRESSURE: 87 MMHG | TEMPERATURE: 98.1 F | HEIGHT: 63 IN | OXYGEN SATURATION: 98 % | HEART RATE: 61 BPM | SYSTOLIC BLOOD PRESSURE: 129 MMHG

## 2020-08-10 DIAGNOSIS — E66.9 OBESITY, CLASS II, BMI 35-39.9: ICD-10-CM

## 2020-08-10 DIAGNOSIS — K44.9 HIATAL HERNIA: ICD-10-CM

## 2020-08-10 DIAGNOSIS — Z98.84 S/P LAPAROSCOPIC SLEEVE GASTRECTOMY: Primary | ICD-10-CM

## 2020-08-10 LAB
ALBUMIN SERPL-MCNC: 4.1 G/DL (ref 3.5–5.2)
ALBUMIN/GLOB SERPL: 1.4 G/DL
ALP SERPL-CCNC: 86 U/L (ref 39–117)
ALT SERPL W P-5'-P-CCNC: 25 U/L (ref 1–33)
ANION GAP SERPL CALCULATED.3IONS-SCNC: 11.9 MMOL/L (ref 5–15)
AST SERPL-CCNC: 23 U/L (ref 1–32)
BILIRUB SERPL-MCNC: 0.4 MG/DL (ref 0–1.2)
BUN SERPL-MCNC: 11 MG/DL (ref 6–20)
BUN/CREAT SERPL: 13.1 (ref 7–25)
CALCIUM SPEC-SCNC: 9.5 MG/DL (ref 8.6–10.5)
CHLORIDE SERPL-SCNC: 100 MMOL/L (ref 98–107)
CO2 SERPL-SCNC: 24.1 MMOL/L (ref 22–29)
CREAT SERPL-MCNC: 0.84 MG/DL (ref 0.57–1)
DEPRECATED RDW RBC AUTO: 37.6 FL (ref 37–54)
ERYTHROCYTE [DISTWIDTH] IN BLOOD BY AUTOMATED COUNT: 12.5 % (ref 12.3–15.4)
GFR SERPL CREATININE-BSD FRML MDRD: 87 ML/MIN/1.73
GLOBULIN UR ELPH-MCNC: 3 GM/DL
GLUCOSE SERPL-MCNC: 94 MG/DL (ref 65–99)
HCT VFR BLD AUTO: 37.4 % (ref 34–46.6)
HGB BLD-MCNC: 12.6 G/DL (ref 12–15.9)
MCH RBC QN AUTO: 27.8 PG (ref 26.6–33)
MCHC RBC AUTO-ENTMCNC: 33.7 G/DL (ref 31.5–35.7)
MCV RBC AUTO: 82.4 FL (ref 79–97)
PLATELET # BLD AUTO: 276 10*3/MM3 (ref 140–450)
PMV BLD AUTO: 10.3 FL (ref 6–12)
POTASSIUM SERPL-SCNC: 4.1 MMOL/L (ref 3.5–5.2)
PROT SERPL-MCNC: 7.1 G/DL (ref 6–8.5)
RBC # BLD AUTO: 4.54 10*6/MM3 (ref 3.77–5.28)
SODIUM SERPL-SCNC: 136 MMOL/L (ref 136–145)
WBC # BLD AUTO: 4.37 10*3/MM3 (ref 3.4–10.8)

## 2020-08-10 PROCEDURE — C9803 HOPD COVID-19 SPEC COLLECT: HCPCS

## 2020-08-10 PROCEDURE — 85027 COMPLETE CBC AUTOMATED: CPT | Performed by: SURGERY

## 2020-08-10 PROCEDURE — 36415 COLL VENOUS BLD VENIPUNCTURE: CPT

## 2020-08-10 PROCEDURE — 80053 COMPREHEN METABOLIC PANEL: CPT | Performed by: SURGERY

## 2020-08-10 PROCEDURE — U0004 COV-19 TEST NON-CDC HGH THRU: HCPCS | Performed by: NURSE PRACTITIONER

## 2020-08-10 NOTE — DISCHARGE INSTRUCTIONS
Take only the following medications the morning of surgery:     METOPROLOL      Do not take Bariatric Vitamins, Folic Acid, Actigall (if applicable) or Lovenox Injections (if applicable) the morning of surgery.  If you have a history of blood clots or have a BMI greater than 50, Dr. Helm may order Lovenox for after surgery. Do not take Lovenox blood thinner before surgery.      General Instructions:   • Drink one 20 ounce Gatorade G2 the evening before surgery.  Nothing red in color.  • Do not eat solid food after midnight the night before surgery.    • The morning of surgery have another 20 ounce Gatorade G2.  Again, nothing red in color.  Your drink must be completed 2 hours before your arrival time.   • Patients who avoid smoking, chewing tobacco and alcohol for 4 weeks prior to surgery have a reduced risk of post-operative complications.  Quit smoking as many days before surgery as you can.  • Do not smoke, use chewing tobacco or drink alcohol the day of surgery.   • Bring any papers given to you in the doctor's office.  Wear clean comfortable clothes.  • Do not wear contact lenses, false eyelashes or make-up.  Bring a case for your glasses.   • Bring crutches or walker if applicable.  • Remove all piercings.  Leave jewelry and any other valuables at home.  • Remove fingernail polish, gel overlays or any artificial nails.  • Hair extensions with metal clips must be removed prior to surgery.  • The Pre-Admission Testing nurse will instruct you to bring medications if unable to obtain an accurate list in Pre-Admission Testing.    Preventing a Surgical Site Infection:  • For 2 to 3 days before surgery, avoid shaving with a razor because the razor can irritate skin and make it easier to develop an infection.    • Any areas of open skin can increase the risk of a post-operative wound infection by allowing bacteria to enter and travel throughout the body.  Notify your surgeon if you have any skin wounds / rashes  even if it is not near the expected surgical site.  The area will need assessed to determine if surgery should be delayed until it is healed.  • 2 days prior to surgery, take a shower using a fresh bar of anti-bacterial soap (such as Dial).  Use a clean washcloth and dry with a clean towel.    • The day prior to surgery, take a shower using a fresh bar of anti-bacterial soap (such as Dial).  Use a clean washcloth and dry with a clean towel.  Sleep in a clean bed with clean clothing.  Do not allow pets to sleep with you.  • The morning of surgery shower using a fresh bar of anti-bacterial soap (such as Dial).  Use a clean washcloth and dry with a clean towel.  Follow the Chlorhexidine instructions below.    CHLORHEXIDINE CLOTH INSTRUCTIONS  The morning of surgery follow these instructions using the Chlorhexidine cloths you've been given.  These steps reduce bacteria on the body.  Do not use the cloths near your eyes, ears mouth, genitalia or on open wounds.  Throw the cloths away after use but do not try to flush them down a toilet.    • Open and remove one cloth at a time from the package.    • Leave the cloth unfolded and begin the bathing.  • Massage the skin with the cloths using gentle pressure to remove bacteria.  Do not scrub harshly.   • Follow the steps below with one 2% CHG cloth per area (6 total cloths).  • One cloth for neck, shoulders and chest.  • One cloth for both arms, hands, fingers and underarms (do underarms last).  • One cloth for the abdomen followed by groin.  • One cloth for right leg and foot including between the toes.  • One cloth for left leg and foot including between the toes.  • The last cloth is to be used for the back of the neck, back and buttocks.    Allow the CHG to air dry 3 minutes on the skin which will give it time to work and decrease the chance of irritation.  The skin may feel sticky until it is dry.  Do not rinse with water or any other liquid or you will lose the  beneficial effects of the CHG.  If mild skin irritation occurs, do rinse the skin to remove the CHG.  Report this to the nurse at time of admission.  Do not apply lotions, creams, ointments, deodorants or perfumes after using the clothes. Dress in clean clothes before coming to the hospital.    • Ask your surgeon if you will be receiving antibiotics prior to surgery.  • Make sure you, your family, and all healthcare providers clean their hands with soap and water or an alcohol based hand  before caring for you or your wound.      Day of surgery:08- ARRIVE @ 0730 AM REPORT TO THE [facility]   Your arrival time is approximately two hours before your scheduled surgery time.  Upon arrival, a Pre-op nurse and Anesthesiologist will review your health history, obtain vital signs, and answer questions you may have.  A Pre-op nurse will start an IV and you may receive medication in preparation for surgery, including something to help you relax.  Your family will be able to see you in the Pre-op area.  Two visitors at a time will be allowed in the Pre-op room.  While you are in surgery, your family should notify the waiting room  if they leave the waiting room area and provide a contact phone number.  If you are staying overnight your family can leave your belongings in the car and bring them to your room later.  If applicable, we do ask that you have your C-PAP/BI-PAP machine available. It can be utilized the night of surgery.     Please be aware that surgery does come with discomfort.  We want to make every effort to control your discomfort so please discuss any uncontrolled symptoms with your nurse.   Your doctor will most likely have prescribed pain medications.      If you are going home after surgery you will receive individualized written care instructions before being discharged.  A responsible adult must drive you to and from the hospital on the day of your surgery and stay with you for 24  hours.    If you are staying overnight following surgery, you will be transported to your hospital room following the recovery period.  [facility] has all private rooms.    If you have any questions please call Pre-Admission Testing at [phone].  Deductibles and co-payments are collected on the day of service. Please be prepared to pay the required co-pay, deductible or deposit on the day of service as defined by your plan.    Patient Education for Self-Quarantine Process    Following your COVID testing, we strongly recommend that you do not leave your home after you have been tested for COVID except to get medical care. This includes not going to work, school or to public areas.  If this is not possible for you to do please limit your activities to only required outings.  Be sure to wear a mask when you are with other people, practice social distancing and wash your hands frequently.      The following items provide additional details to keep you safe.  • Wash your hands with soap and water frequently for at least 20 seconds.   • Avoid touching your eyes, nose and mouth with unwashed hands.  • Do not share anything - utensils, towels, food from the same bowl.   • Have your own utensils, drinking glass, dishes, towels and bedding.   • Do not have visitors.   • Do use FaceTime to stay in touch with family and friends.  • You should stay in a specific room away from others if possible.   • Stay at least 6 feet away from others in the home if you cannot have a dedicated room to yourself.   • Do not snuggle with your pet. While the CDC says there is no evidence that pets can spread COVID-19 or be infected from humans, it is probably best to avoid “petting, snuggling, being kissed or licked and sharing food (during self-quarantine)”, according to the CDC.   • Sanitize household surfaces daily. Include all high touch areas (door handles, light switches, phones, countertops, etc.)  • Do not share a  bathroom with others, if possible.   • Wear a mask around others in your home if you are unable to stay in a separate room or 6 feet apart. If  you are unable to wear a mask, have your family member wear a mask if they must be within 6 feet of you.       Call your surgeon immediately if you experience any of the following symptoms:  • Sore Throat  • Shortness of Breath or difficulty breathing  • Cough  • Chills  • Body soreness or muscle pain  • Headache  • Fever  • New loss of taste or smell  • Do not arrive for your surgery ill.  Your procedure will need to be rescheduled to another time.  You will need to call your physician before the day of surgery to avoid any unnecessary exposure to hospital staff as well as other patients.

## 2020-08-11 ENCOUNTER — ANESTHESIA EVENT (OUTPATIENT)
Dept: PERIOP | Facility: HOSPITAL | Age: 50
End: 2020-08-11

## 2020-08-12 ENCOUNTER — ANESTHESIA (OUTPATIENT)
Dept: PERIOP | Facility: HOSPITAL | Age: 50
End: 2020-08-12

## 2020-08-12 LAB
B PARAPERT DNA SPEC QL NAA+PROBE: NOT DETECTED
B PERT DNA SPEC QL NAA+PROBE: NOT DETECTED
C PNEUM DNA NPH QL NAA+NON-PROBE: NOT DETECTED
FLUAV H1 2009 PAND RNA NPH QL NAA+PROBE: NOT DETECTED
FLUAV H1 HA GENE NPH QL NAA+PROBE: NOT DETECTED
FLUAV H3 RNA NPH QL NAA+PROBE: NOT DETECTED
FLUAV SUBTYP SPEC NAA+PROBE: NOT DETECTED
FLUBV RNA ISLT QL NAA+PROBE: NOT DETECTED
HADV DNA SPEC NAA+PROBE: NOT DETECTED
HCOV 229E RNA SPEC QL NAA+PROBE: NOT DETECTED
HCOV HKU1 RNA SPEC QL NAA+PROBE: NOT DETECTED
HCOV NL63 RNA SPEC QL NAA+PROBE: NOT DETECTED
HCOV OC43 RNA SPEC QL NAA+PROBE: NOT DETECTED
HMPV RNA NPH QL NAA+NON-PROBE: NOT DETECTED
HPIV1 RNA SPEC QL NAA+PROBE: NOT DETECTED
HPIV2 RNA SPEC QL NAA+PROBE: NOT DETECTED
HPIV3 RNA NPH QL NAA+PROBE: NOT DETECTED
HPIV4 P GENE NPH QL NAA+PROBE: NOT DETECTED
M PNEUMO IGG SER IA-ACNC: NOT DETECTED
REF LAB TEST METHOD: NORMAL
RHINOVIRUS RNA SPEC NAA+PROBE: NOT DETECTED
RSV RNA NPH QL NAA+NON-PROBE: NOT DETECTED
SARS-COV-2 RNA PNL SPEC NAA+PROBE: NOT DETECTED
SARS-COV-2 RNA RESP QL NAA+PROBE: NOT DETECTED

## 2020-08-12 PROCEDURE — 25010000002 SUCCINYLCHOLINE PER 20 MG: Performed by: NURSE ANESTHETIST, CERTIFIED REGISTERED

## 2020-08-12 PROCEDURE — 88342 IMHCHEM/IMCYTCHM 1ST ANTB: CPT | Performed by: SURGERY

## 2020-08-12 PROCEDURE — 25010000002 HYDROMORPHONE PER 4 MG: Performed by: NURSE ANESTHETIST, CERTIFIED REGISTERED

## 2020-08-12 PROCEDURE — 25010000002 DEXAMETHASONE PER 1 MG: Performed by: NURSE ANESTHETIST, CERTIFIED REGISTERED

## 2020-08-12 PROCEDURE — 25010000002 HYDROMORPHONE PER 4 MG: Performed by: SURGERY

## 2020-08-12 PROCEDURE — 25010000002 FENTANYL CITRATE (PF) 100 MCG/2ML SOLUTION: Performed by: NURSE ANESTHETIST, CERTIFIED REGISTERED

## 2020-08-12 PROCEDURE — 25010000002 ENOXAPARIN PER 10 MG: Performed by: SURGERY

## 2020-08-12 PROCEDURE — 88307 TISSUE EXAM BY PATHOLOGIST: CPT | Performed by: SURGERY

## 2020-08-12 PROCEDURE — 25010000002 MIDAZOLAM PER 1 MG: Performed by: STUDENT IN AN ORGANIZED HEALTH CARE EDUCATION/TRAINING PROGRAM

## 2020-08-12 PROCEDURE — 43775 LAP SLEEVE GASTRECTOMY: CPT | Performed by: SURGERY

## 2020-08-12 PROCEDURE — 25010000002 PROPOFOL 10 MG/ML EMULSION: Performed by: NURSE ANESTHETIST, CERTIFIED REGISTERED

## 2020-08-12 PROCEDURE — 25010000002 METOCLOPRAMIDE PER 10 MG: Performed by: SURGERY

## 2020-08-12 PROCEDURE — 0BQT4ZZ REPAIR DIAPHRAGM, PERCUTANEOUS ENDOSCOPIC APPROACH: ICD-10-PCS | Performed by: SURGERY

## 2020-08-12 PROCEDURE — 0DB64Z3 EXCISION OF STOMACH, PERCUTANEOUS ENDOSCOPIC APPROACH, VERTICAL: ICD-10-PCS | Performed by: SURGERY

## 2020-08-12 PROCEDURE — 25010000002 VANCOMYCIN 10 G RECONSTITUTED SOLUTION: Performed by: SURGERY

## 2020-08-12 PROCEDURE — 25010000002 ONDANSETRON PER 1 MG: Performed by: NURSE ANESTHETIST, CERTIFIED REGISTERED

## 2020-08-12 DEVICE — STPL/LN REINF PERISTRIP DRY VERITAS THN: Type: IMPLANTABLE DEVICE | Site: STOMACH | Status: FUNCTIONAL

## 2020-08-12 DEVICE — SEALANT WND FIBRIN TISSEEL PREFIL/SYR/PRIMAFZ 4ML: Type: IMPLANTABLE DEVICE | Site: STOMACH | Status: FUNCTIONAL

## 2020-08-12 DEVICE — ENDOPATH ECHELON ENDOSCOPIC LINEAR CUTTER RELOADS, GREEN, 60MM
Type: IMPLANTABLE DEVICE | Site: STOMACH | Status: FUNCTIONAL
Brand: ECHELON ENDOPATH

## 2020-08-12 RX ORDER — SODIUM CHLORIDE 9 MG/ML
INJECTION, SOLUTION INTRAVENOUS AS NEEDED
Status: DISCONTINUED | OUTPATIENT
Start: 2020-08-12 | End: 2020-08-12 | Stop reason: HOSPADM

## 2020-08-12 RX ORDER — DIPHENHYDRAMINE HYDROCHLORIDE 50 MG/ML
25 INJECTION INTRAMUSCULAR; INTRAVENOUS EVERY 4 HOURS PRN
Status: DISCONTINUED | OUTPATIENT
Start: 2020-08-12 | End: 2020-08-13 | Stop reason: HOSPADM

## 2020-08-12 RX ORDER — PROMETHAZINE HYDROCHLORIDE 25 MG/1
25 TABLET ORAL ONCE AS NEEDED
Status: DISCONTINUED | OUTPATIENT
Start: 2020-08-12 | End: 2020-08-12 | Stop reason: HOSPADM

## 2020-08-12 RX ORDER — SODIUM CHLORIDE 0.9 % (FLUSH) 0.9 %
3-10 SYRINGE (ML) INJECTION AS NEEDED
Status: DISCONTINUED | OUTPATIENT
Start: 2020-08-12 | End: 2020-08-12 | Stop reason: HOSPADM

## 2020-08-12 RX ORDER — SODIUM CHLORIDE 0.9 % (FLUSH) 0.9 %
3 SYRINGE (ML) INJECTION EVERY 12 HOURS SCHEDULED
Status: DISCONTINUED | OUTPATIENT
Start: 2020-08-12 | End: 2020-08-13 | Stop reason: HOSPADM

## 2020-08-12 RX ORDER — HYDROMORPHONE HYDROCHLORIDE 1 MG/ML
0.5 INJECTION, SOLUTION INTRAMUSCULAR; INTRAVENOUS; SUBCUTANEOUS
Status: DISCONTINUED | OUTPATIENT
Start: 2020-08-12 | End: 2020-08-13 | Stop reason: HOSPADM

## 2020-08-12 RX ORDER — SODIUM CHLORIDE, SODIUM LACTATE, POTASSIUM CHLORIDE, CALCIUM CHLORIDE 600; 310; 30; 20 MG/100ML; MG/100ML; MG/100ML; MG/100ML
150 INJECTION, SOLUTION INTRAVENOUS CONTINUOUS
Status: DISCONTINUED | OUTPATIENT
Start: 2020-08-12 | End: 2020-08-13 | Stop reason: HOSPADM

## 2020-08-12 RX ORDER — GABAPENTIN 300 MG/1
300 CAPSULE ORAL EVERY 8 HOURS SCHEDULED
Status: DISCONTINUED | OUTPATIENT
Start: 2020-08-12 | End: 2020-08-13 | Stop reason: HOSPADM

## 2020-08-12 RX ORDER — CHLORHEXIDINE GLUCONATE 0.12 MG/ML
15 RINSE ORAL SEE ADMIN INSTRUCTIONS
Status: COMPLETED | OUTPATIENT
Start: 2020-08-12 | End: 2020-08-12

## 2020-08-12 RX ORDER — DIPHENHYDRAMINE HYDROCHLORIDE 50 MG/ML
12.5 INJECTION INTRAMUSCULAR; INTRAVENOUS
Status: DISCONTINUED | OUTPATIENT
Start: 2020-08-12 | End: 2020-08-12 | Stop reason: HOSPADM

## 2020-08-12 RX ORDER — FENTANYL CITRATE 50 UG/ML
50 INJECTION, SOLUTION INTRAMUSCULAR; INTRAVENOUS
Status: DISCONTINUED | OUTPATIENT
Start: 2020-08-12 | End: 2020-08-12 | Stop reason: HOSPADM

## 2020-08-12 RX ORDER — MIRTAZAPINE 15 MG/1
15 TABLET, ORALLY DISINTEGRATING ORAL NIGHTLY
Status: DISCONTINUED | OUTPATIENT
Start: 2020-08-12 | End: 2020-08-13 | Stop reason: HOSPADM

## 2020-08-12 RX ORDER — SODIUM CHLORIDE, SODIUM LACTATE, POTASSIUM CHLORIDE, CALCIUM CHLORIDE 600; 310; 30; 20 MG/100ML; MG/100ML; MG/100ML; MG/100ML
100 INJECTION, SOLUTION INTRAVENOUS CONTINUOUS
Status: DISCONTINUED | OUTPATIENT
Start: 2020-08-12 | End: 2020-08-12 | Stop reason: HOSPADM

## 2020-08-12 RX ORDER — PROMETHAZINE HYDROCHLORIDE 25 MG/ML
12.5 INJECTION, SOLUTION INTRAMUSCULAR; INTRAVENOUS EVERY 4 HOURS PRN
Status: DISCONTINUED | OUTPATIENT
Start: 2020-08-12 | End: 2020-08-13 | Stop reason: HOSPADM

## 2020-08-12 RX ORDER — EPHEDRINE SULFATE 50 MG/ML
5 INJECTION, SOLUTION INTRAVENOUS ONCE AS NEEDED
Status: DISCONTINUED | OUTPATIENT
Start: 2020-08-12 | End: 2020-08-12 | Stop reason: HOSPADM

## 2020-08-12 RX ORDER — FLUMAZENIL 0.1 MG/ML
0.2 INJECTION INTRAVENOUS AS NEEDED
Status: DISCONTINUED | OUTPATIENT
Start: 2020-08-12 | End: 2020-08-12 | Stop reason: HOSPADM

## 2020-08-12 RX ORDER — FAMOTIDINE 10 MG/ML
20 INJECTION, SOLUTION INTRAVENOUS EVERY 12 HOURS SCHEDULED
Status: DISCONTINUED | OUTPATIENT
Start: 2020-08-12 | End: 2020-08-13 | Stop reason: HOSPADM

## 2020-08-12 RX ORDER — NALOXONE HCL 0.4 MG/ML
0.4 VIAL (ML) INJECTION
Status: DISCONTINUED | OUTPATIENT
Start: 2020-08-12 | End: 2020-08-13 | Stop reason: HOSPADM

## 2020-08-12 RX ORDER — HYDRALAZINE HYDROCHLORIDE 20 MG/ML
5 INJECTION INTRAMUSCULAR; INTRAVENOUS
Status: DISCONTINUED | OUTPATIENT
Start: 2020-08-12 | End: 2020-08-12 | Stop reason: HOSPADM

## 2020-08-12 RX ORDER — ACETAMINOPHEN 160 MG/5ML
975 SOLUTION ORAL ONCE
Status: COMPLETED | OUTPATIENT
Start: 2020-08-12 | End: 2020-08-12

## 2020-08-12 RX ORDER — ACETAMINOPHEN 650 MG/1
650 SUPPOSITORY RECTAL ONCE AS NEEDED
Status: DISCONTINUED | OUTPATIENT
Start: 2020-08-12 | End: 2020-08-12 | Stop reason: HOSPADM

## 2020-08-12 RX ORDER — ROCURONIUM BROMIDE 10 MG/ML
INJECTION, SOLUTION INTRAVENOUS AS NEEDED
Status: DISCONTINUED | OUTPATIENT
Start: 2020-08-12 | End: 2020-08-12 | Stop reason: SURG

## 2020-08-12 RX ORDER — LABETALOL HYDROCHLORIDE 5 MG/ML
5 INJECTION, SOLUTION INTRAVENOUS
Status: DISCONTINUED | OUTPATIENT
Start: 2020-08-12 | End: 2020-08-12 | Stop reason: HOSPADM

## 2020-08-12 RX ORDER — NITROGLYCERIN 0.4 MG/1
0.4 TABLET SUBLINGUAL
Status: DISCONTINUED | OUTPATIENT
Start: 2020-08-12 | End: 2020-08-13 | Stop reason: HOSPADM

## 2020-08-12 RX ORDER — CYANOCOBALAMIN 1000 UG/ML
1000 INJECTION, SOLUTION INTRAMUSCULAR; SUBCUTANEOUS ONCE
Status: COMPLETED | OUTPATIENT
Start: 2020-08-13 | End: 2020-08-13

## 2020-08-12 RX ORDER — MAGNESIUM HYDROXIDE 1200 MG/15ML
LIQUID ORAL AS NEEDED
Status: DISCONTINUED | OUTPATIENT
Start: 2020-08-12 | End: 2020-08-12 | Stop reason: HOSPADM

## 2020-08-12 RX ORDER — PROMETHAZINE HYDROCHLORIDE 25 MG/ML
6.25 INJECTION, SOLUTION INTRAMUSCULAR; INTRAVENOUS
Status: DISCONTINUED | OUTPATIENT
Start: 2020-08-12 | End: 2020-08-12 | Stop reason: HOSPADM

## 2020-08-12 RX ORDER — SODIUM CHLORIDE, SODIUM LACTATE, POTASSIUM CHLORIDE, CALCIUM CHLORIDE 600; 310; 30; 20 MG/100ML; MG/100ML; MG/100ML; MG/100ML
9 INJECTION, SOLUTION INTRAVENOUS CONTINUOUS
Status: DISCONTINUED | OUTPATIENT
Start: 2020-08-12 | End: 2020-08-12 | Stop reason: HOSPADM

## 2020-08-12 RX ORDER — DEXAMETHASONE SODIUM PHOSPHATE 10 MG/ML
INJECTION INTRAMUSCULAR; INTRAVENOUS AS NEEDED
Status: DISCONTINUED | OUTPATIENT
Start: 2020-08-12 | End: 2020-08-12 | Stop reason: SURG

## 2020-08-12 RX ORDER — ALBUTEROL SULFATE 2.5 MG/3ML
2.5 SOLUTION RESPIRATORY (INHALATION)
Status: DISCONTINUED | OUTPATIENT
Start: 2020-08-12 | End: 2020-08-12

## 2020-08-12 RX ORDER — LORAZEPAM 1 MG/1
1 TABLET ORAL EVERY 12 HOURS PRN
Status: DISCONTINUED | OUTPATIENT
Start: 2020-08-12 | End: 2020-08-13 | Stop reason: HOSPADM

## 2020-08-12 RX ORDER — HYDROMORPHONE HYDROCHLORIDE 1 MG/ML
0.5 INJECTION, SOLUTION INTRAMUSCULAR; INTRAVENOUS; SUBCUTANEOUS
Status: DISCONTINUED | OUTPATIENT
Start: 2020-08-12 | End: 2020-08-12 | Stop reason: HOSPADM

## 2020-08-12 RX ORDER — FENTANYL CITRATE 50 UG/ML
INJECTION, SOLUTION INTRAMUSCULAR; INTRAVENOUS AS NEEDED
Status: DISCONTINUED | OUTPATIENT
Start: 2020-08-12 | End: 2020-08-12 | Stop reason: SURG

## 2020-08-12 RX ORDER — NALOXONE HCL 0.4 MG/ML
0.2 VIAL (ML) INJECTION AS NEEDED
Status: DISCONTINUED | OUTPATIENT
Start: 2020-08-12 | End: 2020-08-12 | Stop reason: HOSPADM

## 2020-08-12 RX ORDER — ONDANSETRON 2 MG/ML
4 INJECTION INTRAMUSCULAR; INTRAVENOUS EVERY 4 HOURS PRN
Status: DISCONTINUED | OUTPATIENT
Start: 2020-08-12 | End: 2020-08-13 | Stop reason: HOSPADM

## 2020-08-12 RX ORDER — LIDOCAINE HYDROCHLORIDE 10 MG/ML
0.5 INJECTION, SOLUTION EPIDURAL; INFILTRATION; INTRACAUDAL; PERINEURAL ONCE AS NEEDED
Status: COMPLETED | OUTPATIENT
Start: 2020-08-12 | End: 2020-08-12

## 2020-08-12 RX ORDER — DIPHENHYDRAMINE HCL 25 MG
25 CAPSULE ORAL
Status: DISCONTINUED | OUTPATIENT
Start: 2020-08-12 | End: 2020-08-12 | Stop reason: HOSPADM

## 2020-08-12 RX ORDER — LIDOCAINE HYDROCHLORIDE 20 MG/ML
INJECTION, SOLUTION INFILTRATION; PERINEURAL AS NEEDED
Status: DISCONTINUED | OUTPATIENT
Start: 2020-08-12 | End: 2020-08-12 | Stop reason: SURG

## 2020-08-12 RX ORDER — ALBUTEROL SULFATE 2.5 MG/3ML
2.5 SOLUTION RESPIRATORY (INHALATION) EVERY 6 HOURS PRN
Status: DISCONTINUED | OUTPATIENT
Start: 2020-08-12 | End: 2020-08-13 | Stop reason: HOSPADM

## 2020-08-12 RX ORDER — NALOXONE HCL 0.4 MG/ML
0.1 VIAL (ML) INJECTION
Status: DISCONTINUED | OUTPATIENT
Start: 2020-08-12 | End: 2020-08-13 | Stop reason: HOSPADM

## 2020-08-12 RX ORDER — ACETAMINOPHEN 325 MG/1
650 TABLET ORAL ONCE AS NEEDED
Status: DISCONTINUED | OUTPATIENT
Start: 2020-08-12 | End: 2020-08-12 | Stop reason: HOSPADM

## 2020-08-12 RX ORDER — METOCLOPRAMIDE HYDROCHLORIDE 5 MG/ML
10 INJECTION INTRAMUSCULAR; INTRAVENOUS EVERY 6 HOURS
Status: DISCONTINUED | OUTPATIENT
Start: 2020-08-12 | End: 2020-08-13 | Stop reason: HOSPADM

## 2020-08-12 RX ORDER — HYDROCODONE BITARTRATE AND ACETAMINOPHEN 7.5; 325 MG/1; MG/1
1 TABLET ORAL ONCE AS NEEDED
Status: DISCONTINUED | OUTPATIENT
Start: 2020-08-12 | End: 2020-08-12 | Stop reason: HOSPADM

## 2020-08-12 RX ORDER — PROPOFOL 10 MG/ML
VIAL (ML) INTRAVENOUS AS NEEDED
Status: DISCONTINUED | OUTPATIENT
Start: 2020-08-12 | End: 2020-08-12 | Stop reason: SURG

## 2020-08-12 RX ORDER — ONDANSETRON 2 MG/ML
INJECTION INTRAMUSCULAR; INTRAVENOUS AS NEEDED
Status: DISCONTINUED | OUTPATIENT
Start: 2020-08-12 | End: 2020-08-12 | Stop reason: SURG

## 2020-08-12 RX ORDER — ACETAMINOPHEN 500 MG
1000 TABLET ORAL EVERY 6 HOURS
Status: DISCONTINUED | OUTPATIENT
Start: 2020-08-12 | End: 2020-08-13 | Stop reason: HOSPADM

## 2020-08-12 RX ORDER — SODIUM CHLORIDE, SODIUM LACTATE, POTASSIUM CHLORIDE, CALCIUM CHLORIDE 600; 310; 30; 20 MG/100ML; MG/100ML; MG/100ML; MG/100ML
INJECTION, SOLUTION INTRAVENOUS CONTINUOUS PRN
Status: DISCONTINUED | OUTPATIENT
Start: 2020-08-12 | End: 2020-08-12 | Stop reason: SURG

## 2020-08-12 RX ORDER — METOCLOPRAMIDE HYDROCHLORIDE 5 MG/ML
10 INJECTION INTRAMUSCULAR; INTRAVENOUS ONCE
Status: COMPLETED | OUTPATIENT
Start: 2020-08-12 | End: 2020-08-12

## 2020-08-12 RX ORDER — PANTOPRAZOLE SODIUM 40 MG/10ML
40 INJECTION, POWDER, LYOPHILIZED, FOR SOLUTION INTRAVENOUS ONCE
Status: COMPLETED | OUTPATIENT
Start: 2020-08-12 | End: 2020-08-12

## 2020-08-12 RX ORDER — HYDROMORPHONE HYDROCHLORIDE 2 MG/1
2 TABLET ORAL EVERY 4 HOURS PRN
Status: DISCONTINUED | OUTPATIENT
Start: 2020-08-12 | End: 2020-08-13 | Stop reason: HOSPADM

## 2020-08-12 RX ORDER — LABETALOL HYDROCHLORIDE 5 MG/ML
10 INJECTION, SOLUTION INTRAVENOUS
Status: DISCONTINUED | OUTPATIENT
Start: 2020-08-12 | End: 2020-08-13 | Stop reason: HOSPADM

## 2020-08-12 RX ORDER — SCOLOPAMINE TRANSDERMAL SYSTEM 1 MG/1
1 PATCH, EXTENDED RELEASE TRANSDERMAL CONTINUOUS
Status: DISCONTINUED | OUTPATIENT
Start: 2020-08-12 | End: 2020-08-13 | Stop reason: HOSPADM

## 2020-08-12 RX ORDER — SODIUM CHLORIDE 0.9 % (FLUSH) 0.9 %
3 SYRINGE (ML) INJECTION EVERY 12 HOURS SCHEDULED
Status: DISCONTINUED | OUTPATIENT
Start: 2020-08-12 | End: 2020-08-12 | Stop reason: HOSPADM

## 2020-08-12 RX ORDER — LORAZEPAM 2 MG/ML
1 INJECTION INTRAMUSCULAR EVERY 12 HOURS PRN
Status: DISCONTINUED | OUTPATIENT
Start: 2020-08-12 | End: 2020-08-13 | Stop reason: HOSPADM

## 2020-08-12 RX ORDER — PROMETHAZINE HYDROCHLORIDE 25 MG/ML
12.5 INJECTION, SOLUTION INTRAMUSCULAR; INTRAVENOUS ONCE AS NEEDED
Status: DISCONTINUED | OUTPATIENT
Start: 2020-08-12 | End: 2020-08-12 | Stop reason: HOSPADM

## 2020-08-12 RX ORDER — HALOPERIDOL 5 MG/ML
0.5 INJECTION INTRAMUSCULAR EVERY 4 HOURS PRN
Status: DISCONTINUED | OUTPATIENT
Start: 2020-08-12 | End: 2020-08-13 | Stop reason: HOSPADM

## 2020-08-12 RX ORDER — ONDANSETRON 2 MG/ML
4 INJECTION INTRAMUSCULAR; INTRAVENOUS ONCE AS NEEDED
Status: DISCONTINUED | OUTPATIENT
Start: 2020-08-12 | End: 2020-08-12 | Stop reason: HOSPADM

## 2020-08-12 RX ORDER — OXYCODONE AND ACETAMINOPHEN 7.5; 325 MG/1; MG/1
1 TABLET ORAL ONCE AS NEEDED
Status: DISCONTINUED | OUTPATIENT
Start: 2020-08-12 | End: 2020-08-12 | Stop reason: HOSPADM

## 2020-08-12 RX ORDER — ONDANSETRON 4 MG/1
4 TABLET, FILM COATED ORAL EVERY 4 HOURS PRN
Status: DISCONTINUED | OUTPATIENT
Start: 2020-08-12 | End: 2020-08-13 | Stop reason: HOSPADM

## 2020-08-12 RX ORDER — SUCCINYLCHOLINE CHLORIDE 20 MG/ML
INJECTION INTRAMUSCULAR; INTRAVENOUS AS NEEDED
Status: DISCONTINUED | OUTPATIENT
Start: 2020-08-12 | End: 2020-08-12 | Stop reason: SURG

## 2020-08-12 RX ORDER — MORPHINE SULFATE 2 MG/ML
2 INJECTION, SOLUTION INTRAMUSCULAR; INTRAVENOUS
Status: DISCONTINUED | OUTPATIENT
Start: 2020-08-12 | End: 2020-08-13 | Stop reason: HOSPADM

## 2020-08-12 RX ORDER — MIDAZOLAM HYDROCHLORIDE 1 MG/ML
1 INJECTION INTRAMUSCULAR; INTRAVENOUS
Status: COMPLETED | OUTPATIENT
Start: 2020-08-12 | End: 2020-08-12

## 2020-08-12 RX ORDER — ONDANSETRON 4 MG/1
4 TABLET, ORALLY DISINTEGRATING ORAL EVERY 4 HOURS PRN
Status: DISCONTINUED | OUTPATIENT
Start: 2020-08-12 | End: 2020-08-13 | Stop reason: HOSPADM

## 2020-08-12 RX ORDER — PROMETHAZINE HYDROCHLORIDE 25 MG/1
25 SUPPOSITORY RECTAL ONCE AS NEEDED
Status: DISCONTINUED | OUTPATIENT
Start: 2020-08-12 | End: 2020-08-12 | Stop reason: HOSPADM

## 2020-08-12 RX ADMIN — SODIUM CHLORIDE, POTASSIUM CHLORIDE, SODIUM LACTATE AND CALCIUM CHLORIDE 500 ML: 600; 310; 30; 20 INJECTION, SOLUTION INTRAVENOUS at 09:26

## 2020-08-12 RX ADMIN — SODIUM CHLORIDE, POTASSIUM CHLORIDE, SODIUM LACTATE AND CALCIUM CHLORIDE 150 ML/HR: 600; 310; 30; 20 INJECTION, SOLUTION INTRAVENOUS at 13:35

## 2020-08-12 RX ADMIN — METOCLOPRAMIDE HYDROCHLORIDE 10 MG: 5 INJECTION INTRAMUSCULAR; INTRAVENOUS at 09:33

## 2020-08-12 RX ADMIN — HYDROMORPHONE HYDROCHLORIDE 0.5 MG: 1 INJECTION, SOLUTION INTRAMUSCULAR; INTRAVENOUS; SUBCUTANEOUS at 15:46

## 2020-08-12 RX ADMIN — GABAPENTIN 300 MG: 300 CAPSULE ORAL at 21:02

## 2020-08-12 RX ADMIN — METOCLOPRAMIDE HYDROCHLORIDE 10 MG: 5 INJECTION INTRAMUSCULAR; INTRAVENOUS at 15:34

## 2020-08-12 RX ADMIN — METOCLOPRAMIDE HYDROCHLORIDE 10 MG: 5 INJECTION INTRAMUSCULAR; INTRAVENOUS at 21:01

## 2020-08-12 RX ADMIN — ACETAMINOPHEN 1000 MG: 500 TABLET ORAL at 21:01

## 2020-08-12 RX ADMIN — ONDANSETRON HYDROCHLORIDE 4 MG: 2 SOLUTION INTRAMUSCULAR; INTRAVENOUS at 10:33

## 2020-08-12 RX ADMIN — ACETAMINOPHEN ORAL SOLUTION 975 MG: 325 SOLUTION ORAL at 08:49

## 2020-08-12 RX ADMIN — HYDROMORPHONE HYDROCHLORIDE 0.5 MG: 1 INJECTION, SOLUTION INTRAMUSCULAR; INTRAVENOUS; SUBCUTANEOUS at 11:33

## 2020-08-12 RX ADMIN — LABETALOL HYDROCHLORIDE 5 MG: 5 INJECTION, SOLUTION INTRAVENOUS at 11:50

## 2020-08-12 RX ADMIN — FENTANYL CITRATE 50 MCG: 50 INJECTION, SOLUTION INTRAMUSCULAR; INTRAVENOUS at 12:21

## 2020-08-12 RX ADMIN — ACETAMINOPHEN 1000 MG: 500 TABLET ORAL at 13:35

## 2020-08-12 RX ADMIN — LABETALOL HYDROCHLORIDE 5 MG: 5 INJECTION, SOLUTION INTRAVENOUS at 12:02

## 2020-08-12 RX ADMIN — LIDOCAINE HYDROCHLORIDE 0.5 ML: 10 INJECTION, SOLUTION EPIDURAL; INFILTRATION; INTRACAUDAL; PERINEURAL at 09:26

## 2020-08-12 RX ADMIN — SODIUM CHLORIDE, PRESERVATIVE FREE 3 ML: 5 INJECTION INTRAVENOUS at 15:35

## 2020-08-12 RX ADMIN — CHLORHEXIDINE GLUCONATE 15 ML: 1.2 RINSE ORAL at 09:34

## 2020-08-12 RX ADMIN — LABETALOL HYDROCHLORIDE 5 MG: 5 INJECTION, SOLUTION INTRAVENOUS at 11:57

## 2020-08-12 RX ADMIN — PANTOPRAZOLE SODIUM 40 MG: 40 INJECTION, POWDER, FOR SOLUTION INTRAVENOUS at 09:33

## 2020-08-12 RX ADMIN — SODIUM CHLORIDE, POTASSIUM CHLORIDE, SODIUM LACTATE AND CALCIUM CHLORIDE: 600; 310; 30; 20 INJECTION, SOLUTION INTRAVENOUS at 10:20

## 2020-08-12 RX ADMIN — LABETALOL HYDROCHLORIDE 5 MG: 5 INJECTION, SOLUTION INTRAVENOUS at 12:07

## 2020-08-12 RX ADMIN — AZTREONAM 2 G: 2 INJECTION, POWDER, FOR SOLUTION INTRAMUSCULAR; INTRAVENOUS at 10:40

## 2020-08-12 RX ADMIN — ENOXAPARIN SODIUM 40 MG: 40 INJECTION SUBCUTANEOUS at 10:14

## 2020-08-12 RX ADMIN — FAMOTIDINE 20 MG: 10 INJECTION INTRAVENOUS at 21:01

## 2020-08-12 RX ADMIN — HYDROMORPHONE HYDROCHLORIDE 0.5 MG: 1 INJECTION, SOLUTION INTRAMUSCULAR; INTRAVENOUS; SUBCUTANEOUS at 11:49

## 2020-08-12 RX ADMIN — MIDAZOLAM 1 MG: 1 INJECTION INTRAMUSCULAR; INTRAVENOUS at 09:49

## 2020-08-12 RX ADMIN — FAMOTIDINE 20 MG: 10 INJECTION INTRAVENOUS at 15:34

## 2020-08-12 RX ADMIN — FENTANYL CITRATE 50 MCG: 50 INJECTION, SOLUTION INTRAMUSCULAR; INTRAVENOUS at 12:09

## 2020-08-12 RX ADMIN — ROCURONIUM BROMIDE 50 MG: 10 INJECTION, SOLUTION INTRAVENOUS at 10:33

## 2020-08-12 RX ADMIN — PROPOFOL 200 MG: 10 INJECTION, EMULSION INTRAVENOUS at 10:29

## 2020-08-12 RX ADMIN — METOPROLOL TARTRATE 25 MG: 25 TABLET, FILM COATED ORAL at 21:01

## 2020-08-12 RX ADMIN — MIDAZOLAM 1 MG: 1 INJECTION INTRAMUSCULAR; INTRAVENOUS at 09:35

## 2020-08-12 RX ADMIN — SCOPALAMINE 1 PATCH: 1 PATCH, EXTENDED RELEASE TRANSDERMAL at 09:34

## 2020-08-12 RX ADMIN — VANCOMYCIN HYDROCHLORIDE 1500 MG: 10 INJECTION, POWDER, LYOPHILIZED, FOR SOLUTION INTRAVENOUS at 09:50

## 2020-08-12 RX ADMIN — LIDOCAINE HYDROCHLORIDE 100 MG: 20 INJECTION, SOLUTION INFILTRATION; PERINEURAL at 10:29

## 2020-08-12 RX ADMIN — MIRTAZAPINE 15 MG: 15 TABLET, ORALLY DISINTEGRATING ORAL at 21:02

## 2020-08-12 RX ADMIN — SUCCINYLCHOLINE CHLORIDE 160 MG: 20 INJECTION, SOLUTION INTRAMUSCULAR; INTRAVENOUS at 10:29

## 2020-08-12 RX ADMIN — SUGAMMADEX 200 MG: 100 INJECTION, SOLUTION INTRAVENOUS at 11:20

## 2020-08-12 RX ADMIN — DEXAMETHASONE SODIUM PHOSPHATE 8 MG: 10 INJECTION INTRAMUSCULAR; INTRAVENOUS at 10:33

## 2020-08-12 RX ADMIN — GABAPENTIN 300 MG: 300 CAPSULE ORAL at 15:34

## 2020-08-12 RX ADMIN — HYDROMORPHONE HYDROCHLORIDE 0.5 MG: 1 INJECTION, SOLUTION INTRAMUSCULAR; INTRAVENOUS; SUBCUTANEOUS at 13:35

## 2020-08-12 RX ADMIN — FENTANYL CITRATE 100 MCG: 50 INJECTION INTRAMUSCULAR; INTRAVENOUS at 10:29

## 2020-08-12 RX ADMIN — SODIUM CHLORIDE, POTASSIUM CHLORIDE, SODIUM LACTATE AND CALCIUM CHLORIDE 100 ML/HR: 600; 310; 30; 20 INJECTION, SOLUTION INTRAVENOUS at 09:35

## 2020-08-12 RX ADMIN — Medication 3 ML: at 09:50

## 2020-08-12 NOTE — PLAN OF CARE
Problem: Patient Care Overview  Goal: Plan of Care Review  Outcome: Ongoing (interventions implemented as appropriate)  Flowsheets (Taken 8/12/2020 1907)  Plan of Care Reviewed With: patient  Outcome Summary: Post op gastric sleeve hiatal hernia repair. VSS. Ambulated in michele x1. Had sips of water. Voided. Gave PRN dilaudid x2.

## 2020-08-12 NOTE — OP NOTE
PREOPERATIVE DIAGNOSIS:  Morbid obesity with multiple comorbidities as referenced in the most recent history and physical.    POSTOPERATIVE DIAGNOSIS:    1:  Morbid obesity with multiple comorbidities as referenced in the most recent history and physical.  2:  Hiatal Hernia    PROCEDURES PERFORMED:  1.  Laparoscopic sleeve gastrectomy.  2.  Laparoscopic hiatal hernia repair.  3.  Tisseel application.     SURGEON:  Facundo Helm Jr., MD    ASSISTANT: Khadra Cooley MD      Surgery assisted and facilitated by a certified physician assistant, who directly resulted in a decreased operative time, anesthetic time, wound exposure, and possibly of an operative wound infection, thereby decreasing patient morbidity and ultimately total expenditures. The surgical assistant assisted in placement of trochars, take down of the gastrocolic omentum, short gastric vessels and dissection at the angle of His.  Also assisted in retraction of the stomach during stapling so as not to kink the gastric sleeve.  Also assisted in removing of the gastric specimen, closure of the fascial defect as well as closure of the skin incisions. They also assisted in retraction of the stomach during the hiatal hernia repair, dissection of the hernia sac and closure of the crura.      ANESTHESIA:  General endotracheal.    ESTIMATED BLOOD LOSS:   Less than 25 mL unless dictated below.    FLUIDS:  Crystalloids.    SPECIMENS: Gastric remnant    DRAINS:  None.    COUNTS:  Correct.    COMPLICATIONS:  None.    INDICATIONS:  This patient with morbid obesity and associated comorbidities presents for elective laparoscopic, possible open sleeve gastrectomy.  The patient has received medical clearance to proceed.  The patient has undergone our extensive educational process and consent process and wishes to proceed.    DESCRIPTION OF PROCEDURE:  The patient was brought to the operating room and placed supine upon the operating room table. SCD hose were placed.  The  patient underwent uneventful general endotracheal anesthesia per the anesthesiology staff. The abdomen was prepped with ChloraPrep and draped in the usual sterile fashion.  An Ioban was used as well if not allergic.  Anesthesia staff passed a 18 [place] citizen gastric tube into the stomach to decompress. A 5-10 mm transverse incision was made a few centimeters above and to the left of the umbilicus and the peritoneal cavity entered under direct camera visualization using a 5 or 10 mm 0° laparoscope and an Optiview trocar.  The abdomen was then insufflated to a pressure of 15-16 mmHg with CO2 gas.  Exploratory laparoscopy revealed no evidence of injury from the entrance technique and no significant abnormalities were seen unless addendum dictated below.  An angled laparoscope was then used.  The patient was placed in reverse Trendelenburg position.  Under direct camera visualization a 5 mm trocar was placed in the right lateral subcostal position.  A 12 mm trocar was placed in the right midabdominal position.  A 5-12 mm trocar was placed in the left midabdominal position. A Joseph retractor was placed through an epigastric incision and used to elevate the left lobe of the liver.  The fat pad was elevated and the left daniel exposed.  At this point, approximately MCC along the greater curvature, the gastrocolic omentum was divided with the Enseal and this proceeded superiorly to the angle of His taking down the short gastric vessels.  All posterior attachments of the lesser sac and posterior aspect of the stomach to the pancreas were taken down as well.  The left daniel was exposed along its length.  Dissection then proceeded medially taking down the greater curvature with an Enseal until just proximal to the pylorus. The standard clamp and 18 [place] citizen orogastric tube were used to size the gastric sleeve. The stomach was marked in the 3 positions using the indelible ink pen.  The 3 markings were at the angle of Hiss, the  incisura and antrum using 1cm, 3cm and 5cm respectively. Green cartridges were used for a total of 4-5. The 1st load was a green load on the Tennant Flex stapler with Veritas Amie-Strip and this was placed 5 cm proximal to the pylorus.  The next 3-4loads were green with absorbable Veritas Amie-Strips depending on the size of the stomach. Careful attention was made to stay 1 cm from the esophagus.  Areas of the reinforced staple line were oversewn with absorbable sutures as needed for bleeding or questionable staple lines.  The gastric specimen was then removed from the 12 mm trocar site. The specimen staple line was inspected and was intact.  The specimen was then sent off to pathology.   At this point, the sleeve was submerged under saline and using the orogastric tube to insufflate the stomach, a leak test was performed.  This revealed the sleeve to be watertight, no air bubbles, no leak, and no bleeding seen from the staple lines and no significant abnormalities.  Irrigation fluid from the abdomen was then suctioned.  The gastric sleeve staple line was then treated with 4 mL Tisseel fibrin glue. The fascia at the 12 mm trocar site incision was closed with a single 0 Vicryl suture in a figure-of-eight fashion placed under direct laparoscopic camera visualization with a suture passer and tying the knot extracorporeally.  The fascia in the area was infiltrated with local anesthesia. All incisions were then infiltrated with local anesthetic. The remaining trocars were removed under direct camera visualization with no bleeding noted from their sites.  The abdomen was desufflated of gas. The skin in each incision was closed using 4-0 antibiotic impregnated Monocryl in a subcuticular stitch followed by Dermabond.  The patient tolerated the procedure well without complication and was taken to the recovery room in stable condition.  All sponge, needle, and instrument counts were correct.    The patient was noted to have a  hiatal hernia.  The phrenoesophageal membrane was opened up with electrocautery and the right and left crura were cleaned off using sharp and blunt dissection.  The hernia sac was completely dissected free.  The intra-abdominal esophagus was now approximately 3 cm in length.  The base of the left daniel was exposed to evaluate for a posterior defect and lipomas.The short gastric vessels were taken down using the Enseal device and the fundus was removed as dictated above.  The hiatus was then reapproximated with 0 ethibond sutures in a figure-of-eight fashion.

## 2020-08-12 NOTE — ANESTHESIA PROCEDURE NOTES
Airway  Urgency: elective    Date/Time: 8/12/2020 10:30 AM  Airway not difficult    General Information and Staff    Patient location during procedure: OR  CRNA: Anita Shannon CRNA    Indications and Patient Condition  Indications for airway management: airway protection    Preoxygenated: yes      Final Airway Details  Final airway type: endotracheal airway      Successful airway: ETT  Cuffed: yes   Successful intubation technique: direct laryngoscopy  Endotracheal tube insertion site: oral  Blade: Pasha  Blade size: 3  ETT size (mm): 7.0  Cormack-Lehane Classification: grade I - full view of glottis  Placement verified by: chest auscultation and capnometry   Cuff volume (mL): 10  Measured from: lips  ETT/EBT  to lips (cm): 19  Number of attempts at approach: 1    Additional Comments  Atraumatic placement of ETT, dentition unchanged from preop.

## 2020-08-12 NOTE — ANESTHESIA POSTPROCEDURE EVALUATION
Patient: Ortizjessica Salamanca    Procedure Summary     Date:  08/12/20 Room / Location:   [facility] OR  /  [facility]    Anesthesia Start:  1020 Anesthesia Stop:  1136    Procedure:  GASTRIC SLEEVE LAPAROSCOPIC With Hiatal Hernia Repair (N/A Abdomen) Diagnosis:       Obesity, Class II, BMI 35-39.9      Hiatal hernia      (Obesity, Class II, BMI 35-39.9 [E66.9])      (Hiatal hernia [K44.9])    Surgeon:  Facundo Helm Jr., MD Provider:  Jose Armando Goetz MD    Anesthesia Type:  general ASA Status:  3          Anesthesia Type: general    Vitals  Vitals Value Taken Time   /92 8/12/2020 12:45 PM   Temp 36.3 °C (97.4 °F) 8/12/2020 11:30 AM   Pulse 65 8/12/2020 12:57 PM   Resp 16 8/12/2020 12:45 PM   SpO2 98 % 8/12/2020 12:57 PM   Vitals shown include unvalidated device data.        Post Anesthesia Care and Evaluation    Patient location during evaluation: bedside  Patient participation: complete - patient participated  Level of consciousness: awake and alert  Pain management: adequate  Airway patency: patent  Anesthetic complications: No anesthetic complications  PONV Status: controlled  Cardiovascular status: blood pressure returned to baseline and acceptable  Respiratory status: acceptable  Hydration status: acceptable

## 2020-08-12 NOTE — ANESTHESIA PREPROCEDURE EVALUATION
Anesthesia Evaluation     Patient summary reviewed and Nursing notes reviewed   no history of anesthetic complications:  NPO Solid Status: > 8 hours  NPO Liquid Status: > 2 hours           Airway   Mallampati: II  TM distance: >3 FB  Neck ROM: full  No difficulty expected  Dental - normal exam     Pulmonary     breath sounds clear to auscultation  Cardiovascular   Exercise tolerance: good (4-7 METS)    ECG reviewed  Rhythm: regular  Rate: normal    (+) CAD, cardiac stents hyperlipidemia,     ROS comment: Stress test: Low risk study    Neuro/Psych  GI/Hepatic/Renal/Endo    (+) obesity,  hiatal hernia,      Musculoskeletal     Abdominal     Abdomen: soft.   Substance History      OB/GYN          Other                        Anesthesia Plan    ASA 3     general     intravenous induction     Anesthetic plan, all risks, benefits, and alternatives have been provided, discussed and informed consent has been obtained with: patient.    Plan discussed with CRNA.

## 2020-08-13 VITALS
SYSTOLIC BLOOD PRESSURE: 140 MMHG | RESPIRATION RATE: 18 BRPM | DIASTOLIC BLOOD PRESSURE: 74 MMHG | WEIGHT: 211.1 LBS | OXYGEN SATURATION: 95 % | HEART RATE: 69 BPM | TEMPERATURE: 99.8 F | HEIGHT: 63 IN | BODY MASS INDEX: 37.4 KG/M2

## 2020-08-13 LAB
ALBUMIN SERPL-MCNC: 3.8 G/DL (ref 3.5–5.2)
ALBUMIN/GLOB SERPL: 1.3 G/DL
ALP SERPL-CCNC: 74 U/L (ref 39–117)
ALT SERPL W P-5'-P-CCNC: 75 U/L (ref 1–33)
ANION GAP SERPL CALCULATED.3IONS-SCNC: 10.8 MMOL/L (ref 5–15)
AST SERPL-CCNC: 72 U/L (ref 1–32)
BASOPHILS # BLD AUTO: 0.01 10*3/MM3 (ref 0–0.2)
BASOPHILS NFR BLD AUTO: 0.1 % (ref 0–1.5)
BILIRUB SERPL-MCNC: 0.3 MG/DL (ref 0–1.2)
BUN SERPL-MCNC: 6 MG/DL (ref 6–20)
BUN/CREAT SERPL: 7.5 (ref 7–25)
CALCIUM SPEC-SCNC: 9.2 MG/DL (ref 8.6–10.5)
CHLORIDE SERPL-SCNC: 104 MMOL/L (ref 98–107)
CO2 SERPL-SCNC: 23.2 MMOL/L (ref 22–29)
CREAT SERPL-MCNC: 0.8 MG/DL (ref 0.57–1)
DEPRECATED RDW RBC AUTO: 42.8 FL (ref 37–54)
EOSINOPHIL # BLD AUTO: 0 10*3/MM3 (ref 0–0.4)
EOSINOPHIL NFR BLD AUTO: 0 % (ref 0.3–6.2)
ERYTHROCYTE [DISTWIDTH] IN BLOOD BY AUTOMATED COUNT: 13.3 % (ref 12.3–15.4)
GFR SERPL CREATININE-BSD FRML MDRD: 92 ML/MIN/1.73
GLOBULIN UR ELPH-MCNC: 3 GM/DL
GLUCOSE SERPL-MCNC: 97 MG/DL (ref 65–99)
HCT VFR BLD AUTO: 37.6 % (ref 34–46.6)
HGB BLD-MCNC: 12.1 G/DL (ref 12–15.9)
IMM GRANULOCYTES # BLD AUTO: 0.02 10*3/MM3 (ref 0–0.05)
IMM GRANULOCYTES NFR BLD AUTO: 0.2 % (ref 0–0.5)
LYMPHOCYTES # BLD AUTO: 1.88 10*3/MM3 (ref 0.7–3.1)
LYMPHOCYTES NFR BLD AUTO: 20.9 % (ref 19.6–45.3)
MAGNESIUM SERPL-MCNC: 1.8 MG/DL (ref 1.6–2.6)
MCH RBC QN AUTO: 28 PG (ref 26.6–33)
MCHC RBC AUTO-ENTMCNC: 32.2 G/DL (ref 31.5–35.7)
MCV RBC AUTO: 87 FL (ref 79–97)
MONOCYTES # BLD AUTO: 0.59 10*3/MM3 (ref 0.1–0.9)
MONOCYTES NFR BLD AUTO: 6.6 % (ref 5–12)
NEUTROPHILS NFR BLD AUTO: 6.49 10*3/MM3 (ref 1.7–7)
NEUTROPHILS NFR BLD AUTO: 72.2 % (ref 42.7–76)
NRBC BLD AUTO-RTO: 0 /100 WBC (ref 0–0.2)
PHOSPHATE SERPL-MCNC: 3.7 MG/DL (ref 2.5–4.5)
PLATELET # BLD AUTO: 248 10*3/MM3 (ref 140–450)
PMV BLD AUTO: 10.3 FL (ref 6–12)
POTASSIUM SERPL-SCNC: 4 MMOL/L (ref 3.5–5.2)
PROT SERPL-MCNC: 6.8 G/DL (ref 6–8.5)
RBC # BLD AUTO: 4.32 10*6/MM3 (ref 3.77–5.28)
SODIUM SERPL-SCNC: 138 MMOL/L (ref 136–145)
WBC # BLD AUTO: 8.99 10*3/MM3 (ref 3.4–10.8)

## 2020-08-13 PROCEDURE — 25010000002 ENOXAPARIN PER 10 MG: Performed by: SURGERY

## 2020-08-13 PROCEDURE — 80053 COMPREHEN METABOLIC PANEL: CPT | Performed by: SURGERY

## 2020-08-13 PROCEDURE — 25010000002 METOCLOPRAMIDE PER 10 MG: Performed by: SURGERY

## 2020-08-13 PROCEDURE — 25010000002 ONDANSETRON PER 1 MG: Performed by: SURGERY

## 2020-08-13 PROCEDURE — 84100 ASSAY OF PHOSPHORUS: CPT | Performed by: SURGERY

## 2020-08-13 PROCEDURE — 25010000002 THIAMINE PER 100 MG: Performed by: SURGERY

## 2020-08-13 PROCEDURE — 85025 COMPLETE CBC W/AUTO DIFF WBC: CPT | Performed by: SURGERY

## 2020-08-13 PROCEDURE — 83735 ASSAY OF MAGNESIUM: CPT | Performed by: SURGERY

## 2020-08-13 PROCEDURE — 25010000002 CYANOCOBALAMIN PER 1000 MCG: Performed by: SURGERY

## 2020-08-13 RX ORDER — HYDROMORPHONE HYDROCHLORIDE 2 MG/1
2 TABLET ORAL EVERY 4 HOURS PRN
Qty: 18 TABLET | Refills: 0 | Status: SHIPPED | OUTPATIENT
Start: 2020-08-13 | End: 2020-08-22

## 2020-08-13 RX ORDER — ONDANSETRON 4 MG/1
4 TABLET, ORALLY DISINTEGRATING ORAL EVERY 4 HOURS PRN
Qty: 12 TABLET | Refills: 0 | Status: SHIPPED | OUTPATIENT
Start: 2020-08-13 | End: 2020-09-17

## 2020-08-13 RX ADMIN — ONDANSETRON 4 MG: 2 INJECTION INTRAMUSCULAR; INTRAVENOUS at 01:54

## 2020-08-13 RX ADMIN — FOLIC ACID 250 ML/HR: 5 INJECTION, SOLUTION INTRAMUSCULAR; INTRAVENOUS; SUBCUTANEOUS at 00:15

## 2020-08-13 RX ADMIN — ACETAMINOPHEN 1000 MG: 500 TABLET ORAL at 03:06

## 2020-08-13 RX ADMIN — FAMOTIDINE 20 MG: 10 INJECTION INTRAVENOUS at 08:56

## 2020-08-13 RX ADMIN — METOPROLOL TARTRATE 25 MG: 25 TABLET, FILM COATED ORAL at 08:57

## 2020-08-13 RX ADMIN — METOCLOPRAMIDE HYDROCHLORIDE 10 MG: 5 INJECTION INTRAMUSCULAR; INTRAVENOUS at 03:06

## 2020-08-13 RX ADMIN — SODIUM CHLORIDE, PRESERVATIVE FREE 3 ML: 5 INJECTION INTRAVENOUS at 08:58

## 2020-08-13 RX ADMIN — METOCLOPRAMIDE HYDROCHLORIDE 10 MG: 5 INJECTION INTRAMUSCULAR; INTRAVENOUS at 08:56

## 2020-08-13 RX ADMIN — ACETAMINOPHEN 1000 MG: 500 TABLET ORAL at 08:56

## 2020-08-13 RX ADMIN — ENOXAPARIN SODIUM 40 MG: 40 INJECTION SUBCUTANEOUS at 08:57

## 2020-08-13 RX ADMIN — CYANOCOBALAMIN 1000 MCG: 1000 INJECTION, SOLUTION INTRAMUSCULAR at 08:57

## 2020-08-13 NOTE — DISCHARGE SUMMARY
"Discharge Summary    Patient name: Ortiz Salamanca    Medical record number: [id number]    Admission date: 8/12/2020  Discharge date:  8/13/2020    Attending physician: Dr. Facundo Helm    Primary care physician: Morris Chandler MD    Referring physician: Facundo Helm Jr., MD  [address] [address]    Condition on discharge: Stable    Primary Diagnoses:  Morbid obesity with co-morbidities    Operative Procedure: Laparoscopic sleeve gastrectomy with hiatal hernia repair     Ortiz Salamanca  is post op day one status post procedure listed. Patient denies shortness of air and lower extremity pain. Feels better than yesterday. No vomiting this am. Ambulating well and using incentive spirometer.          /74 (BP Location: Left arm, Patient Position: Lying)   Pulse 69   Temp 99.8 °F (37.7 °C) (Oral)   Resp 18   Ht 160 cm (62.99\")   Wt 95.8 kg (211 lb 1.6 oz)   LMP  (LMP Unknown)   SpO2 95%   BMI 37.40 kg/m²     General:  alert, appears stated age and cooperative   Abdomen: soft, bowel sounds active, appropriate tenderness   Incision:   healing well, no drainage, no erythema, no hernia, no seroma, no swelling, no dehiscence, incision well approximated   Heart: Regular rate   Lungs: Clear to auscultation bilaterally     I reviewed the patient's new clinical results.     Lab Results (last 24 hours)     Procedure Component Value Units Date/Time    Comprehensive Metabolic Panel [[id number]]  (Abnormal) Collected:  08/13/20 0549    Specimen:  Blood Updated:  08/13/20 0650     Glucose 97 mg/dL      BUN 6 mg/dL      Creatinine 0.80 mg/dL      Sodium 138 mmol/L      Potassium 4.0 mmol/L      Chloride 104 mmol/L      CO2 23.2 mmol/L      Calcium 9.2 mg/dL      Total Protein 6.8 g/dL      Albumin 3.80 g/dL      ALT (SGPT) 75 U/L      AST (SGOT) 72 U/L      Alkaline Phosphatase 74 U/L      Total Bilirubin 0.3 mg/dL      eGFR  African Amer 92 mL/min/1.73      Globulin 3.0 gm/dL      A/G Ratio " 1.3 g/dL      BUN/Creatinine Ratio 7.5     Anion Gap 10.8 mmol/L     Narrative:       GFR Normal >60  Chronic Kidney Disease <60  Kidney Failure <15      Phosphorus [[id number]]  (Normal) Collected:  08/13/20 0549    Specimen:  Blood Updated:  08/13/20 0650     Phosphorus 3.7 mg/dL     Magnesium [[id number]]  (Normal) Collected:  08/13/20 0549    Specimen:  Blood Updated:  08/13/20 0650     Magnesium 1.8 mg/dL     CBC & Differential [[id number]] Collected:  08/13/20 0549    Specimen:  Blood Updated:  08/13/20 0643    Narrative:       The following orders were created for panel order CBC & Differential.  Procedure                               Abnormality         Status                     ---------                               -----------         ------                     CBC Auto Differential[[id number]]        Abnormal            Final result                 Please view results for these tests on the individual orders.    CBC Auto Differential [[id number]]  (Abnormal) Collected:  08/13/20 0549    Specimen:  Blood Updated:  08/13/20 0643     WBC 8.99 10*3/mm3      RBC 4.32 10*6/mm3      Hemoglobin 12.1 g/dL      Hematocrit 37.6 %      MCV 87.0 fL      MCH 28.0 pg      MCHC 32.2 g/dL      RDW 13.3 %      RDW-SD 42.8 fl      MPV 10.3 fL      Platelets 248 10*3/mm3      Neutrophil % 72.2 %      Lymphocyte % 20.9 %      Monocyte % 6.6 %      Eosinophil % 0.0 %      Basophil % 0.1 %      Immature Grans % 0.2 %      Neutrophils, Absolute 6.49 10*3/mm3      Lymphocytes, Absolute 1.88 10*3/mm3      Monocytes, Absolute 0.59 10*3/mm3      Eosinophils, Absolute 0.00 10*3/mm3      Basophils, Absolute 0.01 10*3/mm3      Immature Grans, Absolute 0.02 10*3/mm3      nRBC 0.0 /100 WBC     Tissue Pathology Exam [[id number]] Collected:  08/12/20 1046    Specimen:  Tissue from Stomach Updated:  08/12/20 8943             Assessment:      Doing well postoperatively.      Plan:   1. Continue Stage 1 diet  2. Continue with ambulation and  Incentive spirometry  3. Plan for d/c home    Patient was seen and examined by Dr. Helm.    Hospital Course: The patient is a very pleasant 50 y.o. female that was admitted to the hospital with morbid obesity who underwent above procedure.  Postoperatively the patient was transferred to the bariatric unit per protocol.  Patient remained afebrile and hemodynamically stable.  Patient was up ambulating well and using incentive spirometer.  Postoperatively day 1 patient was started on stage I bariatric diet and continued with good ambulation.  Lovenox was continued.  Patient was then discharged home.      Discharge medications:      Discharge Medications      New Medications      Instructions Start Date   HYDROmorphone 2 MG tablet  Commonly known as:  DILAUDID   2 mg, Oral, Every 4 Hours PRN      ondansetron ODT 4 MG disintegrating tablet  Commonly known as:  ZOFRAN-ODT   4 mg, Translingual, Every 4 Hours PRN         Changes to Medications      Instructions Start Date   atorvastatin 40 MG tablet  Commonly known as:  LIPITOR  What changed:  See the new instructions.   TAKE 1 TABLET EVERY NIGHT. NEED LABS FOR FURTHER REFILLS, [phone]      nitroglycerin 0.4 MG SL tablet  Commonly known as:  NITROSTAT  What changed:    · when to take this  · reasons to take this  · additional instructions   DISSOLVE 1 TAB UNDER TONGUE FOR CHEST PAIN - IF PAIN REMAINS AFTER 5 MIN, CALL 911 AND REPEAT DOSE. MAX 3 TABS IN 15 MINUTES         Continue These Medications      Instructions Start Date   folic acid-vit B6-vit B12 2.5-25-1 MG tablet tablet  Commonly known as:  FOLBEE   1 tablet, Oral, Daily      gabapentin 300 MG capsule  Commonly known as:  NEURONTIN   300 mg, Oral, Every 12 Hours Scheduled      metoprolol tartrate 25 MG tablet  Commonly known as:  LOPRESSOR   25 mg, Oral, 2 Times Daily      rizatriptan MLT 10 MG disintegrating tablet  Commonly known as:  MAXALT-MLT   10 mg, Translingual, Once As Needed      ursodiol 300 MG  capsule  Commonly known as:  Actigall   300 mg, Oral, 2 Times Daily         Stop These Medications    aspirin 81 MG chewable tablet     CHLORHEXIDINE GLUCONATE CLOTH EX     estradiol 0.1 MG/GM vaginal cream  Commonly known as:  ESTRACE     Norco  MG per tablet  Generic drug:  HYDROcodone-acetaminophen            Discharge instructions:  Per Bariatric manual; per our protocol      Follow-up appointment: Follow up with Dr. Helm in the office as scheduled.  If not already scheduled call for appointment at [phone].

## 2020-08-13 NOTE — DISCHARGE INSTRUCTIONS
GOING HOME AFTER GASTRIC SLEEVE/ GASTRIC BYPASS SURGERY  [facility] Weight Loss: Post-Operative Information/Instructions  Facundo Helm Jr., MD  General Patient Instructions for Discharge   - Call Surgeon's office at [phone] for follow-up appointment.    - Be sure you, the patient, have a follow-up appointment to be seen within seven (7) days after discharge. If not, please call [phone] to schedule an appointment. If you are discharged on a Saturday or Sunday, please call Monday to schedule the appointment.  - Contact the Surgeon at [phone] for any questions or concerns, including temperature greater than or equal to 101F, shortness of breath, leg swelling, redness at incision sites, nausea, vomiting, chills, or problems or questions.    - Follow the Gastric Stage 1 Diet    à Clear liquids, room temperature, sugar-free, caffeine-free, non-carbonated, 70 grams of protein, No Straws.  - You may shower. No tub bath for 2 weeks.  - No lifting, pushing, pulling, or tugging >25 pounds for 3 weeks.  - Ambulate every 3 hours while awake minimum for seven (7) days, increase distance daily.  - For the next several weeks, you are at an increased risk for blood clot formation. Therefore, you should walk regularly. You should not sit for prolonged periods of time, more than 45 minutes, without getting up and walking for 5-10 minutes. This includes any car rides, including the drive home from the hospital. If driving any distance greater than 30 miles over the next two (2) weeks, stop every 30-45 minutes and walk for 5-10 minutes each time.  - Continue using Incentive Spirometer and coughing exercises at least every two (2) hours while awake for one week.  - Continue use of CPAP/BIPAP for diagnosis of sleep apnea as directed.  - No driving or operating machinery allowed while taking narcotic (prescription) pain medication, and until you feel comfortable forcefully applying the brakes if needed. (This  usually takes more than 3 days.)    - Make an appointment with your Primary Care Physician within one week post-op to look at your home medications for possible changes or discontinuity.   Medications  - The nurse will provide a list of medications for you to continue at home   - If you received a Lovenox (Enoxaparin) or Apixiban (Eliquis) prescription at pre-op visit with Surgeon, start taking the medicine the morning after discharge unless directed otherwise.    - If you were prescribed Lovenox (Enoxaparin), review the education/teaching material/video with the nurse.    - Take post op pain meds as prescribed as needed.   - Continue Foltx until finished.   - Resume use of Actigall (Ursodiol) one (1) week after surgery if patient still has gallbladder. You should have been given a prescription at your pre-op visit. Contact the office if you do not have the prescription.   - Resume bariatric vitamin regimen as instructed in pre-op education with bariatric coordinator.    - Zegerid or Prilosec OTC (or generic) by mouth once daily for four (4) weeks unless you are already taking a proton pump inhibitor as home medication. Follow dosing instructions on package.   Nausea/Vomiting:  The following are possible causes for nausea/vomiting:  - Drinking too much or too fast.  - Sinus drainage/post nasal drip for allergy sufferers (you may take Sudafed, Claritin, Tylenol Sinus/Allergy, or other decongestants and nose sprays to help with this discomfort).  - Low blood sugar (sweating, shaky, irritable, weakness, dizzy or tunnel-vision) - treatment is to sip 100% fruit juice - no sugar added until symptoms subside.  - Acid in fruit juice - (may dilute with water or avoid).  - Eating or drinking something that is not on clear liquid (stage 1) diet.  Any nausea/vomiting that prohibits you from keeping fluids down for greater than 24 hours requires a call to the surgeon's office.  Urine:  Use your urine color as a guide to  determine if you are drinking enough fluid. The darker the urine, the more fluids you need to drink. Urine should be clear to light yellow if you are getting enough fluid. If you should experience frequency, burning or pain with urination, blood in urine, contact us or your primary care physician for possible UTI (urinary tract infection), which could require antibiotics (liquid preferred).  Bowel Movements:  You may not have a bowel movement for 2-5 days after going home. You may then experience liquid, runny or loose stools for approximately 3-4 weeks following surgery. This would require you to drink even more fluids to prevent dehydration. Some patients may experience constipation, which can be treated with increased fluids, drinking warm liquids, increased activity and the use of a Fleets Enema, Milk of Magnesia, or suppositories. The first couple of bowel movements could be bloody, tarry black or dark maroon in color. This is OK as long as the stool returns to a normal color in 1-2 days. If however, you have frequent or a large amount of bloody or tarry black stools and/or become light-headed or dizzy, you may be bleeding and require urgent attention. Please call us right away.  Abdominal Incisions:  You will have small incisions. Do not scrub incisions, but allow the warm, soapy water to run over the incisions, rinse well, and pat dry. You may use any brand of anti-bacterial soap. Do not use Peroxide or Neosporin type ointments on sites, unless instructed to do so by a surgeon or nurse. Monitor daily for signs/symptoms of infection, which might include: drainage with a foul odor, pain, redness, swelling or heat at the incision sight; fever, body aches and chills. If you suspect infection or have a fever, give us a call.  Pain:  You will be given a prescription for pain medication to control your pain. If you feel the dose is too strong, you may take half the ordered dose, or you may take Tylenol adult liquid  per package instructions for minor pain. Do not take any medications that contain aspirin or aspirin products.  Do not take medications like: Motrin, Aleve, Ibuprofen, Advil, Naproxen, Celebrex, Daypro, Bextra, Meloxicam or other medications commonly used for arthritis or joint pain.  No steroids or cortisone injections. There may be pain, which should improve every few days. Pain should not suddenly get worse or more intense. Pain that suddenly changes and is constant and severe should be called in to the surgeon's office. Any sudden pain in the lower extremities with associated warmth and redness should be called in to the surgeon's office immediately. Do not rub or massage this area, as it could be a blood clot.  Diet:  Remain on the clear liquid diet (stage 1) per your  which includes 70 grams of protein each day, sugar free, non carbonated and no straws. Day 1 is the day of surgery. If you are tolerating the stage 1 diet, you may then proceed to stage 2 diet, as instructed in the . Do not progress to the stage 2 diet if you are having nausea/vomiting. Refer to the Basic Nutrition and Food Principles guide.  Medications:  The nurse will let you know which medications you will need to continue once you go home. Do not take any medications that are extended or time released if you had the gastric bypass procedure, OK to take if you had the gastric sleeve procedure. Large capsules can be opened and diluted with clear liquids. Check with your physician or pharmacist as to which pills may be crushed and which capsules may be opened and diluted safely. Continue taking Foltx as surgeon orders. If you still have your gall bladder and were prescribed Actigall (Ursodiol), you may resume this medication one week after your surgery. You will remain on Actigall (Ursodiol) for approximately 6 months. The dose is 1 pill, 2 times each day for 6 months.  Activity:  Continue your deep breathing and  coughing exercises with your Incentive Spirometer breathing device at least every 3 hours while awake (10 repetitions each time) for one week. May use CPAP. This will help to prevent respiratory problems such as pneumonia. No lifting, pulling or tugging anything over 25 pounds for 3 weeks after surgery. You may shower but no tub baths, hot tubs or swimming for 2 weeks. Moderate walking is recommended every 3 hours while awake minimum, increase distance daily. Further exercise will be discussed at the first post-op visit. No driving or operating machinery allow until off narcotic pain medication and until you feel comfortable forcefully applying the brakes (usually takes 3 or more days). For the next few weeks you are at an increased risk for blood clot formation. Therefore you should walk regularly and you should not sit for prolonged periods of time, more than 45 minutes without getting up and walking for 5-10 minutes. This includes car rides. Including riding home from the hospital. If riding a distance greater than 30 miles over the next 2 weeks stop every 30-45 minutes and walk 5-10 mintues each time. No tanning bed use for 8 weeks after surgery and in general, not recommended due to the increased risk for skin cancer. Incisions will burn/blister very badly with tanning bed use.  Illness:  Your primary care physician should treat general illness such as ear infections, sinus infections, and viral type illnesses, etc. Medications prescribed should be liquid/elixir form when possible, for the first 30 days.  General:  In general, it is recommended that you weigh yourself no more than once per week. Let the weight come off you and concentrate on more important things. Remember the weight was not gained overnight, nor will it be lost overnight. Gastric Bypass/ Gastric Sleeve weight loss will continue over a period of 12-18 months. Do not  yourself according to how others are doing after surgery, as this will  cause unnecessary discouragement.  THE ABOVE ARE GENERAL GUIDELINES TO ASSIST YOU ONCE HOME, IF YOU ARE IN DOUBT, OR YOU HAVE ANY QUESTIONS, CALL US AT THE NUMBERS LISTED BELOW.  IN THE EVENT OF SUDDEN CHEST PAIN, SHORTNESS OF BREATH, OR ANY LIFE THREATENING CONDITION, CALL 911.  Any time you are evaluated or admitted to another facility, please have someone notify the surgeon's office.  Supplements:  70 grams of protein taken EVERY DAY. Remember to drink at least 64 ounces of fluid a day, sipping slowly early on. Increase this amount during the summertime. Sipping slowly will not stretch your new stomach. Drinking too fast or gulping liquids will cause brief discomfort and early could cause staple line disruption (leak). With eating, tiny bites, then chew, chew, chew, and swallow. Lay your fork/spoon down for 2-3 minutes, and then take your next bite. Your pouch will tell you within 1-2 bites if it is going to tolerate what you are eating.   Protein Vendors:  Refer to protein vendors' handout from consult class. You can always find protein drinks at the bariatric office, grocery stores, [employer], drug [employer], [employer], health food stores, and on the Internet. Find one high in protein (15-30 grams per serving) and low carb (less than 18 grams per serving).  Now is a great time to re-read your . Please review specific instructions given to you at discharge by your physician (surgeon).  HOW/WHEN TO CONTACT US:  It is imperative that you contact us with any of the following:    Ÿ fever greater than 101 degrees  Ÿ shortness of breath  Ÿ leg swelling  Ÿ body aches  Ÿ shaking chills  Ÿ nausea and vomitting  Ÿ pain that has worsened  Ÿ redness at incision sites  Ÿ pus or foul smelling drainage from an incision or wound  Ÿ inability to keep fluids down for more than a day  Ÿ any other condition you feel needs our attention.  [facility] - Bariatric: [phone] call this number anytime 24 hours a  day / 7 days a week.  Teach-back Questions to be answered by the patient prior to discharge.   What complications would prompt you to call your doctor when you return home? _________________    What is the purpose of your prescribed medication? ________________  What are some potential side effects of the medications you will be taking at home? _______________

## 2020-08-13 NOTE — PROGRESS NOTES
Case Management Discharge Note      Final Note: Home with no needs. Transported by private auto.          Destination      No service has been selected for the patient.      Durable Medical Equipment      No service has been selected for the patient.      Dialysis/Infusion      No service has been selected for the patient.      Home Medical Care      No service has been selected for the patient.      Therapy      No service has been selected for the patient.      Community Resources      No service has been selected for the patient.        Transportation Services  Private: Car    Final Discharge Disposition Code: 01 - home or self-care

## 2020-08-13 NOTE — PLAN OF CARE
Problem: Patient Care Overview  Goal: Plan of Care Review  Outcome: Ongoing (interventions implemented as appropriate)  Flowsheets (Taken 8/13/2020 3480)  Plan of Care Reviewed With: patient  Outcome Summary: VSS. Walked x4, demonstrated IS. PRN zofran for small emesis. Sipping clears. Will CTM.

## 2020-08-18 ENCOUNTER — OFFICE VISIT (OUTPATIENT)
Dept: BARIATRICS/WEIGHT MGMT | Facility: CLINIC | Age: 50
End: 2020-08-18

## 2020-08-18 VITALS
TEMPERATURE: 97.7 F | RESPIRATION RATE: 18 BRPM | WEIGHT: 203 LBS | BODY MASS INDEX: 35.97 KG/M2 | DIASTOLIC BLOOD PRESSURE: 74 MMHG | HEART RATE: 76 BPM | SYSTOLIC BLOOD PRESSURE: 113 MMHG | HEIGHT: 63 IN

## 2020-08-18 DIAGNOSIS — Z90.3 HISTORY OF SLEEVE GASTRECTOMY: ICD-10-CM

## 2020-08-18 DIAGNOSIS — R53.82 CHRONIC FATIGUE: ICD-10-CM

## 2020-08-18 DIAGNOSIS — K44.9 HIATAL HERNIA: ICD-10-CM

## 2020-08-18 DIAGNOSIS — E66.9 OBESITY, CLASS II, BMI 35-39.9: Primary | ICD-10-CM

## 2020-08-18 PROBLEM — R10.13 DYSPEPSIA: Status: RESOLVED | Noted: 2020-06-05 | Resolved: 2020-08-18

## 2020-08-18 PROCEDURE — 99024 POSTOP FOLLOW-UP VISIT: CPT | Performed by: SURGERY

## 2020-08-18 NOTE — PROGRESS NOTES
[facility] BARIATRIC SURGERY  [address] [facility] [address]  [facility] [address]  [phone]  [address] [address]  [phone]  Dept: [phone]  8/18/2020      Ortiz Salamanca.  [id number]  [id number]  1970  female      Chief Complaint   Patient presents with   • Follow-up     1 week post op sleeve       BH Post-Op Bariatric Surgery:   Ortiz Salamanca is status post laparopscopic Laparoscopic Sleeve/HH procedure, performed on 8/12/20.     HPI:   Today's weight is 92.1 kg (203 lb) pounds, today's BMI is Body mass index is 35.97 kg/m².,@ has a  loss of 15 pounds since the last visit and@ weight loss since surgery is 21 pounds. The patient reports a decreased portion size and loss of appetite.  Ortiz Salamanca denies nausea vomiting fever chills chest pain shortness of air or lower extremity pain and reports feeling great. The patient c/o appropriate post-op incisional discomfort that is improving. she is doing well with protein and water intake so far. Taking their vitamins, walking and using IS. Denies fevers, chills, chest pain or shortness of air.      Diet and Exercise: Diet history reviewed and discussed with the patient. Weight loss/gains to date discussed with the patient. No carbonated beverage consumption and exercising regularly- walking frequently.   Supplements: multivitamins, B-12, calcium, iron, B-1 and Vitamin D.   Patient is taking blood thinner as prescribed: Not indicated  Current outpatient and discharge medications have been reconciled for the patient.  Reviewed by: Facundo Helm Jr., MD        Review of Systems   Constitutional: Positive for fatigue.   All other systems reviewed and are negative.      Patient Active Problem List   Diagnosis   • Chest pain   • NSTEMI (non-ST elevated myocardial infarction) (CMS/HCC)   • Anxiety   • Dizziness, nonspecific   • Ganglion cyst   • Primary osteoarthritis of left knee   •  Syncope   • Tobacco abuse   • Coronary artery disease of native artery of native heart with stable angina pectoris (CMS/HCC)   • Presence of drug coated stent in LAD coronary artery   • Screen for colon cancer   • Mechanical knee pain, left   • Insufficiency fracture of tibia   • Pes anserine bursitis   • Chronic fatigue   • Ankle swelling   • Multiple joint pain   • Low back pain   • Edema   • Hyperlipidemia   • Obesity, Class II, BMI 35-39.9   • History of sleeve gastrectomy       The following portions of the patient's history were reviewed and updated as appropriate: allergies, current medications, past family history, past medical history, past social history, past surgical history and problem list.    Vitals:    08/18/20 1456   BP: 113/74   Pulse: 76   Resp: 18   Temp: 97.7 °F (36.5 °C)       Physical Exam   Constitutional: She is oriented to person, place, and time. She appears well-nourished.   HENT:   Head: Normocephalic and atraumatic.   Mouth/Throat: Oropharynx is clear and moist.   Eyes: Pupils are equal, round, and reactive to light. Conjunctivae and EOM are normal. No scleral icterus.   Neck: Normal range of motion. Neck supple. No thyromegaly present.   Cardiovascular: Normal rate and regular rhythm.   Pulmonary/Chest: Effort normal and breath sounds normal.   Abdominal: Soft. Bowel sounds are normal. She exhibits no distension and no mass. There is no rebound and no guarding. No hernia.   Incisions clean, dry and intact without erythema and minimal tenderness   Musculoskeletal: Normal range of motion.   Lymphadenopathy:     She has no cervical adenopathy.   Neurological: She is alert and oriented to person, place, and time. No cranial nerve deficit. Coordination normal.   Skin: Skin is warm and dry. No erythema.   Psychiatric: She has a normal mood and affect. Her behavior is normal.   Vitals reviewed.      Assessment:   Post-op, the patient is postop day 6 status post laparoscopic sleeve gastrectomy  and hiatal hernia repair.  Patient is afebrile and hemodynamically stable and abdomen benign on exam.  She is tolerating stage I diet and will advance to stage II.  She is taking her vitamins as directed..     Encounter Diagnoses   Name Primary?   • Obesity, Class II, BMI 35-39.9 Yes   • History of sleeve gastrectomy    • Hiatal hernia    • Chronic fatigue        Plan:   Reviewed with patient the importance of following the manual for diet progression. Increase activity as tolerated. Continue increasing daily intake of protein and water.   Return to work: the patient is to return to 3 weeks from their surgery date with no restrictions unless they develop medical problems in which we will see them back in the office. They received a note in our office today with their return to work date.  Activity restrictions: no lifting, pushing or pulling over 25lbs for 3 weeks.   Recommended patient be sure to get at least 70 grams of protein per day. Discussed with the patient the recommended amount of water per day to intake. Reviewed vitamin requirements. Be sure to do routine exercise and increase activity as tolerated. No asa, nsaids or steroids for 8 weeks if gastric sleeve procedure and lifelong if gastric bypass procedure.. Patient may use miralax as needed if necessary.     Instructions / Recommendations: dietary counseling recommended, recommended a daily protein intake of  grams, vitamin supplement(s) recommended, recommended exercising at least 150 minutes per week, behavior modifications recommended and instructed to call the office for concerns, questions, or problems.     The patient was instructed to follow up at one month follow up appt.     The patient was counseled regarding post op bariatric manual

## 2020-08-19 ENCOUNTER — TELEPHONE (OUTPATIENT)
Dept: CARDIOLOGY | Facility: CLINIC | Age: 50
End: 2020-08-19

## 2020-08-19 NOTE — TELEPHONE ENCOUNTER
Pt left . She said that she had some questions for Dr. Santiago. She would like to know if someone would call her back.     Thanks   Omi     Phone number [phone]

## 2020-08-19 NOTE — TELEPHONE ENCOUNTER
I spoke to the pt. She is wanting to know if it is ok to take Prilosec or Nexium from a heart stand point?

## 2020-08-20 RX ORDER — PANTOPRAZOLE SODIUM 40 MG/1
40 TABLET, DELAYED RELEASE ORAL DAILY
Qty: 30 TABLET | Refills: 5 | Status: SHIPPED | OUTPATIENT
Start: 2020-08-20 | End: 2020-09-15 | Stop reason: SDUPTHER

## 2020-08-20 NOTE — TELEPHONE ENCOUNTER
Patient had GS surgery with HH repair on 8/12. Her cardiologist would like her to use Protonix vs OTC Nexium due to a previous heart attack. Just wanted to make sure this was something you were okay with.    Thanks!

## 2020-08-24 ENCOUNTER — APPOINTMENT (OUTPATIENT)
Dept: CT IMAGING | Facility: HOSPITAL | Age: 50
End: 2020-08-24

## 2020-08-24 ENCOUNTER — HOSPITAL ENCOUNTER (EMERGENCY)
Facility: HOSPITAL | Age: 50
Discharge: HOME | End: 2020-08-24
Attending: EMERGENCY MEDICINE | Admitting: EMERGENCY MEDICINE

## 2020-08-24 VITALS
WEIGHT: 203 LBS | BODY MASS INDEX: 35.97 KG/M2 | SYSTOLIC BLOOD PRESSURE: 137 MMHG | HEART RATE: 88 BPM | RESPIRATION RATE: 12 BRPM | OXYGEN SATURATION: 97 % | HEIGHT: 63 IN | DIASTOLIC BLOOD PRESSURE: 101 MMHG | TEMPERATURE: 97.7 F

## 2020-08-24 DIAGNOSIS — K59.00 CONSTIPATION, UNSPECIFIED CONSTIPATION TYPE: Primary | ICD-10-CM

## 2020-08-24 LAB
ALBUMIN SERPL-MCNC: 4 G/DL (ref 3.5–5.2)
ALBUMIN/GLOB SERPL: 1 G/DL
ALP SERPL-CCNC: 93 U/L (ref 39–117)
ALT SERPL W P-5'-P-CCNC: 26 U/L (ref 1–33)
ANION GAP SERPL CALCULATED.3IONS-SCNC: 15.7 MMOL/L (ref 5–15)
AST SERPL-CCNC: 26 U/L (ref 1–32)
BASOPHILS # BLD AUTO: 0.04 10*3/MM3 (ref 0–0.2)
BASOPHILS NFR BLD AUTO: 0.4 % (ref 0–1.5)
BILIRUB SERPL-MCNC: 0.3 MG/DL (ref 0–1.2)
BUN SERPL-MCNC: 11 MG/DL (ref 6–20)
BUN/CREAT SERPL: 13.9 (ref 7–25)
CALCIUM SPEC-SCNC: 9.7 MG/DL (ref 8.6–10.5)
CHLORIDE SERPL-SCNC: 99 MMOL/L (ref 98–107)
CO2 SERPL-SCNC: 24.3 MMOL/L (ref 22–29)
CREAT SERPL-MCNC: 0.79 MG/DL (ref 0.57–1)
DEPRECATED RDW RBC AUTO: 44 FL (ref 37–54)
EOSINOPHIL # BLD AUTO: 0.16 10*3/MM3 (ref 0–0.4)
EOSINOPHIL NFR BLD AUTO: 1.7 % (ref 0.3–6.2)
ERYTHROCYTE [DISTWIDTH] IN BLOOD BY AUTOMATED COUNT: 14 % (ref 12.3–15.4)
GFR SERPL CREATININE-BSD FRML MDRD: 93 ML/MIN/1.73
GLOBULIN UR ELPH-MCNC: 3.9 GM/DL
GLUCOSE SERPL-MCNC: 108 MG/DL (ref 65–99)
HCT VFR BLD AUTO: 43.1 % (ref 34–46.6)
HGB BLD-MCNC: 13.7 G/DL (ref 12–15.9)
IMM GRANULOCYTES # BLD AUTO: 0.04 10*3/MM3 (ref 0–0.05)
IMM GRANULOCYTES NFR BLD AUTO: 0.4 % (ref 0–0.5)
LYMPHOCYTES # BLD AUTO: 1.83 10*3/MM3 (ref 0.7–3.1)
LYMPHOCYTES NFR BLD AUTO: 19.7 % (ref 19.6–45.3)
MCH RBC QN AUTO: 27.6 PG (ref 26.6–33)
MCHC RBC AUTO-ENTMCNC: 31.8 G/DL (ref 31.5–35.7)
MCV RBC AUTO: 86.9 FL (ref 79–97)
MONOCYTES # BLD AUTO: 0.36 10*3/MM3 (ref 0.1–0.9)
MONOCYTES NFR BLD AUTO: 3.9 % (ref 5–12)
NEUTROPHILS NFR BLD AUTO: 6.85 10*3/MM3 (ref 1.7–7)
NEUTROPHILS NFR BLD AUTO: 73.9 % (ref 42.7–76)
NRBC BLD AUTO-RTO: 0 /100 WBC (ref 0–0.2)
PLATELET # BLD AUTO: 396 10*3/MM3 (ref 140–450)
PMV BLD AUTO: 9.8 FL (ref 6–12)
POTASSIUM SERPL-SCNC: 4.2 MMOL/L (ref 3.5–5.2)
PROT SERPL-MCNC: 7.9 G/DL (ref 6–8.5)
RBC # BLD AUTO: 4.96 10*6/MM3 (ref 3.77–5.28)
SODIUM SERPL-SCNC: 139 MMOL/L (ref 136–145)
WBC # BLD AUTO: 9.28 10*3/MM3 (ref 3.4–10.8)

## 2020-08-24 PROCEDURE — 99282 EMERGENCY DEPT VISIT SF MDM: CPT | Performed by: EMERGENCY MEDICINE

## 2020-08-24 PROCEDURE — 74177 CT ABD & PELVIS W/CONTRAST: CPT

## 2020-08-24 PROCEDURE — 0 IOPAMIDOL PER 1 ML: Performed by: EMERGENCY MEDICINE

## 2020-08-24 PROCEDURE — 80053 COMPREHEN METABOLIC PANEL: CPT | Performed by: EMERGENCY MEDICINE

## 2020-08-24 PROCEDURE — 99283 EMERGENCY DEPT VISIT LOW MDM: CPT

## 2020-08-24 PROCEDURE — 85025 COMPLETE CBC W/AUTO DIFF WBC: CPT | Performed by: EMERGENCY MEDICINE

## 2020-08-24 RX ORDER — POLYETHYLENE GLYCOL 3350 17 G/17G
17 POWDER, FOR SOLUTION ORAL 2 TIMES DAILY
Qty: 14 PACKET | Refills: 0 | Status: SHIPPED | OUTPATIENT
Start: 2020-08-24 | End: 2020-08-31

## 2020-08-24 RX ADMIN — SODIUM CHLORIDE, POTASSIUM CHLORIDE, SODIUM LACTATE AND CALCIUM CHLORIDE 1000 ML: 600; 310; 30; 20 INJECTION, SOLUTION INTRAVENOUS at 02:06

## 2020-08-24 RX ADMIN — IOPAMIDOL 100 ML: 755 INJECTION, SOLUTION INTRAVENOUS at 02:38

## 2020-08-24 NOTE — ED NOTES
Patient states had gastric sleeve surgery 2 weeks ago. Has only had liquids since surgery. States no BM in past week. Took Miralax, but no results.     Amie Saez, RN  08/24/20 0142

## 2020-08-24 NOTE — ED PROVIDER NOTES
Subjective   50-year-old female presents with complaint of constipation.  Patient had laparoscopic gastric sleeve and hiatal hernia repair on August 12 performed by Dr. Helm at [facility].  Reports that she has generally done well since then.  Patient is still on liquid diet reports she is tolerating it well without nausea or vomiting.  No fevers or chills.  Reports that she had one bowel movement the week following her procedure but has not had any since.  Patient has rectal pain, states she feels like she needs to have a bowel movement but has been unable to do so.  Attempted to give herself a soapsuds enema at home but was unable to administer this well and had little results.  Patient also just began MiraLAX.  Patient was prescribed Dilaudid at time of procedure but reports she has not taken any of those for several days.  Denies pain at incision sites.  States she has some occasional abdominal cramping but denies abdominal pain.  Pain is primarily at rectum.  Reports that it is exacerbated by sitting.          Review of Systems   All other systems reviewed and are negative.      Past Medical History:   Diagnosis Date   • Colon polyp     BENIGN   • Coronary artery disease involving native coronary artery of native heart without angina pectoris 1/31/2019   • DDD (degenerative disc disease), lumbosacral    • Elevated cholesterol    • Heart attack (CMS/HCC) 12/25/2018    2018 dec 25   • Hiatal hernia    • History of heart artery stent 12/16/2018    LAD   • Hyperlipidemia    • Migraine headache    • Presence of drug coated stent in LAD coronary artery 1/31/2019   • Spinal stenosis        Allergies   Allergen Reactions   • Cephalexin Anaphylaxis     Other reaction(s): Vomiting   • Nsaids Other (See Comments)     PER DR. ANDREW BRAND, CARDIOLOGIST        Past Surgical History:   Procedure Laterality Date   • CARDIAC CATHETERIZATION N/A 12/26/2018    Procedure: Left Heart Cath;  Surgeon: Hilario Coronado  MD;  Location: [facility] CATH INVASIVE LOCATION;  Service: Cardiovascular   • CARDIAC CATHETERIZATION N/A 2018    Procedure: Left ventriculography;  Surgeon: Hilario Coronado MD;  Location: [facility] CATH INVASIVE LOCATION;  Service: Cardiovascular   • CARDIAC CATHETERIZATION N/A 2018    Procedure: Stent EVARISTO coronary;  Surgeon: Hilario Coronado MD;  Location: [facility] CATH INVASIVE LOCATION;  Service: Cardiovascular   • CARDIAC CATHETERIZATION N/A 2018    Procedure: Coronary angiography;  Surgeon: Hilario Coronado MD;  Location: [facility] CATH INVASIVE LOCATION;  Service: Cardiovascular   •  SECTION     • COLONOSCOPY N/A 10/9/2019    Procedure: COLONOSCOPY with polypectomy;  Surgeon: Dung Hutchison MD;  Location: [facility] OR;  Service: Gastroenterology   • CORONARY STENT PLACEMENT     • ENDOSCOPY N/A 2020    Procedure: ESOPHAGOGASTRODUODENOSCOPY WITH BIOPSY;  Surgeon: Facundo Helm Jr., MD;  Location: [facility] ENDOSCOPY;  Service: General;  Laterality: N/A;  PRE- DYSPEPSIA  POST- GASTRITIS   • GASTRIC SLEEVE LAPAROSCOPIC N/A 2020    Procedure: GASTRIC SLEEVE LAPAROSCOPIC With Hiatal Hernia Repair;  Surgeon: Facundo Helm Jr., MD;  Location: [facility] OR [facility];  Service: Bariatric;  Laterality: N/A;   • HYSTERECTOMY     • KNEE SURGERY Left 2014    cyst removal       Family History   Problem Relation Age of Onset   • Cancer Mother    • Hypertension Mother    • Sleep apnea Mother    • Heart disease Brother    • Heart attack Brother    • Heart disease Maternal Grandmother    • Hypertension Maternal Grandmother    • Heart attack Maternal Grandmother    • Hypertension Father    • Hypertension Paternal Grandmother    • Heart disease Paternal Grandmother    • Malig Hyperthermia Neg Hx        Social History     Socioeconomic History   • Marital status:      Spouse name: Not on file   • Number of children: Not on file   • Years of education: Not on  file   • Highest education level: Not on file   Tobacco Use   • Smoking status: Former Smoker     Packs/day: 0.25     Years: 5.00     Pack years: 1.25     Types: Cigarettes     Last attempt to quit: 2018     Years since quittin.7   • Smokeless tobacco: Never Used   Substance and Sexual Activity   • Alcohol use: Yes     Comment: HOLIDAY DRINKER   • Drug use: No   • Sexual activity: Defer           Objective   Physical Exam   Constitutional: She is oriented to person, place, and time. She appears well-developed and well-nourished. No distress.   HENT:   Head: Normocephalic and atraumatic.   Mouth/Throat: Oropharynx is clear and moist.   Eyes: Pupils are equal, round, and reactive to light. EOM are normal.   Cardiovascular: Normal rate, regular rhythm, normal heart sounds and intact distal pulses.   Pulmonary/Chest: Effort normal and breath sounds normal. No respiratory distress.   Abdominal: Soft. She exhibits no distension. There is no tenderness.   Incisions well healing, no signs of infection   Musculoskeletal: Normal range of motion. She exhibits no tenderness or deformity.   Neurological: She is alert and oriented to person, place, and time.   Skin: Skin is warm and dry. Capillary refill takes less than 2 seconds. She is not diaphoretic.   Psychiatric: She has a normal mood and affect. Her behavior is normal. Judgment and thought content normal.       Procedures           ED Course  ED Course as of Aug 24 0626   Mon Aug 24, 2020   0151 Patient nontoxic-appearing.  Heart rate slightly elevated in triage but improved by time of my evaluation, patient was quite worked up at time of triage and appears to have calm down.  Abdomen soft, nontender, incisions well-appearing.    [TD]   0625 Patient found to have large stool burden, otherwise unremarkable CT scan.  Patient was given enema and was manually disimpacted by nurses, had large volume stool output after that and felt much better.  Advised course of  scheduled MiraLAX, primary care follow-up.    [TD]      ED Course User Index  [TD] Ji Mario MD                                           [facility]    Final diagnoses:   Constipation, unspecified constipation type            Ji Mario MD  08/24/20 0621

## 2020-09-16 RX ORDER — PANTOPRAZOLE SODIUM 40 MG/1
40 TABLET, DELAYED RELEASE ORAL DAILY
Qty: 90 TABLET | Refills: 1 | Status: SHIPPED | OUTPATIENT
Start: 2020-09-16 | End: 2021-12-09

## 2020-09-16 RX ORDER — URSODIOL 300 MG/1
300 CAPSULE ORAL 2 TIMES DAILY
Qty: 180 CAPSULE | Refills: 0 | Status: SHIPPED | OUTPATIENT
Start: 2020-09-16 | End: 2020-10-18

## 2020-09-17 ENCOUNTER — OFFICE VISIT (OUTPATIENT)
Dept: BARIATRICS/WEIGHT MGMT | Facility: CLINIC | Age: 50
End: 2020-09-17

## 2020-09-17 VITALS
TEMPERATURE: 98.2 F | WEIGHT: 195 LBS | DIASTOLIC BLOOD PRESSURE: 80 MMHG | SYSTOLIC BLOOD PRESSURE: 122 MMHG | RESPIRATION RATE: 18 BRPM | BODY MASS INDEX: 35.88 KG/M2 | HEART RATE: 81 BPM | HEIGHT: 62 IN

## 2020-09-17 DIAGNOSIS — R53.82 CHRONIC FATIGUE: ICD-10-CM

## 2020-09-17 DIAGNOSIS — E78.5 HYPERLIPIDEMIA, UNSPECIFIED HYPERLIPIDEMIA TYPE: ICD-10-CM

## 2020-09-17 DIAGNOSIS — K59.01 SLOW TRANSIT CONSTIPATION: ICD-10-CM

## 2020-09-17 DIAGNOSIS — Z90.3 HISTORY OF SLEEVE GASTRECTOMY: ICD-10-CM

## 2020-09-17 DIAGNOSIS — E66.9 OBESITY, CLASS II, BMI 35-39.9: Primary | ICD-10-CM

## 2020-09-17 PROCEDURE — 99024 POSTOP FOLLOW-UP VISIT: CPT | Performed by: SURGERY

## 2020-09-17 NOTE — PROGRESS NOTES
[facility] BARIATRIC SURGERY  [address] [address]  [phone]  [address] [address]  [phone]  Dept: [phone]  9/17/2020      Ortiz Salamanca.  [id number]  [id number]  1970  female      Chief Complaint   Patient presents with   • Follow-up     1 month post op sleeve       BH Post-Op Bariatric Surgery:   Ortiz Salamanca is status post Laparoscopic Sleeve/HH procedure, performed on 8/12/20     HPI:   Today's weight is 88.5 kg (195 lb) pounds, today's BMI is Body mass index is 35.25 kg/m²., she has a  loss of 8 pounds since the last visit and her weight loss since surgery is 29 pounds. The patient reports a decreased portion size and loss of appetite.      Ortiz Salamanca denies nausea vomiting fever chills chest pain shortness of air or lower extremity pain and no abdominal pain and reports constipation a few weeks ago but that has resolved.     Diet and Exercise: Diet history reviewed and discussed with the patient. Weight loss/gains to date discussed with the patient. The patient states they are eating 70 grams of protein per day. She reports eating 3 meals per day, a typical portion size of 1 cup, eating 2 snacks per day, drinking 5 or more 8-oz. glasses of water per day, no carbonated beverage consumption and exercising regularly.     Supplements: Hermosa chewable vitamin but will switch to bariatric advantage.     Review of Systems   Constitutional: Positive for fatigue.   Gastrointestinal: Positive for constipation.   Musculoskeletal: Positive for arthralgias.   All other systems reviewed and are negative.      Patient Active Problem List   Diagnosis   • Chest pain   • NSTEMI (non-ST elevated myocardial infarction) (CMS/HCC)   • Anxiety   • Dizziness, nonspecific   • Ganglion cyst   • Primary osteoarthritis of left knee   • Syncope   • Tobacco abuse   • Coronary artery disease of native artery of  native heart with stable angina pectoris (CMS/HCC)   • Presence of drug coated stent in LAD coronary artery   • Screen for colon cancer   • Mechanical knee pain, left   • Insufficiency fracture of tibia   • Pes anserine bursitis   • Chronic fatigue   • Ankle swelling   • Multiple joint pain   • Low back pain   • Edema   • Hyperlipidemia   • Obesity, Class II, BMI 35-39.9   • History of sleeve gastrectomy   • Slow transit constipation       Past Medical History:   Diagnosis Date   • Colon polyp     BENIGN   • Coronary artery disease involving native coronary artery of native heart without angina pectoris 1/31/2019   • DDD (degenerative disc disease), lumbosacral    • Elevated cholesterol    • Heart attack (CMS/HCC) 12/25/2018    2018 dec 25   • Hiatal hernia    • History of heart artery stent 12/16/2018    LAD   • Hyperlipidemia    • Migraine headache    • Presence of drug coated stent in LAD coronary artery 1/31/2019   • Spinal stenosis        The following portions of the patient's history were reviewed and updated as appropriate: allergies, current medications, past family history, past medical history, past social history, past surgical history and problem list.    Vitals:    09/17/20 1529   BP: 122/80   Pulse: 81   Resp: 18   Temp: 98.2 °F (36.8 °C)       Physical Exam  Vitals signs reviewed.   HENT:      Head: Normocephalic and atraumatic.   Eyes:      General: No scleral icterus.     Conjunctiva/sclera: Conjunctivae normal.      Pupils: Pupils are equal, round, and reactive to light.   Neck:      Musculoskeletal: Normal range of motion and neck supple.      Thyroid: No thyromegaly.   Cardiovascular:      Rate and Rhythm: Normal rate and regular rhythm.   Pulmonary:      Effort: Pulmonary effort is normal.      Breath sounds: Normal breath sounds.   Abdominal:      General: Bowel sounds are normal. There is no distension.      Palpations: Abdomen is soft. There is no mass.      Tenderness: There is no abdominal  tenderness. There is no right CVA tenderness, left CVA tenderness, guarding or rebound.      Hernia: No hernia is present.   Musculoskeletal: Normal range of motion.   Lymphadenopathy:      Cervical: No cervical adenopathy.   Skin:     General: Skin is warm and dry.      Findings: No erythema.   Neurological:      Mental Status: She is alert and oriented to person, place, and time.      Cranial Nerves: No cranial nerve deficit.      Coordination: Coordination normal.   Psychiatric:         Behavior: Behavior normal.         Assessment:   Post-op, the patient 1 month status post laparoscopic sleeve gastrectomy and hiatal hernia repaired..  Patient is afebrile and hemodynamically stable and abdomen benign on exam.  We went over her daily routine and she is concentrating on clean eating with protein.  She has not started an exercise routine which she will do now.  We will check 1 month labs.     Encounter Diagnoses   Name Primary?   • Obesity, Class II, BMI 35-39.9 Yes   • History of sleeve gastrectomy    • Slow transit constipation    • Chronic fatigue    • Hyperlipidemia, unspecified hyperlipidemia type        Plan:     Encouraged patient to be sure to get plenty of lean protein per day through small frequent meals all with a protein source.   Activity restrictions: none.   Recommended patient be sure to get at least 70 grams of protein per day by eating small, frequent meals all with high lean protein choices. Be sure to limit/cut back on daily carbohydrate intake. Discussed with the patient the recommended amount of water per day to intake- half of body weight in ounces. Reviewed vitamin requirements. Be sure to do routine exercise, 150 minutes per week minimum, including both cardio and strength training.     Instructions / Recommendations: dietary counseling recommended, recommended a daily protein intake of  grams, vitamin supplement(s) recommended, recommended exercising at least 150 minutes per  week, behavior modifications recommended and instructed to call the office for concerns, questions, or problems.     The patient was instructed to follow up in 2 months.     The patient was counseled regarding. Total time spent face to face was 20 minutes and 15 minutes was spent counseling.

## 2020-09-21 ENCOUNTER — TELEPHONE (OUTPATIENT)
Dept: OBSTETRICS AND GYNECOLOGY | Facility: CLINIC | Age: 50
End: 2020-09-21

## 2020-09-21 NOTE — TELEPHONE ENCOUNTER
Pt has a hx of a hysterectomy so this is not PMPB. If you can find a place for her then I am happy to see her

## 2020-09-21 NOTE — TELEPHONE ENCOUNTER
Patient calling states she had surgery about a month ago. She now has a discharge she did not have before.She states it does not have an odor but it is a dark color. Would you like us to work her in somewhere?

## 2020-09-25 ENCOUNTER — OFFICE VISIT (OUTPATIENT)
Dept: OBSTETRICS AND GYNECOLOGY | Facility: CLINIC | Age: 50
End: 2020-09-25

## 2020-09-25 VITALS
DIASTOLIC BLOOD PRESSURE: 80 MMHG | WEIGHT: 192.4 LBS | SYSTOLIC BLOOD PRESSURE: 118 MMHG | HEIGHT: 62 IN | BODY MASS INDEX: 35.41 KG/M2

## 2020-09-25 DIAGNOSIS — N89.8 VAGINAL DISCHARGE: Primary | ICD-10-CM

## 2020-09-25 DIAGNOSIS — Z13.9 SCREENING FOR CONDITION: ICD-10-CM

## 2020-09-25 LAB
BILIRUB BLD-MCNC: NEGATIVE MG/DL
CLARITY, POC: CLEAR
COLOR UR: YELLOW
GLUCOSE UR STRIP-MCNC: NEGATIVE MG/DL
KETONES UR QL: NEGATIVE
LEUKOCYTE EST, POC: NEGATIVE
NITRITE UR-MCNC: NEGATIVE MG/ML
PH UR: 5 [PH] (ref 5–8)
PROT UR STRIP-MCNC: ABNORMAL MG/DL
RBC # UR STRIP: NEGATIVE /UL
SP GR UR: 1.03 (ref 1–1.03)
UROBILINOGEN UR QL: NORMAL

## 2020-09-25 PROCEDURE — 81002 URINALYSIS NONAUTO W/O SCOPE: CPT | Performed by: OBSTETRICS & GYNECOLOGY

## 2020-09-25 PROCEDURE — 99213 OFFICE O/P EST LOW 20 MIN: CPT | Performed by: OBSTETRICS & GYNECOLOGY

## 2020-09-25 RX ORDER — POLYETHYLENE GLYCOL 3350 17 G/17G
POWDER, FOR SOLUTION ORAL
COMMUNITY
Start: 2020-09-01 | End: 2023-02-01

## 2020-09-25 RX ORDER — ASPIRIN 81 MG/1
81 TABLET, CHEWABLE ORAL DAILY
COMMUNITY
Start: 2020-09-04 | End: 2021-03-03

## 2020-09-25 RX ORDER — GABAPENTIN 100 MG/1
CAPSULE ORAL
COMMUNITY
Start: 2020-08-26 | End: 2020-11-20

## 2020-09-25 RX ORDER — CYANOCOBALAMIN 500 UG/1
SPRAY NASAL
COMMUNITY
Start: 2020-09-03 | End: 2020-11-20 | Stop reason: SDUPTHER

## 2020-09-25 RX ORDER — HYDROCODONE BITARTRATE AND ACETAMINOPHEN 10; 325 MG/1; MG/1
TABLET ORAL
COMMUNITY
Start: 2020-08-26 | End: 2020-11-20

## 2020-09-25 NOTE — PROGRESS NOTES
"PROBLEM VISIT    Chief Complaint:      Sunday JACQUES Salamanca is a 50 y.o. patient who presents complaining of abnormal vaginal discharge. Pt had a laparoscopic gastric sleeve placed 8/12/20. She has lost 30 pounds! Since surgery she has had an abnormal vaginal discharge. She reports a brown discharge. She does not notice any foul odors. She was dx with trich 5/2020.  Chief Complaint   Patient presents with   • Vaginal Discharge     2 weeks brown discharge, little itching             The following portions of the patient's history were reviewed and updated as appropriate: allergies, current medications and problem list.    Review of Systems   Constitutional: Negative for appetite change, chills, fatigue, fever and unexpected weight change.   Gastrointestinal: Negative for abdominal distention, abdominal pain, anal bleeding, blood in stool, constipation, diarrhea, nausea and vomiting.   Genitourinary: Positive for vaginal discharge. Negative for dyspareunia, dysuria, menstrual problem, pelvic pain, vaginal bleeding and vaginal pain.       /80   Ht 158.4 cm (62.36\")   Wt 87.3 kg (192 lb 6.4 oz)   LMP  (LMP Unknown)   Breastfeeding No   BMI 34.78 kg/m²     Physical Exam  Vitals signs reviewed.   Constitutional:       General: She is not in acute distress.     Appearance: She is well-developed. She is not diaphoretic.   Genitourinary:     Labia:         Right: No rash, tenderness, lesion or injury.         Left: No rash, tenderness, lesion or injury.       Vagina: No signs of injury and foreign body. Vaginal discharge present. No erythema, tenderness or bleeding.   Skin:     General: Skin is warm.      Coloration: Skin is not pale.      Findings: No erythema or rash.   Neurological:      Mental Status: She is alert.      Cranial Nerves: No cranial nerve deficit.      Coordination: Coordination normal.   Psychiatric:         Behavior: Behavior normal.         Thought Content: Thought content normal.         Judgment: " Judgment normal.           Assessment/Plan   Sunday was seen today for vaginal discharge.    Diagnoses and all orders for this visit:    Vaginal discharge  -     NuSwab VG+ - Swab, Vagina  -     Genital Mycoplasmas LALO, Swab - Swab, Vagina    Screening for condition  -     Cancel: POC Pregnancy, Urine  -     POC Urinalysis Dipstick    49yo with vaginal discharge    1) genital discharge: Check genital culture.  Patient had trichomonas in 5/2020.    2) status post gastric sleeve: Patient has lost 30 pounds since her surgery 8/12/20             No follow-ups on file.      Cadence Golden DO    9/25/2020  12:18 EDT

## 2020-09-29 LAB
A VAGINAE DNA VAG QL NAA+PROBE: ABNORMAL SCORE
BVAB2 DNA VAG QL NAA+PROBE: ABNORMAL SCORE
C ALBICANS DNA VAG QL NAA+PROBE: NEGATIVE
C GLABRATA DNA VAG QL NAA+PROBE: NEGATIVE
C TRACH DNA VAG QL NAA+PROBE: NEGATIVE
M GENITALIUM DNA SPEC QL NAA+PROBE: NEGATIVE
M HOMINIS DNA SPEC QL NAA+PROBE: POSITIVE
MEGA1 DNA VAG QL NAA+PROBE: ABNORMAL SCORE
N GONORRHOEA DNA VAG QL NAA+PROBE: NEGATIVE
T VAGINALIS DNA VAG QL NAA+PROBE: POSITIVE
UREAPLASMA DNA SPEC QL NAA+PROBE: POSITIVE

## 2020-09-30 RX ORDER — DOXYCYCLINE 50 MG/1
50 CAPSULE ORAL 2 TIMES DAILY
Qty: 14 CAPSULE | Refills: 0 | Status: SHIPPED | OUTPATIENT
Start: 2020-09-30 | End: 2020-10-18

## 2020-09-30 RX ORDER — METRONIDAZOLE 500 MG/1
500 TABLET ORAL 2 TIMES DAILY
Qty: 14 TABLET | Refills: 0 | Status: SHIPPED | OUTPATIENT
Start: 2020-09-30 | End: 2020-10-07

## 2020-11-20 ENCOUNTER — OFFICE VISIT (OUTPATIENT)
Dept: BARIATRICS/WEIGHT MGMT | Facility: CLINIC | Age: 50
End: 2020-11-20

## 2020-11-20 VITALS
HEIGHT: 62 IN | DIASTOLIC BLOOD PRESSURE: 81 MMHG | BODY MASS INDEX: 33.68 KG/M2 | RESPIRATION RATE: 18 BRPM | SYSTOLIC BLOOD PRESSURE: 133 MMHG | TEMPERATURE: 98.2 F | HEART RATE: 71 BPM | WEIGHT: 183 LBS

## 2020-11-20 DIAGNOSIS — E66.9 OBESITY, CLASS I, BMI 30-34.9: Primary | ICD-10-CM

## 2020-11-20 DIAGNOSIS — M25.473 ANKLE SWELLING, UNSPECIFIED LATERALITY: ICD-10-CM

## 2020-11-20 DIAGNOSIS — M25.50 MULTIPLE JOINT PAIN: ICD-10-CM

## 2020-11-20 DIAGNOSIS — K21.9 GASTROESOPHAGEAL REFLUX DISEASE, UNSPECIFIED WHETHER ESOPHAGITIS PRESENT: ICD-10-CM

## 2020-11-20 DIAGNOSIS — Z90.3 HISTORY OF SLEEVE GASTRECTOMY: ICD-10-CM

## 2020-11-20 DIAGNOSIS — E78.5 HYPERLIPIDEMIA, UNSPECIFIED HYPERLIPIDEMIA TYPE: ICD-10-CM

## 2020-11-20 PROBLEM — E66.812 OBESITY, CLASS II, BMI 35-39.9: Status: RESOLVED | Noted: 2020-07-29 | Resolved: 2020-11-20

## 2020-11-20 PROBLEM — Z72.0 TOBACCO ABUSE: Status: RESOLVED | Noted: 2018-02-06 | Resolved: 2020-11-20

## 2020-11-20 PROBLEM — E66.811 OBESITY, CLASS I, BMI 30-34.9: Status: ACTIVE | Noted: 2020-11-20

## 2020-11-20 PROBLEM — E55.9 VITAMIN D DEFICIENCY: Status: ACTIVE | Noted: 2020-10-24

## 2020-11-20 PROCEDURE — 99214 OFFICE O/P EST MOD 30 MIN: CPT | Performed by: NURSE PRACTITIONER

## 2020-11-20 RX ORDER — URSODIOL 300 MG/1
300 CAPSULE ORAL 2 TIMES DAILY
COMMUNITY
End: 2021-12-09

## 2020-11-20 RX ORDER — ESTRADIOL 0.1 MG/G
1 CREAM VAGINAL DAILY
COMMUNITY
End: 2021-12-09

## 2020-11-20 RX ORDER — CYANOCOBALAMIN 500 UG/1
0.1 SPRAY NASAL WEEKLY
Qty: 12 BOTTLE | Refills: 3 | Status: SHIPPED | OUTPATIENT
Start: 2020-11-20 | End: 2020-12-03

## 2020-11-20 RX ORDER — HYDROCODONE BITARTRATE AND ACETAMINOPHEN 7.5; 325 MG/1; MG/1
1 TABLET ORAL EVERY 6 HOURS PRN
COMMUNITY
Start: 2020-09-26

## 2020-11-20 RX ORDER — GABAPENTIN 300 MG/1
1 CAPSULE ORAL 2 TIMES DAILY
COMMUNITY
Start: 2020-08-26

## 2020-11-20 RX ORDER — ERGOCALCIFEROL 1.25 MG/1
50000 CAPSULE ORAL
Qty: 12 CAPSULE | Refills: 0 | Status: SHIPPED | OUTPATIENT
Start: 2020-11-20 | End: 2020-12-26

## 2020-11-20 NOTE — PROGRESS NOTES
[facility] BARIATRIC SURGERY  [address] [address]  [phone]  [address] [address]  [phone]  Dept: [phone]  11/20/2020      Ortiz Salamanca.  [id number]  [id number]  1970  female      Chief Complaint   Patient presents with   • Follow-up     3 MONTH SLEEVE       BH Post-Op Bariatric Surgery:   Ortiz Salamanca is status post Laparoscopic Sleeve/HH procedure, performed on 8/12/20     HPI:   Today's weight is 83 kg (183 lb) pounds, today's BMI is Body mass index is 33.08 kg/m².,she has a  loss of 12 pounds since the last visit and@ weight loss since surgery is 35 pounds. The patient reports a decreased portion size and loss of appetite.      Ortiz Salamanca denies nausea vomiting reflux regurgitation dysphagia     Diet and Exercise: Diet history reviewed and discussed with the patient. Weight loss/gains to date discussed with the patient. The patient states they are eating 2-3 grams of protein per day. She reports eating 2-4 meals per day, a typical portion size of 1/2 cup, eating 2 snacks per day, drinking 5-6 or more 8-oz. glasses of water per day, no carbonated beverage consumption and exercising regularly- walking 2-3 days per week.    She has been taking mirilax for constipation. She takes is doing that regularly.    She using at least one premier.     Supplements: bariactive [employer] .     Review of Systems   Constitutional: Positive for appetite change. Negative for fatigue and unexpected weight change.   HENT: Negative.    Eyes: Negative.    Respiratory: Negative.    Cardiovascular: Negative.  Negative for leg swelling.   Gastrointestinal: Negative for abdominal distention, abdominal pain, constipation, diarrhea, nausea and vomiting.   Genitourinary: Negative for difficulty urinating, frequency and urgency.   Musculoskeletal: Negative for back pain.   Skin: Negative.    Psychiatric/Behavioral: Negative.     All other systems reviewed and are negative.      Patient Active Problem List   Diagnosis   • Chest pain   • NSTEMI (non-ST elevated myocardial infarction) (CMS/HCC)   • Anxiety   • Dizziness, nonspecific   • Ganglion cyst   • Primary osteoarthritis of left knee   • Syncope   • Coronary artery disease of native artery of native heart with stable angina pectoris (CMS/HCC)   • Presence of drug coated stent in LAD coronary artery   • Screen for colon cancer   • Mechanical knee pain, left   • Insufficiency fracture of tibia   • Pes anserine bursitis   • Chronic fatigue   • Ankle swelling   • Multiple joint pain   • Low back pain   • Edema   • Hyperlipidemia   • History of sleeve gastrectomy   • Slow transit constipation   • Gastroesophageal reflux disease   • Vitamin D deficiency   • Obesity, Class I, BMI 30-34.9       Past Medical History:   Diagnosis Date   • Colon polyp     BENIGN   • Coronary artery disease involving native coronary artery of native heart without angina pectoris 1/31/2019   • DDD (degenerative disc disease), lumbosacral    • Elevated cholesterol    • Heart attack (CMS/HCC) 12/25/2018    2018 dec 25   • Hiatal hernia    • History of heart artery stent 12/16/2018    LAD   • Hyperlipidemia    • Migraine headache    • Presence of drug coated stent in LAD coronary artery 1/31/2019   • Spinal stenosis        The following portions of the patient's history were reviewed and updated as appropriate: allergies, current medications, past family history, past medical history, past social history, past surgical history and problem list.    Vitals:    11/20/20 1042   BP: 133/81   Pulse: 71   Resp: 18   Temp: 98.2 °F (36.8 °C)       Physical Exam  Vitals signs and nursing note reviewed.   Constitutional:       Appearance: She is well-developed.   Neck:      Thyroid: No thyromegaly.   Cardiovascular:      Rate and Rhythm: Normal rate and regular rhythm.      Heart sounds: Normal heart sounds.   Pulmonary:       Effort: Pulmonary effort is normal. No respiratory distress.      Breath sounds: Normal breath sounds. No wheezing.   Abdominal:      General: Bowel sounds are normal. There is no distension.      Palpations: Abdomen is soft.      Tenderness: There is no abdominal tenderness. There is no guarding.      Hernia: No hernia is present.   Musculoskeletal:         General: No tenderness.   Skin:     General: Skin is warm and dry.      Findings: No erythema or rash.   Neurological:      Mental Status: She is alert.   Psychiatric:         Behavior: Behavior normal.         Assessment:   Post-op, the patient is doing well.     Encounter Diagnoses   Name Primary?   • Obesity, Class I, BMI 30-34.9 Yes   • Hyperlipidemia, unspecified hyperlipidemia type    • Gastroesophageal reflux disease, unspecified whether esophagitis present    • Multiple joint pain    • Ankle swelling, unspecified laterality    • History of sleeve gastrectomy        Plan:   I will send replacement dosing on patient's vitamin D to her pharmacy.  I will take a look at the Bariactive vitamin D dose compared to the celebrate vitamin D dose.  I will plan to retrend her vitamin D when she is back with us in the office for her 6-month follow-up and we will play with her maintenance dosing based on where her levels are at that time.  Patient was encouraged to focus on strength training over cardio being careful with her back and her knee.  Patient was encouraged to continue to keep up the great work with meal frequency and supplementing her protein as needed.  She was encouraged to consider switching to a fiber supplement over MiraLAX when she feels that she has the room.  Encouraged patient to be sure to get plenty of lean protein per day through small frequent meals all with a protein source.   Activity restrictions: none.   Recommended patient be sure to get at least 70 grams of protein per day by eating small, frequent meals all with high lean protein  choices. Be sure to limit/cut back on daily carbohydrate intake. Discussed with the patient the recommended amount of water per day to intake- half of body weight in ounces. Reviewed vitamin requirements. Be sure to do routine exercise, 150 minutes per week minimum, including both cardio and strength training.     Instructions / Recommendations: dietary counseling recommended, recommended a daily protein intake of  grams, vitamin supplement(s) recommended, recommended exercising at least 150 minutes per week, behavior modifications recommended and instructed to call the office for concerns, questions, or problems.     The patient was instructed to follow up in 3 month .      Total time spent face to face was 20 minutes and 15 minutes was spent counseling.

## 2020-11-23 ENCOUNTER — TRANSCRIBE ORDERS (OUTPATIENT)
Dept: ADMINISTRATIVE | Facility: HOSPITAL | Age: 50
End: 2020-11-23

## 2020-11-23 DIAGNOSIS — Z12.31 VISIT FOR SCREENING MAMMOGRAM: Primary | ICD-10-CM

## 2020-12-03 ENCOUNTER — OFFICE VISIT (OUTPATIENT)
Dept: CARDIOLOGY | Facility: CLINIC | Age: 50
End: 2020-12-03

## 2020-12-03 VITALS — HEIGHT: 62 IN | BODY MASS INDEX: 32.57 KG/M2 | WEIGHT: 177 LBS

## 2020-12-03 DIAGNOSIS — Z95.5 PRESENCE OF DRUG COATED STENT IN LAD CORONARY ARTERY: Chronic | ICD-10-CM

## 2020-12-03 DIAGNOSIS — E78.2 MIXED HYPERLIPIDEMIA: Chronic | ICD-10-CM

## 2020-12-03 DIAGNOSIS — I25.118 CORONARY ARTERY DISEASE OF NATIVE ARTERY OF NATIVE HEART WITH STABLE ANGINA PECTORIS (HCC): Primary | ICD-10-CM

## 2020-12-03 PROBLEM — E78.5 HYPERLIPIDEMIA: Chronic | Status: ACTIVE | Noted: 2020-05-21

## 2020-12-03 PROCEDURE — 99441 PR PHYS/QHP TELEPHONE EVALUATION 5-10 MIN: CPT | Performed by: INTERNAL MEDICINE

## 2020-12-03 NOTE — PROGRESS NOTES
Subjective:     Encounter Date:20        Patient ID:  JACQUES Salamanca is a 50 y.o. female.    Chief Complaint: CAD  Coronary Artery Disease  Pertinent negatives include no muscle weakness.       Dear Dr. Chandler,    This patient has consented to a telehealth visit via audio. The visit was scheduled as a audio visit to comply with patient safety concerns in accordance with CDC recommendations.  All vitals recorded within this visit are reported by the patient.  I spent  15 minutes in total including but not limited to the 9 minutes spent in direct conversation with this patient.     She has a history of CAD and prior drug-eluting stent.    She had weight loss surgery and has lost about 40-45 pounds. She feels great without any problems except for shoulder pain. She denies any chest pain, pressure, tightness, squeezing, or heartburn.  She has not experienced any feeling of palpitations, tachycardia or heart racing and no presyncope or syncope.  There has not been any problems with dizziness or lightheadedness.  There has not been any orthopnea or PND, and no problems with lower extremity edema.  She denies any shortness of breath at rest or with activity and has not had any wheezing.  She has continued to perform daily activities of living without any specific problem or change in the level of activity.     She presented to the ER in [place] on 18 with chest pain and mild SOA. She had the chest pain for 2 days and it was increasing. At times it was worse when lying down and at other times it would radiate into her left arm. It was not worse with cough or deep breathing.  She stated the pain was not sharp and there was no nausea or vomiting. She thought it was indigestion for the first day or so and took Tums and Nexium which did not help.  Her ECG was unremarkable but her initial troponin was indeterminate at 0.040.  She has a strong family history of CAD.  One of her brothers  from a MI in his 40s  and another brother has stents.  She has a history of hyperlipidemia and was a daily smoker.  It was felt she needed to be admitted and cardiology was consulted.       She had an echocardiogram that showed normal biventricular size and function, no wall motion abnormalities, and no pericardial effusion.  Her serial troponin continued to increase to 0.046 and 0.072.  She was then transferred to [facility] for a cardiac catheterization.  She had a 99% stenosis of the mid LAD which was treated with angioplasty and EVARISTO 3.5 x 28MM Xience Divina.  She was discharged home on DAPT.   She presented to the [facility] ER again on 12/29/18 with complaints of left sided heart pain that started the previous night around 11 pm.  She did not take anything for the pain.  Her ECG was essentially unchanged from her last ECG on 12/27/18 at discharge. Her troponin was 0.079 which had decreased from 0.083 on previous admission.  She was given the option to be discharged home and to follow-up in the office this coming week or to stay and have repeat cardiac catheterization.  She initially chose to be transferred to [facility] for elective cardiac catheterization.       The following portions of the patient's history were reviewed and updated as appropriate: allergies, current medications, past family history, past medical history, past social history, past surgical history and problem list.    Past Medical History:   Diagnosis Date   • Colon polyp     BENIGN   • Coronary artery disease involving native coronary artery of native heart without angina pectoris 1/31/2019   • DDD (degenerative disc disease), lumbosacral    • Elevated cholesterol    • Heart attack (CMS/HCC) 12/25/2018    2018 dec 25   • Hiatal hernia    • History of heart artery stent 12/16/2018    LAD   • Hyperlipidemia    • Migraine headache    • Presence of drug coated stent in LAD coronary artery 1/31/2019   • Spinal stenosis        Past Surgical  History:   Procedure Laterality Date   • CARDIAC CATHETERIZATION N/A 2018    Procedure: Left Heart Cath;  Surgeon: Hilario Coronado MD;  Location: [facility] CATH INVASIVE LOCATION;  Service: Cardiovascular   • CARDIAC CATHETERIZATION N/A 2018    Procedure: Left ventriculography;  Surgeon: Hilario Coronado MD;  [facility]IVE [facility];  Service: Cardiovascular   • CARDIAC CATHETERIZATION N/A 2018    Procedure: Stent EVARISTO coronary;  Surgeon: Hilario Coronado MD;  Location: [facility] CATH INVASIVE LOCATION;  Service: Cardiovascular   • CARDIAC CATHETERIZATION N/A 2018    Procedure: Coronary angiography;  Surgeon: Hilario Corondao MD;  Location: [facility] CATH INVASIVE LOCATION;  Service: Cardiovascular   •  SECTION     • COLONOSCOPY N/A 10/9/2019    Procedure: COLONOSCOPY with polypectomy;  Surgeon: Dung Hutchison MD;  Location: [facility] OR;  Service: Gastroenterology   • CORONARY STENT PLACEMENT     • ENDOSCOPY N/A 2020    Procedure: ESOPHAGOGASTRODUODENOSCOPY WITH BIOPSY;  Surgeon: Facundo Helm Jr., MD;  Location: [facility] ENDOSCOPY;  Service: General;  Laterality: N/A;  PRE- DYSPEPSIA  POST- GASTRITIS   • GASTRIC SLEEVE LAPAROSCOPIC N/A 2020    Procedure: GASTRIC SLEEVE LAPAROSCOPIC With Hiatal Hernia Repair;  Surgeon: Facundo Helm Jr., MD;  [place] [facility] [place] [facility]  Service: Bariatric;  Laterality: N/A;   • HYSTERECTOMY     • KNEE SURGERY Left 2014    cyst removal       Social History     Socioeconomic History   • Marital status:      Spouse name: Not on file   • Number of children: Not on file   • Years of education: Not on file   • Highest education level: Not on file   Tobacco Use   • Smoking status: Former Smoker     Packs/day: 0.25     Years: 5.00     Pack years: 1.25     Types: Cigarettes     Quit date: 2018     Years since quittin.9   • Smokeless tobacco: Never Used   Substance and  "Sexual Activity   • Alcohol use: Yes     Comment: HOLIDAY DRINKER   • Drug use: No   • Sexual activity: Defer       Review of Systems   Constitution: Negative for chills, decreased appetite, fever and night sweats.   HENT: Negative for ear discharge, ear pain, hearing loss, nosebleeds and sore throat.    Eyes: Negative for blurred vision, double vision and pain.   Cardiovascular: Negative for cyanosis.   Respiratory: Negative for hemoptysis and sputum production.    Endocrine: Negative for cold intolerance and heat intolerance.   Hematologic/Lymphatic: Negative for adenopathy.   Skin: Negative for dry skin, itching, nail changes, rash and suspicious lesions.   Musculoskeletal: Negative for arthritis, gout, muscle cramps, muscle weakness, myalgias and neck pain.   Gastrointestinal: Negative for anorexia, bowel incontinence, constipation, diarrhea, dysphagia, hematemesis and jaundice.   Genitourinary: Negative for bladder incontinence, dysuria, flank pain, frequency, hematuria and nocturia.   Neurological: Negative for focal weakness, numbness, paresthesias and seizures.   Psychiatric/Behavioral: Negative for altered mental status, hallucinations, hypervigilance, suicidal ideas and thoughts of violence.   Allergic/Immunologic: Negative for persistent infections.       Procedures       Objective:     Vitals:    12/03/20 1426   Weight: 80.3 kg (177 lb)   Height: 158.4 cm (62.36\")     Alert and oriented x3, thought processes normal, judgment normal, speaking in full sentences with no wheezing and no respiratory distress. Hearing is appropriate without difficulty.      Lab Review:             Performed        Assessment:          Diagnosis Plan   1. Coronary artery disease of native artery of native heart with stable angina pectoris (CMS/HCC)     2. Mixed hyperlipidemia     3. Presence of drug coated stent in LAD coronary artery            Plan:       1.  CAD status post non-ST segment elevation myocardial infarction a " drug-coated stent.  Doing great, cont same therapy  2.  Tobacco abuse-patient stopped smoking at the time of her myocardial infarction and has not resumed.      Thank you very much for allowing us to participate in the care of this pleasant patient.  Please don't hesitate to call if I can be of assistance in any way.      Current Outpatient Medications:   •  aspirin 81 MG chewable tablet, Chew 81 mg Daily., Disp: , Rfl:   •  atorvastatin (LIPITOR) 40 MG tablet, TAKE 1 TABLET EVERY NIGHT. NEED LABS FOR FURTHER REFILLS, [phone] (Patient taking differently: Take 40 mg by mouth Every Night. TAKE 1 TABLET EVERY NIGHT. Need labs for further refills. [phone]), Disp: 30 tablet, Rfl: 11  •  CBD (cannabidiol) oral oil, Take 1 dose by mouth 2 (two) times a day., Disp: , Rfl:   •  estradiol (ESTRACE) 0.1 MG/GM vaginal cream, Insert 1 applicator into the vagina Daily., Disp: , Rfl:   •  gabapentin (NEURONTIN) 300 MG capsule, Take 1 capsule by mouth 2 (two) times a day., Disp: , Rfl:   •  HYDROcodone-acetaminophen (NORCO) 7.5-325 MG per tablet, Take 1 tablet by mouth Every 6 (Six) Hours As Needed., Disp: , Rfl:   •  metoprolol tartrate (LOPRESSOR) 25 MG tablet, Take 1 tablet by mouth 2 (Two) Times a Day., Disp: 180 tablet, Rfl: 2  •  nitroglycerin (NITROSTAT) 0.4 MG SL tablet, DISSOLVE 1 TAB UNDER TONGUE FOR CHEST PAIN - IF PAIN REMAINS AFTER 5 MIN, CALL 911 AND REPEAT DOSE. MAX 3 TABS IN 15 MINUTES (Patient taking differently: Place 0.4 mg under the tongue Every 5 (Five) Minutes As Needed.), Disp: 25 tablet, Rfl: 0  •  pantoprazole (PROTONIX) 40 MG EC tablet, Take 1 tablet by mouth Daily., Disp: 90 tablet, Rfl: 1  •  polyethylene glycol 17 g packet, , Disp: , Rfl:   •  rizatriptan MLT (MAXALT-MLT) 10 MG disintegrating tablet, Place 10 mg on the tongue 1 (One) Time As Needed for Migraine., Disp: , Rfl:   •  ursodiol (ACTIGALL) 300 MG capsule, Take 300 mg by mouth 2 (Two) Times a Day., Disp: , Rfl:   •  vitamin D  (ERGOCALCIFEROL) 1.25 MG (31482 UT) capsule capsule, Take 1 capsule by mouth Every 7 (Seven) Days for 6 doses., Disp: 12 capsule, Rfl: 0

## 2020-12-17 ENCOUNTER — APPOINTMENT (OUTPATIENT)
Dept: MAMMOGRAPHY | Facility: HOSPITAL | Age: 50
End: 2020-12-17

## 2021-03-03 RX ORDER — ASPIRIN 81 MG/1
TABLET, CHEWABLE ORAL
Qty: 90 TABLET | Refills: 3 | Status: SHIPPED | OUTPATIENT
Start: 2021-03-03 | End: 2022-03-01

## 2021-03-22 RX ORDER — ATORVASTATIN CALCIUM 40 MG/1
TABLET, FILM COATED ORAL
Qty: 90 TABLET | Refills: 3 | Status: SHIPPED | OUTPATIENT
Start: 2021-03-22 | End: 2022-04-29

## 2021-03-30 ENCOUNTER — APPOINTMENT (OUTPATIENT)
Dept: VACCINE CLINIC | Facility: HOSPITAL | Age: 51
End: 2021-03-30

## 2021-04-13 ENCOUNTER — APPOINTMENT (OUTPATIENT)
Dept: VACCINE CLINIC | Facility: HOSPITAL | Age: 51
End: 2021-04-13

## 2021-04-14 ENCOUNTER — IMMUNIZATION (OUTPATIENT)
Dept: VACCINE CLINIC | Facility: HOSPITAL | Age: 51
End: 2021-04-14

## 2021-04-14 PROCEDURE — 0001A: CPT | Performed by: OBSTETRICS & GYNECOLOGY

## 2021-04-14 PROCEDURE — 91300 HC SARSCOV02 VAC 30MCG/0.3ML IM: CPT | Performed by: OBSTETRICS & GYNECOLOGY

## 2021-05-05 ENCOUNTER — IMMUNIZATION (OUTPATIENT)
Dept: VACCINE CLINIC | Facility: HOSPITAL | Age: 51
End: 2021-05-05

## 2021-05-05 PROCEDURE — 0002A: CPT | Performed by: OBSTETRICS & GYNECOLOGY

## 2021-05-05 PROCEDURE — 91300 HC SARSCOV02 VAC 30MCG/0.3ML IM: CPT | Performed by: OBSTETRICS & GYNECOLOGY

## 2021-12-09 ENCOUNTER — OFFICE VISIT (OUTPATIENT)
Dept: CARDIOLOGY | Facility: CLINIC | Age: 51
End: 2021-12-09

## 2021-12-09 VITALS
WEIGHT: 170 LBS | BODY MASS INDEX: 30.12 KG/M2 | HEIGHT: 63 IN | SYSTOLIC BLOOD PRESSURE: 120 MMHG | OXYGEN SATURATION: 95 % | RESPIRATION RATE: 16 BRPM | HEART RATE: 75 BPM | DIASTOLIC BLOOD PRESSURE: 84 MMHG

## 2021-12-09 DIAGNOSIS — I25.118 CORONARY ARTERY DISEASE OF NATIVE ARTERY OF NATIVE HEART WITH STABLE ANGINA PECTORIS (HCC): Primary | ICD-10-CM

## 2021-12-09 DIAGNOSIS — R07.2 PRECORDIAL PAIN: ICD-10-CM

## 2021-12-09 DIAGNOSIS — I21.4 NSTEMI (NON-ST ELEVATED MYOCARDIAL INFARCTION) (HCC): ICD-10-CM

## 2021-12-09 DIAGNOSIS — Z95.5 PRESENCE OF DRUG COATED STENT IN LAD CORONARY ARTERY: ICD-10-CM

## 2021-12-09 DIAGNOSIS — E78.2 MIXED HYPERLIPIDEMIA: ICD-10-CM

## 2021-12-09 PROCEDURE — 93000 ELECTROCARDIOGRAM COMPLETE: CPT | Performed by: INTERNAL MEDICINE

## 2021-12-09 PROCEDURE — 99214 OFFICE O/P EST MOD 30 MIN: CPT | Performed by: INTERNAL MEDICINE

## 2021-12-09 RX ORDER — CYCLOBENZAPRINE HCL 10 MG
TABLET ORAL
COMMUNITY
Start: 2021-11-12 | End: 2022-08-18

## 2021-12-09 RX ORDER — ERGOCALCIFEROL 1.25 MG/1
CAPSULE ORAL
COMMUNITY

## 2021-12-09 NOTE — PROGRESS NOTES
Subjective:     Encounter Date:21        Patient ID: Ortiz JACQUES Salamanca is a 51 y.o. female.    Chief Complaint: CAD  Coronary Artery Disease  Pertinent negatives include no muscle weakness.       Dear Dr. Chandler,    She has a history of CAD and prior drug-eluting stent.    She comes in today for 1 year follow-up.  She denies any cardiac complaints.  No chest pain or chest discomfort, no shortness of breath.  He does have chronic left shoulder pain that is unchanged.  He has had back surgery recently and that is been her main complaint.     She presented to the ER in [place] on 18 with chest pain and mild SOA. She had the chest pain for 2 days and it was increasing. At times it was worse when lying down and at other times it would radiate into her left arm. It was not worse with cough or deep breathing.  She stated the pain was not sharp and there was no nausea or vomiting. She thought it was indigestion for the first day or so and took Tums and Nexium which did not help.  Her ECG was unremarkable but her initial troponin was indeterminate at 0.040.  She has a strong family history of CAD.  One of her brothers  from a MI in his 40s and another brother has stents.  She has a history of hyperlipidemia and was a daily smoker.  It was felt she needed to be admitted and cardiology was consulted.       She had an echocardiogram that showed normal biventricular size and function, no wall motion abnormalities, and no pericardial effusion.  Her serial troponin continued to increase to 0.046 and 0.072.  She was then transferred to [facility] for a cardiac catheterization.  She had a 99% stenosis of the mid LAD which was treated with angioplasty and EVARISTO 3.5 x 28MM Xience Divina.  She was discharged home on DAPT.   She presented to the [facility] ER again on 18 with complaints of left sided heart pain that started the previous night around 11 pm.  She did not take anything for the pain.  Her  ECG was essentially unchanged from her last ECG on 18 at discharge. Her troponin was 0.079 which had decreased from 0.083 on previous admission.  She was given the option to be discharged home and to follow-up in the office this coming week or to stay and have repeat cardiac catheterization.  She initially chose to be transferred to [facility] for elective cardiac catheterization.       The following portions of the patient's history were reviewed and updated as appropriate: allergies, current medications, past family history, past medical history, past social history, past surgical history and problem list.    Past Medical History:   Diagnosis Date   • Colon polyp     BENIGN   • Coronary artery disease involving native coronary artery of native heart without angina pectoris 2019   • DDD (degenerative disc disease), lumbosacral    • Elevated cholesterol    • Heart attack (HCC) 2018    2018 dec 25   • Hiatal hernia    • History of heart artery stent 2018    LAD   • Hyperlipidemia    • Migraine headache    • Presence of drug coated stent in LAD coronary artery 2019   • Spinal stenosis        Past Surgical History:   Procedure Laterality Date   • CARDIAC CATHETERIZATION N/A 2018    Procedure: Left Heart Cath;  Surgeon: Hilario Coronado MD;  [facility] CATH INVASIVE LOC[facility];  Service: Cardiovascular   • CARDIAC CATHETERIZATION N/A 2018    Procedure: Left ventriculography;  Surgeon: Hilario Coronado MD;  [facility];  Service: Cardiovascular   • CARDIAC CATHETERIZATION N/A 2018    Procedure: Stent EVARISTO coronary;  Surgeon: Hilario Coronado MD;  [facility];  Service: Cardiovascular   • CARDIAC CATHETERIZATION N/A 2018    Procedure: Coronary angiography;  Surgeon: Hilario Coronado MD;  [facility];  Service: Cardiovascular   •  SECTION  1994  "  • COLONOSCOPY N/A 10/9/2019    Procedure: COLONOSCOPY with polypectomy;  Surgeon: Dung Hutchison MD;  Location:  [facility] OR;  Service: Gastroenterology   • CORONARY STENT PLACEMENT  2018   • ENDOSCOPY N/A 7/7/2020    Procedure: ESOPHAGOGASTRODUODENOSCOPY WITH BIOPSY;  Surgeon: Facundo Helm Jr., MD;  Location:  [facility] ENDOSCOPY;  Service: General;  Laterality: N/A;  PRE- DYSPEPSIA  POST- GASTRITIS   • GASTRIC SLEEVE LAPAROSCOPIC N/A 8/12/2020    Procedure: GASTRIC SLEEVE LAPAROSCOPIC With Hiatal Hernia Repair;  Surgeon: Facundo Helm Jr., MD;  Location:  [facility] OR [facility];  Service: Bariatric;  Laterality: N/A;   • HYSTERECTOMY  2008   • KNEE SURGERY Left 2014    cyst removal             ECG 12 Lead    Date/Time: 12/9/2021 11:55 AM  Performed by: Ag Santiago III, MD  Authorized by: Ag Santiago III, MD   Comparison: compared with previous ECG   Similar to previous ECG  Rhythm: sinus rhythm  Rate: normal  Conduction: conduction normal  ST Segments: ST segments normal  T Waves: T waves normal  QRS axis: normal  Other: no other findings    Clinical impression: normal ECG               Objective:     Vitals:    12/09/21 1136   BP: 120/84   Pulse: 75   Resp: 16   SpO2: 95%   Weight: 77.1 kg (170 lb)   Height: 160 cm (63\")     General Appearance:    Alert, cooperative, in no acute distress   Head:    Normocephalic, without obvious abnormality   Eyes:            Lids and lashes normal, conjunctivae and sclerae normal, no icterus, no pallor, corneas clear   Ears:    Ears appear intact with no abnormalities noted   Throat:   No oral lesions, oral mucosa moist   Neck:   No adenopathy, supple, trachea midline, no thyromegaly, no carotid bruit, no JVD   Back:     No kyphosis present, no erythema or scars, no tenderness to palpation    Lungs:     Clear to auscultation,respirations regular, even and unlabored    Heart:    Regular rhythm and normal rate, normal S1 and S2, no murmur, no gallop, no rub, no " click   Chest Wall:    No abnormalities observed   Abdomen:     Normal bowel sounds, no masses, no organomegaly, soft        non-tender, non-distended, no guarding   Extremities:   Moves all extremities well, no edema, no cyanosis, no redness   Pulses:  Bilateral carotids brisk   Skin:  Psychiatric:   No bleeding or rash    Alert and oriented, normal mood and affect           Lab Review:             Lab Results   Component Value Date    GLUCOSE 108 (H) 08/24/2020    BUN 8 10/23/2020    CREATININE 0.7 10/23/2020    EGFRIFAFRI 93 08/24/2020    BCR 11.0 10/23/2020    K 4.7 10/23/2020    CO2 26 10/23/2020    CALCIUM 9.9 10/23/2020    ALBUMIN 4.2 10/23/2020    LABIL2 1.3 10/23/2020    AST 26 10/23/2020    ALT 18 10/23/2020             Assessment:          Diagnosis Plan   1. Coronary artery disease of native artery of native heart with stable angina pectoris ([facility])  ECG 12 Lead   2. Mixed hyperlipidemia  ECG 12 Lead   3. Presence of drug coated stent in LAD coronary artery  ECG 12 Lead   4. Precordial pain  ECG 12 Lead   5. NSTEMI (non-ST elevated myocardial infarction) ([facility])  ECG 12 Lead          Plan:       1.  CAD status post non-ST segment elevation myocardial infarction a drug-coated stent.  Doing great, no change on her EKG, continues to be active, continue current medical therapy, AST, ALT reviewed  2.  Tobacco abuse-patient stopped smoking at the time of her myocardial infarction and has not resumed.      Thank you very much for allowing us to participate in the care of this pleasant patient.  Please don't hesitate to call if I can be of assistance in any way.      Current Outpatient Medications:   •  aspirin 81 MG chewable tablet, CHEW 1 TABLET DAILY, Disp: 90 tablet, Rfl: 3  •  atorvastatin (LIPITOR) 40 MG tablet, TAKE 1 TABLET EVERY NIGHT., Disp: 90 tablet, Rfl: 3  •  cyclobenzaprine (FLEXERIL) 10 MG tablet, cyclobenzaprine 10 mg tablet, Disp: , Rfl:   •  ergocalciferol (ERGOCALCIFEROL) 1.25 MG (98226 UT)  capsule, ergocalciferol (vitamin D2) 1,250 mcg (50,000 unit) capsule, Disp: , Rfl:   •  gabapentin (NEURONTIN) 300 MG capsule, Take 1 capsule by mouth 2 (two) times a day., Disp: , Rfl:   •  HYDROcodone-acetaminophen (NORCO) 7.5-325 MG per tablet, Take 1 tablet by mouth Every 6 (Six) Hours As Needed., Disp: , Rfl:   •  metoprolol tartrate (LOPRESSOR) 25 MG tablet, TAKE 1 TABLET TWICE A DAY, Disp: 180 tablet, Rfl: 3  •  polyethylene glycol 17 g packet, , Disp: , Rfl:   •  rizatriptan MLT (MAXALT-MLT) 10 MG disintegrating tablet, Place 10 mg on the tongue 1 (One) Time As Needed for Migraine., Disp: , Rfl:

## 2022-01-11 ENCOUNTER — TELEPHONE (OUTPATIENT)
Dept: BARIATRICS/WEIGHT MGMT | Facility: CLINIC | Age: 52
End: 2022-01-11

## 2022-02-28 ENCOUNTER — OFFICE VISIT (OUTPATIENT)
Dept: ORTHOPEDIC SURGERY | Facility: CLINIC | Age: 52
End: 2022-02-28

## 2022-02-28 VITALS
SYSTOLIC BLOOD PRESSURE: 132 MMHG | HEART RATE: 70 BPM | WEIGHT: 170 LBS | BODY MASS INDEX: 30.12 KG/M2 | HEIGHT: 63 IN | DIASTOLIC BLOOD PRESSURE: 85 MMHG

## 2022-02-28 DIAGNOSIS — M17.11 OSTEOARTHRITIS OF RIGHT PATELLOFEMORAL JOINT: ICD-10-CM

## 2022-02-28 DIAGNOSIS — M25.561 CHRONIC PAIN OF RIGHT KNEE: Primary | ICD-10-CM

## 2022-02-28 DIAGNOSIS — G89.29 CHRONIC PAIN OF RIGHT KNEE: Primary | ICD-10-CM

## 2022-02-28 PROCEDURE — 73562 X-RAY EXAM OF KNEE 3: CPT | Performed by: NURSE PRACTITIONER

## 2022-02-28 PROCEDURE — 20610 DRAIN/INJ JOINT/BURSA W/O US: CPT | Performed by: NURSE PRACTITIONER

## 2022-02-28 PROCEDURE — 99214 OFFICE O/P EST MOD 30 MIN: CPT | Performed by: NURSE PRACTITIONER

## 2022-02-28 RX ADMIN — TRIAMCINOLONE ACETONIDE 80 MG: 40 INJECTION, SUSPENSION INTRA-ARTICULAR; INTRAMUSCULAR at 10:34

## 2022-02-28 RX ADMIN — LIDOCAINE HYDROCHLORIDE 8 ML: 10 INJECTION, SOLUTION EPIDURAL; INFILTRATION; INTRACAUDAL; PERINEURAL at 10:34

## 2022-03-01 RX ORDER — ASPIRIN 81 MG/1
TABLET, CHEWABLE ORAL
Qty: 90 TABLET | Refills: 3 | Status: SHIPPED | OUTPATIENT
Start: 2022-03-01

## 2022-03-01 RX ORDER — LIDOCAINE HYDROCHLORIDE 10 MG/ML
8 INJECTION, SOLUTION EPIDURAL; INFILTRATION; INTRACAUDAL; PERINEURAL
Status: COMPLETED | OUTPATIENT
Start: 2022-02-28 | End: 2022-02-28

## 2022-03-01 RX ORDER — TRIAMCINOLONE ACETONIDE 40 MG/ML
80 INJECTION, SUSPENSION INTRA-ARTICULAR; INTRAMUSCULAR
Status: COMPLETED | OUTPATIENT
Start: 2022-02-28 | End: 2022-02-28

## 2022-03-17 DIAGNOSIS — E78.2 MIXED HYPERLIPIDEMIA: Primary | ICD-10-CM

## 2022-06-13 ENCOUNTER — LAB (OUTPATIENT)
Dept: LAB | Facility: HOSPITAL | Age: 52
End: 2022-06-13

## 2022-06-13 DIAGNOSIS — E78.2 MIXED HYPERLIPIDEMIA: ICD-10-CM

## 2022-06-13 LAB
CHOLEST SERPL-MCNC: 207 MG/DL (ref 0–200)
HDLC SERPL-MCNC: 54 MG/DL (ref 40–60)
LDLC SERPL CALC-MCNC: 138 MG/DL (ref 0–100)
LDLC/HDLC SERPL: 2.52 {RATIO}
TRIGL SERPL-MCNC: 85 MG/DL (ref 0–150)
VLDLC SERPL-MCNC: 15 MG/DL (ref 5–40)

## 2022-06-13 PROCEDURE — 36415 COLL VENOUS BLD VENIPUNCTURE: CPT

## 2022-06-13 PROCEDURE — 80061 LIPID PANEL: CPT

## 2022-06-14 ENCOUNTER — TELEPHONE (OUTPATIENT)
Dept: CARDIOLOGY | Facility: CLINIC | Age: 52
End: 2022-06-14

## 2022-06-14 DIAGNOSIS — E78.2 MIXED HYPERLIPIDEMIA: Primary | ICD-10-CM

## 2022-06-14 RX ORDER — ATORVASTATIN CALCIUM 80 MG/1
TABLET, FILM COATED ORAL
Qty: 90 TABLET | Refills: 1 | Status: SHIPPED | OUTPATIENT
Start: 2022-06-14 | End: 2022-12-15

## 2022-06-14 NOTE — TELEPHONE ENCOUNTER
If her pain is positional when lying and she can make it better with different position changes, its not cardiac.  Follow-up with PCP.      I placed the orders for the cholesterol panel.  Thank you.       alcohol intoxication

## 2022-06-14 NOTE — TELEPHONE ENCOUNTER
"Called and spoke with patient. Went over results and new orders. Pt verbalized understanding.    Gissell,    Pt said she has had left shoulder pain for the last 3 weeks. It does not radiate to her arm, hand, neck or jaw. She did say it radiates to the center of her chest if she lays certain ways at night. She can adjust how she is laying and this helps the chest pain part. She said she saw her PCP about the pain. She knows she has some bursitis and her PCP gave her a steroid shot. The steroid shot did not help the pain. She is just concerned because she \"doesn't want to have another heart episode. Do you have any recommendations for her?    Also, do you want me to put the order in for the lipid panel in 3 months?    Thank you,    Lucy Bernal RN  [place] [facility]    "

## 2022-06-14 NOTE — TELEPHONE ENCOUNTER
----- Message from ROMELIA Monroy sent at 6/14/2022 10:33 AM EDT -----  LDL less than 70 recommended.  Increase atorvastatin to 80 mg daily.  New prescription sent to her local pharmacy.  Recheck lipids in 3 months.

## 2022-06-14 NOTE — TELEPHONE ENCOUNTER
Notified pt of Gissell's recommendations. She verbalized understanding.    Thank you,    Lucy Bernal RN  Triage [facility]

## 2022-06-18 ENCOUNTER — HOSPITAL ENCOUNTER (EMERGENCY)
Facility: HOSPITAL | Age: 52
Discharge: HOME | End: 2022-06-18
Attending: EMERGENCY MEDICINE | Admitting: EMERGENCY MEDICINE

## 2022-06-18 ENCOUNTER — APPOINTMENT (OUTPATIENT)
Dept: GENERAL RADIOLOGY | Facility: HOSPITAL | Age: 52
End: 2022-06-18

## 2022-06-18 VITALS
RESPIRATION RATE: 16 BRPM | SYSTOLIC BLOOD PRESSURE: 151 MMHG | WEIGHT: 173 LBS | BODY MASS INDEX: 30.65 KG/M2 | DIASTOLIC BLOOD PRESSURE: 85 MMHG | TEMPERATURE: 98.9 F | HEART RATE: 57 BPM | HEIGHT: 63 IN | OXYGEN SATURATION: 98 %

## 2022-06-18 DIAGNOSIS — R07.89 CHEST WALL PAIN: Primary | ICD-10-CM

## 2022-06-18 LAB
ALBUMIN SERPL-MCNC: 4.1 G/DL (ref 3.5–5.2)
ALBUMIN/GLOB SERPL: 1.4 G/DL
ALP SERPL-CCNC: 94 U/L (ref 39–117)
ALT SERPL W P-5'-P-CCNC: 21 U/L (ref 1–33)
ANION GAP SERPL CALCULATED.3IONS-SCNC: 11.1 MMOL/L (ref 5–15)
AST SERPL-CCNC: 18 U/L (ref 1–32)
BASOPHILS # BLD AUTO: 0.01 10*3/MM3 (ref 0–0.2)
BASOPHILS NFR BLD AUTO: 0.1 % (ref 0–1.5)
BILIRUB SERPL-MCNC: 0.2 MG/DL (ref 0–1.2)
BUN SERPL-MCNC: 11 MG/DL (ref 6–20)
BUN/CREAT SERPL: 15.1 (ref 7–25)
CALCIUM SPEC-SCNC: 9.4 MG/DL (ref 8.6–10.5)
CHLORIDE SERPL-SCNC: 105 MMOL/L (ref 98–107)
CO2 SERPL-SCNC: 21.9 MMOL/L (ref 22–29)
CREAT SERPL-MCNC: 0.73 MG/DL (ref 0.57–1)
DEPRECATED RDW RBC AUTO: 45.1 FL (ref 37–54)
EGFRCR SERPLBLD CKD-EPI 2021: 99.7 ML/MIN/1.73
EOSINOPHIL # BLD AUTO: 0.03 10*3/MM3 (ref 0–0.4)
EOSINOPHIL NFR BLD AUTO: 0.3 % (ref 0.3–6.2)
ERYTHROCYTE [DISTWIDTH] IN BLOOD BY AUTOMATED COUNT: 14.2 % (ref 12.3–15.4)
GLOBULIN UR ELPH-MCNC: 3 GM/DL
GLUCOSE SERPL-MCNC: 106 MG/DL (ref 65–99)
HCT VFR BLD AUTO: 36.5 % (ref 34–46.6)
HGB BLD-MCNC: 11.9 G/DL (ref 12–15.9)
HOLD SPECIMEN: NORMAL
HOLD SPECIMEN: NORMAL
IMM GRANULOCYTES # BLD AUTO: 0.05 10*3/MM3 (ref 0–0.05)
IMM GRANULOCYTES NFR BLD AUTO: 0.5 % (ref 0–0.5)
LYMPHOCYTES # BLD AUTO: 2.56 10*3/MM3 (ref 0.7–3.1)
LYMPHOCYTES NFR BLD AUTO: 25.8 % (ref 19.6–45.3)
MCH RBC QN AUTO: 28.4 PG (ref 26.6–33)
MCHC RBC AUTO-ENTMCNC: 32.6 G/DL (ref 31.5–35.7)
MCV RBC AUTO: 87.1 FL (ref 79–97)
MONOCYTES # BLD AUTO: 0.67 10*3/MM3 (ref 0.1–0.9)
MONOCYTES NFR BLD AUTO: 6.8 % (ref 5–12)
NEUTROPHILS NFR BLD AUTO: 6.6 10*3/MM3 (ref 1.7–7)
NEUTROPHILS NFR BLD AUTO: 66.5 % (ref 42.7–76)
NRBC BLD AUTO-RTO: 0 /100 WBC (ref 0–0.2)
PLATELET # BLD AUTO: 253 10*3/MM3 (ref 140–450)
PMV BLD AUTO: 9.8 FL (ref 6–12)
POTASSIUM SERPL-SCNC: 3.8 MMOL/L (ref 3.5–5.2)
PROT SERPL-MCNC: 7.1 G/DL (ref 6–8.5)
RBC # BLD AUTO: 4.19 10*6/MM3 (ref 3.77–5.28)
SODIUM SERPL-SCNC: 138 MMOL/L (ref 136–145)
TROPONIN T SERPL-MCNC: <0.01 NG/ML (ref 0–0.03)
TROPONIN T SERPL-MCNC: <0.01 NG/ML (ref 0–0.03)
WBC NRBC COR # BLD: 9.92 10*3/MM3 (ref 3.4–10.8)
WHOLE BLOOD HOLD COAG: NORMAL
WHOLE BLOOD HOLD SPECIMEN: NORMAL

## 2022-06-18 PROCEDURE — 99282 EMERGENCY DEPT VISIT SF MDM: CPT | Performed by: EMERGENCY MEDICINE

## 2022-06-18 PROCEDURE — 93010 ELECTROCARDIOGRAM REPORT: CPT | Performed by: INTERNAL MEDICINE

## 2022-06-18 PROCEDURE — 80053 COMPREHEN METABOLIC PANEL: CPT | Performed by: EMERGENCY MEDICINE

## 2022-06-18 PROCEDURE — 84484 ASSAY OF TROPONIN QUANT: CPT | Performed by: EMERGENCY MEDICINE

## 2022-06-18 PROCEDURE — 99283 EMERGENCY DEPT VISIT LOW MDM: CPT

## 2022-06-18 PROCEDURE — 93005 ELECTROCARDIOGRAM TRACING: CPT | Performed by: EMERGENCY MEDICINE

## 2022-06-18 PROCEDURE — 71046 X-RAY EXAM CHEST 2 VIEWS: CPT

## 2022-06-18 PROCEDURE — 85025 COMPLETE CBC W/AUTO DIFF WBC: CPT | Performed by: EMERGENCY MEDICINE

## 2022-06-18 PROCEDURE — 36415 COLL VENOUS BLD VENIPUNCTURE: CPT

## 2022-06-18 RX ORDER — SODIUM CHLORIDE 0.9 % (FLUSH) 0.9 %
10 SYRINGE (ML) INJECTION AS NEEDED
Status: DISCONTINUED | OUTPATIENT
Start: 2022-06-18 | End: 2022-06-18 | Stop reason: HOSPADM

## 2022-06-19 LAB — QT INTERVAL: 394 MS

## 2022-06-19 NOTE — ED PROVIDER NOTES
Subjective   51-year-old female presents with left-sided chest pain which radiates to her shoulder.  Pain has been present for several days.  She states it is constant but is aggravated by certain movements of her left arm.  No associated symptoms.  No relieving factors.  Patient has history of NSTEMI in 2018 which ultimately led to her having stent to her LAD placed.  She does follow with cardiology had most recent visit last year.  Stress test in 2020 was nonischemic.  Symptoms are not similar to patient's NSTEMI symptoms.  Patient is otherwise felt well.  Denies history of DVT or PE.          Review of Systems   All other systems reviewed and are negative.      Past Medical History:   Diagnosis Date   • Colon polyp     BENIGN   • Coronary artery disease involving native coronary artery of native heart without angina pectoris 1/31/2019   • DDD (degenerative disc disease), lumbosacral    • Elevated cholesterol    • Heart attack (HCC) 12/25/2018    2018 dec 25   • Hiatal hernia    • History of heart artery stent 12/16/2018    LAD   • Hyperlipidemia    • Migraine headache    • Presence of drug coated stent in LAD coronary artery 1/31/2019   • Spinal stenosis        Allergies   Allergen Reactions   • Cephalexin Anaphylaxis     Other reaction(s): Vomiting   • Nsaids Other (See Comments)     PER DR. ANDREW BRAND, CARDIOLOGIST        Past Surgical History:   Procedure Laterality Date   • CARDIAC CATHETERIZATION N/A 12/26/2018    Procedure: Left Heart Cath;  Surgeon: Hilario Coronado MD;  Location: [facility] CATH INVASIVE LOCATION;  Service: Cardiovascular   • CARDIAC CATHETERIZATION N/A 12/26/2018    Procedure: Left ventriculography;  Surgeon: Hilario Coronado MD;  Location: [facility] CATH INVASIVE LOCATION;  Service: Cardiovascular   • CARDIAC CATHETERIZATION N/A 12/26/2018    Procedure: Stent EVARISTO coronary;  Surgeon: Hilario Coronado MD;  Location: [facility] CATH INVASIVE LOCATION;  Service: Cardiovascular   •  CARDIAC CATHETERIZATION N/A 2018    Procedure: Coronary angiography;  Surgeon: Hilario Coronado MD;  Location: [facility] CATH INVASIVE LOCATION;  Service: Cardiovascular   •  SECTION     • COLONOSCOPY N/A 10/9/2019    Procedure: COLONOSCOPY with polypectomy;  Surgeon: Dung Hutchison MD;  Location: [facility] OR;  Service: Gastroenterology   • CORONARY STENT PLACEMENT     • ENDOSCOPY N/A 2020    Procedure: ESOPHAGOGASTRODUODENOSCOPY WITH BIOPSY;  Surgeon: Facundo Helm Jr., MD;  Location: [facility] ENDOSCOPY;  Service: General;  Laterality: N/A;  PRE- DYSPEPSIA  POST- GASTRITIS   • GASTRIC SLEEVE LAPAROSCOPIC N/A 2020    Procedure: GASTRIC SLEEVE LAPAROSCOPIC With Hiatal Hernia Repair;  Surgeon: Facundo Helm Jr., MD;  Location: [facility] OR [facility];  Service: Bariatric;  Laterality: N/A;   • HYSTERECTOMY     • KNEE SURGERY Left 2014    cyst removal       Family History   Problem Relation Age of Onset   • Cancer Mother    • Hypertension Mother    • Sleep apnea Mother    • Heart disease Brother    • Heart attack Brother    • Heart disease Maternal Grandmother    • Hypertension Maternal Grandmother    • Heart attack Maternal Grandmother    • Hypertension Father    • Hypertension Paternal Grandmother    • Heart disease Paternal Grandmother    • Malig Hyperthermia Neg Hx        Social History     Socioeconomic History   • Marital status:    Tobacco Use   • Smoking status: Former Smoker     Packs/day: 0.25     Years: 5.00     Pack years: 1.25     Types: Cigarettes     Quit date: 2018     Years since quitting: 3.5   • Smokeless tobacco: Never Used   • Tobacco comment: intermittent caffiene   Vaping Use   • Vaping Use: Never used   Substance and Sexual Activity   • Alcohol use: Yes     Comment: HOLIDAY DRINKER   • Drug use: No   • Sexual activity: Defer           Objective   Physical Exam  Constitutional:       General: She is not in acute distress.      Appearance: Normal appearance. She is not toxic-appearing.   HENT:      Head: Normocephalic and atraumatic.      Nose: Nose normal.      Mouth/Throat:      Mouth: Mucous membranes are moist.      Pharynx: Oropharynx is clear.   Eyes:      Extraocular Movements: Extraocular movements intact.      Pupils: Pupils are equal, round, and reactive to light.   Cardiovascular:      Rate and Rhythm: Normal rate and regular rhythm.      Pulses: Normal pulses.      Heart sounds: Normal heart sounds.   Pulmonary:      Effort: Pulmonary effort is normal. No respiratory distress.      Breath sounds: Normal breath sounds.   Abdominal:      General: There is no distension.      Palpations: Abdomen is soft.      Tenderness: There is no abdominal tenderness.   Musculoskeletal:      Comments: Pain is exacerbated by patient having to press arms against resistance.  Also has tenderness to palpation over left pectoralis tendon.  Extremities otherwise unremarkable.   Skin:     General: Skin is warm and dry.   Neurological:      General: No focal deficit present.      Mental Status: She is alert and oriented to person, place, and time. Mental status is at baseline.   Psychiatric:         Mood and Affect: Mood normal.         Behavior: Behavior normal.         Thought Content: Thought content normal.         Judgment: Judgment normal.         Procedures           ED Course  ED Course as of 06/19/22 0115   Sat Jun 18, 2022   0134 EKG obtained at 0059 shows sinus rhythm, rate 69, normal AR and QTC, no ST segment changes.  Nonspecific T wave changes. [TD]   Sun Jun 19, 2022   0111 Patient overall nontoxic-appearing.  Troponin x2 is negative here.  EKG is not concerning.  Patient had stress test not terribly long ago which was nonischemic.  Reviewed her cath from 2018 as well which showed only minimal blockages to other vessels.  Besides that, patient's pain is pretty easily reproduced and seems to be localized to her pectoralis muscle and  tendon.  She does have repetitive use and motion at her job that would easily aggravate this.  Patient has NSAID contraindication so is limited to Tylenol.  Advised primary care follow-up for reevaluation of symptoms persist after rest. [TD]      ED Course User Index  [TD] Ji Mario MD                                                 [facility]    Final diagnoses:   Chest wall pain       ED Disposition  ED Disposition     ED Disposition   Discharge    Condition   Stable    Comment   --             Sarah Bright MD  [address] [facility] [id number] [phone]    In 2 days           Medication List      No changes were made to your prescriptions during this visit.          Ji Mario MD  06/19/22 0115

## 2022-06-26 ENCOUNTER — HOSPITAL ENCOUNTER (OUTPATIENT)
Dept: GENERAL RADIOLOGY | Facility: HOSPITAL | Age: 52
Discharge: HOME | End: 2022-06-26
Admitting: NURSE PRACTITIONER

## 2022-06-26 DIAGNOSIS — R05.9 COUGH: ICD-10-CM

## 2022-06-26 DIAGNOSIS — R68.89 FLU-LIKE SYMPTOMS: ICD-10-CM

## 2022-06-26 PROCEDURE — 71046 X-RAY EXAM CHEST 2 VIEWS: CPT

## 2022-09-12 ENCOUNTER — TELEPHONE (OUTPATIENT)
Dept: CARDIOLOGY | Facility: CLINIC | Age: 52
End: 2022-09-12

## 2022-09-12 NOTE — TELEPHONE ENCOUNTER
Notified patient of recommendations. Patient verbalized understanding.    Tiffanie Montez RN  Triage [facility]

## 2022-09-12 NOTE — TELEPHONE ENCOUNTER
In June, I increased atorvastatin to 80 mg daily. Please call patient and ask him to have repeat blood work done at main lab (lipid panel, AST, ALT). Orders have been placed. Thanks.

## 2022-09-12 NOTE — TELEPHONE ENCOUNTER
Attempted to call patient, no answer.  Left a voicemail for patient to call back.  Will continue to try to reach patient.    Lianne Quiñonez RN  [facility] Triage  09/12/22 08:49 EDT

## 2022-12-02 ENCOUNTER — OFFICE VISIT (OUTPATIENT)
Dept: ORTHOPEDIC SURGERY | Facility: CLINIC | Age: 52
End: 2022-12-02

## 2022-12-02 VITALS — HEIGHT: 63 IN | WEIGHT: 158 LBS | BODY MASS INDEX: 28 KG/M2

## 2022-12-02 DIAGNOSIS — R20.2 PARESTHESIA OF HAND, BILATERAL: ICD-10-CM

## 2022-12-02 DIAGNOSIS — M19.019 AC JOINT ARTHROPATHY: ICD-10-CM

## 2022-12-02 DIAGNOSIS — M75.52 SUBACROMIAL BURSITIS OF LEFT SHOULDER JOINT: ICD-10-CM

## 2022-12-02 DIAGNOSIS — M25.512 LEFT SHOULDER PAIN, UNSPECIFIED CHRONICITY: Primary | ICD-10-CM

## 2022-12-02 PROCEDURE — 73030 X-RAY EXAM OF SHOULDER: CPT | Performed by: NURSE PRACTITIONER

## 2022-12-02 PROCEDURE — 99214 OFFICE O/P EST MOD 30 MIN: CPT | Performed by: NURSE PRACTITIONER

## 2022-12-02 RX ORDER — PANTOPRAZOLE SODIUM 40 MG/1
40 TABLET, DELAYED RELEASE ORAL DAILY
COMMUNITY
Start: 2022-09-30 | End: 2023-02-01

## 2022-12-02 RX ORDER — PREDNISONE 10 MG/1
TABLET ORAL
Qty: 39 TABLET | Refills: 0 | Status: SHIPPED | OUTPATIENT
Start: 2022-12-02 | End: 2023-02-01

## 2022-12-02 RX ORDER — HYDROXYZINE HYDROCHLORIDE 25 MG/1
TABLET, FILM COATED ORAL
COMMUNITY
Start: 2022-09-30

## 2022-12-02 RX ORDER — CYCLOBENZAPRINE HCL 10 MG
10 TABLET ORAL NIGHTLY PRN
Qty: 45 TABLET | Refills: 0 | Status: SHIPPED | OUTPATIENT
Start: 2022-12-02 | End: 2023-02-06 | Stop reason: ALTCHOICE

## 2022-12-02 NOTE — PROGRESS NOTES
Subjective:     Patient ID: Ortiz Salamanca is a 52 y.o. female.    Chief Complaint:  Left shoulder pain, new issue to examiner   History of Present Illness  Ortiz Salamanca Presents to clinic for evaluation of her left shoulder.  This is new issue to examiner began experiencing pain off and on for the last few months getting worse.  Localizes pain to the anterior aspect of the left shoulder, lateral aspect of the shoulder reports pain is throbbing burning sensation.  Denies known injury increased pain noted when reaching above head and reaching across her body.  She is experiencing numbness and tingling radiating down the left upper extremity to the palmar aspect of the hand.  She also reports numbness and tingling at night when she is attempting to sleep and does wake up having to shake bilateral upper extremities out.  Paresthesia present along the palmar aspect from the wrist to the middle finger bilaterally but also present the index and thumb.  Denies any prior bracing denies any prior steroid injections.  Unable to tolerate oral NSAIDs per cardiologist recommendations.  Denies any other concerns present.      Social History     Occupational History   • Not on file   Tobacco Use   • Smoking status: Former     Packs/day: 0.25     Years: 5.00     Pack years: 1.25     Types: Cigarettes     Quit date: 12/11/2018     Years since quitting: 3.9   • Smokeless tobacco: Never   • Tobacco comments:     intermittent caffiene   Vaping Use   • Vaping Use: Never used   Substance and Sexual Activity   • Alcohol use: Yes     Comment: HOLIDAY DRINKER   • Drug use: No   • Sexual activity: Defer      Past Medical History:   Diagnosis Date   • Colon polyp     BENIGN   • Coronary artery disease involving native coronary artery of native heart without angina pectoris 1/31/2019   • DDD (degenerative disc disease), lumbosacral    • Elevated cholesterol    • Heart attack (HCC) 12/25/2018    2018 dec 25   • Hiatal hernia    • History of  heart artery stent 2018    LAD   • Hyperlipidemia    • Migraine headache    • Presence of drug coated stent in LAD coronary artery 2019   • Spinal stenosis      Past Surgical History:   Procedure Laterality Date   • CARDIAC CATHETERIZATION N/A 2018    Procedure: Left Heart Cath;  Surgeon: Hilario Coronado MD;  Location: [facility] CATH INVASIVE LOCATION;  Service: Cardiovascular   • CARDIAC CATHETERIZATION N/A 2018    Procedure: Left ventriculography;  Surgeon: Hilario Coronado MD;  Location: [facility] CATH INVASIVE LOCATION;  Service: Cardiovascular   • CARDIAC CATHETERIZATION N/A 2018    Procedure: Stent EVARISTO coronary;  Surgeon: Hilario Coronado MD;  Location: [facility] CATH INVASIVE LOCATION;  Service: Cardiovascular   • CARDIAC CATHETERIZATION N/A 2018    Procedure: Coronary angiography;  Surgeon: Hilario Coronado MD;  Location: [facility] CATH INVASIVE LOCATION;  Service: Cardiovascular   •  SECTION     • COLONOSCOPY N/A 10/09/2019    Procedure: COLONOSCOPY with polypectomy;  Surgeon: Dung Hutchison MD;  Location: [facility];  Service: Gastroenterology   • CORONARY STENT PLACEMENT     • ENDOSCOPY N/A 2020    Procedure: ESOPHAGOGASTRODUODENOSCOPY WITH BIOPSY;  Surgeon: Facundo Helm Jr., MD;  Location: [facility] ENDOSCOPY;  Service: General;  Laterality: N/A;  PRE- DYSPEPSIA  POST- GASTRITIS   • GASTRIC SLEEVE LAPAROSCOPIC N/A 2020    Procedure: GASTRIC SLEEVE LAPAROSCOPIC With Hiatal Hernia Repair;  Surgeon: Facundo Helm Jr., MD;  Location: [facility];  Service: Bariatric;  Laterality: N/A;   • HYSTERECTOMY     • KNEE SURGERY Left 2014    cyst removal   • LUMBAR FUSION  2021       Family History   Problem Relation Age of Onset   • Cancer Mother    • Hypertension Mother    • Sleep apnea Mother    • Heart disease Brother    • Heart attack Brother    • Heart disease Maternal Grandmother    • Hypertension  "Maternal Grandmother    • Heart attack Maternal Grandmother    • Hypertension Father    • Hypertension Paternal Grandmother    • Heart disease Paternal Grandmother    • Malig Hyperthermia Neg Hx                Objective:  Physical Exam    Vital signs reviewed.   General: No acute distress.  Eyes: conjunctiva clear; pupils equally round and reactive  ENT: external ears and nose atraumatic; oropharynx clear  CV: no peripheral edema  Resp: normal respiratory effort  Skin: no rashes or wounds; normal turgor  Psych: mood and affect appropriate; recent and remote memory intact    Vitals:    12/02/22 1425   Weight: 71.7 kg (158 lb)   Height: 160 cm (63\")         12/02/22  1425   Weight: 71.7 kg (158 lb)     Body mass index is 27.99 kg/m².      Right Hand Exam     Tenderness   The patient is experiencing tenderness in the palmar area.    Muscle Strength   : 4/5     Tests   Tinel's sign (median nerve): positive    Other   Erythema: absent  Pulse: present    Comments:  Positive durkan's exam      Left Hand Exam     Tenderness   The patient is experiencing tenderness in the palmar area.     Muscle Strength   :  4/5     Tests   Tinel's sign (median nerve): positive    Other   Erythema: absent  Pulse: present    Comments:  Positive durkan's exam      Right Elbow Exam     Tests   Tinel's sign (cubital tunnel): positive      Left Elbow Exam     Tests   Tinel's sign (cubital tunnel): positive      Left Shoulder Exam     Tenderness   The patient is experiencing tenderness in the acromion and biceps tendon.    Range of Motion   External rotation: 70   Forward flexion: 160   Internal rotation 0 degrees: L5     Muscle Strength   Internal rotation: 4/5   External rotation: 4/5   Subscapularis: 4/5   Biceps: 4/5     Tests   Williamson test: positive  Cross arm: positive  Impingement: positive  Drop arm: negative    Other   Erythema: absent  Sensation: normal  Pulse: present     Comments:  Positive empty can  negative " Winston's  negative Speed's  positive bear hug exam  Supraspinatus strength 4-/5                 Imaging:  Left Shoulder X-Ray  Indication: Pain  AP Internal and External Rotation views    Findings:  No fracture  No bony lesion  Normal soft tissues   AC joint arthropathy     No prior studies were available for comparison.    Assessment:        1. Left shoulder pain, unspecified chronicity    2. Subacromial bursitis of left shoulder joint    3. AC joint arthropathy    4. Paresthesia of hand, bilateral           Plan:  1.  Discussed plan of care with patient.  Treatment of paresthesia will include soft wrist immobilizers to be worn at night only so she has a range of motion bilateral wrist during the day.  Follow-up in 6 weeks to reevaluate.  May consider EMG and CT in the future.  2.  Left shoulder discussed treatment options including MRI versus home strengthening exercises and oral steroid taper as she is unable to tolerate oral NSAIDs.  We will start prednisone taper as well as cyclobenzaprine.  We will plan to see her back in clinic in 6 weeks to reevaluate bilateral wrist and left shoulder.  All questions answered.Orders:  Orders Placed This Encounter   Procedures   • XR Shoulder 2+ View Left     New Medications Ordered This Visit   Medications   • predniSONE (DELTASONE) 10 MG tablet     Si mg daily x 3 days, 40 mg daily x 3 days, 20 mg daily x 3 days, 10 mg daily x 3 days     Dispense:  39 tablet     Refill:  0   • cyclobenzaprine (FLEXERIL) 10 MG tablet     Sig: Take 1 tablet by mouth At Night As Needed for Muscle Spasms.     Dispense:  45 tablet     Refill:  0           I ordered and reviewed the MEL today.     Dragon dictation utilized.

## 2022-12-02 NOTE — TELEPHONE ENCOUNTER
Patients insurance requires 90 day prescriptions after initial fill.     Please advise, thank you!   02-Dec-2022 23:34

## 2022-12-02 NOTE — PROGRESS NOTES
Presents to clinic for evaluation of her left shoulder. This is new issue to examiner began experiencing pain off and on for the last few months getting worse. Localizes pain to the anterior aspect of the left shoulder, lateral aspect of the shoulder reports pain is throbbing burning sensation. Denies known injury increased pain noted when reaching above head and reaching across her body. She is experiencing numbness and tingling radiating down the left upper extremity to the palmar aspect of the hand. She also reports numbness and tingling at night when she is attempting to sleep and does wake up having to shake bilateral upper extremities out. Paresthesia present along the palmar aspect from the wrist to the middle finger bilaterally but also present the index and thumb. Denies any prior bracing denies any prior steroid injections. Unable to tolerate oral NSAIDs per cardiologist recommendations. Denies any other concerns present.    Plan:  1. Discussed plan of care with patient. Treatment of paresthesia will include soft wrist immobilizers to be worn at night only so she has a range of motion bilateral wrist during the day. Follow-up in 6 weeks to reevaluate. May consider EMG and CT in the future.  2. Left shoulder discussed treatment options including MRI versus home strengthening exercises and oral steroid taper as she is unable to tolerate oral NSAIDs. We will start prednisone taper as well as cyclobenzaprine. We will plan to see her back in clinic in 6 weeks to reevaluate bilateral wrist and left shoulder. All questions answered.

## 2022-12-15 RX ORDER — ATORVASTATIN CALCIUM 80 MG/1
TABLET, FILM COATED ORAL
Qty: 90 TABLET | Refills: 1 | Status: SHIPPED | OUTPATIENT
Start: 2022-12-15

## 2022-12-27 ENCOUNTER — TELEPHONE (OUTPATIENT)
Dept: CARDIOLOGY | Facility: CLINIC | Age: 52
End: 2022-12-27

## 2022-12-27 DIAGNOSIS — I25.118 CORONARY ARTERY DISEASE OF NATIVE ARTERY OF NATIVE HEART WITH STABLE ANGINA PECTORIS: Primary | Chronic | ICD-10-CM

## 2022-12-28 NOTE — TELEPHONE ENCOUNTER
Atorvastatin just refilled by MA. She needs to have a lipid panel, AST, ALT blood work at main lab at hospital. She can have it done soon or on the day she sees Dr. Santiago in January. New orders have been placed. Please call her. Thanks.

## 2023-02-01 ENCOUNTER — OFFICE VISIT (OUTPATIENT)
Dept: CARDIOLOGY | Facility: CLINIC | Age: 53
End: 2023-02-01
Payer: COMMERCIAL

## 2023-02-01 ENCOUNTER — LAB (OUTPATIENT)
Dept: LAB | Facility: HOSPITAL | Age: 53
End: 2023-02-01
Payer: COMMERCIAL

## 2023-02-01 VITALS
HEIGHT: 63 IN | BODY MASS INDEX: 28.69 KG/M2 | DIASTOLIC BLOOD PRESSURE: 60 MMHG | HEART RATE: 76 BPM | SYSTOLIC BLOOD PRESSURE: 106 MMHG | WEIGHT: 161.9 LBS

## 2023-02-01 DIAGNOSIS — I25.118 CORONARY ARTERY DISEASE OF NATIVE ARTERY OF NATIVE HEART WITH STABLE ANGINA PECTORIS: Primary | Chronic | ICD-10-CM

## 2023-02-01 DIAGNOSIS — Z95.5 PRESENCE OF DRUG COATED STENT IN LAD CORONARY ARTERY: Chronic | ICD-10-CM

## 2023-02-01 DIAGNOSIS — I25.118 CORONARY ARTERY DISEASE OF NATIVE ARTERY OF NATIVE HEART WITH STABLE ANGINA PECTORIS: Chronic | ICD-10-CM

## 2023-02-01 DIAGNOSIS — E78.2 MIXED HYPERLIPIDEMIA: Chronic | ICD-10-CM

## 2023-02-01 LAB
ALBUMIN SERPL-MCNC: 4.2 G/DL (ref 3.5–5.2)
ALBUMIN/GLOB SERPL: 1.6 G/DL
ALP SERPL-CCNC: 101 U/L (ref 39–117)
ALT SERPL W P-5'-P-CCNC: 38 U/L (ref 1–33)
ANION GAP SERPL CALCULATED.3IONS-SCNC: 8.4 MMOL/L (ref 5–15)
AST SERPL-CCNC: 38 U/L (ref 1–32)
BILIRUB SERPL-MCNC: 0.3 MG/DL (ref 0–1.2)
BUN SERPL-MCNC: 9 MG/DL (ref 6–20)
BUN/CREAT SERPL: 10.7 (ref 7–25)
CALCIUM SPEC-SCNC: 9.9 MG/DL (ref 8.6–10.5)
CHLORIDE SERPL-SCNC: 104 MMOL/L (ref 98–107)
CHOLEST SERPL-MCNC: 198 MG/DL (ref 0–200)
CO2 SERPL-SCNC: 27.6 MMOL/L (ref 22–29)
CREAT SERPL-MCNC: 0.84 MG/DL (ref 0.57–1)
EGFRCR SERPLBLD CKD-EPI 2021: 83.7 ML/MIN/1.73
GLOBULIN UR ELPH-MCNC: 2.7 GM/DL
GLUCOSE SERPL-MCNC: 123 MG/DL (ref 65–99)
HDLC SERPL-MCNC: 73 MG/DL (ref 40–60)
LDLC SERPL CALC-MCNC: 114 MG/DL (ref 0–100)
LDLC/HDLC SERPL: 1.54 {RATIO}
POTASSIUM SERPL-SCNC: 4.1 MMOL/L (ref 3.5–5.2)
PROT SERPL-MCNC: 6.9 G/DL (ref 6–8.5)
SODIUM SERPL-SCNC: 140 MMOL/L (ref 136–145)
TRIGL SERPL-MCNC: 62 MG/DL (ref 0–150)
VLDLC SERPL-MCNC: 11 MG/DL (ref 5–40)

## 2023-02-01 PROCEDURE — 80061 LIPID PANEL: CPT

## 2023-02-01 PROCEDURE — 99214 OFFICE O/P EST MOD 30 MIN: CPT | Performed by: INTERNAL MEDICINE

## 2023-02-01 PROCEDURE — 80053 COMPREHEN METABOLIC PANEL: CPT

## 2023-02-01 PROCEDURE — 36415 COLL VENOUS BLD VENIPUNCTURE: CPT

## 2023-02-01 PROCEDURE — 93000 ELECTROCARDIOGRAM COMPLETE: CPT | Performed by: INTERNAL MEDICINE

## 2023-02-01 NOTE — PROGRESS NOTES
Subjective:     Encounter Date:23        Patient ID:  JACQUES Salamanca is a 52 y.o. female.    Chief Complaint: CAD  Coronary Artery Disease  Pertinent negatives include no muscle weakness.       Dear Dr. Chandler,    She has a history of CAD and prior drug-eluting stent.    She comes in today for 1 year follow-up.      She denies any chest pain, pressure, tightness, squeezing, or heartburn.  She has not experienced any feeling of palpitations, tachycardia or heart racing and no presyncope or syncope.  There has not been any problems with dizziness or lightheadedness.  There has not been any orthopnea or PND, and no problems with lower extremity edema.  She denies any shortness of breath at rest or with activity and has not had any wheezing.  She has continued to perform daily activities of living without any specific problem or change in the level of activity.     She presented to the ER in [place] on 18 with chest pain and mild SOA. She had the chest pain for 2 days and it was increasing. At times it was worse when lying down and at other times it would radiate into her left arm. It was not worse with cough or deep breathing.  She stated the pain was not sharp and there was no nausea or vomiting. She thought it was indigestion for the first day or so and took Tums and Nexium which did not help.  Her ECG was unremarkable but her initial troponin was indeterminate at 0.040.  She has a strong family history of CAD.  One of her brothers  from a MI in his 40s and another brother has stents.  She has a history of hyperlipidemia and was a daily smoker.  It was felt she needed to be admitted and cardiology was consulted.       She had an echocardiogram that showed normal biventricular size and function, no wall motion abnormalities, and no pericardial effusion.  Her serial troponin continued to increase to 0.046 and 0.072.  She was then transferred to [facility] for a cardiac catheterization.  She  had a 99% stenosis of the mid LAD which was treated with angioplasty and EVARISTO 3.5 x 28MM Xience Divina.  She was discharged home on DAPT.   [facility] with complaints of left sided heart pain that started the previous night around 11 pm.  She did not take anything for the pain.  Her ECG was essentially unchanged from her last ECG on 12/27/18 at discharge. Her troponin was 0.079 which had decreased from 0.083 on previous admission.  She was given the option to be discharged home and to follow-up in the office this coming week or to stay and have repeat cardiac catheterization.  She initially chose to be transferred to [facility] for elective cardiac catheterization.       The following portions of the patient's history were reviewed and updated as appropriate: allergies, current medications, past family history, past medical history, past social history, past surgical history and problem list.    Past Medical History:   Diagnosis Date   • Colon polyp     BENIGN   • Coronary artery disease involving native coronary artery of native heart without angina pectoris 1/31/2019   • DDD (degenerative disc disease), lumbosacral    • Elevated cholesterol    • Heart attack (HCC) 12/25/2018    2018 dec 25   • Hiatal hernia    • History of heart artery stent 12/16/2018    LAD   • Hyperlipidemia    • Migraine headache    • Presence of drug coated stent in LAD coronary artery 1/31/2019   • Spinal stenosis        Past Surgical History:   Procedure Laterality Date   • CARDIAC CATHETERIZATION N/A 12/26/2018    Procedure: Left Heart Cath;  Surgeon: Hilario Coronado MD;  Location:  [facility] CATH INVASIVE LOCATION;  Service: Cardiovascular   • CARDIAC CATHETERIZATION N/A 12/26/2018    Procedure: Left ventriculography;  Surgeon: Hilario Coronado MD;  Location:  [facility] CATH INVASIVE LOCATION;  Service: Cardiovascular   • CARDIAC CATHETERIZATION N/A 12/26/2018    Procedure: Stent  "EVARISTO coronary;  Surgeon: Hilario Coronado MD;  [facility];  Service: Cardiovascular   • CARDIAC CATHETERIZATION N/A 2018    Procedure: Coronary angiography;  Surgeon: Hilario Coronado MD;  [facility];  Service: Cardiovascular   •  SECTION     • COLONOSCOPY N/A 10/09/2019    Procedure: COLONOSCOPY with polypectomy;  Surgeon: Dung Hutchison MD;  Location: [facility] OR;  Service: Gastroenterology   • CORONARY STENT PLACEMENT     • ENDOSCOPY N/A 2020    Procedure: ESOPHAGOGASTRODUODENOSCOPY WITH BIOPSY;  Surgeon: Facundo Helm Jr., MD;  [facility];  Service: General;  Laterality: N/A;  PRE- DYSPEPSIA  POST- GASTRITIS   • GASTRIC SLEEVE LAPAROSCOPIC N/A 2020    Procedure: GASTRIC SLEEVE LAPAROSCOPIC With Hiatal Hernia Repair;  Surgeon: Facundo Helm Jr., MD;  [facility];  Service: Bariatric;  Laterality: N/A;   • HYSTERECTOMY     • KNEE SURGERY Left 2014    cyst removal   • LUMBAR FUSION  2021             ECG 12 Lead    Date/Time: 2023 1:06 PM  Performed by: Ag Santiago III, MD  Authorized by: Ag Santiago III, MD   Comparison: compared with previous ECG   Similar to previous ECG  Rhythm: sinus rhythm  Rate: normal  Conduction: conduction normal  ST Segments: ST segments normal  T Waves: T waves normal  QRS axis: normal  Other findings: non-specific ST-T wave changes    Clinical impression: normal ECG               Objective:     Vitals:    23 1255   BP: 106/60   BP Location: Right arm   Pulse: 76   Weight: 73.4 kg (161 lb 14.4 oz)   Height: 160 cm (63\")     General Appearance:    Alert, cooperative, in no acute distress   Head:    Normocephalic, without obvious abnormality   Eyes:            Lids and lashes normal, conjunctivae and sclerae normal, no icterus, no pallor, corneas clear   Ears:    Ears appear intact with no abnormalities noted "   Throat:   No oral lesions, oral mucosa moist   Neck:   No adenopathy, supple, trachea midline, no thyromegaly, no carotid bruit, no JVD   Back:     No kyphosis present, no erythema or scars, no tenderness to palpation    Lungs:     Clear to auscultation,respirations regular, even and unlabored    Heart:    Regular rhythm and normal rate, normal S1 and S2, no murmur, no gallop, no rub, no click   Chest Wall:    No abnormalities observed   Abdomen:     Normal bowel sounds, no masses, no organomegaly, soft        non-tender, non-distended, no guarding   Extremities:   Moves all extremities well, no edema, no cyanosis, no redness   Pulses:  Bilateral carotids brisk   Skin:  Psychiatric:   No bleeding or rash    Alert and oriented, normal mood and affect           Lab Review:             Lab Results   Component Value Date    GLUCOSE 106 (H) 06/18/2022    BUN 11 06/18/2022    CREATININE 0.73 06/18/2022    EGFRIFAFRI 93 08/24/2020    BCR 15.1 06/18/2022    K 3.8 06/18/2022    CO2 21.9 (L) 06/18/2022    CALCIUM 9.4 06/18/2022    ALBUMIN 4.10 06/18/2022    LABIL2 1.3 10/23/2020    AST 18 06/18/2022    ALT 21 06/18/2022             Assessment:          Diagnosis Plan   1. Coronary artery disease of native artery of native heart with stable angina pectoris (HCC)  ECG 12 Lead    Comprehensive Metabolic Panel    Lipid Panel      2. Presence of drug coated stent in LAD coronary artery  ECG 12 Lead    Comprehensive Metabolic Panel    Lipid Panel      3. Mixed hyperlipidemia  ECG 12 Lead    Comprehensive Metabolic Panel    Lipid Panel             Plan:       1.  CAD status post non-ST segment elevation myocardial infarction a drug-coated stent.  Doing great, no change on her EKG, continues to be active, continue current medical therapy, AST, ALT reviewed  2.  Tobacco abuse-patient stopped smoking at the time of her myocardial infarction and has not resumed.  3.  HLD- check labs today      Thank you very much for allowing us to  participate in the care of this pleasant patient.  Please don't hesitate to call if I can be of assistance in any way.      Current Outpatient Medications:   •  aspirin 81 MG chewable tablet, CHEW 1 TABLET DAILY, Disp: 90 tablet, Rfl: 3  •  atorvastatin (LIPITOR) 80 MG tablet, TAKE ONE TABLET BY MOUTH ONCE NIGHTLY, Disp: 90 tablet, Rfl: 1  •  cyclobenzaprine (FLEXERIL) 10 MG tablet, Take 1 tablet by mouth At Night As Needed for Muscle Spasms., Disp: 45 tablet, Rfl: 0  •  ergocalciferol (ERGOCALCIFEROL) 1.25 MG (16159 UT) capsule, ergocalciferol (vitamin D2) 1,250 mcg (50,000 unit) capsule, Disp: , Rfl:   •  gabapentin (NEURONTIN) 100 MG capsule, 1 capsule Daily., Disp: , Rfl:   •  gabapentin (NEURONTIN) 300 MG capsule, Take 1 capsule by mouth 2 (two) times a day., Disp: , Rfl:   •  HYDROcodone-acetaminophen (NORCO) 7.5-325 MG per tablet, Take 1 tablet by mouth Every 6 (Six) Hours As Needed., Disp: , Rfl:   •  hydrOXYzine (ATARAX) 25 MG tablet, hydroxyzine HCl 25 mg tablet, Disp: , Rfl:   •  Loratadine 10 MG capsule, Take  by mouth., Disp: , Rfl:   •  metoprolol tartrate (LOPRESSOR) 25 MG tablet, TAKE 1 TABLET TWICE A DAY, Disp: 180 tablet, Rfl: 3  •  rizatriptan MLT (MAXALT-MLT) 10 MG disintegrating tablet, Place 10 mg on the tongue 1 (One) Time As Needed for Migraine., Disp: , Rfl:

## 2023-02-06 ENCOUNTER — TELEPHONE (OUTPATIENT)
Dept: ORTHOPEDIC SURGERY | Facility: CLINIC | Age: 53
End: 2023-02-06
Payer: COMMERCIAL

## 2023-02-06 RX ORDER — TIZANIDINE 2 MG/1
TABLET ORAL
COMMUNITY
Start: 2023-01-27

## 2023-02-06 NOTE — TELEPHONE ENCOUNTER
"Caller: Matthew Salamancaay JACQUES    Relationship: Self    Best call back number: [phone]     Requested Prescriptions:   PREDNISONE (DELTASONE) 10 MG     Pharmacy where request should be sent: [facility] # [id number] PH: [phone]    Does the patient have less than a 3 day supply:  [x] Yes - PATIENT RAN OUT AROUND DEC 2022 (\"OVER A MONTH AGO\")     PATIENT SCHEDULED FOR FOLLOW UP w/MANSI HINTON WED 02-15-23 @ 1500 (PATIENT UNAVAILABLE TUES 02-07-23, OUT OF TOWN FRI 02/10 & MON 02-13-22, AND NEEDED LATEST APPT POSSIBLE)     Would you like a call back once the refill request has been completed: [x] Yes     If the office needs to give you a call back, can they leave a voicemail: [x] Yes     THANKS   "

## 2023-02-06 NOTE — TELEPHONE ENCOUNTER
Please see message below and advise- medication list updated (Flexeril stopped due to drowsiness on 01.27.2023.    Last plan of care 12.02.2022  Plan:  1.  Discussed plan of care with patient.  Treatment of paresthesia will include soft wrist immobilizers to be worn at night only so she has a range of motion bilateral wrist during the day.  Follow-up in 6 weeks to reevaluate.  May consider EMG and CT in the future.  2.  Left shoulder discussed treatment options including MRI versus home strengthening exercises and oral steroid taper as she is unable to tolerate oral NSAIDs.  We will start prednisone taper as well as cyclobenzaprine.  We will plan to see her back in clinic in 6 weeks to reevaluate bilateral wrist and left shoulder.  All questions answered    DX:  1. Left shoulder pain, unspecified chronicity    2. Subacromial bursitis of left shoulder joint    3. AC joint arthropathy    4. Paresthesia of hand, bilateral

## 2023-02-07 RX ORDER — PREDNISONE 10 MG/1
TABLET ORAL
Qty: 39 TABLET | Refills: 0 | Status: SHIPPED | OUTPATIENT
Start: 2023-02-07

## 2023-05-24 ENCOUNTER — APPOINTMENT (OUTPATIENT)
Dept: CT IMAGING | Facility: HOSPITAL | Age: 53
End: 2023-05-24
Payer: COMMERCIAL

## 2023-05-24 ENCOUNTER — HOSPITAL ENCOUNTER (OUTPATIENT)
Facility: HOSPITAL | Age: 53
Setting detail: OBSERVATION
Discharge: HOME | End: 2023-05-26
Attending: EMERGENCY MEDICINE | Admitting: INTERNAL MEDICINE
Payer: COMMERCIAL

## 2023-05-24 ENCOUNTER — APPOINTMENT (OUTPATIENT)
Dept: GENERAL RADIOLOGY | Facility: HOSPITAL | Age: 53
End: 2023-05-24
Payer: COMMERCIAL

## 2023-05-24 DIAGNOSIS — R07.9 CHEST PAIN, UNSPECIFIED TYPE: Primary | ICD-10-CM

## 2023-05-24 DIAGNOSIS — R20.2 PARESTHESIA OF HAND, BILATERAL: ICD-10-CM

## 2023-05-24 DIAGNOSIS — R53.1 LEFT-SIDED WEAKNESS: ICD-10-CM

## 2023-05-24 LAB
ALBUMIN SERPL-MCNC: 3.9 G/DL (ref 3.5–5.2)
ALBUMIN/GLOB SERPL: 1.4 G/DL
ALP SERPL-CCNC: 113 U/L (ref 39–117)
ALT SERPL W P-5'-P-CCNC: 34 U/L (ref 1–33)
ANION GAP SERPL CALCULATED.3IONS-SCNC: 7.4 MMOL/L (ref 5–15)
APTT PPP: 26.1 SECONDS (ref 24.3–38.1)
AST SERPL-CCNC: 26 U/L (ref 1–32)
BASOPHILS # BLD AUTO: 0.03 10*3/MM3 (ref 0–0.2)
BASOPHILS NFR BLD AUTO: 0.5 % (ref 0–1.5)
BILIRUB SERPL-MCNC: 0.2 MG/DL (ref 0–1.2)
BUN SERPL-MCNC: 6 MG/DL (ref 6–20)
BUN/CREAT SERPL: 7.9 (ref 7–25)
CALCIUM SPEC-SCNC: 9.1 MG/DL (ref 8.6–10.5)
CHLORIDE SERPL-SCNC: 108 MMOL/L (ref 98–107)
CO2 SERPL-SCNC: 25.6 MMOL/L (ref 22–29)
CREAT SERPL-MCNC: 0.76 MG/DL (ref 0.57–1)
D DIMER PPP FEU-MCNC: 0.52 MCGFEU/ML (ref 0–0.52)
DEPRECATED RDW RBC AUTO: 46.7 FL (ref 37–54)
EGFRCR SERPLBLD CKD-EPI 2021: 94.4 ML/MIN/1.73
EOSINOPHIL # BLD AUTO: 0.09 10*3/MM3 (ref 0–0.4)
EOSINOPHIL NFR BLD AUTO: 1.4 % (ref 0.3–6.2)
ERYTHROCYTE [DISTWIDTH] IN BLOOD BY AUTOMATED COUNT: 14.9 % (ref 12.3–15.4)
GEN 5 2HR TROPONIN T REFLEX: <6 NG/L
GLOBULIN UR ELPH-MCNC: 2.8 GM/DL
GLUCOSE BLDC GLUCOMTR-MCNC: 102 MG/DL (ref 70–130)
GLUCOSE BLDC GLUCOMTR-MCNC: 112 MG/DL (ref 70–130)
GLUCOSE SERPL-MCNC: 106 MG/DL (ref 65–99)
HCT VFR BLD AUTO: 37.1 % (ref 34–46.6)
HGB BLD-MCNC: 12.1 G/DL (ref 12–15.9)
HOLD SPECIMEN: NORMAL
IMM GRANULOCYTES # BLD AUTO: 0.01 10*3/MM3 (ref 0–0.05)
IMM GRANULOCYTES NFR BLD AUTO: 0.2 % (ref 0–0.5)
INR PPP: 0.93 (ref 0.9–1.1)
LYMPHOCYTES # BLD AUTO: 1.92 10*3/MM3 (ref 0.7–3.1)
LYMPHOCYTES NFR BLD AUTO: 30.8 % (ref 19.6–45.3)
MCH RBC QN AUTO: 28.5 PG (ref 26.6–33)
MCHC RBC AUTO-ENTMCNC: 32.6 G/DL (ref 31.5–35.7)
MCV RBC AUTO: 87.5 FL (ref 79–97)
MONOCYTES # BLD AUTO: 0.89 10*3/MM3 (ref 0.1–0.9)
MONOCYTES NFR BLD AUTO: 14.3 % (ref 5–12)
NEUTROPHILS NFR BLD AUTO: 3.29 10*3/MM3 (ref 1.7–7)
NEUTROPHILS NFR BLD AUTO: 52.8 % (ref 42.7–76)
NRBC BLD AUTO-RTO: 0 /100 WBC (ref 0–0.2)
NT-PROBNP SERPL-MCNC: 139.3 PG/ML (ref 0–900)
PLATELET # BLD AUTO: 258 10*3/MM3 (ref 140–450)
PMV BLD AUTO: 9.7 FL (ref 6–12)
POTASSIUM SERPL-SCNC: 4.1 MMOL/L (ref 3.5–5.2)
PROT SERPL-MCNC: 6.7 G/DL (ref 6–8.5)
PROTHROMBIN TIME: 12.5 SECONDS (ref 12.1–15)
QT INTERVAL: 392 MS
RBC # BLD AUTO: 4.24 10*6/MM3 (ref 3.77–5.28)
SODIUM SERPL-SCNC: 141 MMOL/L (ref 136–145)
TROPONIN T DELTA: NORMAL
TROPONIN T SERPL HS-MCNC: <6 NG/L
TROPONIN T SERPL HS-MCNC: <6 NG/L
WBC NRBC COR # BLD: 6.23 10*3/MM3 (ref 3.4–10.8)

## 2023-05-24 PROCEDURE — 93005 ELECTROCARDIOGRAM TRACING: CPT

## 2023-05-24 PROCEDURE — 70496 CT ANGIOGRAPHY HEAD: CPT

## 2023-05-24 PROCEDURE — G0378 HOSPITAL OBSERVATION PER HR: HCPCS

## 2023-05-24 PROCEDURE — 0042T HC CT CEREBRAL PERFUSION W/WO CONTRAST: CPT

## 2023-05-24 PROCEDURE — 96374 THER/PROPH/DIAG INJ IV PUSH: CPT

## 2023-05-24 PROCEDURE — 82948 REAGENT STRIP/BLOOD GLUCOSE: CPT

## 2023-05-24 PROCEDURE — 85730 THROMBOPLASTIN TIME PARTIAL: CPT | Performed by: EMERGENCY MEDICINE

## 2023-05-24 PROCEDURE — 70450 CT HEAD/BRAIN W/O DYE: CPT

## 2023-05-24 PROCEDURE — 70498 CT ANGIOGRAPHY NECK: CPT

## 2023-05-24 PROCEDURE — 71275 CT ANGIOGRAPHY CHEST: CPT

## 2023-05-24 PROCEDURE — 83880 ASSAY OF NATRIURETIC PEPTIDE: CPT | Performed by: EMERGENCY MEDICINE

## 2023-05-24 PROCEDURE — 99223 1ST HOSP IP/OBS HIGH 75: CPT | Performed by: INTERNAL MEDICINE

## 2023-05-24 PROCEDURE — 84484 ASSAY OF TROPONIN QUANT: CPT | Performed by: EMERGENCY MEDICINE

## 2023-05-24 PROCEDURE — 85610 PROTHROMBIN TIME: CPT | Performed by: EMERGENCY MEDICINE

## 2023-05-24 PROCEDURE — 25510000001 IOPAMIDOL PER 1 ML: Performed by: EMERGENCY MEDICINE

## 2023-05-24 PROCEDURE — 85379 FIBRIN DEGRADATION QUANT: CPT | Performed by: EMERGENCY MEDICINE

## 2023-05-24 PROCEDURE — 80053 COMPREHEN METABOLIC PANEL: CPT | Performed by: EMERGENCY MEDICINE

## 2023-05-24 PROCEDURE — 99285 EMERGENCY DEPT VISIT HI MDM: CPT

## 2023-05-24 PROCEDURE — 25010000002 LORAZEPAM PER 2 MG: Performed by: EMERGENCY MEDICINE

## 2023-05-24 PROCEDURE — 85025 COMPLETE CBC W/AUTO DIFF WBC: CPT | Performed by: EMERGENCY MEDICINE

## 2023-05-24 PROCEDURE — 36415 COLL VENOUS BLD VENIPUNCTURE: CPT

## 2023-05-24 RX ORDER — HYDROXYZINE HYDROCHLORIDE 25 MG/1
25 TABLET, FILM COATED ORAL 3 TIMES DAILY PRN
Status: DISCONTINUED | OUTPATIENT
Start: 2023-05-24 | End: 2023-05-26 | Stop reason: HOSPADM

## 2023-05-24 RX ORDER — AMOXICILLIN 500 MG/1
500 CAPSULE ORAL 3 TIMES DAILY
COMMUNITY
End: 2023-05-26 | Stop reason: HOSPADM

## 2023-05-24 RX ORDER — SUMATRIPTAN 25 MG/1
25 TABLET, FILM COATED ORAL ONCE AS NEEDED
Status: DISCONTINUED | OUTPATIENT
Start: 2023-05-24 | End: 2023-05-26 | Stop reason: HOSPADM

## 2023-05-24 RX ORDER — CETIRIZINE HYDROCHLORIDE 10 MG/1
10 TABLET ORAL DAILY
Status: DISCONTINUED | OUTPATIENT
Start: 2023-05-25 | End: 2023-05-26 | Stop reason: HOSPADM

## 2023-05-24 RX ORDER — SODIUM CHLORIDE 0.9 % (FLUSH) 0.9 %
10 SYRINGE (ML) INJECTION AS NEEDED
Status: DISCONTINUED | OUTPATIENT
Start: 2023-05-24 | End: 2023-05-26 | Stop reason: HOSPADM

## 2023-05-24 RX ORDER — ASPIRIN 81 MG/1
81 TABLET, CHEWABLE ORAL DAILY
Status: DISCONTINUED | OUTPATIENT
Start: 2023-05-25 | End: 2023-05-26 | Stop reason: HOSPADM

## 2023-05-24 RX ORDER — SODIUM CHLORIDE 9 MG/ML
40 INJECTION, SOLUTION INTRAVENOUS AS NEEDED
Status: DISCONTINUED | OUTPATIENT
Start: 2023-05-24 | End: 2023-05-26 | Stop reason: HOSPADM

## 2023-05-24 RX ORDER — LORAZEPAM 2 MG/ML
1 INJECTION INTRAMUSCULAR ONCE
Status: COMPLETED | OUTPATIENT
Start: 2023-05-24 | End: 2023-05-24

## 2023-05-24 RX ORDER — ATORVASTATIN CALCIUM 40 MG/1
TABLET, FILM COATED ORAL
Status: COMPLETED
Start: 2023-05-24 | End: 2023-05-24

## 2023-05-24 RX ORDER — ATORVASTATIN CALCIUM 40 MG/1
80 TABLET, FILM COATED ORAL NIGHTLY
Status: DISCONTINUED | OUTPATIENT
Start: 2023-05-24 | End: 2023-05-26

## 2023-05-24 RX ORDER — SODIUM CHLORIDE 0.9 % (FLUSH) 0.9 %
10 SYRINGE (ML) INJECTION EVERY 12 HOURS SCHEDULED
Status: DISCONTINUED | OUTPATIENT
Start: 2023-05-24 | End: 2023-05-26 | Stop reason: HOSPADM

## 2023-05-24 RX ORDER — HYDROCODONE BITARTRATE AND ACETAMINOPHEN 5; 325 MG/1; MG/1
1 TABLET ORAL EVERY 6 HOURS PRN
Status: DISCONTINUED | OUTPATIENT
Start: 2023-05-24 | End: 2023-05-25

## 2023-05-24 RX ORDER — NITROGLYCERIN 0.4 MG/1
0.4 TABLET SUBLINGUAL
Status: DISCONTINUED | OUTPATIENT
Start: 2023-05-24 | End: 2023-05-26

## 2023-05-24 RX ADMIN — METOPROLOL TARTRATE 25 MG: 25 TABLET, FILM COATED ORAL at 21:40

## 2023-05-24 RX ADMIN — IOPAMIDOL 100 ML: 755 INJECTION, SOLUTION INTRAVENOUS at 18:38

## 2023-05-24 RX ADMIN — Medication 10 ML: at 22:29

## 2023-05-24 RX ADMIN — IOPAMIDOL 150 ML: 755 INJECTION, SOLUTION INTRAVENOUS at 18:34

## 2023-05-24 RX ADMIN — LORAZEPAM 1 MG: 2 INJECTION INTRAMUSCULAR; INTRAVENOUS at 20:18

## 2023-05-24 RX ADMIN — ATORVASTATIN CALCIUM 80 MG: 40 TABLET, FILM COATED ORAL at 21:40

## 2023-05-24 RX ADMIN — HYDROCODONE BITARTRATE AND ACETAMINOPHEN 1 TABLET: 5; 325 TABLET ORAL at 22:35

## 2023-05-24 NOTE — ED NOTES
Pt arrived via PV with c/o CP and L arm heaviness that started last night. Pt took 81 mg ASA with hx of MI. MD called to bedside

## 2023-05-24 NOTE — ED PROVIDER NOTES
Subjective   History of Present Illness  History of Present Illness    Chief complaint: Chest pain    Location: Left-sided radiating down the left arm    Quality/Severity: Heavy    Timing/Onset/Duration: Started last night    Modifying Factors: Nothing makes it better    Associated Symptoms: No headache.  No fever chills or cough.  No sore throat earache or nasal congestion.  No abdominal pain.  Patient denies nausea or vomiting.  She did get diaphoretic she is short of breath.  No diarrhea or burning when she urinates    Narrative: This 52-year-old -American female presents with chest pain that she states is like the pain that she had prior to having a cath and stent back in 2019.  He states that started last night.    PCP:    Cardiology: Jack      Review of Systems     Medication List      ASK your doctor about these medications    aspirin 81 MG chewable tablet  CHEW 1 TABLET DAILY     atorvastatin 80 MG tablet  Commonly known as: LIPITOR  TAKE ONE TABLET BY MOUTH ONCE NIGHTLY     ergocalciferol 1.25 MG (79695 UT) capsule  Commonly known as: ERGOCALCIFEROL     * gabapentin 300 MG capsule  Commonly known as: NEURONTIN     * gabapentin 100 MG capsule  Commonly known as: NEURONTIN     HYDROcodone-acetaminophen 7.5-325 MG per tablet  Commonly known as: NORCO     hydrOXYzine 25 MG tablet  Commonly known as: ATARAX     Loratadine 10 MG capsule     metoprolol tartrate 25 MG tablet  Commonly known as: LOPRESSOR  TAKE 1 TABLET TWICE A DAY     predniSONE 10 MG tablet  Commonly known as: DELTASONE  60 mg daily x 3 days, 40 mg daily x 3 days, 20 mg daily x 3 days, 10 mg daily x 3 days     rizatriptan MLT 10 MG disintegrating tablet  Commonly known as: MAXALT-MLT     tiZANidine 2 MG tablet  Commonly known as: ZANAFLEX         * This list has 2 medication(s) that are the same as other medications prescribed for you. Read the directions carefully, and ask your doctor or other care provider to review them with you.                 Past Medical History:   Diagnosis Date   • Colon polyp     BENIGN   • Coronary artery disease involving native coronary artery of native heart without angina pectoris 2019   • DDD (degenerative disc disease), lumbosacral    • Elevated cholesterol    • Heart attack 2018    2018 dec 25   • Hiatal hernia    • History of heart artery stent 2018    LAD   • Hyperlipidemia    • Migraine headache    • Presence of drug coated stent in LAD coronary artery 2019   • Spinal stenosis        Allergies   Allergen Reactions   • Cephalexin Anaphylaxis     Other reaction(s): Vomiting   • Nsaids Other (See Comments)     PER DR. ANDREW BRAND, CARDIOLOGIST        Past Surgical History:   Procedure Laterality Date   • CARDIAC CATHETERIZATION N/A 2018    Procedure: Left Heart Cath;  Surgeon: Hilario Coronado MD;  Location: [facility] CATH INVASIVE LOCATION;  Service: Cardiovascular   • CARDIAC CATHETERIZATION N/A 2018    Procedure: Left ventriculography;  Surgeon: Hilario Coronado MD;  Location: [facility] CATH INVASIVE LOCATION;  Service: Cardiovascular   • CARDIAC CATHETERIZATION N/A 2018    Procedure: Stent EVARISTO coronary;  Surgeon: Hilario Coronado MD;  Location: [facility] CATH INVASIVE LOCATION;  Service: Cardiovascular   • CARDIAC CATHETERIZATION N/A 2018    Procedure: Coronary angiography;  Surgeon: Hilario Coronado MD;  Location: [facility] CATH INVASIVE LOCATION;  Service: Cardiovascular   •  SECTION     • COLONOSCOPY N/A 10/09/2019    Procedure: COLONOSCOPY with polypectomy;  Surgeon: Dung Hutchison MD;  Location: [facility];  Service: Gastroenterology   • CORONARY STENT PLACEMENT     • ENDOSCOPY N/A 2020    Procedure: ESOPHAGOGASTRODUODENOSCOPY WITH BIOPSY;  Surgeon: Facundo Helm Jr., MD;  Location: [facility] ENDOSCOPY;  Service: General;  Laterality: N/A;  PRE- DYSPEPSIA  POST- GASTRITIS   • GASTRIC SLEEVE LAPAROSCOPIC N/A  2020    Procedure: GASTRIC SLEEVE LAPAROSCOPIC With Hiatal Hernia Repair;  Surgeon: Facundo Helm Jr., MD;  Location[facility] OR [facility];  Service: Bariatric;  Laterality: N/A;   • HYSTERECTOMY     • KNEE SURGERY Left 2014    cyst removal   • LUMBAR FUSION  2021       Family History   Problem Relation Age of Onset   • Cancer Mother    • Hypertension Mother    • Sleep apnea Mother    • Heart disease Brother    • Heart attack Brother    • Heart disease Maternal Grandmother    • Hypertension Maternal Grandmother    • Heart attack Maternal Grandmother    • Hypertension Father    • Hypertension Paternal Grandmother    • Heart disease Paternal Grandmother    • Malig Hyperthermia Neg Hx        Social History     Socioeconomic History   • Marital status:    Tobacco Use   • Smoking status: Former     Packs/day: 0.25     Years: 5.00     Pack years: 1.25     Types: Cigarettes     Quit date: 2018     Years since quittin.4   • Smokeless tobacco: Never   • Tobacco comments:     intermittent caffiene   Vaping Use   • Vaping Use: Never used   Substance and Sexual Activity   • Alcohol use: Yes     Comment: HOLIDAY DRINKER   • Drug use: No   • Sexual activity: Defer           Objective   Physical Exam    Procedures           ED Course  ED Course as of 23 1900   Wed May 24, 2023   181 The CBC is unremarkable. [RC]   1856 The high-sensitivity troponin is less than 6, PTT is 26 and normal.  The PT is 12.5 and normal.  The INR 0.93 and normal.    The CBC is unremarkable.    The D-dimer is 0.52 and normal.    The serum glucose is 106.  The chloride is 108.  The ALT is 34.  The CMP is otherwise unremarkable. [RC]      ED Course User Index  [RC] Dayron Lyons MD      18:08 EDT, 23:  EKG was obtained at 1759 read by me at 1759.  EKG shows a normal sinus rhythm with rate of 70.  There is a normal axis with no hypertrophy.  The SD, QRS, QT intervals are unremarkable.  There is no ectopy.   There is no acute ST elevation or depression.  There is probable left atrial enlargement     19:13 EDT, 05/24/23:  I spoke with Dr. Perkins, on-call for stroke neurology, he has reviewed the the imaging and would like the patient admitted for aspirin and MRI tomorrow..    19:51 EDT, 05/24/23:  I spoke with Dr. Martinez, on-call for the hospitalist, he will bring the patient in for observation    19:52 EDT, 05/24/23:  The patient was reassessed.  She has no new complaints.  Her vital signs were reviewed and are stable.  Neurological exam: Alert and oriented x4 with no focal deficits noted.    19:52 EDT, 05/24/23:  The patient's diagnosis of chest pain and left-sided weakness was discussed with her.  The patient's troponins are reassuring.  Her imaging thus far is unremarkable the plan will be to do an MRI tomorrow, and further work-up and evaluation for the chest pain.  Per neurology's recommendation patient will be started on aspirin.  All the patient's questions were answered the patient will be discharged in good condition                                    MDM    Final diagnoses:   Chest pain, unspecified type   Left-sided weakness       ED Disposition  ED Disposition     None          No follow-up provider specified.       Medication List      No changes were made to your prescriptions during this visit.          Dayron Lyons MD  05/25/23 0006

## 2023-05-24 NOTE — CONSULTS
Teleneurology Consultation Note - Phone:    Date of Consultation: 05/24/2023 19:18 EDT  Reason for consult: Stroke like symptoms  Location: ED  Date/Time Telestroke doctor on phone: 05/24/2023 19:18 EDT  Reason video not used: Provider to provider discussion  Diagnosis: Other(R/o stroke, chest pain related weakness)    Patient Demographics:  Age: 52  Gender: Female    Assessment:  -Could be chest pain related weakness on left  -R/o stroke  -Chest pain workup    Recommendation:  Medication recommendation: -aspirin for now    Imaging recommendations: MRI brain w/o contrast  Lab recommendation: Lipid Panel    Follow-up recommendations: In this patient with acute neurologic deficits, we would recommend that the inpatient neurology team be consulted for further ongoing recommendations. Please be sure to place the consult through EMR. Please call us at [facility] # [phone] and press 1 for emergent and press 2 for non-emergent if you have any questions.    Updated ED provider with recommendations at: 05/24/2023 19:18 EDT      History of Presenting Illness:  51 yo female presented with chest pain, left sided weakness since last night. LKW around 24 hours ago.   CTA CT all negative and no LVO.  Allergies: Reviewed in EMR    Physical Exam:  General Appearance: In no apparent distress  Neurological:  Mental status: Awake, Alert and Oriented to name, date, location and birthdate. Following commands.  Cranial Nerves:  Visual fields: Full to confrontation bilaterally  Eye motility: Full motility both eyes in all directions  Facial sensation: No sensory loss to light touch in face  Facial motor: No facial droop or facial asymmetry  Tongue: No tongue deviation  Motor: 5/5 strength on extension and flexion in arms and legs. Handgrip equal B/L. No drift in arms or legs B/L  Sensation: No sensory loss to light touch in face, arms or legs B/L  Coordination:  Finger to nose: Intact  Heel to shin: Intact  Gait: Not tested      Initial  assessment: 05/24/2023 19:17 EDT    Personal review of CNS Imaging:  CTH performed? Yes  CT Interpretation date and time: 05/24/2023 19:17 EDT  Hemorrhage vs non- hemorrhage? No acute intracranial hemorrhage  Type of No acute intracranial hemorrhage:  Does not apply  ASPECT Score does not apply here because: Not a stroke  CTA head and neck performed? Yes  CTA review date and time: 05/24/2023 19:17 EDT  Results of CTA: No intracranial or extracranial large vessel occlusion or significant stenosis  MRI brain: Yes: pending      Teleneurology patient verification & consent    I, Kirit Perkins MD, was consulted about this patient on 05/24/2023, 19:18 EDT for discussion over the phone regarding management of the patient's care via a provider to provider discussion for 15 minutes which includes reviewing medical records, reviewing laboratory data, radiology, personal review of CNS images and other diagnostic tests as well as clinical documentation. My location was [place]. The recommendations are based on information I received from the consulting provider.

## 2023-05-24 NOTE — H&P
[facility]     PCP: Sarah Bright MD    CODE status: Full    CHIEF COMPLAINT: Chest pain and left arm heaviness    HISTORY OF PRESENT ILLNESS:    Patient is a 52-year-old female with past medical history significant for previous MI with heart stent placed in 2018, hyperlipidemia, hypertension, migraine headache.  She presents to [facility] ED complaining of chest pain radiating down the left arm resulting in her left arm feeling heavy.  Her symptoms first started last night.  She has found no alleviating factors.  She reports pain and symptoms are similar to that of her previous MI resulting in need for stent placement. She reports feeling less anxious now after receiving IV ativan in ED, but does state she was very worried previously. She continues to feel like her arm is heavy and weak.      Work-up in the ED revealed no acute ischemic changes on EKG, first troponin was negative, stroke neurology was consulted from ED and recommended MRI brain without contrast and daily aspirin after reviewing CT head and CTA imaging.       Past Medical History:   Diagnosis Date   • Colon polyp     BENIGN   • Coronary artery disease involving native coronary artery of native heart without angina pectoris 1/31/2019   • DDD (degenerative disc disease), lumbosacral    • Elevated cholesterol    • Heart attack 12/25/2018    2018 dec 25   • Hiatal hernia    • History of heart artery stent 12/16/2018    LAD   • Hyperlipidemia    • Migraine headache    • Presence of drug coated stent in LAD coronary artery 1/31/2019   • Spinal stenosis      Past Surgical History:   Procedure Laterality Date   • CARDIAC CATHETERIZATION N/A 12/26/2018    Procedure: Left Heart Cath;  Surgeon: Hilario Coronado MD;  Location: [facility] INVASIVE LOCATION;  Service: Cardiovascular   • CARDIAC CATHETERIZATION N/A 12/26/2018    Procedure: Left ventriculography;  Surgeon: Hilario Coronado MD;   Location: [facility] CATH INVASIVE LOCATION;  Service: Cardiovascular   • CARDIAC CATHETERIZATION N/A 2018    Procedure: Stent EVARISTO coronary;  Surgeon: Hilario Coronado MD;  Location: [facility] CATH INVASIVE LOCATION;  Service: Cardiovascular   • CARDIAC CATHETERIZATION N/A 2018    Procedure: Coronary angiography;  Surgeon: Hilario Coronado MD;  Location: [facility] CATH INVASIVE LOCATION;  Service: Cardiovascular   •  SECTION     • COLONOSCOPY N/A 10/09/2019    Procedure: COLONOSCOPY with polypectomy;  Surgeon: Dung Hutchison MD;  Location: [facility] OR;  Service: Gastroenterology   • CORONARY STENT PLACEMENT     • ENDOSCOPY N/A 2020    Procedure: ESOPHAGOGASTRODUODENOSCOPY WITH BIOPSY;  Surgeon: Facundo Helm Jr., MD;  Location: [facility] ENDOSCOPY;  Service: General;  Laterality: N/A;  PRE- DYSPEPSIA  POST- GASTRITIS   • GASTRIC SLEEVE LAPAROSCOPIC N/A 2020    Procedure: GASTRIC SLEEVE LAPAROSCOPIC With Hiatal Hernia Repair;  Surgeon: Facundo Helm Jr., MD;  Location: [facility] OR OSC;  Service: Bariatric;  Laterality: N/A;   • HYSTERECTOMY     • KNEE SURGERY Left 2014    cyst removal   • LUMBAR FUSION  2021     Family History   Problem Relation Age of Onset   • Cancer Mother    • Hypertension Mother    • Sleep apnea Mother    • Heart disease Brother    • Heart attack Brother    • Heart disease Maternal Grandmother    • Hypertension Maternal Grandmother    • Heart attack Maternal Grandmother    • Hypertension Father    • Hypertension Paternal Grandmother    • Heart disease Paternal Grandmother    • Malig Hyperthermia Neg Hx      Social History     Tobacco Use   • Smoking status: Former     Packs/day: 0.25     Years: 5.00     Pack years: 1.25     Types: Cigarettes     Quit date: 2018     Years since quittin.4   • Smokeless tobacco: Never   • Tobacco comments:     intermittent caffiene   Vaping Use   • Vaping Use: Never used   Substance Use  Topics   • Alcohol use: Yes     Comment: HOLIDAY DRINKER   • Drug use: No     (Not in a hospital admission)    Allergies:  Cephalexin and Nsaids    Immunization History   Administered Date(s) Administered   • COVID-19 (PFIZER) Purple Cap Monovalent 04/14/2021, 05/05/2021       REVIEW OF SYSTEMS:  Please see the above history of present illness for pertinent positives and negatives.  The remainder of the patient's systems have been reviewed and are negative.    Vital Signs  Temp:  [98.5 °F (36.9 °C)] 98.5 °F (36.9 °C)  Heart Rate:  [79-85] 79  Resp:  [16-18] 16  BP: (154-163)/(87-94) 154/87  Flowsheet Rows    Flowsheet Row First Filed Value   Admission Height --   Admission Weight 74.4 kg (164 lb) Documented at 05/24/2023 1803             Physical Exam:  General: Patient is awake and alert.  Anxious appearing -American female. No acute distress noted.   HENT: Head is atraumatic, normocephalic. Hearing is grossly intact. Nose is without obvious congestion and appears patent. Neck is supple and trachea is midline.   Eyes: Vision is grossly intact. Pupils appear equal and round.   Cardiovascular: Heart has regular rate and rhythm with no murmurs, rubs or gallops noted.   Respiratory: Lungs are clear to ausculation without wheezes, rhonchi or rales.   Abdominal/GI: Soft, nontender, bowel sounds present. No rebound or guarding present.   Extremities: No peripheral edema noted.   Musculoskeletal: Spontaneous movement of bilateral upper and lower extremities against gravity noted. No signs of injury or deformity noted.   Skin: Warm and dry.   Psych: Mood and affect are appropriate. Cooperative with exam.   Neuro: No facial asymmetry noted. CN II-XII intact bilaterally, patient has 3/5 strength is upper and lower left extremity, but 4+/5 in upper and lower right extremities. She has extreme difficulty with finger to nose coordination with both hands. She also has difficulty with heel to shin coordination of LLE. No  difficulty with rapid alternating movements.     Emotional Behavior: all of the following were found to be normal   Judgment and Insight   Mental Status   Memory   Mood and Affect: neither of the following found acutely        Depression                 Anxiety     Debilities: no signs of the following found    Physical Weakness     Handicaps     Disabilities     Agitation         Results Review:    I reviewed the patient's new clinical results.  Lab Results (most recent)     Procedure Component Value Units Date/Time    BNP [[id number]]  (Normal) Collected: 05/24/23 1920    Specimen: Blood Updated: 05/24/23 1955     proBNP 139.3 pg/mL     Narrative:      Among patients with dyspnea, NT-proBNP is highly sensitive for the detection of acute congestive heart failure. In addition NT-proBNP of <300 pg/ml effectively rules out acute congestive heart failure with 99% negative predictive value.    Results may be falsely decreased if patient taking Biotin.      High Sensitivity Troponin T [[id number]]  (Normal) Collected: 05/24/23 1920    Specimen: Blood Updated: 05/24/23 1955     HS Troponin T <6 ng/L     Narrative:      High Sensitive Troponin T Reference Range:  <10.0 ng/L- Negative Female for AMI  <15.0 ng/L- Negative Male for AMI  >=10 - Abnormal Female indicating possible myocardial injury.  >=15 - Abnormal Male indicating possible myocardial injury.   Clinicians would have to utilize clinical acumen, EKG, Troponin, and serial changes to determine if it is an Acute Myocardial Infarction or myocardial injury due to an underlying chronic condition.         [place] Draw [[id number]] Collected: 05/24/23 1920    Specimen: Blood Updated: 05/24/23 1928    Narrative:      The following orders were created for panel order [place] Draw.  Procedure                               Abnormality         Status                     ---------                               -----------         ------                     [facility]  "(Gel)[[id number]]                                  In process                 Lavender Top[[id number]]                                                                Gold Top - SST[[id number]]                                                              Light Blue Top[[id number]]                                                                Please view results for these tests on the individual orders.    Green Top (Gel) [[id number]] Collected: 05/24/23 1920    Specimen: Blood Updated: 05/24/23 1928    D-dimer, Quantitative [[id number]]  (Normal) Collected: 05/24/23 1810    Specimen: Blood Updated: 05/24/23 1837     D-Dimer, Quantitative 0.52 MCGFEU/mL     Narrative:      According to the assay 's published package insert, a normal (<0.50 MCGFEU/mL) D-dimer result in conjunction with a non-high clinical probability assessment, excludes deep vein thrombosis (DVT) and pulmonary embolism (PE) with high sensitivity.    D-dimer values increase with age and this can make VTE exclusion of an older population difficult. To address this, the [employer], based on best available evidence and recent guidelines, recommends that clinicians use age-adjusted D-dimer thresholds in patients greater than 50 years of age with: a) a low probability of PE who do not meet all Pulmonary Embolism Rule Out Criteria, or b) in those with intermediate probability of PE.   The formula for an age-adjusted D-dimer cut-off is \"age/100\".  For example, a 60 year old patient would have an age-adjusted cut-off of 0.60 MCGFEU/mL and an 80 year old 0.80 MCGFEU/mL.    Single High Sensitivity Troponin T [[id number]]  (Normal) Collected: 05/24/23 1810    Specimen: Blood Updated: 05/24/23 1836     HS Troponin T <6 ng/L     Narrative:      High Sensitive Troponin T Reference Range:  <10.0 ng/L- Negative Female for AMI  <15.0 ng/L- Negative Male for AMI  >=10 - Abnormal Female indicating possible myocardial injury.  >=15 - Abnormal " Male indicating possible myocardial injury.   Clinicians would have to utilize clinical acumen, EKG, Troponin, and serial changes to determine if it is an Acute Myocardial Infarction or myocardial injury due to an underlying chronic condition.         Comprehensive Metabolic Panel [[id number]]  (Abnormal) Collected: 05/24/23 1810    Specimen: Blood Updated: 05/24/23 1834     Glucose 106 mg/dL      BUN 6 mg/dL      Creatinine 0.76 mg/dL      Sodium 141 mmol/L      Potassium 4.1 mmol/L      Chloride 108 mmol/L      CO2 25.6 mmol/L      Calcium 9.1 mg/dL      Total Protein 6.7 g/dL      Albumin 3.9 g/dL      ALT (SGPT) 34 U/L      AST (SGOT) 26 U/L      Alkaline Phosphatase 113 U/L      Total Bilirubin 0.2 mg/dL      Globulin 2.8 gm/dL      A/G Ratio 1.4 g/dL      BUN/Creatinine Ratio 7.9     Anion Gap 7.4 mmol/L      eGFR 94.4 mL/min/1.73     Narrative:      GFR Normal >60  Chronic Kidney Disease <60  Kidney Failure <15      aPTT [[id number]]  (Normal) Collected: 05/24/23 1810    Specimen: Blood Updated: 05/24/23 1827     PTT 26.1 seconds     Narrative:      PTT = The equivalent PTT values for the therapeutic range of heparin levels at 0.1 to 0.7 U/ml are 53 to 110 seconds.      Protime-INR [[id number]]  (Normal) Collected: 05/24/23 1810    Specimen: Blood Updated: 05/24/23 1827     Protime 12.5 Seconds      INR 0.93    Narrative:      Therapeutic Ranges for INR: 2.0-3.0 (PT 20-30)                              2.5-3.5 (PT 25-34)    POC Glucose Once [[id number]]  (Normal) Collected: 05/24/23 1807    Specimen: Blood Updated: 05/24/23 1813     Glucose 102 mg/dL      Comment: Meter: [id number] : [id number] Juan Ayala RN       CBC & Differential [[id number]]  (Abnormal) Collected: 05/24/23 1810    Specimen: Blood Updated: 05/24/23 1813    Narrative:      The following orders were created for panel order CBC & Differential.  Procedure                               Abnormality         Status                     ---------                                -----------         ------                     CBC Auto Differential[[id number]]        Abnormal            Final result                 Please view results for these tests on the individual orders.    CBC Auto Differential [[id number]]  (Abnormal) Collected: 05/24/23 1810    Specimen: Blood Updated: 05/24/23 1813     WBC 6.23 10*3/mm3      RBC 4.24 10*6/mm3      Hemoglobin 12.1 g/dL      Hematocrit 37.1 %      MCV 87.5 fL      MCH 28.5 pg      MCHC 32.6 g/dL      RDW 14.9 %      RDW-SD 46.7 fl      MPV 9.7 fL      Platelets 258 10*3/mm3      Neutrophil % 52.8 %      Lymphocyte % 30.8 %      Monocyte % 14.3 %      Eosinophil % 1.4 %      Basophil % 0.5 %      Immature Grans % 0.2 %      Neutrophils, Absolute 3.29 10*3/mm3      Lymphocytes, Absolute 1.92 10*3/mm3      Monocytes, Absolute 0.89 10*3/mm3      Eosinophils, Absolute 0.09 10*3/mm3      Basophils, Absolute 0.03 10*3/mm3      Immature Grans, Absolute 0.01 10*3/mm3      nRBC 0.0 /100 WBC           Imaging Results (Most Recent)     Procedure Component Value Units Date/Time    CT CEREBRAL PERFUSION WITH & WITHOUT CONTRAST [[id number]] Collected: 05/24/23 1847     Updated: 05/24/23 1859    Narrative:      CT CEREBRAL PERFUSION W WO CONTRAST-05/24/2023     HISTORY:   Headache. Left-sided weakness beginning yesterday.     TECHNIQUE:   Axial CT images of the brain without and with intravenous contrast using  cerebral perfusion protocol. Post-processing parametric maps were  created using RAPID software and reviewed. Radiation dose reduction  techniques included automated exposure control or exposure modulation  based on body size. CT and nuclear cardiology exams in the last 12  months: 0.     COMPARISON:   CT head 05/24/2023     FINDINGS:   Arterial input function is optimal.      Perfusion maps are symmetric without evidence of cerebral ischemia or  core infarction.       Impression:      Normal CT cerebral perfusion exam.               This report was finalized on 5/24/2023 6:49 PM by Dr. Leno Oropeza MD.       CT Angiogram Chest [[id number]] Collected: 05/24/23 1849     Updated: 05/24/23 1858    Narrative:      CT ANGIOGRAM CHEST-05/24/2023     INDICATION:   Shortness of air and chest pain beginning yesterday.     TECHNIQUE:   CT angiogram of the chest with IV contrast. 3-D reconstructions were  obtained and reviewed.   Radiation dose reduction techniques included  automated exposure control or exposure modulation based on body size.  Count of known CT and cardiac nuc med studies performed in previous 12  months: 0.      COMPARISON:   None available.     FINDINGS:   Adequate opacification of the pulmonary arteries with no filling  defects. Thoracic aorta normal in course and caliber without dissection.  Heart size is normal. No pericardial effusion.     No pleural effusion. No pneumothorax. No focal pulmonary infiltrates.     Visualized upper abdomen is unremarkable.     No acute osseous abnormality.       Impression:      1. Negative for pulmonary embolus.  2. Negative for thoracic aortic aneurysm/dissection.  3. No acute pulmonary process.     This report was finalized on 5/24/2023 6:51 PM by Dr. Leon Oropeza MD.       CT Head Without Contrast Stroke Protocol [[id number]] Collected: 05/24/23 1817     Updated: 05/24/23 1858    Narrative:      CT HEAD WO CONTRAST STROKE PROTOCOL-05/24/2023     HISTORY:   Headache. Left-sided weakness beginning last night.     TECHNIQUE:   Routine noncontrast head CT. Coronal and sagittal reformatted images.  Radiation dose reduction techniques included automated exposure control  or exposure modulation based on body size. Count of known CT and cardiac  nuc med studies performed in previous 12 months: 0.      COMPARISON:   None available     FINDINGS:   No hemorrhage, acute infarction, mass lesion, or abnormal extra-axial  fluid collection. No midline shift or focal mass effect. Ventricular  system is normal in  size and configuration. No acute osseous  abnormality. Visualized paranasal sinuses are clear. Visualized mastoid  air cells are clear.       Impression:      No acute intracranial abnormality.        This report was finalized on 5/24/2023 6:19 PM by Dr. Leno Oropeza MD.       CT Angiogram Head w AI Analysis of LVO [[id number]] Resulted: 05/24/23 1836     Updated: 05/24/23 1836    CT Angiogram Neck [[id number]] Resulted: 05/24/23 1835     Updated: 05/24/23 1835        Reviewed    ECG/EMG Results (most recent)     Procedure Component Value Units Date/Time    ECG 12 Lead Chest Pain [[id number]] Collected: 05/24/23 1759     Updated: 05/24/23 1801     QT Interval 392 ms     Narrative:      HEART RATE= 70  bpm  RR Interval= 860  ms  NH Interval= 159  ms  P Horizontal Axis= -6  deg  P Front Axis= 46  deg  QRSD Interval= 73  ms  QT Interval= 392  ms  QRS Axis= 23  deg  T Wave Axis= 23  deg  - BORDERLINE ECG -  Sinus rhythm  Probable left atrial enlargement  Electronically Signed By:   Date and Time of Study: 2023-05-24 17:59:14        Reviewed    Assessment & Plan     Typical chest pain, similar to that of previous MI in 2018, with associated left arm heaviness/weakness concerning for possible embolic   CVA  -Left sided weakness noted on exam as above with difficulty with coordination as well.   -first troponin is negative, no acute ischemic changes on EKG, monitor telemetry and await second troponin.   -Stroke neurology consulted from ED, they have reviewed CT head and CTA, they recommended observation for patient and providing daily aspirin and obtaining MRI in the morning.  These have been ordered for patient.   -We will also order PT/OT/speech consultation.  Patient will initially be n.p.o. until bedside swallow is passed.  Continue neurochecks.   -A1c and lipid panel pending  -Continue home atorvastatin 80 mg nightly.       Chronic stable conditions to be managed with home medications include the following conditions  listed below. Also please note: Every effort was made to accurately obtain patient's home medications. This was done utilizing extensive chart review, ED documentation, recent pharmacy records, and where possible thorough discussion with the patient if medications were known by the patient. I have reviewed and edited the patient's home medications as per my efforts above and current med list can be found within home medications portion of this electronic medical record and is accurate as possible as of 05/24/23     ASCVD with previous MI in 2018-as per above continue daily aspirin and atorvastatin    Anxiety-continue Atarax as needed    Seasonal allergic rhinitis-continue loratadine    Hypertension-continue Lopressor    Hyperlipidemia-continue atorvastatin    Migraine-continue Maxalt as needed    Peripheral neuropathy- hold home gabapentin, to allow for close symptom monitoring.     Chronic pain-hold home [place]da-as per above.         DVT PPX: SCDs        I discussed the patient's findings and my recommendations with patient     Joe MartinezDO  05/24/23  19:58 EDT    Time: 41 minutes     At [facility], we believe that sharing information builds trust and better relationships. You are receiving this note because you recently visited [facility]. It is possible you will see health information before a provider has talked with you about it. This kind of information can be easy to misunderstand. To help you fully understand what it means for your health, we urge you to discuss this note with your provider.

## 2023-05-24 NOTE — ED NOTES
"During triage patient exhibited left arm weakness, acted as if she could not put arm in the gown provided. Kept stating \" this is just like when I had my heart attack\". Attempted to do NIH due to weakness and pt could not perform movements with left arm. Pt became angry and yelled \" I can't do it, just stop\". Pt tearful. Dr Crafton called to bs  "

## 2023-05-25 ENCOUNTER — APPOINTMENT (OUTPATIENT)
Dept: CARDIOLOGY | Facility: HOSPITAL | Age: 53
End: 2023-05-25
Payer: COMMERCIAL

## 2023-05-25 ENCOUNTER — APPOINTMENT (OUTPATIENT)
Dept: MRI IMAGING | Facility: HOSPITAL | Age: 53
End: 2023-05-25
Payer: COMMERCIAL

## 2023-05-25 ENCOUNTER — APPOINTMENT (OUTPATIENT)
Dept: GENERAL RADIOLOGY | Facility: HOSPITAL | Age: 53
End: 2023-05-25
Payer: COMMERCIAL

## 2023-05-25 PROBLEM — R53.1 LEFT-SIDED WEAKNESS: Status: ACTIVE | Noted: 2023-05-25

## 2023-05-25 LAB
AORTIC DIMENSIONLESS INDEX: 0.9 (DI)
BH CV ECHO MEAS - ACS: 1.72 CM
BH CV ECHO MEAS - AO MAX PG: 10.5 MMHG
BH CV ECHO MEAS - AO MEAN PG: 5 MMHG
BH CV ECHO MEAS - AO ROOT DIAM: 2.8 CM
BH CV ECHO MEAS - AO V2 MAX: 162 CM/SEC
BH CV ECHO MEAS - AO V2 VTI: 32.3 CM
BH CV ECHO MEAS - AVA(I,D): 3.1 CM2
BH CV ECHO MEAS - EDV(CUBED): 64 ML
BH CV ECHO MEAS - EDV(MOD-SP2): 67 ML
BH CV ECHO MEAS - EDV(MOD-SP4): 81 ML
BH CV ECHO MEAS - EF(MOD-BP): 65.8 %
BH CV ECHO MEAS - EF(MOD-SP2): 68.7 %
BH CV ECHO MEAS - EF(MOD-SP4): 63 %
BH CV ECHO MEAS - ESV(CUBED): 18.6 ML
BH CV ECHO MEAS - ESV(MOD-SP2): 21 ML
BH CV ECHO MEAS - ESV(MOD-SP4): 30 ML
BH CV ECHO MEAS - FS: 33.7 %
BH CV ECHO MEAS - IVS/LVPW: 1.11 CM
BH CV ECHO MEAS - IVSD: 1 CM
BH CV ECHO MEAS - LAT PEAK E' VEL: 13.5 CM/SEC
BH CV ECHO MEAS - LV DIASTOLIC VOL/BSA (35-75): 46.2 CM2
BH CV ECHO MEAS - LV MASS(C)D: 118.2 GRAMS
BH CV ECHO MEAS - LV MAX PG: 9.4 MMHG
BH CV ECHO MEAS - LV MEAN PG: 4 MMHG
BH CV ECHO MEAS - LV SYSTOLIC VOL/BSA (12-30): 17.1 CM2
BH CV ECHO MEAS - LV V1 MAX: 153 CM/SEC
BH CV ECHO MEAS - LV V1 VTI: 30 CM
BH CV ECHO MEAS - LVIDD: 4 CM
BH CV ECHO MEAS - LVIDS: 2.7 CM
BH CV ECHO MEAS - LVOT AREA: 3.4 CM2
BH CV ECHO MEAS - LVOT DIAM: 2.07 CM
BH CV ECHO MEAS - LVPWD: 0.9 CM
BH CV ECHO MEAS - MED PEAK E' VEL: 9.8 CM/SEC
BH CV ECHO MEAS - MR MAX PG: 89 MMHG
BH CV ECHO MEAS - MR MAX VEL: 471.6 CM/SEC
BH CV ECHO MEAS - MV A MAX VEL: 128 CM/SEC
BH CV ECHO MEAS - MV DEC SLOPE: 455.6 CM/SEC2
BH CV ECHO MEAS - MV DEC TIME: 0.37 MSEC
BH CV ECHO MEAS - MV E MAX VEL: 113 CM/SEC
BH CV ECHO MEAS - MV E/A: 0.88
BH CV ECHO MEAS - MV MAX PG: 7.6 MMHG
BH CV ECHO MEAS - MV MEAN PG: 2.7 MMHG
BH CV ECHO MEAS - MV P1/2T: 85.6 MSEC
BH CV ECHO MEAS - MV V2 VTI: 35.8 CM
BH CV ECHO MEAS - MVA(P1/2T): 2.6 CM2
BH CV ECHO MEAS - MVA(VTI): 2.8 CM2
BH CV ECHO MEAS - PA V2 MAX: 120.3 CM/SEC
BH CV ECHO MEAS - QP/QS: 0.64
BH CV ECHO MEAS - RAP SYSTOLE: 3 MMHG
BH CV ECHO MEAS - RV MAX PG: 2.6 MMHG
BH CV ECHO MEAS - RV V1 MAX: 81.2 CM/SEC
BH CV ECHO MEAS - RV V1 VTI: 19.4 CM
BH CV ECHO MEAS - RVOT DIAM: 2.07 CM
BH CV ECHO MEAS - RVSP: 18.6 MMHG
BH CV ECHO MEAS - SI(MOD-SP2): 26.3 ML/M2
BH CV ECHO MEAS - SI(MOD-SP4): 29.1 ML/M2
BH CV ECHO MEAS - SV(LVOT): 100.9 ML
BH CV ECHO MEAS - SV(MOD-SP2): 46 ML
BH CV ECHO MEAS - SV(MOD-SP4): 51 ML
BH CV ECHO MEAS - SV(RVOT): 65.1 ML
BH CV ECHO MEAS - TR MAX PG: 15.6 MMHG
BH CV ECHO MEAS - TR MAX VEL: 197.3 CM/SEC
BH CV ECHO MEASUREMENTS AVERAGE E/E' RATIO: 9.7
BH CV XLRA - TDI S': 11.6 CM/SEC
CHOLEST SERPL-MCNC: 185 MG/DL (ref 0–200)
GLUCOSE BLDC GLUCOMTR-MCNC: 113 MG/DL (ref 70–130)
HBA1C MFR BLD: 5.4 % (ref 4.8–5.6)
HDLC SERPL-MCNC: 55 MG/DL (ref 40–60)
LDLC SERPL CALC-MCNC: 120 MG/DL (ref 0–100)
LDLC/HDLC SERPL: 2.18 {RATIO}
LEFT ATRIUM VOLUME INDEX: 26.7 ML/M2
MAXIMAL PREDICTED HEART RATE: 168 BPM
SINUS: 2.6 CM
STJ: 2.1 CM
STRESS TARGET HR: 143 BPM
TRIGL SERPL-MCNC: 51 MG/DL (ref 0–150)
TSH SERPL DL<=0.05 MIU/L-ACNC: 2.51 UIU/ML (ref 0.27–4.2)
VLDLC SERPL-MCNC: 10 MG/DL (ref 5–40)

## 2023-05-25 PROCEDURE — G0378 HOSPITAL OBSERVATION PER HR: HCPCS

## 2023-05-25 PROCEDURE — 96376 TX/PRO/DX INJ SAME DRUG ADON: CPT

## 2023-05-25 PROCEDURE — 97165 OT EVAL LOW COMPLEX 30 MIN: CPT

## 2023-05-25 PROCEDURE — 94799 UNLISTED PULMONARY SVC/PX: CPT

## 2023-05-25 PROCEDURE — 93306 TTE W/DOPPLER COMPLETE: CPT

## 2023-05-25 PROCEDURE — 93306 TTE W/DOPPLER COMPLETE: CPT | Performed by: INTERNAL MEDICINE

## 2023-05-25 PROCEDURE — 83036 HEMOGLOBIN GLYCOSYLATED A1C: CPT | Performed by: INTERNAL MEDICINE

## 2023-05-25 PROCEDURE — 99221 1ST HOSP IP/OBS SF/LOW 40: CPT | Performed by: INTERNAL MEDICINE

## 2023-05-25 PROCEDURE — 70551 MRI BRAIN STEM W/O DYE: CPT

## 2023-05-25 PROCEDURE — 84443 ASSAY THYROID STIM HORMONE: CPT | Performed by: NURSE PRACTITIONER

## 2023-05-25 PROCEDURE — 71045 X-RAY EXAM CHEST 1 VIEW: CPT

## 2023-05-25 PROCEDURE — 97161 PT EVAL LOW COMPLEX 20 MIN: CPT

## 2023-05-25 PROCEDURE — 82948 REAGENT STRIP/BLOOD GLUCOSE: CPT

## 2023-05-25 PROCEDURE — 25010000002 LORAZEPAM PER 2 MG: Performed by: NURSE PRACTITIONER

## 2023-05-25 PROCEDURE — 99232 SBSQ HOSP IP/OBS MODERATE 35: CPT | Performed by: NURSE PRACTITIONER

## 2023-05-25 PROCEDURE — 97530 THERAPEUTIC ACTIVITIES: CPT

## 2023-05-25 PROCEDURE — 80061 LIPID PANEL: CPT | Performed by: INTERNAL MEDICINE

## 2023-05-25 RX ORDER — LORAZEPAM 2 MG/ML
1 INJECTION INTRAMUSCULAR ONCE
Status: DISCONTINUED | OUTPATIENT
Start: 2023-05-26 | End: 2023-05-25

## 2023-05-25 RX ORDER — LORAZEPAM 2 MG/ML
1 INJECTION INTRAMUSCULAR ONCE AS NEEDED
Status: COMPLETED | OUTPATIENT
Start: 2023-05-25 | End: 2023-05-26

## 2023-05-25 RX ORDER — HYDROCODONE BITARTRATE AND ACETAMINOPHEN 5; 325 MG/1; MG/1
1 TABLET ORAL EVERY 8 HOURS PRN
Status: DISCONTINUED | OUTPATIENT
Start: 2023-05-25 | End: 2023-05-26 | Stop reason: HOSPADM

## 2023-05-25 RX ORDER — LORAZEPAM 2 MG/ML
1 INJECTION INTRAMUSCULAR ONCE
Status: DISCONTINUED | OUTPATIENT
Start: 2023-05-25 | End: 2023-05-26 | Stop reason: HOSPADM

## 2023-05-25 RX ORDER — GABAPENTIN 300 MG/1
300 CAPSULE ORAL DAILY
Status: DISCONTINUED | OUTPATIENT
Start: 2023-05-25 | End: 2023-05-25

## 2023-05-25 RX ORDER — LORAZEPAM 2 MG/ML
1 INJECTION INTRAMUSCULAR ONCE
Status: DISCONTINUED | OUTPATIENT
Start: 2023-05-25 | End: 2023-05-25 | Stop reason: SDUPTHER

## 2023-05-25 RX ADMIN — Medication 10 ML: at 08:27

## 2023-05-25 RX ADMIN — METOPROLOL TARTRATE 25 MG: 25 TABLET, FILM COATED ORAL at 08:19

## 2023-05-25 RX ADMIN — Medication 10 ML: at 21:15

## 2023-05-25 RX ADMIN — METOPROLOL TARTRATE 25 MG: 25 TABLET, FILM COATED ORAL at 21:13

## 2023-05-25 RX ADMIN — HYDROCODONE BITARTRATE AND ACETAMINOPHEN 1 TABLET: 5; 325 TABLET ORAL at 08:32

## 2023-05-25 RX ADMIN — LORAZEPAM 1 MG: 2 INJECTION INTRAMUSCULAR at 07:37

## 2023-05-25 RX ADMIN — HYDROCODONE BITARTRATE AND ACETAMINOPHEN 1 TABLET: 5; 325 TABLET ORAL at 23:58

## 2023-05-25 RX ADMIN — ASPIRIN 81 MG CHEWABLE TABLET 81 MG: 81 TABLET CHEWABLE at 08:19

## 2023-05-25 RX ADMIN — HYDROCODONE BITARTRATE AND ACETAMINOPHEN 1 TABLET: 5; 325 TABLET ORAL at 15:55

## 2023-05-25 RX ADMIN — ATORVASTATIN CALCIUM 80 MG: 40 TABLET, FILM COATED ORAL at 21:14

## 2023-05-25 RX ADMIN — CETIRIZINE HYDROCHLORIDE 10 MG: 10 TABLET, FILM COATED ORAL at 08:19

## 2023-05-25 NOTE — THERAPY EVALUATION
Patient Name: Ortiz Salamanca  : 1970    MRN: [id number]                              Today's Date: 2023       Admit Date: 2023    Visit Dx:     ICD-10-CM ICD-9-CM   1. Chest pain, unspecified type  R07.9 786.50   2. Left-sided weakness  R53.1 728.87     Patient Active Problem List   Diagnosis   • Chest pain   • NSTEMI (non-ST elevated myocardial infarction)   • Anxiety   • Dizziness, nonspecific   • Ganglion cyst   • Primary osteoarthritis of left knee   • Syncope   • Coronary artery disease of native artery of native heart with stable angina pectoris   • Presence of drug coated stent in LAD coronary artery   • Screen for colon cancer   • Mechanical knee pain, left   • Insufficiency fracture of tibia   • Pes anserine bursitis   • Chronic fatigue   • Ankle swelling   • Multiple joint pain   • Low back pain   • Edema   • Hyperlipidemia   • History of sleeve gastrectomy   • Slow transit constipation   • Gastroesophageal reflux disease   • Vitamin D deficiency   • Obesity, Class I, BMI 30-34.9   • Subacromial bursitis of left shoulder joint   • AC joint arthropathy   • Paresthesia of hand, bilateral   • Chest pain, unspecified type   • Left-sided weakness     Past Medical History:   Diagnosis Date   • Colon polyp     BENIGN   • Coronary artery disease involving native coronary artery of native heart without angina pectoris 2019   • DDD (degenerative disc disease), lumbosacral    • Elevated cholesterol    • Heart attack 2018    2018 dec 25   • Hiatal hernia    • History of heart artery stent 2018    LAD   • Hyperlipidemia    • Migraine headache    • Presence of drug coated stent in LAD coronary artery 2019   • Spinal stenosis      Past Surgical History:   Procedure Laterality Date   • BACK SURGERY     • CARDIAC CATHETERIZATION N/A 2018    Procedure: Left Heart Cath;  Surgeon: Hilario Coronado MD;  Location: [facility] CATH INVASIVE LOCATION;  Service: Cardiovascular   •  CARDIAC CATHETERIZATION N/A 2018    Procedure: Left ventriculography;  Surgeon: Hilario Coronado MD;  [facility];  Service: Cardiovascular   • CARDIAC CATHETERIZATION N/A 2018    Procedure: Stent EVARISTO coronary;  Surgeon: Hilario Coronado MD;  Location:  [facility] CATH INVASIVE LOCATION;  Service: Cardiovascular   • CARDIAC CATHETERIZATION N/A 2018    Procedure: Coronary angiography;  Surgeon: Hilario Coronado MD;  Location:  [facility] CATH INVASIVE LOCATION;  Service: Cardiovascular   •  SECTION     • COLONOSCOPY N/A 10/09/2019    Procedure: COLONOSCOPY with polypectomy;  Surgeon: Dung Hutchison MD;  Location: [facility] OR;  Service: Gastroenterology   • CORONARY STENT PLACEMENT     • ENDOSCOPY N/A 2020    Procedure: ESOPHAGOGASTRODUODENOSCOPY WITH BIOPSY;  Surgeon: Facundo Helm Jr., MD;  Location: [facility] ENDOSCOPY;  Service: General;  Laterality: N/A;  PRE- DYSPEPSIA  POST- GASTRITIS   • GASTRIC SLEEVE LAPAROSCOPIC N/A 2020    Procedure: GASTRIC SLEEVE LAPAROSCOPIC With Hiatal Hernia Repair;  Surgeon: Facundo Helm Jr., MD;  Location: [facility] [place] [facility];  Service: Bariatric;  Laterality: N/A;   • HERNIA REPAIR     • HYSTERECTOMY     • KNEE SURGERY Left     cyst removal   • LUMBAR FUSION  2021      General Information     Row Name 23 0940          OT Time and Intention    Document Type evaluation  -SD     Mode of Treatment occupational therapy  -SD     Row Name 23 0940          General Information    Patient Profile Reviewed yes  52-year-old female with PMHX significant for previous MI with heart stent placed in , hyperlipidemia, hypertension, migraine headache.  She presents to [facility] ED with c/o CP radiating down the left arm resulting in her LUE feeling heavy. R/O stroke  -SD     Prior Level of Function independent:;ADL's;all household mobility;community mobility;cleaning;work  -SD   "   Row Name 05/25/23 0940          Occupational Profile    Reason for Services/Referral (Occupational Profile) r/o CVA  -SD     Successful Occupations (Occupational Profile) Pt I with daily tasks.  -SD     Occupational History/Life Experiences (Occupational Profile) She works at [employer] in [place]. Pt reports hx of MI with stents placed a few years ago, and she reported similiar symptoms as this admittance  -SD     Environmental Supports and Barriers (Occupational Profile) son lives with pt  -SD     Row Name 05/25/23 0940          Living Environment    People in Home child(jose luis), adult  -SD     Row Name 05/25/23 0940          Home Main Entrance    Number of Stairs, Main Entrance --  \"a few\"  -SD     Row Name 05/25/23 0940          Cognition    Orientation Status (Cognition) oriented x 3  -SD     Row Name 05/25/23 0940          Safety Issues, Functional Mobility    Safety Issues Affecting Function (Mobility) positioning of assistive device;other (see comments)  pt veering toward right and needed cues/assistance to avoid environmental barriers  -SD     Impairments Affecting Function (Mobility) balance;other (see comments)  generalized weakness.  -SD     Comment, Safety Issues/Impairments (Mobility) Pt stated she had medication prior to her MRI to help calm her. Medication may be impacting her balance and mobility.  -SD           User Key  (r) = Recorded By, (t) = Taken By, (c) = Cosigned By    Initials Name Provider Type    Naldo Panda, OTR Occupational Therapist                 Mobility/ADL's     Row Name 05/25/23 0979          Bed Mobility    Bed Mobility supine-sit;sit-supine  -SD     Supine-Sit [place] (Bed Mobility) supervision  -SD     Sit-Supine [place] (Bed Mobility) supervision  -SD     Comment, (Bed Mobility) pt reported feeling \"woozy\" upon sitting up to EOB. Pt stated it may be from medication taken to calm her for her MRI.  -SD     Row Name 05/25/23 0962          Transfers " "   Transfers sit-stand transfer;stand-sit transfer  -SD     Row Name 05/25/23 0949          Sit-Stand Transfer    Sit-Stand [place] (Transfers) contact guard  -SD     Assistive Device (Sit-Stand Transfers) walker, front-wheeled  -SD     Row Name 05/25/23 0949          Stand-Sit Transfer    Stand-Sit [place] (Transfers) contact guard  -SD     Assistive Device (Stand-Sit Transfers) walker, front-wheeled  -SD     Row Name 05/25/23 0949          Functional Mobility    Functional Mobility- Ind. Level contact guard assist  -SD     Functional Mobility- Device walker, front-wheeled  -SD     Functional Mobility-Distance (Feet) 75  -SD     Functional Mobility- Comment Pt was veering to the right during mobility and required verbal cues and assistance to manuever the walker around environmental barriers  -SD     Row Name 05/25/23 0949          Activities of Daily Living    BADL Assessment/Intervention lower body dressing  -[facility] Name 05/25/23 0949          Lower Body Dressing Assessment/Training    Comment, (Lower Body Dressing) anticipate CGA/min assist for lb adl activity from standing for safety due to balance  -SD           User Key  (r) = Recorded By, (t) = Taken By, (c) = Cosigned By    Initials Name Provider Type    SD Naldo Zambrano, OTR Occupational Therapist               Obj/Interventions     Row Name 05/25/23 0957          Sensory Assessment (Somatosensory)    Sensory Assessment (Somatosensory) other (see comments)  pt reports her left arm feels \"sensitize\". pt able to indentify tactile stimulation/light touch for BUE's  -SD     Row Name 05/25/23 0957          Vision Assessment/Intervention    Vision Assessment Comment attempted visual tracking test, but pt was not able to follow directions. will continue to assess  -SD     Row Name 05/25/23 0957          Range of Motion Comprehensive    General Range of Motion bilateral upper extremity ROM WNL  -SD     Row Name 05/25/23 0957          Strength " "Comprehensive (MMT)    Comment, General Manual Muscle Testing (MMT) Assessment Pt reported her LUE \"felt heavy\". She presented with generalized weakness of BUE (3+/5 MMT), but no asymmetry was noted.  -SD     Row Name 05/25/23 0957          Motor Skills    Motor Skills other (see comments)  pt performed thumb opposition to tips of each digits with both hands (no significant difference in functional coordination with either hand)  -SD     Row Name 05/25/23 0957          Balance    Comment, Balance SBA for sitting balance at EOB and CGA for standing balance. Pt states she felt \"woozy\"  -SD           User Key  (r) = Recorded By, (t) = Taken By, (c) = Cosigned By    Initials Name Provider Type    Naldo Panda, OTR Occupational Therapist               Goals/Plan     Row Name 05/25/23 1209          Transfer Goal 1 (OT)    Activity/Assistive Device (Transfer Goal 1, OT) sit-to-stand/stand-to-sit  to increase safety during adl tasks from standing  -SD     [place] Level/Cues Needed (Transfer Goal 1, OT) supervision required  -SD     Time Frame (Transfer Goal 1, OT) by discharge  -SD     Progress/Outcome (Transfer Goal 1, OT) new goal  -SD     Row Name 05/25/23 1206          Dressing Goal 1 (OT)    Activity/Device (Dressing Goal 1, OT) lower body dressing  -SD     [place]/Cues Needed (Dressing Goal 1, OT) supervision required  -SD     Time Frame (Dressing Goal 1, OT) by discharge  -SD     Strategies/Barriers (Dressing Goal 1, OT) barriers- balance and generalized weakness  -SD     Progress/Outcome (Dressing Goal 1, OT) new goal  -SD     Row Name 05/25/23 1206          Strength Goal 1 (OT)    Strength Goal 1 (OT) Pt to be independent with BUE HEP to address generalized weakness to increase adl function  -SD     Time Frame (Strength Goal 1, OT) by discharge  -SD     Progress/Outcome (Strength Goal 1, OT) new goal  -SD     Row Name 05/25/23 1206          Therapy Assessment/Plan (OT)    Planned Therapy " "Interventions (OT) patient/caregiver education/training;transfer/mobility retraining  -SD           User Key  (r) = Recorded By, (t) = Taken By, (c) = Cosigned By    Initials Name Provider Type    Naldo Panda OTR Occupational Therapist               Clinical Impression     Row Name 05/25/23 1004          Pain Assessment    Pre/Posttreatment Pain Comment no c/o pain  -SD     Row Name 05/25/23 1004          Plan of Care Review    Plan of Care Reviewed With patient  -SD     Outcome Evaluation OT evaluation completed. Pt came to ER with c/o left arm heaviness/weakness and chest pain. Pt stated she felt \"woozy\" this am possibly due to medication to help calm her for an MRI. Pt was oriented this am. She did have some difficulty consistently following commands for BUE rom/mmt and visual tracking activity. Pt demonstrated functional rom of BUE's. She presented with generalized weakness of BUE (3+/5), yet no asymmetry was noted. Pt managed bed mobility with supervision and transfers/functional mobility with CGA using a rolling walker for support for standing activity. Pt was noted to veer to her right during mobility and required verbal cues and assistance to maneuver the walker around evironmental barriers. Anticiapte CGA for safety for adl tasks at this time. Plan to follow in OT regarding safety with management of adl/daily tasks.  -SD     Row Name 05/25/23 1004          Therapy Assessment/Plan (OT)    Patient/Family Therapy Goal Statement (OT) none stated  -SD     Rehab Potential (OT) fair, will monitor progress closely  -SD     Criteria for Skilled Therapeutic Interventions Met (OT) yes;skilled treatment is necessary  -SD     Therapy Frequency (OT) other (see comments)  1-2 visits for education with home safety  -SD     Row Name 05/25/23 1004          Therapy Plan Review/Discharge Plan (OT)    Equipment Needs Upon Discharge (OT) --  pt may need a walker if her balance does not improve by discharge.  -SD     " Anticipated Discharge Disposition (OT) home with assist  -SD     Row Name 05/25/23 1004          Positioning and Restraints    Pre-Treatment Position in bed  -SD     Post Treatment Position bed  -SD     In Bed supine;with nsg;call light within reach;encouraged to call for assist  -SD           User Key  (r) = Recorded By, (t) = Taken By, (c) = Cosigned By    Initials Name Provider Type    SD Naldo Zambrano OTR Occupational Therapist               Outcome Measures     Row Name 05/25/23 1210          How much help from another is currently needed...    Putting on and taking off regular lower body clothing? 3  -SD     Bathing (including washing, rinsing, and drying) 3  -SD     Toileting (which includes using toilet bed pan or urinal) 3  -SD     Putting on and taking off regular upper body clothing 4  -SD     Taking care of personal grooming (such as brushing teeth) 4  -SD     Eating meals 4  -SD     AM-PAC 6 Clicks Score (OT) 21  -SD     Row Name 05/25/23 0858          How much help from another person do you currently need...    Turning from your back to your side while in flat bed without using bedrails? 4  -JW     Moving from lying on back to sitting on the side of a flat bed without bedrails? 3  -JW     Moving to and from a bed to a chair (including a wheelchair)? 3  -JW     Standing up from a chair using your arms (e.g., wheelchair, bedside chair)? 3  -JW     Climbing 3-5 steps with a railing? 3  -JW     To walk in hospital room? 3  -JW     AM-PAC 6 Clicks Score (PT) 19  -JW     Highest level of mobility 6 --> Walked 10 steps or more  -     Row Name 05/25/23 1215 05/25/23 0858       Modified [place] Scale    Pre-Stroke Modified [place] Scale 0 - No Symptoms at all.  -SD 0 - No Symptoms at all.  -JW    Modified [place] Scale 4 - Moderately severe disability.  Unable to walk without assistance, and unable to attend to own bodily needs without assistance.  -SD 4 - Moderately severe disability.  Unable to walk  "without assistance, and unable to attend to own bodily needs without assistance.  -    Row Name 05/25/23 1210 05/25/23 0858       Functional Assessment    Outcome Measure Options AM-PAC 6 Clicks Daily Activity (OT);Modified [place]  -SD AM-PAC 6 Clicks Basic Mobility (PT);Modified [place]  -          User Key  (r) = Recorded By, (t) = Taken By, (c) = Cosigned By    Initials Name Provider Type    SD Naldo Zambrano OTR Occupational Therapist    Katharine Molina, AYAH Physical Therapist                Occupational Therapy Education     Title: PT OT SLP Therapies (In Progress)     Topic: Occupational Therapy (In Progress)     Point: ADL training (Done)     Description:   Instruct learner(s) on proper safety adaptation and remediation techniques during self care or transfers.   Instruct in proper use of assistive devices.              Learning Progress Summary           Patient Acceptance, E,TB,D, VU,NR by SD at 5/25/2023 1210    Comment: Education regarding safety during daily tasks, transfers and mobility.                   Point: Home exercise program (Not Started)     Description:   Instruct learner(s) on appropriate technique for monitoring, assisting and/or progressing therapeutic exercises/activities.              Learner Progress:  Not documented in this visit.                      User Key     Initials Effective Dates Name Provider Type Discipline    SD 06/16/21 -  Naldo Zambrano [email or web address] Occupational Therapist OT              OT Recommendation and Plan  Planned Therapy Interventions (OT): patient/caregiver education/training, transfer/mobility retraining  Therapy Frequency (OT): other (see comments) (1-2 visits for education with home safety)  Plan of Care Review  Plan of Care Reviewed With: patient  Outcome Evaluation: OT evaluation completed. Pt came to ER with c/o left arm heaviness/weakness and chest pain. Pt stated she felt \"woozy\" this am possibly due to medication to help calm her for an MRI. Pt was " oriented this am. She did have some difficulty consistently following commands for BUE rom/mmt and visual tracking activity. Pt demonstrated functional rom of BUE's. She presented with generalized weakness of BUE (3+/5), yet no asymmetry was noted. Pt managed bed mobility with supervision and transfers/functional mobility with CGA using a rolling walker for support for standing activity. Pt was noted to veer to her right during mobility and required verbal cues and assistance to maneuver the walker around evironmental barriers. Anticiapte CGA for safety for adl tasks at this time. Plan to follow in OT regarding safety with management of adl/daily tasks.     Time Calculation:    Time Calculation- OT     Row Name 05/25/23 1203             Time Calculation- OT    OT Start Time 0858  -SD      OT Stop Time 0918  -SD      OT Time Calculation (min) 20 min  -SD         Untimed Charges    OT Eval/Re-eval Minutes 20  -SD         Total Minutes    Untimed Charges Total Minutes 20  -SD       Total Minutes 20  -SD            User Key  (r) = Recorded By, (t) = Taken By, (c) = Cosigned By    Initials Name Provider Type    SD Naldo Zambrano OTR Occupational Therapist              Therapy Charges for Today     Code Description Service Date Service Provider Modifiers Qty    [id number] HC OT EVAL LOW COMPLEXITY 1 5/25/2023 Naldo Zambrano OTR GO 1               PETRA Butt  5/25/2023

## 2023-05-25 NOTE — PROGRESS NOTES
Called nurse at 7:09 am EST to get camera in front of patient. The patient has been away at MRI and the nurses since then have been justifiably busy helping sicker patients. Please text me at [phone] when camera is in front of patient and I will be happy to see this patient      ASSESSMENT/PLAN: Stroke vs stroke mimic (chest pain)  AWAITS MRI brain (ordered)   --recommend cardiology consultation  ---continue aspirin/ lipitor 80 (ordered)

## 2023-05-25 NOTE — PLAN OF CARE
"Goal Outcome Evaluation:  Plan of Care Reviewed With: patient           Outcome Evaluation: OT evaluation completed. Pt came to ER with c/o left arm heaviness/weakness and chest pain. Pt stated she felt \"woozy\" this am possibly due to medication to help calm her for an MRI. Pt was oriented this am. She did have some difficulty consistently following commands for BUE rom/mmt and visual tracking activity. Pt demonstrated functional rom of BUE's. She presented with generalized weakness of BUE (3+/5), yet no asymmetry was noted. Pt managed bed mobility with supervision and transfers/functional mobility with CGA using a rolling walker for support for standing activity. Pt was noted to veer to her right during mobility and required verbal cues and assistance to maneuver the walker around evironmental barriers. Anticiapte CGA for safety for adl tasks at this time. Plan to follow in OT regarding safety with management of adl/daily tasks.         "

## 2023-05-25 NOTE — PLAN OF CARE
Goal Outcome Evaluation:  Plan of Care Reviewed With: patient        Progress: no change  Outcome Evaluation: NIHSS score 3, continue to monitor, VSS, Tele: NSR, passed dysphagia screen; diet advanced. plan for MRI and CXR today.

## 2023-05-25 NOTE — PROGRESS NOTES
Patient's son, Christiano,  is requesting to be updated about patient's MRI brain result. He is aware this can will not be conducted until tomorrow morning, but he is very concerned that this is indeed a stroke based on symptoms he observed. He says patient was normal when he left her house yesterday, and within 20 mins of leaving she called him to report all of her symptoms including left sided weakness.

## 2023-05-25 NOTE — PLAN OF CARE
"Goal Outcome Evaluation:  Plan of Care Reviewed With: patient           Outcome Evaluation: Physical therapy evaluation complete.  patient reports feeling \"woozy\" this AM stating it may be due to medication received prior to MRI.  Patient performs supine to sit with SBA and sit to stand with CGA and rolling walker.  patient performs gait x75 feet with CGA/min assist, requires assistance to manage device and avoid obstacles.  Patient noted to have increased veering of walker to the right and multiple episodes of running into objects on the right side.  Patient displays some difficulty following commands for muscle testing, LE strength 3+/5 bilaterally, however no asymmetry noted.  Performance may be impacted by medication received prior to MRI earlier this morning, however will continue to follow for therapy needs.         "

## 2023-05-25 NOTE — PROGRESS NOTES
"SERVICE: [facility] HOSPITALIST    CONSULTANTS: Neurology, cardiology, neuro stroke    CHIEF COMPLAINT: Follow-up gait instability, chest pain    SUBJECTIVE: The patient reports that she continues to feel \"off\".  She notes when she stands she does get dizzy and feels that she is leaning to the left.  She notes she has not felt well in the past week and has had less of an appetite and has had some palpitations requiring her to sit down at work.  She notes some shortness of air with exertion lately.  No current chest pain.  She notes very similar symptoms when she was requiring a stent in 2018.    OBJECTIVE:    /77 (BP Location: Left arm, Patient Position: Lying)   Pulse 76   Temp 98 °F (36.7 °C) (Oral)   Resp 14   Ht 160 cm (62.99\")   Wt 74.2 kg (163 lb 9.6 oz)   LMP  (LMP Unknown)   SpO2 97%   BMI 28.99 kg/m²     MEDS/LABS REVIEWED AND ORDERED    aspirin, 81 mg, Oral, Daily  atorvastatin, 80 mg, Oral, Nightly  cetirizine, 10 mg, Oral, Daily  LORazepam, 1 mg, Intravenous, Once  metoprolol tartrate, 25 mg, Oral, BID  sodium chloride, 10 mL, Intravenous, Q12H      Physical Exam  Vitals reviewed.   Constitutional:       General: She is not in acute distress.     Appearance: Normal appearance. She is not ill-appearing.   HENT:      Head: Normocephalic and atraumatic.      Mouth/Throat:      Mouth: Mucous membranes are moist.   Eyes:      Extraocular Movements: Extraocular movements intact.      Pupils: Pupils are equal, round, and reactive to light.   Cardiovascular:      Rate and Rhythm: Normal rate and regular rhythm.   Pulmonary:      Effort: Pulmonary effort is normal. No respiratory distress.      Breath sounds: Normal breath sounds. No wheezing or rales.   Abdominal:      General: Abdomen is flat. Bowel sounds are normal. There is no distension.      Palpations: Abdomen is soft.      Tenderness: There is no abdominal tenderness. There is no guarding.   Musculoskeletal:         " General: No swelling.   Skin:     General: Skin is warm and dry.      Capillary Refill: Capillary refill takes less than 2 seconds.      Findings: No erythema.   Neurological:      General: No focal deficit present.      Mental Status: She is alert and oriented to person, place, and time.      Comments: Generalized weakness with 3/5 strength   Psychiatric:         Mood and Affect: Mood normal.         Behavior: Behavior normal.       LAB/DIAGNOSTICS:    Lab Results (last 24 hours)     Procedure Component Value Units Date/Time    TSH [[id number]]  (Normal) Collected: 05/25/23 0337    Specimen: Blood Updated: 05/25/23 1038     TSH 2.510 uIU/mL     [place] Draw [[id number]] Collected: 05/24/23 1920    Specimen: Blood Updated: 05/25/23 0632    Narrative:      The following orders were created for panel order [place] Draw.  Procedure                               Abnormality         Status                     ---------                               -----------         ------                     Green Top (Gel)[[id number]]                                  Final result               Lavender Top[[id number]]                                                                Gold Top - SST[[id number]]                                                              Light Blue Top[[id number]]                                                                Please view results for these tests on the individual orders.    POC Glucose Once [[id number]]  (Normal) Collected: 05/25/23 0607    Specimen: Blood Updated: 05/25/23 0613     Glucose 113 mg/dL      Comment: Meter: [id number] : [id number] Jenniferolivia Pruitt GATITO       Lipid Panel [[id number]]  (Abnormal) Collected: 05/25/23 0337    Specimen: Blood Updated: 05/25/23 0447     Total Cholesterol 185 mg/dL      Triglycerides 51 mg/dL      HDL Cholesterol 55 mg/dL      LDL Cholesterol  120 mg/dL      VLDL Cholesterol 10 mg/dL      LDL/HDL Ratio 2.18    Narrative:      Cholesterol Reference  Ranges  (U.S. Department of Health and Human Services ATP III Classifications)    Desirable          <200 mg/dL  Borderline High    200-239 mg/dL  High Risk          >240 mg/dL      Triglyceride Reference Ranges  (U.S. Department of Health and Human Services ATP III Classifications)    Normal           <150 mg/dL  Borderline High  150-199 mg/dL  High             200-499 mg/dL  Very High        >500 mg/dL    HDL Reference Ranges  (U.S. Department of Health and Human Services ATP III Classifications)    Low     <40 mg/dl (major risk factor for CHD)  High    >60 mg/dl ('negative' risk factor for CHD)        LDL Reference Ranges  (U.S. Department of Health and Human Services ATP III Classifications)    Optimal          <100 mg/dL  Near Optimal     100-129 mg/dL  Borderline High  130-159 mg/dL  High             160-189 mg/dL  Very High        >189 mg/dL    Hemoglobin A1c [[id number]]  (Normal) Collected: 05/25/23 0337    Specimen: Blood Updated: 05/25/23 0445     Hemoglobin A1C 5.40 %     Narrative:      Hemoglobin A1C Ranges:    Increased Risk for Diabetes  5.7% to 6.4%  Diabetes                     >= 6.5%  Diabetic Goal                < 7.0%    POC Glucose Once [[id number]]  (Normal) Collected: 05/24/23 2341    Specimen: Blood Updated: 05/24/23 2347     Glucose 112 mg/dL      Comment: Meter: [id number] : [id number] Franciabernabe Pruitt GATITO       High Sensitivity Troponin T 2Hr [[id number]] Collected: 05/24/23 2135    Specimen: Blood Updated: 05/24/23 2157     HS Troponin T <6 ng/L      Troponin T Delta --     Comment: Unable to calculate.       Narrative:      High Sensitive Troponin T Reference Range:  <10.0 ng/L- Negative Female for AMI  <15.0 ng/L- Negative Male for AMI  >=10 - Abnormal Female indicating possible myocardial injury.  >=15 - Abnormal Male indicating possible myocardial injury.   Clinicians would have to utilize clinical acumen, EKG, Troponin, and serial changes to determine if it is an Acute Myocardial  Infarction or myocardial injury due to an underlying chronic condition.         Green Top (Gel) [[id number]] Collected: 05/24/23 1920    Specimen: Blood Updated: 05/24/23 2031     Extra Tube Hold for add-ons.     Comment: Auto resulted.       BNP [[id number]]  (Normal) Collected: 05/24/23 1920    Specimen: Blood Updated: 05/24/23 1955     proBNP 139.3 pg/mL     Narrative:      Among patients with dyspnea, NT-proBNP is highly sensitive for the detection of acute congestive heart failure. In addition NT-proBNP of <300 pg/ml effectively rules out acute congestive heart failure with 99% negative predictive value.    Results may be falsely decreased if patient taking Biotin.      High Sensitivity Troponin T [[id number]]  (Normal) Collected: 05/24/23 1920    Specimen: Blood Updated: 05/24/23 1955     HS Troponin T <6 ng/L     Narrative:      High Sensitive Troponin T Reference Range:  <10.0 ng/L- Negative Female for AMI  <15.0 ng/L- Negative Male for AMI  >=10 - Abnormal Female indicating possible myocardial injury.  >=15 - Abnormal Male indicating possible myocardial injury.   Clinicians would have to utilize clinical acumen, EKG, Troponin, and serial changes to determine if it is an Acute Myocardial Infarction or myocardial injury due to an underlying chronic condition.         D-dimer, Quantitative [[id number]]  (Normal) Collected: 05/24/23 1810    Specimen: Blood Updated: 05/24/23 1837     D-Dimer, Quantitative 0.52 MCGFEU/mL     Narrative:      According to the assay 's published package insert, a normal (<0.50 MCGFEU/mL) D-dimer result in conjunction with a non-high clinical probability assessment, excludes deep vein thrombosis (DVT) and pulmonary embolism (PE) with high sensitivity.    D-dimer values increase with age and this can make VTE exclusion of an older population difficult. To address this, the American College of [employer], based on best available evidence and recent guidelines, recommends that  "clinicians use age-adjusted D-dimer thresholds in patients greater than 50 years of age with: a) a low probability of PE who do not meet all Pulmonary Embolism Rule Out Criteria, or b) in those with intermediate probability of PE.   The formula for an age-adjusted D-dimer cut-off is \"age/100\".  For example, a 60 year old patient would have an age-adjusted cut-off of 0.60 MCGFEU/mL and an 80 year old 0.80 MCGFEU/mL.    Single High Sensitivity Troponin T [[id number]]  (Normal) Collected: 05/24/23 1810    Specimen: Blood Updated: 05/24/23 1836     HS Troponin T <6 ng/L     Narrative:      High Sensitive Troponin T Reference Range:  <10.0 ng/L- Negative Female for AMI  <15.0 ng/L- Negative Male for AMI  >=10 - Abnormal Female indicating possible myocardial injury.  >=15 - Abnormal Male indicating possible myocardial injury.   Clinicians would have to utilize clinical acumen, EKG, Troponin, and serial changes to determine if it is an Acute Myocardial Infarction or myocardial injury due to an underlying chronic condition.         Comprehensive Metabolic Panel [[id number]]  (Abnormal) Collected: 05/24/23 1810    Specimen: Blood Updated: 05/24/23 1834     Glucose 106 mg/dL      BUN 6 mg/dL      Creatinine 0.76 mg/dL      Sodium 141 mmol/L      Potassium 4.1 mmol/L      Chloride 108 mmol/L      CO2 25.6 mmol/L      Calcium 9.1 mg/dL      Total Protein 6.7 g/dL      Albumin 3.9 g/dL      ALT (SGPT) 34 U/L      AST (SGOT) 26 U/L      Alkaline Phosphatase 113 U/L      Total Bilirubin 0.2 mg/dL      Globulin 2.8 gm/dL      A/G Ratio 1.4 g/dL      BUN/Creatinine Ratio 7.9     Anion Gap 7.4 mmol/L      eGFR 94.4 mL/min/1.73     Narrative:      GFR Normal >60  Chronic Kidney Disease <60  Kidney Failure <15      aPTT [[id number]]  (Normal) Collected: 05/24/23 1810    Specimen: Blood Updated: 05/24/23 1827     PTT 26.1 seconds     Narrative:      PTT = The equivalent PTT values for the therapeutic range of heparin levels at 0.1 to 0.7 " U/ml are 53 to 110 seconds.      Protime-INR [[id number]]  (Normal) Collected: 05/24/23 1810    Specimen: Blood Updated: 05/24/23 1827     Protime 12.5 Seconds      INR 0.93    Narrative:      Therapeutic Ranges for INR: 2.0-3.0 (PT 20-30)                              2.5-3.5 (PT 25-34)    POC Glucose Once [[id number]]  (Normal) Collected: 05/24/23 1807    Specimen: Blood Updated: 05/24/23 1813     Glucose 102 mg/dL      Comment: Meter: [id number] : [id number] Juan Ayala RN       CBC & Differential [[id number]]  (Abnormal) Collected: 05/24/23 1810    Specimen: Blood Updated: 05/24/23 1813    Narrative:      The following orders were created for panel order CBC & Differential.  Procedure                               Abnormality         Status                     ---------                               -----------         ------                     CBC Auto Differential[[id number]]        Abnormal            Final result                 Please view results for these tests on the individual orders.    CBC Auto Differential [[id number]]  (Abnormal) Collected: 05/24/23 1810    Specimen: Blood Updated: 05/24/23 1813     WBC 6.23 10*3/mm3      RBC 4.24 10*6/mm3      Hemoglobin 12.1 g/dL      Hematocrit 37.1 %      MCV 87.5 fL      MCH 28.5 pg      MCHC 32.6 g/dL      RDW 14.9 %      RDW-SD 46.7 fl      MPV 9.7 fL      Platelets 258 10*3/mm3      Neutrophil % 52.8 %      Lymphocyte % 30.8 %      Monocyte % 14.3 %      Eosinophil % 1.4 %      Basophil % 0.5 %      Immature Grans % 0.2 %      Neutrophils, Absolute 3.29 10*3/mm3      Lymphocytes, Absolute 1.92 10*3/mm3      Monocytes, Absolute 0.89 10*3/mm3      Eosinophils, Absolute 0.09 10*3/mm3      Basophils, Absolute 0.03 10*3/mm3      Immature Grans, Absolute 0.01 10*3/mm3      nRBC 0.0 /100 WBC         ECG 12 Lead Chest Pain   Final Result   HEART RATE= 70  bpm   RR Interval= 860  ms   AL Interval= 159  ms   P Horizontal Axis= -6  deg   P Front Axis= 46  deg   QRSD  Interval= 73  ms   QT Interval= 392  ms   QRS Axis= 23  deg   T Wave Axis= 23  deg   - BORDERLINE ECG -   Sinus rhythm   Probable left atrial enlargement   No change from prior tracing   Electronically Signed By: Annel Kay ([facility]) 24-May-2023 21:11:45   Date and Time of Study: 2023-05-24 17:59:14      SCANNED - TELEMETRY     Final Result        Results for orders placed during the hospital encounter of 12/25/18    Adult Transthoracic Echo Complete W/ Cont if Necessary Per Protocol    Interpretation Summary  · Estimated EF = 69%.  · Left ventricular systolic function is normal.    CT Angiogram Neck    Result Date: 5/24/2023  Negative CT angiogram of the head and neck. No intracranial or extracranial arterial flow limiting stenosis or aneurysm.  This report was finalized on 5/24/2023 8:36 PM by Dr. Leno Oropeza MD.      CT Angiogram Chest    Result Date: 5/24/2023  1. Negative for pulmonary embolus. 2. Negative for thoracic aortic aneurysm/dissection. 3. No acute pulmonary process.  This report was finalized on 5/24/2023 6:51 PM by Dr. Leno Oropeza MD.      MRI Brain Without Contrast    Result Date: 5/25/2023  Nonspecific small white matter lesions bilaterally one in each cerebral hemisphere which may represent old Small vessel ischemic changes  Otherwise Normal MRI of the brain without contrast. There is no evidence of recent ischemic change.  This report was finalized on 5/25/2023 9:37 AM by Dr. Paul Serrano MD.      XR Chest 1 View    Result Date: 5/25/2023  Negative single view chest  This report was finalized on 5/25/2023 7:30 AM by Dr. Paul Serrano MD.      CT Head Without Contrast Stroke Protocol    Result Date: 5/24/2023  No acute intracranial abnormality.   This report was finalized on 5/24/2023 6:19 PM by Dr. Leno Oropeza MD.      CT Angiogram Head w AI Analysis of LVO    Result Date: 5/24/2023  Negative CT angiogram of the head and neck. No intracranial or extracranial arterial flow limiting stenosis or  "aneurysm.  This report was finalized on 5/24/2023 8:36 PM by Dr. Leno Oropeza MD.      CT CEREBRAL PERFUSION WITH & WITHOUT CONTRAST    Result Date: 5/24/2023  Normal CT cerebral perfusion exam.     This report was finalized on 5/24/2023 6:49 PM by Dr. Leno Oropeza MD.      ASSESSMENT/PLAN:  Chest pain, ruled out ACS: Consult cardiology  H/O MI 2018 with stent 2018:   HTN:  HLD:   Patient concerned due to similarity of symptoms when she required a stent in 2018  Palpitations and dizziness on and off  Check echocardiogram  Labs are unremarkable for acute MI  Continue metoprolol aspirin and Lipitor  Monitor on telemetry    Dizziness and gait instability:  PT/OT did find irregularities in performance  MRI was not completely normal but did not show any acute stroke  Check orthostatic vital signs  Consult neurology to evaluate  Continue PT/OT  Add fall precautions     Anxiety-continue Atarax as needed     Seasonal allergic rhinitis-continue loratadine     Migraine-continue Maxalt as needed     Chronic peripheral neuropathy:    Chronic pain: typically on hydrocodone/gabapentin, hold for now pending consults    PLAN FOR DISPOSITION: Home when able    ROMELIA Garcia  Hospitalist, [facility]  05/25/23  08:56 EDT    Note Disclaimer: At [facility], we believe that sharing information builds trust and better relationships. You are receiving this note because you recently visited [facility]. It is possible you will see health information before a provider has talked with you about it. This kind of information can be easy to misunderstand. To help you fully understand what it means for your health, we urge you to discuss this note with your provider.     \"Dictated utilizing Dragon dictation\"      "

## 2023-05-25 NOTE — CONSULTS
Date of Hospital Visit: 2023  Date of consult: 2023  Encounter Provider: Tone Copeland MD  Place of Service: [facility] CARDIOLOGY  Patient Name:  JACQUES Salamanca  :1970  Referral Provider: No ref. provider found    Chief complaint: Left arm heaviness    Reason for consult: Suspect chest pain    History of Present Illness    Mrs Salamanca came to the ER yesterday with 1 day history of intermittent left arm heaviness, left lower extremity feeling weird and shortness of breath.  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  Left arm heaviness was severe along with shortness of breath that prompted her to come to the ER as she thought symptoms resembled before prior placement of stent in .  Its not related to exertion.  She was evaluated by neurology and acute CVA ruled out.  EKG and troponin are negative.  She reports having some heaviness but better today.  She is compliant with current medications at home.  No significant new complaints today.  No acute events overnight.    Past Medical History:   Diagnosis Date   • Colon polyp     BENIGN   • Coronary artery disease involving native coronary artery of native heart without angina pectoris 2019   • DDD (degenerative disc disease), lumbosacral    • Elevated cholesterol    • Heart attack 2018    2018 dec 25   • Hiatal hernia    • History of heart artery stent 2018    LAD   • Hyperlipidemia    • Migraine headache    • Presence of drug coated stent in LAD coronary artery 2019   • Spinal stenosis        Past Surgical History:   Procedure Laterality Date   • BACK SURGERY     • CARDIAC CATHETERIZATION N/A 2018    Procedure: Left Heart Cath;  Surgeon: Hilario Coronado MD;  Location: [facility] CATH INVASIVE LOCATION;  Service: Cardiovascular   • CARDIAC CATHETERIZATION N/A 2018    Procedure: Left ventriculography;  Surgeon: Hilario Coronado MD;   Location: [facility] CATH INVASIVE LOCATION;  Service: Cardiovascular   • CARDIAC CATHETERIZATION N/A 2018    Procedure: Stent EVARISTO coronary;  Surgeon: Hilario Coronado MD;  Location: [facility] CATH INVASIVE LOCATION;  Service: Cardiovascular   • CARDIAC CATHETERIZATION N/A 2018    Procedure: Coronary angiography;  Surgeon: Hilario Coronado MD;  Location: [facility] CATH INVASIVE LOCATION;  Service: Cardiovascular   •  SECTION     • COLONOSCOPY N/A 10/09/2019    Procedure: COLONOSCOPY with polypectomy;  Surgeon: Dung Hutchison MD;  Location: [facility] OR;  Service: Gastroenterology   • CORONARY STENT PLACEMENT     • ENDOSCOPY N/A 2020    Procedure: ESOPHAGOGASTRODUODENOSCOPY WITH BIOPSY;  Surgeon: Facundo Helm Jr., MD;  Location: [facility] ENDOSCOPY;  Service: General;  Laterality: N/A;  PRE- DYSPEPSIA  POST- GASTRITIS   • GASTRIC SLEEVE LAPAROSCOPIC N/A 2020    Procedure: GASTRIC SLEEVE LAPAROSCOPIC With Hiatal Hernia Repair;  Surgeon: Facundo Helm Jr., MD;  Location: [facility] OR [facility];  Service: Bariatric;  Laterality: N/A;   • HERNIA REPAIR     • HYSTERECTOMY     • KNEE SURGERY Left 2014    cyst removal   • LUMBAR FUSION  2021       Medications Prior to Admission   Medication Sig Dispense Refill Last Dose   • amoxicillin (AMOXIL) 500 MG capsule Take 1 capsule by mouth 3 (Three) Times a Day.   2023   • aspirin 81 MG chewable tablet CHEW 1 TABLET DAILY (Patient taking differently: Chew 1 tablet Daily.) 90 tablet 3 2023   • atorvastatin (LIPITOR) 80 MG tablet TAKE ONE TABLET BY MOUTH ONCE NIGHTLY (Patient taking differently: Take 1 tablet by mouth Every Night. TAKE ONE TABLET BY MOUTH ONCE NIGHTLY) 90 tablet 1 2023   • gabapentin (NEURONTIN) 100 MG capsule Take 1 capsule by mouth Daily.   2023   • gabapentin (NEURONTIN) 300 MG capsule Take 1 capsule by mouth 2 (two) times a day.   2023   • HYDROcodone-acetaminophen (NORCO)  7.5-325 MG per tablet Take 1 tablet by mouth Every 6 (Six) Hours As Needed.   2023   • Loratadine 10 MG capsule Take 1 capsule by mouth Daily.   2023   • metoprolol tartrate (LOPRESSOR) 25 MG tablet TAKE 1 TABLET TWICE A DAY (Patient taking differently: Take 1 tablet by mouth 2 (Two) Times a Day.) 180 tablet 3 2023   • rizatriptan MLT (MAXALT-MLT) 10 MG disintegrating tablet Place 1 tablet on the tongue 1 (One) Time As Needed for Migraine.   More than a month       Current Meds  Scheduled Meds:aspirin, 81 mg, Oral, Daily  atorvastatin, 80 mg, Oral, Nightly  cetirizine, 10 mg, Oral, Daily  LORazepam, 1 mg, Intravenous, Once  metoprolol tartrate, 25 mg, Oral, BID  sodium chloride, 10 mL, Intravenous, Q12H      Continuous Infusions:   PRN Meds:.•  HYDROcodone-acetaminophen  •  hydrOXYzine  •  nitroglycerin  •  sodium chloride  •  sodium chloride  •  sodium chloride  •  SUMAtriptan    Allergies as of 2023 - Reviewed 2023   Allergen Reaction Noted   • Cephalexin Anaphylaxis 2018   • Nsaids Other (See Comments) 2020       Social History     Socioeconomic History   • Marital status:    Tobacco Use   • Smoking status: Former     Packs/day: 0.25     Years: 5.00     Pack years: 1.25     Types: Cigarettes     Quit date: 2018     Years since quittin.4   • Smokeless tobacco: Never   • Tobacco comments:     intermittent caffiene   Vaping Use   • Vaping Use: Never used   Substance and Sexual Activity   • Alcohol use: Yes     Comment: HOLIDAY DRINKER   • Drug use: No   • Sexual activity: Defer       Family History   Problem Relation Age of Onset   • Cancer Mother    • Hypertension Mother    • Sleep apnea Mother    • Heart disease Brother    • Heart attack Brother    • Heart disease Maternal Grandmother    • Hypertension Maternal Grandmother    • Heart attack Maternal Grandmother    • Hypertension Father    • Hypertension Paternal Grandmother    • Heart disease Paternal  "Grandmother    • Malig Hyperthermia Neg Hx        REVIEW OF SYSTEMS:   All systems reviewed and pertinent positives include in HPI otherwise negative review of systems.       Objective:   Temp:  [97.7 °F (36.5 °C)-98.5 °F (36.9 °C)] 98 °F (36.7 °C)  Heart Rate:  [70-85] 76  Resp:  [14-18] 14  BP: (107-163)/() 120/77  Body mass index is 28.88 kg/m².  Flowsheet Rows    Flowsheet Row First Filed Value   Admission Height 160 cm (63\") Documented at 05/24/2023 2019   Admission Weight 74.4 kg (164 lb) Documented at 05/24/2023 1803        Vitals:    05/25/23 1311   BP: 120/77   Pulse:    Resp:    Temp:    SpO2:        General Appearance:    Alert, cooperative, in no acute distress   Head:    Normocephalic, without obvious abnormality, atraumatic   Eyes:            Lids and lashes normal, conjunctivae and sclerae normal, no   icterus, no pallor, corneas clear, PERRLA   Ears:    Ears appear intact with no abnormalities noted   Throat:   No oral lesions, no thrush, oral mucosa moist   Neck:   No adenopathy, supple, trachea midline, no thyromegaly, no   carotid bruit, no JVD   Back:     No kyphosis present, no scoliosis present, no skin lesions, erythema or scars, no tenderness to percussion or palpation, range of motion normal   Lungs:     Clear to auscultation,respirations regular, even and unlabored    Heart:    Regular rhythm and normal rate, normal S1 and S2, no murmur, no gallop, no rub, no click   Chest Wall:    No abnormalities observed   Abdomen:     Normal bowel sounds, no masses, no organomegaly, soft nontender, nondistended, no guarding, no rebound  tenderness   Extremities:   Moves all extremities well, no edema, no cyanosis, no redness   Pulses:   Pulses palpable and equal bilaterally. Normal radial, carotid, femoral, dorsalis pedis and posterior tibial pulses bilaterally. Normal abdominal aorta   Skin:  Neurology:   Psychiatric:   No bleeding, bruising or rash   Normal speech and cranial nerve exam, no " focal deficit   Alert and oriented x 3, normal mood and affect             Review of Data:      Results from last 7 days   Lab Units 05/24/23  1810   SODIUM mmol/L 141   POTASSIUM mmol/L 4.1   CHLORIDE mmol/L 108*   CO2 mmol/L 25.6   BUN mg/dL 6   CREATININE mg/dL 0.76   CALCIUM mg/dL 9.1   BILIRUBIN mg/dL 0.2   ALK PHOS U/L 113   ALT (SGPT) U/L 34*   AST (SGOT) U/L 26   GLUCOSE mg/dL 106*     Results from last 7 days   Lab Units 05/24/23  2135 05/24/23  1920 05/24/23  1810   HSTROP T ng/L <6 <6 <6     @LABRCNTbnp@  Results from last 7 days   Lab Units 05/24/23  1810   WBC 10*3/mm3 6.23   HEMOGLOBIN g/dL 12.1   HEMATOCRIT % 37.1   PLATELETS 10*3/mm3 258     Results from last 7 days   Lab Units 05/24/23  1810   INR  0.93   APTT seconds 26.1         @LABRCNTIP(chol,trig,hdl,ldl)    I personally viewed and interpreted the patient's EKG/Telemetry data  )   Left-sided weakness    Anxiety    Hyperlipidemia    Chest pain, unspecified type        Assessment and Plan:    1. Shortness of breath/atypical chest pain/left arm heaviness-negative troponin and normal EKG.  Normal proBNP.  Probably not of cardiac origin.  Normal echocardiogram done today  2. Prior history of coronary artery disease status post mid LAD stent in 2018 on metoprolol, aspirin and high intensity statin    Her symptoms are probably of noncardiac origin.  No further inpatient cardiac testing needed.  Continue current home cardiac medications.  We may do stress testing during outpatient follow-up.  Patient can ambulate from cardiac standpoint.    I have discussed patient with Dr. Nba Copeland MD  05/25/23  15:51 EDT.      Time spent in reviewing chart, discussion and examination:

## 2023-05-25 NOTE — SIGNIFICANT NOTE
05/25/23 0938   Rehab Evaluation   Session Not Performed patient/family declined evaluation   Evaluation Not Performed, Comment Pt reports she has not experienced any difficulty with her speech or communication skills. Reports no deficits with word retrieval, slurred speech or comprehension. Declines further evaluation at this time. Discussed with RN and no current concerns with communication at this time. Occasional slow responses reported but no gross deficits. No further evaluation at this time. Recommended and reviewed with pt s/s of communication deficits r/t to stroke and to notify RN if any issues arise. Pt verbalizes understanding.

## 2023-05-25 NOTE — PROGRESS NOTES
SUBJECTIVE  Patient admits toChest pain  Patient denies focal numbness or weakness    OBJECTIVE  CTA H/N --no sig stenosis    Exam: alert and oriented x3  CN 2-12 intact  strength 5/5    ASSESSMENT/PLAN: Stroke vs stroke mimic (chest pain/cardiac)  AWAITS MRI brain (ordered)  --recommend cardiology consultation  ---continue aspirin/ lipitor 80 (ordered)  Toni CHANDLER MD, saw the patient for a follow up or in-patient or emergency room telememedicine face to face consult or chart review using interactive technology. The location of the patient was at [facility] . I was located at [facility] .    I have proceeded with this evaluation at the request of the referring practitioner as it is felt to be an emergency setting and no appropriate specialist is available to perform this evaluation. The [facility] hospital has reported that this is the correct patient and has obtained consent from the patient/surrogate whenever possible to perform this telemedicine evaluation(including obtaining history, performing examination and reviewing data provided by the patient an/or originating site of care provider)    I have either done chart review only or introduced myself to the patient, provided my credentials, disclosed my location, and determined that, based on review of the patient's chart and discussion with the patient's primary team, telemedicine via a HIPAA compliant, real-time, face-to-face two-way, interactive audio and video platform is an appropriate and effective means of providing the service.    The patient/surrogate has a right to refuse this evaluation as they have been explained risks including potential loss of confidentiality, benefits, alternatives, and the potential need for subsequent face-to-face care. In this evaluation, we will be providing recommendations only.  The ultimate decision to follow or not to follow these recommendations will be left to the bedside treating/requesting  practitioner.    The patient/surrogate whenevr possible has been notified that other healthcare professionals including technical person may be involved in this A/V evaluation.  All laws concerning confidentiality and patient access to medical records and copies of medical records apply to telemedicine.  The patient/surrogate has received the originating site's Health Notice of Privacy Practices.    Toni Phillips MD, OLLIE

## 2023-05-25 NOTE — THERAPY EVALUATION
Patient Name: Ortiz Salamanca  : 1970    MRN: [id number]                              Today's Date: 2023       Admit Date: 2023    Visit Dx:     ICD-10-CM ICD-9-CM   1. Chest pain, unspecified type  R07.9 786.50   2. Left-sided weakness  R53.1 728.87     Patient Active Problem List   Diagnosis   • Chest pain   • NSTEMI (non-ST elevated myocardial infarction)   • Anxiety   • Dizziness, nonspecific   • Ganglion cyst   • Primary osteoarthritis of left knee   • Syncope   • Coronary artery disease of native artery of native heart with stable angina pectoris   • Presence of drug coated stent in LAD coronary artery   • Screen for colon cancer   • Mechanical knee pain, left   • Insufficiency fracture of tibia   • Pes anserine bursitis   • Chronic fatigue   • Ankle swelling   • Multiple joint pain   • Low back pain   • Edema   • Hyperlipidemia   • History of sleeve gastrectomy   • Slow transit constipation   • Gastroesophageal reflux disease   • Vitamin D deficiency   • Obesity, Class I, BMI 30-34.9   • Subacromial bursitis of left shoulder joint   • AC joint arthropathy   • Paresthesia of hand, bilateral   • Chest pain, unspecified type   • Left-sided weakness     Past Medical History:   Diagnosis Date   • Colon polyp     BENIGN   • Coronary artery disease involving native coronary artery of native heart without angina pectoris 2019   • DDD (degenerative disc disease), lumbosacral    • Elevated cholesterol    • Heart attack 2018    2018 dec 25   • Hiatal hernia    • History of heart artery stent 2018    LAD   • Hyperlipidemia    • Migraine headache    • Presence of drug coated stent in LAD coronary artery 2019   • Spinal stenosis      Past Surgical History:   Procedure Laterality Date   • BACK SURGERY     • CARDIAC CATHETERIZATION N/A 2018    Procedure: Left Heart Cath;  Surgeon: Hilario Coronado MD;  Location: [facility] CATH INVASIVE LOCATION;  Service: Cardiovascular   •  CARDIAC CATHETERIZATION N/A 2018    Procedure: Left ventriculography;  Surgeon: Hilario Coronado MD;  Location:  [facility] CATH INVASIVE LOCATION;  Service: Cardiovascular   • CARDIAC CATHETERIZATION N/A 2018    Procedure: Stent EVARISTO coronary;  Surgeon: Hilario Coronado MD;  Location:  [facility] CATH INVASIVE LOCATION;  Service: Cardiovascular   • CARDIAC CATHETERIZATION N/A 2018    Procedure: Coronary angiography;  Surgeon: Hilario Coronado MD;  Location:  [facility] CATH INVASIVE LOCATION;  Service: Cardiovascular   •  SECTION     • COLONOSCOPY N/A 10/09/2019    Procedure: COLONOSCOPY with polypectomy;  Surgeon: Dung Hutchison MD;  Location: [facility] OR;  Service: Gastroenterology   • CORONARY STENT PLACEMENT     • ENDOSCOPY N/A 2020    Procedure: ESOPHAGOGASTRODUODENOSCOPY WITH BIOPSY;  Surgeon: Facundo Helm Jr., MD;  [facility];  Service: General;  Laterality: N/A;  PRE- DYSPEPSIA  POST- GASTRITIS   • GASTRIC SLEEVE LAPAROSCOPIC N/A 2020    Procedure: GASTRIC SLEEVE LAPAROSCOPIC With Hiatal Hernia Repair;  Surgeon: Facundo Helm Jr., MD;  Location:  [facility];  Service: Bariatric;  Laterality: N/A;   • HERNIA REPAIR     • HYSTERECTOMY     • KNEE SURGERY Left     cyst removal   • LUMBAR FUSION  2021      General Information     Row Name 23          Physical Therapy Time and Intention    Document Type evaluation  -     Mode of Treatment physical therapy  -     Row Name 23          General Information    Patient Profile Reviewed yes  -JW     Prior Level of Function independent:;all household mobility;community mobility;work  -JW     Existing Precautions/Restrictions fall  -JW     Barriers to Rehab none identified  -JW     Row Name 23          Living Environment    People in Home child(jose luis), adult  pt reports her son lives with her  -JW     Row Name 23           "Home Main Entrance    Number of Stairs, Main Entrance --  pt reports \"a few\" steps, does not specify number of steps  -     Row Name 05/25/23 0818          Stairs Within Home, Primary    Number of Stairs, Within Home, Primary none  -     Row Name 05/25/23 0818          Cognition    Orientation Status (Cognition) oriented x 3  -     Row Name 05/25/23 0818          Safety Issues, Functional Mobility    Safety Issues Affecting Function (Mobility) positioning of assistive device  pt with episodes of running into objects on right with walker, requires assistance to manage device  -           User Key  (r) = Recorded By, (t) = Taken By, (c) = Cosigned By    Initials Name Provider Type    Katharine Molina PT Physical Therapist               Mobility     Row Name 05/25/23 0858          Bed Mobility    Bed Mobility supine-sit;sit-supine  -     Supine-Sit [place] (Bed Mobility) supervision  -     Sit-Supine [place] (Bed Mobility) supervision  -     Assistive Device (Bed Mobility) bed rails  -     Comment, (Bed Mobility) pt reports feeling \"woozy\" upon sitting EOB  -     Row Name 05/25/23 0858          Sit-Stand Transfer    Sit-Stand [place] (Transfers) contact guard;verbal cues  -     Assistive Device (Sit-Stand Transfers) walker, front-wheeled  -     Row Name 05/25/23 0858          Gait/Stairs (Locomotion)    [place] Level (Gait) contact guard;minimum assist (75% patient effort);verbal cues  -     Assistive Device (Gait) walker, front-wheeled  -     Distance in Feet (Gait) 75  -     Comment, (Gait/Stairs) pt with episodes of running into objects on right side, requires assistance to manage device, noted significantly decreased gait speed during direction changes  -           User Key  (r) = Recorded By, (t) = Taken By, (c) = Cosigned By    Initials Name Provider Type    Katharine Molina PT Physical Therapist               Obj/Interventions     Row Name 05/25/23 0858    "       Range of Motion Comprehensive    Comment, General Range of Motion LE ROM WFL bilaterally  -     Row Name 05/25/23 0858          Strength Comprehensive (MMT)    Comment, General Manual Muscle Testing (MMT) Assessment bilateral LEs 3+/5, no asymmetry noted  -     Row Name 05/25/23 0858          Balance    Comment, Balance SBA for static sitting EOB, CGA for static standing with walker  -     Row Name 05/25/23 0858          Sensory Assessment (Somatosensory)    Sensory Assessment (Somatosensory) --  pt reports left UE feels sensitive, able to localize light touch  -           User Key  (r) = Recorded By, (t) = Taken By, (c) = Cosigned By    Initials Name Provider Type    JW Katharine Barrientos, PT Physical Therapist               Goals/Plan     [facility] Name 05/25/23 0858          Bed Mobility Goal 1 (PT)    Activity/Assistive Device (Bed Mobility Goal 1, PT) bed mobility activities, all  -JW     [place] Level/Cues Needed (Bed Mobility Goal 1, PT) modified independence  -JW     Time Frame (Bed Mobility Goal 1, PT) 3 days  -JW     Progress/Outcomes (Bed Mobility Goal 1, PT) new goal  -[facility] Name 05/25/23 0858          Transfer Goal 1 (PT)    Activity/Assistive Device (Transfer Goal 1, PT) transfers, all  -JW     [place] Level/Cues Needed (Transfer Goal 1, PT) supervision required  -JW     Time Frame (Transfer Goal 1, PT) 3 days  -JW     Progress/Outcome (Transfer Goal 1, PT) new goal  -[facility] Name 05/25/23 0858          Gait Training Goal 1 (PT)    Activity/Assistive Device (Gait Training Goal 1, PT) gait (walking locomotion);assistive device use  -JW     [place] Level (Gait Training Goal 1, PT) supervision required  -JW     Distance (Gait Training Goal 1, PT) 150  -JW     Time Frame (Gait Training Goal 1, PT) 3 days  -JW     Progress/Outcome (Gait Training Goal 1, PT) new goal  -     Row Name 05/25/23 0858          Therapy Assessment/Plan (PT)    Planned Therapy Interventions (PT) balance  "training;bed mobility training;gait training;home exercise program;strengthening;transfer training;patient/family education  -           User Key  (r) = Recorded By, (t) = Taken By, (c) = Cosigned By    Initials Name Provider Type    Katharine Molina, PT Physical Therapist               Clinical Impression     Row Name 05/25/23 0858          Pain    Pre/Posttreatment Pain Comment no c/o pain  -     Row Name 05/25/23 0858          Plan of Care Review    Plan of Care Reviewed With patient  -JW     Outcome Evaluation Physical therapy evaluation complete.  patient reports feeling \"woozy\" this AM stating it may be due ot medication received prior to MRI.  Patient performs supine to sit with SBA and sit to stand with CGA and rolling walker.  patient performs gait x75 feet with CGA/min assist, requires assistance to manage device and avoid obstacles.  Patient noted to have increased veering of walker to the right and multiple episodes of running into objects on the right side.  Patient displays some difficulty following commands for muscle testing, noted LE strength 3+/5 bilaterally, however no asymmetry noted.  Performance may be impacted by medication received prior to MRI earlier this morning, however will continue to follow for therapy needs.  -     Row Name 05/25/23 0858          Therapy Assessment/Plan (PT)    Patient/Family Therapy Goals Statement (PT) \"go home\"  -     Rehab Potential (PT) good, to achieve stated therapy goals  -     Criteria for Skilled Interventions Met (PT) yes;meets criteria  -     Therapy Frequency (PT) daily  -     Predicted Duration of Therapy Intervention (PT) 3 days  -     Row Name 05/25/23 0858          Positioning and Restraints    Pre-Treatment Position in bed  -     Post Treatment Position bed  -JW     In Bed supine;call light within reach;encouraged to call for assist;with nsg  -           User Key  (r) = Recorded By, (t) = Taken By, (c) = Cosigned By    Initials " Name Provider Type    Katharine Molina, AYAH Physical Therapist               Outcome Measures     Row Name 05/25/23 0858          How much help from another person do you currently need...    Turning from your back to your side while in flat bed without using bedrails? 4  -JW     Moving from lying on back to sitting on the side of a flat bed without bedrails? 3  -JW     Moving to and from a bed to a chair (including a wheelchair)? 3  -JW     Standing up from a chair using your arms (e.g., wheelchair, bedside chair)? 3  -JW     Climbing 3-5 steps with a railing? 3  -JW     To walk in hospital room? 3  -JW     AM-PAC 6 Clicks Score (PT) 19  -     Highest level of mobility 6 --> Walked 10 steps or more  -     Row Name 05/25/23 0858          Modified [place] Scale    Pre-Stroke Modified [place] Scale 0 - No Symptoms at all.  -     Modified [place] Scale 4 - Moderately severe disability.  Unable to walk without assistance, and unable to attend to own bodily needs without assistance.  -     Row Name 05/25/23 0858          Functional Assessment    Outcome Measure Options AM-PAC 6 Clicks Basic Mobility (PT);Modified Austyn  -           User Key  (r) = Recorded By, (t) = Taken By, (c) = Cosigned By    Initials Name Provider Type    Katharine Molina PT Physical Therapist                             Physical Therapy Education     Title: PT OT SLP Therapies (In Progress)     Topic: Physical Therapy (In Progress)     Point: Mobility training (Done)     Learning Progress Summary           Patient Acceptance, E,TB, VU by  at 5/25/2023 1110                   Point: Home exercise program (Not Started)     Learner Progress:  Not documented in this visit.                      User Key     Initials Effective Dates Name Provider Type Kay     06/16/21 -  Katharine Barrientos, AYAH Physical Therapist PT              PT Recommendation and Plan  Planned Therapy Interventions (PT): balance training, bed mobility training, gait  "training, home exercise program, strengthening, transfer training, patient/family education  Plan of Care Reviewed With: patient  Outcome Evaluation: Physical therapy evaluation complete.  patient reports feeling \"woozy\" this AM stating it may be due ot medication received prior to MRI.  Patient performs supine to sit with SBA and sit to stand with CGA and rolling walker.  patient performs gait x75 feet with CGA/min assist, requires assistance to manage device and avoid obstacles.  Patient noted to have increased veering of walker to the right and multiple episodes of running into objects on the right side.  Patient displays some difficulty following commands for muscle testing, noted LE strength 3+/5 bilaterally, however no asymmetry noted.  Performance may be impacted by medication received prior to MRI earlier this morning, however will continue to follow for therapy needs.     Time Calculation:    PT Charges     Row Name 05/25/23 0858             Time Calculation    Start Time 0858  -      Stop Time 0915  -RAFA      Time Calculation (min) 17 min  -RAFA      PT Received On 05/25/23  -RAFA      PT - Next Appointment 05/26/23  -RAFA            User Key  (r) = Recorded By, (t) = Taken By, (c) = Cosigned By    Initials Name Provider Type    Katharine Molina, AYAH Physical Therapist              Therapy Charges for Today     Code Description Service Date Service Provider Modifiers Qty    [id number]  PT EVAL LOW COMPLEXITY 1 5/25/2023 Katharine Barrientos, PT GP 1          PT G-Codes  Outcome Measure Options: AM-PAC 6 Clicks Basic Mobility (PT), Modified [place]yn  AM-PAC 6 Clicks Score (PT): 19  Modified Hernandez Scale: 4 - Moderately severe disability.  Unable to walk without assistance, and unable to attend to own bodily needs without assistance.  PT Discharge Summary  Anticipated Discharge Disposition (PT):  (will continue to assess)    Katharine Barrientos PT  5/25/2023    "

## 2023-05-25 NOTE — CASE MANAGEMENT/SOCIAL WORK
Continued Stay Note  KAILEE Gutierrez     Patient Name: Ortizjessica Salamanca  MRN: [id number]  Today's Date: 5/25/2023    Admit Date: 5/24/2023    Plan: plan home with son   Discharge Plan     Row Name 05/25/23 1239       Plan    Plan plan home with son    Patient/Family in Agreement with Plan yes    Plan Comments Spoke with patient at bedside, face sheet verifed. Patient lives in a home with her son and 9yo grandson. She is independent of ADLs including driving. She denies use of DME. She has used HH in the past, but does not recall the agency. She has not used inpt rehab previously. She has a living will, encouraged her to bring a copy to be scanned tot medical record.  She sees Dr Sarah Bright as PCP. She uses [facility] [place] and denies issues obtaining medications. She indicates she would like information regarding area food [place] and assistance with utilities, information provided. She plans to return home with her son to assist as needed at [place]. CM # placed on white board, will continue to follow.               Discharge Codes    No documentation.                     Cain Ramos RN

## 2023-05-25 NOTE — PLAN OF CARE
Goal Outcome Evaluation:  Plan of Care Reviewed With: patient        Progress: no change  Outcome Evaluation: VSS, patient c/o left arm heaviness and pain, patient has slept well most of day. wants her gabapentin but hospitalist team wishes to hold it until she sees Neuro

## 2023-05-25 NOTE — PLAN OF CARE
"  Problem: Adult Inpatient Plan of Care  Goal: Plan of Care Review  Recent Flowsheet Documentation  Taken 5/25/2023 1433 by Naldo Zambrano OTR  Plan of Care Reviewed With: patient  Outcome Evaluation: OT: Pt states she just feels \"off\" today, and her left arm feels \"heavy\". Pt demonstrated better balance and safety this afternoon with transfers, mobility and activity from standing. Pt was independent with bed mobility. She donned her slippers independently from EOB. Pt managed transfes and in-room mobility with SBA using a rolling walker for support. Pt did not need verbal cues/assistance to avoid furniture/barriers with the walker (she did need assist/cues this am). Pt was able to retreive items at shoulder level from her closet without a loss of balance. Education provided regarding benefits of BUE rom program to address her c/o weakenss. Pt demonstrates full BUE rom and stregth is symmetrical for BUE's. Anticipate discharge to home when cleared by physician.       "

## 2023-05-26 ENCOUNTER — APPOINTMENT (OUTPATIENT)
Dept: MRI IMAGING | Facility: HOSPITAL | Age: 53
End: 2023-05-26
Payer: COMMERCIAL

## 2023-05-26 VITALS
BODY MASS INDEX: 28.88 KG/M2 | WEIGHT: 163 LBS | DIASTOLIC BLOOD PRESSURE: 84 MMHG | HEART RATE: 59 BPM | SYSTOLIC BLOOD PRESSURE: 132 MMHG | OXYGEN SATURATION: 94 % | HEIGHT: 63 IN | TEMPERATURE: 98.5 F | RESPIRATION RATE: 16 BRPM

## 2023-05-26 PROBLEM — G43.109 MIGRAINE WITH AURA AND WITHOUT STATUS MIGRAINOSUS, NOT INTRACTABLE: Status: ACTIVE | Noted: 2023-05-26

## 2023-05-26 LAB — GLUCOSE BLDC GLUCOMTR-MCNC: 98 MG/DL (ref 70–130)

## 2023-05-26 PROCEDURE — 99238 HOSP IP/OBS DSCHRG MGMT 30/<: CPT | Performed by: HOSPITALIST

## 2023-05-26 PROCEDURE — 72141 MRI NECK SPINE W/O DYE: CPT

## 2023-05-26 PROCEDURE — 82948 REAGENT STRIP/BLOOD GLUCOSE: CPT

## 2023-05-26 PROCEDURE — G0378 HOSPITAL OBSERVATION PER HR: HCPCS

## 2023-05-26 PROCEDURE — 99214 OFFICE O/P EST MOD 30 MIN: CPT | Performed by: PSYCHIATRY & NEUROLOGY

## 2023-05-26 PROCEDURE — 25010000002 LORAZEPAM PER 2 MG: Performed by: INTERNAL MEDICINE

## 2023-05-26 PROCEDURE — 97530 THERAPEUTIC ACTIVITIES: CPT

## 2023-05-26 PROCEDURE — 97116 GAIT TRAINING THERAPY: CPT

## 2023-05-26 PROCEDURE — 96376 TX/PRO/DX INJ SAME DRUG ADON: CPT

## 2023-05-26 RX ORDER — ATORVASTATIN CALCIUM 40 MG/1
40 TABLET, FILM COATED ORAL NIGHTLY
Status: DISCONTINUED | OUTPATIENT
Start: 2023-05-26 | End: 2023-05-26 | Stop reason: HOSPADM

## 2023-05-26 RX ORDER — GABAPENTIN 300 MG/1
300 CAPSULE ORAL DAILY
Start: 2023-05-26

## 2023-05-26 RX ADMIN — CETIRIZINE HYDROCHLORIDE 10 MG: 10 TABLET, FILM COATED ORAL at 08:27

## 2023-05-26 RX ADMIN — HYDROCODONE BITARTRATE AND ACETAMINOPHEN 1 TABLET: 5; 325 TABLET ORAL at 08:32

## 2023-05-26 RX ADMIN — METOPROLOL TARTRATE 25 MG: 25 TABLET, FILM COATED ORAL at 08:27

## 2023-05-26 RX ADMIN — Medication 10 ML: at 08:27

## 2023-05-26 RX ADMIN — ASPIRIN 81 MG CHEWABLE TABLET 81 MG: 81 TABLET CHEWABLE at 08:27

## 2023-05-26 RX ADMIN — LORAZEPAM 1 MG: 2 INJECTION INTRAMUSCULAR; INTRAVENOUS at 07:02

## 2023-05-26 NOTE — THERAPY TREATMENT NOTE
Acute Care - Physical Therapy Treatment Note   [facility]     Patient Name: Ortiz Salamanca  : 1970  MRN: [id number]  Today's Date: 2023      Visit Dx:     ICD-10-CM ICD-9-CM   1. Chest pain, unspecified type  R07.9 786.50   2. Left-sided weakness  R53.1 728.87     Patient Active Problem List   Diagnosis   • Chest pain   • NSTEMI (non-ST elevated myocardial infarction)   • Anxiety   • Dizziness, nonspecific   • Ganglion cyst   • Primary osteoarthritis of left knee   • Syncope   • Coronary artery disease of native artery of native heart with stable angina pectoris   • Presence of drug coated stent in LAD coronary artery   • Screen for colon cancer   • Mechanical knee pain, left   • Insufficiency fracture of tibia   • Pes anserine bursitis   • Chronic fatigue   • Ankle swelling   • Multiple joint pain   • Low back pain   • Edema   • Hyperlipidemia   • History of sleeve gastrectomy   • Slow transit constipation   • Gastroesophageal reflux disease   • Vitamin D deficiency   • Obesity, Class I, BMI 30-34.9   • Subacromial bursitis of left shoulder joint   • AC joint arthropathy   • Paresthesia of hand, bilateral   • Chest pain, unspecified type   • Left-sided weakness     Past Medical History:   Diagnosis Date   • Colon polyp     BENIGN   • Coronary artery disease involving native coronary artery of native heart without angina pectoris 2019   • DDD (degenerative disc disease), lumbosacral    • Elevated cholesterol    • Heart attack 2018    2018 dec 25   • Hiatal hernia    • History of heart artery stent 2018    LAD   • Hyperlipidemia    • Migraine headache    • Presence of drug coated stent in LAD coronary artery 2019   • Spinal stenosis      Past Surgical History:   Procedure Laterality Date   • BACK SURGERY     • CARDIAC CATHETERIZATION N/A 2018    Procedure: Left Heart Cath;  Surgeon: Hilario Coronado MD;  Location: [facility] CATH INVASIVE LOCATION;  Service: Cardiovascular    • CARDIAC CATHETERIZATION N/A 2018    Procedure: Left ventriculography;  Surgeon: Hilario Coronado MD;  Location: [facility] CATH INVASIVE LOCATION;  Service: Cardiovascular   • CARDIAC CATHETERIZATION N/A 2018    Procedure: Stent EVARISTO coronary;  Surgeon: Hilario Coronado MD;  Location:  [facility] CATH INVASIVE LOCATION;  Service: Cardiovascular   • CARDIAC CATHETERIZATION N/A 2018    Procedure: Coronary angiography;  Surgeon: Hilario Coronado MD;  Location:  [facility] CATH INVASIVE LOCATION;  Service: Cardiovascular   •  SECTION     • COLONOSCOPY N/A 10/09/2019    Procedure: COLONOSCOPY with polypectomy;  Surgeon: Dung Hutchison MD;  Location: [facility] OR;  Service: Gastroenterology   • CORONARY STENT PLACEMENT     • ENDOSCOPY N/A 2020    Procedure: ESOPHAGOGASTRODUODENOSCOPY WITH BIOPSY;  Surgeon: Facundo Helm Jr., MD;  Location: [facility] ENDOSCOPY;  Service: General;  Laterality: N/A;  PRE- DYSPEPSIA  POST- GASTRITIS   • GASTRIC SLEEVE LAPAROSCOPIC N/A 2020    Procedure: GASTRIC SLEEVE LAPAROSCOPIC With Hiatal Hernia Repair;  Surgeon: Facundo Helm Jr., MD;  Location: [facility] OR [facility];  Service: Bariatric;  Laterality: N/A;   • HERNIA REPAIR     • HYSTERECTOMY     • KNEE SURGERY Left 2014    cyst removal   • LUMBAR FUSION  2021     PT Assessment (last 12 hours)     PT Evaluation and Treatment     Row Name 23 1000          Physical Therapy Time and Intention    Document Type therapy note (daily note)  -     Mode of Treatment physical therapy  -     Comment pt reports feeling much better today  -     Row Name 23 1009          General Information    Patient/Family/Caregiver Comments/Observations pt in recliner, agrees to therapy  -     Row Name 23 1009          Pain    Pre/Posttreatment Pain Comment no c/o pain  -     Row Name 23 1009          Cognition    Personal Safety Interventions gait  belt;nonskid shoes/slippers when out of bed  -     Row Name 05/26/23 1009          Bed Mobility    Comment, (Bed Mobility) deferred up in chair  -     Row Name 05/26/23 1009          Transfers    Transfers sit-stand transfer;stand-sit transfer  -     Row Name 05/26/23 1009          Sit-Stand Transfer    Sit-Stand [place] (Transfers) modified independence  -     Assistive Device (Sit-Stand Transfers) walker, front-wheeled  -     Row Name 05/26/23 1009          Stand-Sit Transfer    Stand-Sit [place] (Transfers) modified independence  -     Assistive Device (Stand-Sit Transfers) walker, front-wheeled  -     Row Name 05/26/23 1009          Gait/Stairs (Locomotion)    Cache Level (Gait) supervision  -     Assistive Device (Gait) walker, front-wheeled  -     Distance in Feet (Gait) 175  -     Comment, (Gait/Stairs) pt able to manage device around obstacles and direction changes without difficulty or balance loss  -     Row Name 05/26/23 1009          Plan of Care Review    Outcome Evaluation PT: Patient reports feeling much better today.  patient performs sit to stand with modified independence and gait x175 feet with supervision with rolling walker.  Patient able to manage device around obstacles and manages direction changes without balance loss.  Patient with much improved balance during mobility today compared to previous session. Patient will benefit from rolling walker at discharge, informed .  -     Row Name 05/26/23 1009          Positioning and Restraints    Pre-Treatment Position sitting in chair/recliner  -     Post Treatment Position chair  -JW     In Chair reclined;call light within reach;encouraged to call for assist  -     Row Name 05/26/23 1009          Progress Summary (PT)    Progress Toward Functional Goals (PT) progress toward functional goals is good  -           User Key  (r) = Recorded By, (t) = Taken By, (c) = Cosigned By    Initials Name  Provider Type    Katharine Molina, PT Physical Therapist                Physical Therapy Education     Title: PT OT SLP Therapies (In Progress)     Topic: Physical Therapy (In Progress)     Point: Mobility training (Done)     Learning Progress Summary           Patient Acceptance, E,TB, VU by RAFA at 5/26/2023 1123    Acceptance, E,TB, VU by RAFA at 5/25/2023 1110                   Point: Home exercise program (Not Started)     Learner Progress:  Not documented in this visit.                      User Key     Initials Effective Dates Name Provider Type Discipline    RAFA 06/16/21 -  Katharine Barrientos PT Physical Therapist PT              PT Recommendation and Plan  Anticipated Discharge Disposition (PT): home  Planned Therapy Interventions (PT): balance training, bed mobility training, gait training, home exercise program, strengthening, transfer training, patient/family education  Therapy Frequency (PT): daily  Progress Summary (PT)  Progress Toward Functional Goals (PT): progress toward functional goals is good  Plan of Care Reviewed With: patient  Outcome Evaluation: PT: Patient reports feeling much better today.  patient performs sit to stand with modified independence and gait x175 feet with supervision with rolling walker.  Patient able to manage device around obstacles and manages direction changes without balance loss.  Patient with much improved balance during mobility today compared to previous session. Patient will benefit from rolling walker at discharge, informed .   Outcome Measures     Row Name 05/26/23 1100 05/26/23 1009          How much help from another person do you currently need...    Turning from your back to your side while in flat bed without using bedrails? -- 4  -JW     Moving from lying on back to sitting on the side of a flat bed without bedrails? -- 4  -JW     Moving to and from a bed to a chair (including a wheelchair)? -- 3  -JW     Standing up from a chair using your arms  (e.g., wheelchair, bedside chair)? -- 3  -JW     Climbing 3-5 steps with a railing? -- 3  -JW     To walk in hospital room? -- 3  -JW     AM-PAC 6 Clicks Score (PT) -- 20  -JW        How much help from another is currently needed...    Putting on and taking off regular lower body clothing? 3  -EN --     Bathing (including washing, rinsing, and drying) 3  -EN --     Toileting (which includes using toilet bed pan or urinal) 3  -EN --     Putting on and taking off regular upper body clothing 4  -EN --     Taking care of personal grooming (such as brushing teeth) 4  -EN --     Eating meals 4  -EN --     AM-PAC 6 Clicks Score (OT) 21  -EN --        Modified Orrum Scale    Pre-Stroke Modified Orrum Scale -- 0 - No Symptoms at all.  -JW     Modified Orrum Scale -- 2 - Slight disability.  Unable to carry out all previous activities but able to look after own affairs without assistance.  -JW        Functional Assessment    Outcome Measure Options -- AM-PAC 6 Clicks Basic Mobility (PT)  -JW           User Key  (r) = Recorded By, (t) = Taken By, (c) = Cosigned By    Initials Name Provider Type    EN Marjorie Odonnell, OTR Occupational Therapist    Katharine Molina PT Physical Therapist                 Time Calculation:    PT Charges     Row Name 05/26/23 1125             Time Calculation    Start Time 1009  -      Stop Time 1022  -      Time Calculation (min) 13 min  -      PT Received On 05/26/23  -         Timed Charges    29361 - Gait Training Minutes  13  -JW         Total Minutes    Timed Charges Total Minutes 13  -JW       Total Minutes 13  -            User Key  (r) = Recorded By, (t) = Taken By, (c) = Cosigned By    Initials Name Provider Type    Katharine Molina PT Physical Therapist              Therapy Charges for Today     Code Description Service Date Service Provider Modifiers Qty    75512943926 HC PT EVAL LOW COMPLEXITY 1 5/25/2023 Katharine Barrientos, PT GP 1    [id number]  GAIT TRAINING EA  15 MIN 5/26/2023 Katharine Barrientos, PT GP 1          PT G-Codes  Outcome Measure Options: AM-PAC 6 Clicks Basic Mobility (PT)  AM-PAC 6 Clicks Score (PT): 20  AM-PAC 6 Clicks Score (OT): 21  Modified Austyn Scale: 2 - Slight disability.  Unable to carry out all previous activities but able to look after own affairs without assistance.    Katharine Barrientos, PT  5/26/2023

## 2023-05-26 NOTE — CASE MANAGEMENT/SOCIAL WORK
Case Management Discharge Note      Final Note: Discharged home.         Selected Continued Care - Discharged on 5/26/2023 Admission date: 5/24/2023 - Discharge disposition: Home or Self Care    Destination    No services have been selected for the patient.              Durable Medical Equipment     Service Provider Selected Services Address Phone Fax Patient Preferred    [facility] [address], [place] [id number] [phone] --          Dialysis/Infusion    No services have been selected for the patient.              Home Medical Care    No services have been selected for the patient.              Therapy    No services have been selected for the patient.              Community Resources    No services have been selected for the patient.              Community & DME    No services have been selected for the patient.                       Final Discharge Disposition Code: 01 - home or self-care

## 2023-05-26 NOTE — PLAN OF CARE
Goal Outcome Evaluation:  Plan of Care Reviewed With: patient           Outcome Evaluation: PT: Patient reports feeling much better today.  patient performs sit to stand with modified independence and gait x175 feet with supervision with rolling walker.  Patient able to manage device around obstacles and manages direction changes without balance loss.  Patient with much improved balance during mobility today compared to previous session. Patient will benefit from rolling walker at discharge, informed .

## 2023-05-26 NOTE — THERAPY TREATMENT NOTE
Acute Care - Occupational Therapy Treatment Note   [facility]     Patient Name: Ortiz Salamanca  : 1970  MRN: [id number]  Today's Date: 2023             Admit Date: 2023       ICD-10-CM ICD-9-CM   1. Chest pain, unspecified type  R07.9 786.50   2. Left-sided weakness  R53.1 728.87     Patient Active Problem List   Diagnosis   • Chest pain   • NSTEMI (non-ST elevated myocardial infarction)   • Anxiety   • Dizziness, nonspecific   • Ganglion cyst   • Primary osteoarthritis of left knee   • Syncope   • Coronary artery disease of native artery of native heart with stable angina pectoris   • Presence of drug coated stent in LAD coronary artery   • Screen for colon cancer   • Mechanical knee pain, left   • Insufficiency fracture of tibia   • Pes anserine bursitis   • Chronic fatigue   • Ankle swelling   • Multiple joint pain   • Low back pain   • Edema   • Hyperlipidemia   • History of sleeve gastrectomy   • Slow transit constipation   • Gastroesophageal reflux disease   • Vitamin D deficiency   • Obesity, Class I, BMI 30-34.9   • Subacromial bursitis of left shoulder joint   • AC joint arthropathy   • Paresthesia of hand, bilateral   • Chest pain, unspecified type   • Left-sided weakness     Past Medical History:   Diagnosis Date   • Colon polyp     BENIGN   • Coronary artery disease involving native coronary artery of native heart without angina pectoris 2019   • DDD (degenerative disc disease), lumbosacral    • Elevated cholesterol    • Heart attack 2018    2018 dec 25   • Hiatal hernia    • History of heart artery stent 2018    LAD   • Hyperlipidemia    • Migraine headache    • Presence of drug coated stent in LAD coronary artery 2019   • Spinal stenosis      Past Surgical History:   Procedure Laterality Date   • BACK SURGERY     • CARDIAC CATHETERIZATION N/A 2018    Procedure: Left Heart Cath;  Surgeon: Hilario Coronado MD;  Location: [facility] INVASIVE LOCATION;   Service: Cardiovascular   • CARDIAC CATHETERIZATION N/A 2018    Procedure: Left ventriculography;  Surgeon: Hilario Coronado MD;  Location: [facility] CATH INVASIVE LOCATION;  Service: Cardiovascular   • CARDIAC CATHETERIZATION N/A 2018    Procedure: Stent EVARISTO coronary;  Surgeon: Hilario Coronado MD;  Location:  [facility] CATH INVASIVE LOCATION;  Service: Cardiovascular   • CARDIAC CATHETERIZATION N/A 2018    Procedure: Coronary angiography;  Surgeon: Hilario Coronado MD;  Location: [facility] CATH INVASIVE LOCATION;  Service: Cardiovascular   •  SECTION     • COLONOSCOPY N/A 10/09/2019    Procedure: COLONOSCOPY with polypectomy;  Surgeon: Dung Hutchison MD;  Location: [facility] OR;  Service: Gastroenterology   • CORONARY STENT PLACEMENT     • ENDOSCOPY N/A 2020    Procedure: ESOPHAGOGASTRODUODENOSCOPY WITH BIOPSY;  Surgeon: Facundo Helm Jr., MD;  Location: [facility] ENDOSCOPY;  Service: General;  Laterality: N/A;  PRE- DYSPEPSIA  POST- GASTRITIS   • GASTRIC SLEEVE LAPAROSCOPIC N/A 2020    Procedure: GASTRIC SLEEVE LAPAROSCOPIC With Hiatal Hernia Repair;  Surgeon: Facundo Helm Jr., MD;  Location:  [facility] OR [facility];  Service: Bariatric;  Laterality: N/A;   • HERNIA REPAIR     • HYSTERECTOMY     • KNEE SURGERY Left 2014    cyst removal   • LUMBAR FUSION  2021         OT ASSESSMENT FLOWSHEET (last 12 hours)     OT Evaluation and Treatment     Row Name 23 1010                   OT Time and Intention    Subjective Information no complaints  -EN        Document Type therapy note (daily note)  -EN        Mode of Treatment occupational therapy  -EN        Patient Effort good  -EN        Comment Patient reports she feels much better today.  -EN           General Information    Patient/Family/Caregiver Comments/Observations Patient reclined in chair, agreed to therapy.  -EN        Benefits Reviewed patient:;increase strength;increase balance   -EN        Barriers to Rehab none identified  -EN           Pain Assessment    Pre/Posttreatment Pain Comment no c/o pain  -EN           Cognition    Personal Safety Interventions gait belt;nonskid shoes/slippers when out of bed  -EN           Lower Body Dressing Assessment/Training    Maury Level (Lower Body Dressing) don;shoes/slippers;independent  -EN           Functional Mobility    Functional Mobility- Ind. Level standby assist  -EN        Functional Mobility- Device walker, front-wheeled  -EN        Functional Mobility-Distance (Feet) 175  -EN        Functional Mobility- Comment No veering today, pt reported she does think the FWW is helpful and would like to have one for home  -EN           Transfer Assessment/Treatment    Transfers sit-stand transfer;stand-sit transfer  -EN        Comment, (Transfers) v/cs for hand placement  -EN           Sit-Stand Transfer    Sit-Stand Ma[place] (Transfers) standby assist  -EN        Assistive Device (Sit-Stand Transfers) walker, front-wheeled  -EN           Stand-Sit Transfer    Stand-Sit Ma[place] (Transfers) standby assist  -EN        Assistive Device (Stand-Sit Transfers) walker, front-wheeled  -EN           Motor Skills    Therapeutic Exercise --  issued UE HEP, handout provided  -EN           Plan of Care Review    Plan of Care Reviewed With patient  -EN        Outcome Evaluation OT- Patient donned shoes independently. Pt stood with SBA and performed functional mobility with FWW and SBA. Patient given UE HEP. Patient reports feeling better and hopes to return home today.  -EN           Positioning and Restraints    Pre-Treatment Position sitting in chair/recliner  -EN        Post Treatment Position chair  -EN        In Chair reclined;call light within reach  -EN           Progress Summary (OT)    Progress Toward Functional Goals (OT) progress toward functional goals is good  -EN        Daily Progress Summary (OT) improving  -EN              User Key  (r)  = Recorded By, (t) = Taken By, (c) = Cosigned By    Initials Name Effective Dates    EN Marjorie Odonnell OTJAYSHREE 06/16/21 -                  Occupational Therapy Education     Title: PT OT SLP Therapies (In Progress)     Topic: Occupational Therapy (Done)     Point: ADL training (Done)     Description:   Instruct learner(s) on proper safety adaptation and remediation techniques during self care or transfers.   Instruct in proper use of assistive devices.              Learning Progress Summary           Patient Acceptance, E,H, VU by EN at 5/26/2023 1107    Comment: UE HEP    Acceptance, E,TB,D, VU,NR by SD at 5/25/2023 1210    Comment: Education regarding safety during daily tasks, transfers and mobility.                   Point: Home exercise program (Done)     Description:   Instruct learner(s) on appropriate technique for monitoring, assisting and/or progressing therapeutic exercises/activities.              Learning Progress Summary           Patient Acceptance, E,H, VU by EN at 5/26/2023 1107    Comment: UE HEP    Acceptance, E,TB,D, VU,DU by SD at 5/25/2023 1443    Comment: Education with benefits of activity and BUE rom program to address generalized weakness                               User Key     Initials Effective Dates Name Provider Type Discipline    EN 06/16/21 -  Marjorie Odonnell OTR Occupational Therapist OT    SD 06/16/21 -  Naldo Zambrano OTJAYSHREE Occupational Therapist OT                  OT Recommendation and Plan     Progress Toward Functional Goals (OT): progress toward functional goals is good  Plan of Care Review  Plan of Care Reviewed With: patient  Outcome Evaluation: OT- Patient donned shoes independently. Pt stood with SBA and performed functional mobility with FWW and SBA. Patient given UE HEP. Patient reports feeling better and hopes to return home today.       Outcome Measures     Row Name 05/26/23 1100             How much help from another is currently needed...    Putting on  and taking off regular lower body clothing? 3  -EN      Bathing (including washing, rinsing, and drying) 3  -EN      Toileting (which includes using toilet bed pan or urinal) 3  -EN      Putting on and taking off regular upper body clothing 4  -EN      Taking care of personal grooming (such as brushing teeth) 4  -EN      Eating meals 4  -EN      AM-PAC 6 Clicks Score (OT) 21  -EN            User Key  (r) = Recorded By, (t) = Taken By, (c) = Cosigned By    Initials Name Provider Type    Marjorie Kaye OTR Occupational Therapist                Time Calculation:    Time Calculation- OT     Row Name 05/26/23 1109             Time Calculation- OT    OT Start Time 1009  -EN      OT Stop Time 1028  -EN      OT Time Calculation (min) 19 min  -EN         Timed Charges    62089 - OT Therapeutic Activity Minutes 19  -EN         Total Minutes    Timed Charges Total Minutes 19  -EN       Total Minutes 19  -EN            User Key  (r) = Recorded By, (t) = Taken By, (c) = Cosigned By    Initials Name Provider Type    Marjorie Kaye OTR Occupational Therapist              Therapy Charges for Today     Code Description Service Date Service Provider Modifiers Qty    [id number]  OT THERAPEUTIC ACT EA 15 MIN 5/26/2023 Marjorie Odonnell OTR GO 1               PETRA Handy  5/26/2023

## 2023-05-26 NOTE — PLAN OF CARE
Goal Outcome Evaluation:  Plan of Care Reviewed With: patient           Outcome Evaluation: OT- Patient donned shoes independently. Pt stood with SBA and performed functional mobility with FWW and SBA. Patient given UE HEP. Patient reports feeling better and hopes to return home today.

## 2023-05-26 NOTE — PLAN OF CARE
Goal Outcome Evaluation:  Plan of Care Reviewed With: patient        Progress: improving  Outcome Evaluation: NIH score !, MRI c-spine ordered for today., VSS, Tele: NSR

## 2023-05-26 NOTE — CONSULTS
"  Patient Identification:  NAME:  Ortiz Salamanca  Age:  52 y.o.   Sex:  female   :  1970   MRN:  [id number]       Chief complaint: I had chest pain that went into my left shoulder and made my left side feel heavy, reason for consult left-sided weakness    History of present illness: Patient is a 52-year-old right-handed black female who has had hyperlipidemia migraine headaches that have been recurrent through her entire life heart attack in 2018 and had a stent in the LAD.  She comes to the hospital after he on 524 she developed chest pain.  It went into the left shoulder.  Quality was chest pain.  She thought \"I was worried I was having another heart attack\" went into the left shoulder made the left arm heavy.  She states it made the whole left side heavy.  Duration more than a few minutes.  Associated symptoms she definitely developed a throbbing headache.  By the time she was put through the scanner here at the hospital.  Context a patient who has never had a focal weakness with migraine before but has had definite aura in the past and has taken sublingual Maxalt for her headaches.  Quality as described modifying factors she is on aspirin and Lipitor but otherwise has not had symptoms like this before.  Again she feels like it started with the chest pain.  I reviewed the MRI of the brain.  It shows only 2 tiny subcortical changes that are in my opinion absolutely normal for her age.  CTA of the head shows no cutoff and no significant stenosis.      Past medical history:  Past Medical History:   Diagnosis Date   • Colon polyp     BENIGN   • Coronary artery disease involving native coronary artery of native heart without angina pectoris 2019   • DDD (degenerative disc disease), lumbosacral    • Elevated cholesterol    • Heart attack 2018    2018 dec 25   • Hiatal hernia    • History of heart artery stent 2018    LAD   • Hyperlipidemia    • Migraine headache    • Presence of drug coated " stent in LAD coronary artery 2019   • Spinal stenosis        Past surgical history:  Past Surgical History:   Procedure Laterality Date   • BACK SURGERY     • CARDIAC CATHETERIZATION N/A 2018    Procedure: Left Heart Cath;  Surgeon: Hilario Coronado MD;  Location: [facility] CATH INVASIVE LOCATION;  Service: Cardiovascular   • CARDIAC CATHETERIZATION N/A 2018    Procedure: Left ventriculography;  Surgeon: Hilario Coronado MD;  Location: [facility] CATH INVASIVE LOCATION;  Service: Cardiovascular   • CARDIAC CATHETERIZATION N/A 2018    Procedure: Stent EVARISTO coronary;  Surgeon: Hilario Coronado MD;  Location: [facility] CATH INVASIVE LOCATION;  Service: Cardiovascular   • CARDIAC CATHETERIZATION N/A 2018    Procedure: Coronary angiography;  Surgeon: Hilario Coronado MD;  Location: [facility] CATH INVASIVE LOCATION;  Service: Cardiovascular   •  SECTION     • COLONOSCOPY N/A 10/09/2019    Procedure: COLONOSCOPY with polypectomy;  Surgeon: Dung Hutchison MD;  Location: [facility];  Service: Gastroenterology   • CORONARY STENT PLACEMENT     • ENDOSCOPY N/A 2020    Procedure: ESOPHAGOGASTRODUODENOSCOPY WITH BIOPSY;  Surgeon: Facundo Helm Jr., MD;  Location: [facility] ENDOSCOPY;  Service: General;  Laterality: N/A;  PRE- DYSPEPSIA  POST- GASTRITIS   • GASTRIC SLEEVE LAPAROSCOPIC N/A 2020    Procedure: GASTRIC SLEEVE LAPAROSCOPIC With Hiatal Hernia Repair;  Surgeon: Facundo Helm Jr., MD;  Location: [facility];  Service: Bariatric;  Laterality: N/A;   • HERNIA REPAIR     • HYSTERECTOMY     • KNEE SURGERY Left 2014    cyst removal   • LUMBAR FUSION  2021       Allergies:  Cephalexin and Nsaids    Home medications:  Medications Prior to Admission   Medication Sig Dispense Refill Last Dose   • amoxicillin (AMOXIL) 500 MG capsule Take 1 capsule by mouth 3 (Three) Times a Day.   2023   • aspirin 81 MG chewable tablet CHEW 1  TABLET DAILY (Patient taking differently: Chew 1 tablet Daily.) 90 tablet 3 5/24/2023   • atorvastatin (LIPITOR) 80 MG tablet TAKE ONE TABLET BY MOUTH ONCE NIGHTLY (Patient taking differently: Take 1 tablet by mouth Every Night. TAKE ONE TABLET BY MOUTH ONCE NIGHTLY) 90 tablet 1 5/24/2023   • gabapentin (NEURONTIN) 100 MG capsule Take 1 capsule by mouth Daily.   5/23/2023   • gabapentin (NEURONTIN) 300 MG capsule Take 1 capsule by mouth 2 (two) times a day.   5/23/2023   • HYDROcodone-acetaminophen (NORCO) 7.5-325 MG per tablet Take 1 tablet by mouth Every 6 (Six) Hours As Needed.   5/23/2023   • Loratadine 10 MG capsule Take 1 capsule by mouth Daily.   5/23/2023   • metoprolol tartrate (LOPRESSOR) 25 MG tablet TAKE 1 TABLET TWICE A DAY (Patient taking differently: Take 1 tablet by mouth 2 (Two) Times a Day.) 180 tablet 3 5/24/2023   • rizatriptan MLT (MAXALT-MLT) 10 MG disintegrating tablet Place 1 tablet on the tongue 1 (One) Time As Needed for Migraine.   More than a month        Hospital medications:  aspirin, 81 mg, Oral, Daily  atorvastatin, 40 mg, Oral, Nightly  cetirizine, 10 mg, Oral, Daily  LORazepam, 1 mg, Intravenous, Once  metoprolol tartrate, 25 mg, Oral, BID  sodium chloride, 10 mL, Intravenous, Q12H         •  HYDROcodone-acetaminophen  •  hydrOXYzine  •  sodium chloride  •  sodium chloride  •  sodium chloride  •  SUMAtriptan    Family history:  Family History   Problem Relation Age of Onset   • Cancer Mother    • Hypertension Mother    • Sleep apnea Mother    • Heart disease Brother    • Heart attack Brother    • Heart disease Maternal Grandmother    • Hypertension Maternal Grandmother    • Heart attack Maternal Grandmother    • Hypertension Father    • Hypertension Paternal Grandmother    • Heart disease Paternal Grandmother    • Malig Hyperthermia Neg Hx        Social history:  Social History     Tobacco Use   • Smoking status: Former     Packs/day: 0.25     Years: 5.00     Pack years: 1.25      Types: Cigarettes     Quit date: 2018     Years since quittin.4   • Smokeless tobacco: Never   • Tobacco comments:     intermittent caffiene   Vaping Use   • Vaping Use: Never used   Substance Use Topics   • Alcohol use: Yes     Comment: HOLIDAY DRINKER   • Drug use: No       Review of systems:    She denies everything now.  There is no hair eyes ears nose throat skin bone joint  lymphatic hematologic oncologic complaints no neck pain chest pain abdominal pain bowel bladder incontinence fever chills or rash    Objective:  Vitals Ranges:   Temp:  [97.8 °F (36.6 °C)-98.5 °F (36.9 °C)] 98.5 °F (36.9 °C)  Heart Rate:  [59-80] 59  Resp:  [14-20] 16  BP: (108-132)/(71-84) 132/84      Physical Exam:  Patient is awake alert oriented x3 fund of knowledge good attention span concentration good recent remote memory good language function normal in no distress.  She is shy in her interaction and very pleasant.  Pupils 3 and half constricting to 2-1/2 extraocular movements full without nystagmus nasolabial folds palate tongue symmetrical normal hearing facial sensation head turning shoulder shrug motor 5 out of 5 x 4 extremities no pronator drift.  At first she does have slight weakness in the left  compared to the right but on repeat testing and my motor control.  It is absolutely normal.  No atrophy or fasciculations reflexes trace throughout symmetrical toes downgoing.  Sensation normal light touch face both arms both legs coordination normal in the upper extremities Station and gait she is using a walker but has absolutely normal ambulation.  Heart is regular without murmur neck supple without bruits extremities no clubbing cyanosis edema visual acuity normal at 3 feet    Results review:   I reviewed the patient's new clinical results.    Data review:  Lab Results (last 24 hours)     Procedure Component Value Units Date/Time    POC Glucose Once [[id number]]  (Normal) Collected: 23 0807    Specimen: Blood  "Updated: 05/26/23 0814     Glucose 98 mg/dL      Comment: Meter: [id number] : [id number] Yanet Melendez NURSING ASSISTANT              Imaging:  Imaging Results (Last 24 Hours)     Procedure Component Value Units Date/Time    MRI Cervical Spine Without Contrast [[id number]] Resulted: 05/26/23 0710     Updated: 05/26/23 0733         PPE worn at all times washed before washed up afterwards disposed of everything properly is not within 6 feet of them for more than few minutes during my exam no aerosols used at any point    Assessment and Plan:     This patient had an episode of left-sided \"heaviness and pain\" which began with some chest pain that seem to radiate in to the left shoulder and down her left side.  I cannot call this a true stroke or TIA syndrome.  I have looked at the MRI scan and I believe it is absolutely within normal limits for her age.  Note she has had hyperlipidemia and history of LAD stenting so she does have some small vessel disease everywhere but she does not have any significant stenosis involving circulation of the brain.  Likewise have looked at the cervical spine MRI scan and there is no evidence that she has any significant cervical spondylosis.  No evidence of radiculopathy or myelopathy.  She does have some psychomotor retardation, functionality and certainly anxiety played some role in some of this as she was worried that the chest pain was a sign she was \"having another heart attack\"  At this point I would not recommend further work-up.  Of course I would definitely continue the baby aspirin every day and the Lipitor that she is taking as this would be the perfect treatment for prevention of any type of CVA or TIA.  I just do not think she had one at this time.  Lastly, this patient does have a history of migraine and did develop a headache after all of this seemed to resolve and this could be consistent with a classic migraine, nonetheless it seemed to start with chest pain and " significant anxiety and she has been worked up and treated appropriately.  She definitely has classic migraine with aura  I would not recommend further neurologic work-up.      Facundo Vasquez MD  05/26/23  11:49 EDT

## 2023-05-26 NOTE — PROGRESS NOTES
OBJECTIVE    MRI of brain IMPRESSION:  Nonspecific small white matter lesions bilaterally one in  each cerebral hemisphere which may represent old Small vessel ischemic  changes     Otherwise Normal MRI of the brain without contrast. There is no evidence  of recent ischemic change.      ASSESSMENT/PLAN: left UE pain: anxiety vs cervical djd?  AWAITS: Cervical MRI  Ok to d/c if wnl        I, Toni Phillips MD, saw the patient for a follow up or in-patient or emergency room telememedicine face to face consult or chart review using interactive technology. The location of the patient was at [facility] . I was located at [facility] .    I have proceeded with this evaluation at the request of the referring practitioner as it is felt to be an emergency setting and no appropriate specialist is available to perform this evaluation. The [facility] hospital has reported that this is the correct patient and has obtained consent from the patient/surrogate whenever possible to perform this telemedicine evaluation(including obtaining history, performing examination and reviewing data provided by the patient an/or originating site of care provider)    I have either done chart review only or introduced myself to the patient, provided my credentials, disclosed my location, and determined that, based on review of the patient's chart and discussion with the patient's primary team, telemedicine via a HIPAA compliant, real-time, face-to-face two-way, interactive audio and video platform is an appropriate and effective means of providing the service.    The patient/surrogate has a right to refuse this evaluation as they have been explained risks including potential loss of confidentiality, benefits, alternatives, and the potential need for subsequent face-to-face care. In this evaluation, we will be providing recommendations only.  The ultimate decision to follow or not to follow these recommendations will be left to  the bedside treating/requesting practitioner.    The patient/surrogate whenevr possible has been notified that other healthcare professionals including technical person may be involved in this A/V evaluation.  All laws concerning confidentiality and patient access to medical records and copies of medical records apply to telemedicine.  The patient/surrogate has received the originating site's Health Notice of Privacy Practices.    Toni Phillips MD, OLLIE

## 2023-05-26 NOTE — CASE MANAGEMENT/SOCIAL WORK
Continued Stay Note  KAILEE Gutierrez     Patient Name: Ortiz Salamanca  MRN: [id number]  Today's Date: 5/26/2023    Admit Date: 5/24/2023    Plan: Home with son   Discharge Plan     Row Name 05/26/23 1207       Plan    Plan Home with son    Patient/Family in Agreement with Plan yes    Plan Comments Per PT/OT they informed CM that patient would need a rolling walker at discharge. CM delivered a rolling walker to patient prior to discharge. She is currently sitting up in the chair and son is at bedside. Patient had no other questions or concerns regarding her discharge plans at this time. Patient will discharge home with son. CM will follow.               Discharge Codes    No documentation.               Expected Discharge Date and Time     Expected Discharge Date Expected Discharge Time    May 26, 2023             Marilin Henning RN

## 2023-05-26 NOTE — DISCHARGE SUMMARY
"Ortiz Salamanca  1970  [id number]    Hospitalists Discharge Summary    Date of Admission: 5/24/2023  Date of Discharge:  5/26/2023    Primary Discharge Diagnoses:   Chest pain ruled out ACS  Migraine with aura    Secondary Discharge Diagnoses:   H/O MI 2018 WITH LAD stent  Hypertension  CAD/HLD  Gait instability secondary to medication  Anxiety  Chronic peripheral neuropathy with chronic pain syndrome  Seasonal allergic rhinitis    History of Present Illness:  \"Patient is a 52-year-old female with past medical history significant for previous MI with heart stent placed in 2018, hyperlipidemia, hypertension, migraine headache.  She presents to [facility] ED complaining of chest pain radiating down the left arm resulting in her left arm feeling heavy.  Her symptoms first started last night.  She has found no alleviating factors.  She reports pain and symptoms are similar to that of her previous MI resulting in need for stent placement. She reports feeling less anxious now after receiving IV ativan in ED, but does state she was very worried previously. She continues to feel like her arm is heavy and weak.       Work-up in the ED revealed no acute ischemic changes on EKG, first troponin was negative, stroke neurology was consulted from ED and recommended MRI brain without contrast and daily aspirin after reviewing CT head and CTA imaging.\"     Hospital Course:  The patient was followed in consultation by neuro stroke, cardiology and neurology.  MRI brain did not show acute stroke however patient's gait was unstable when PT/OT evaluated.  It was noted that patient had had dose of lorazepam sometime prior to evaluation and PT/OT felt gait instability was likely secondary to this as her symptoms resolved by discharge.    Chest pain with ACS ruled out by negative troponins with symptoms dissipating.      With negative cardiac and stroke work-up, CT neck was ordered by neuro stroke.  This was reviewed by " neurology and also found to be negative  Dr. Vasquez with the neurology felt that symptoms were likely attributable to migraine with aura which was consistent with patient's routine diagnoses.  She was already taking Maxalt as needed and reported that she did have a headache following the initial episode.  No further work-up was recommended and the patient was discharged home in stable condition  Follow-up PCP 1 week    PCP  Patient Care Team:  Sarah Bright MD as PCP - General (Internal Medicine)    Consults:   Consults     Date and Time Order Name Status Description    5/25/2023 11:46 AM Inpatient Neurology Consult General Completed     5/25/2023  9:58 AM Inpatient Cardiology Consult      5/24/2023  9:18 PM Inpatient Neurology Consult Stroke      5/24/2023  6:08 PM Inpatient Neurology Consult Stroke          Operations and Procedures Performed:     Adult Transthoracic Echo Complete w/ Color, Spectral and Contrast if Necessary Per Protocol    Result Date: 5/25/2023  Narrative: •  Left ventricular systolic function is normal. Left ventricular ejection fraction appears to be 66 - 70%. •  Left ventricular diastolic function was normal. •  Estimated right ventricular systolic pressure from tricuspid regurgitation is normal (<35 mmHg).     CT Angiogram Head w AI Analysis of LVO, CT Angiogram Neck    Result Date: 5/24/2023  Narrative: CT ANGIOGRAM HEAD W AI ANALYSIS OF LVO-, CT ANGIOGRAM NECK-  HISTORY: Headache and left-sided weakness beginning yesterday.  TECHNIQUE: CT angiogram of the head and neck with IV contrast. 3-D reconstructions were obtained and reviewed. Evaluation for a significant carotid arterial stenosis is based on NASCET criteria. Radiation dose reduction techniques included automated exposure control. Radiation audit for known CT and nuclear cardiology exams in the last 12 months: 0.  COMPARISON: CT head 05/24/2023  CTA NECK: Normal aortic arch branching pattern with no proximal great vessel  stenosis. The vertebral arteries are widely patent and codominant. Cervical carotid bifurcations demonstrate no evidence of hemodynamically significant stenoses by NASCET criteria.  Cervical soft tissues are unremarkable. No acute osseous abnormality.  CTA HEAD: Intracranially, there is symmetric distal vascular contrast distribution in the anterior, middle and posterior cerebral artery territories. Bilateral fetal origin of the posterior cerebral arteries with diminutive basilar artery and bilateral P1 segments. No evidence of intracranial arterial flow limiting stenosis. No intracranial aneurysm or vascular malformation is identified. The dural venous sinuses appear normal. No intracranial mass or abnormal contrast enhancement.      Impression: Negative CT angiogram of the head and neck. No intracranial or extracranial arterial flow limiting stenosis or aneurysm.  This report was finalized on 5/24/2023 8:36 PM by Dr. Leno Oropeza MD.      CT Angiogram Chest    Result Date: 5/24/2023  Narrative: CT ANGIOGRAM CHEST-05/24/2023  INDICATION: Shortness of air and chest pain beginning yesterday.  TECHNIQUE: CT angiogram of the chest with IV contrast. 3-D reconstructions were obtained and reviewed.   Radiation dose reduction techniques included automated exposure control or exposure modulation based on body size. Count of known CT and cardiac nuc med studies performed in previous 12 months: 0.  COMPARISON: None available.  FINDINGS: Adequate opacification of the pulmonary arteries with no filling defects. Thoracic aorta normal in course and caliber without dissection. Heart size is normal. No pericardial effusion.  No pleural effusion. No pneumothorax. No focal pulmonary infiltrates.  Visualized upper abdomen is unremarkable.  No acute osseous abnormality.      Impression: 1. Negative for pulmonary embolus. 2. Negative for thoracic aortic aneurysm/dissection. 3. No acute pulmonary process.  This report was finalized on  5/24/2023 6:51 PM by Dr. Leno Oropeza MD.      MRI Brain Without Contrast    Result Date: 5/25/2023  Narrative: Exam: MRI of the brain without contrast  INDICATION: Left-sided extremity weakness since yesterday.  COMPARISON: CTA head and neck and CT perfusion study 05/24/2023  TECHNIQUE: Sagittal T1-weighted images and axial T1, T2, FLAIR and diffusion images were obtained.  FINDINGS: The pituitary gland and foramen magnum region are normal in appearance. The ventricles and subarachnoid spaces are normal. There is no abnormal diffusion. There are no masses or extra-axial fluid collections or hemorrhage. The FLAIR series shows a single small nonspecific white matter lesion in each cerebral hemisphere measuring up to 4 mm in diameter.      Impression: Nonspecific small white matter lesions bilaterally one in each cerebral hemisphere which may represent old Small vessel ischemic changes  Otherwise Normal MRI of the brain without contrast. There is no evidence of recent ischemic change.  This report was finalized on 5/25/2023 9:37 AM by Dr. Paul Serrano MD.      XR Chest 1 View    Result Date: 5/25/2023  Narrative: AP PORTABLE CHEST, 05/25/2023  HISTORY: Left-sided chest heaviness and pain for 2 days  COMPARISON: 06/26/2022  TECHNIQUE: AP portable chest x-ray.  FINDINGS: Heart size and pulmonary vascularity are normal. The lungs are expanded and clear. No visible pulmonary infiltrate or pleural effusion.       Impression: Negative single view chest  This report was finalized on 5/25/2023 7:30 AM by Dr. Paul Serrano MD.      CT Head Without Contrast Stroke Protocol    Result Date: 5/24/2023  Narrative: CT HEAD WO CONTRAST STROKE PROTOCOL-05/24/2023  HISTORY: Headache. Left-sided weakness beginning last night.  TECHNIQUE: Routine noncontrast head CT. Coronal and sagittal reformatted images. Radiation dose reduction techniques included automated exposure control or exposure modulation based on body size. Count of known CT and  cardiac nuc med studies performed in previous 12 months: 0.  COMPARISON: None available  FINDINGS: No hemorrhage, acute infarction, mass lesion, or abnormal extra-axial fluid collection. No midline shift or focal mass effect. Ventricular system is normal in size and configuration. No acute osseous abnormality. Visualized paranasal sinuses are clear. Visualized mastoid air cells are clear.      Impression: No acute intracranial abnormality.   This report was finalized on 5/24/2023 6:19 PM by Dr. Leno Oropeza MD.      CT CEREBRAL PERFUSION WITH & WITHOUT CONTRAST    Result Date: 5/24/2023  Narrative: CT CEREBRAL PERFUSION W WO CONTRAST-05/24/2023  HISTORY: Headache. Left-sided weakness beginning yesterday.  TECHNIQUE: Axial CT images of the brain without and with intravenous contrast using cerebral perfusion protocol. Post-processing parametric maps were created using RAPID software and reviewed. Radiation dose reduction techniques included automated exposure control or exposure modulation based on body size. CT and nuclear cardiology exams in the last 12 months: 0.  COMPARISON: CT head 05/24/2023  FINDINGS: Arterial input function is optimal.  Perfusion maps are symmetric without evidence of cerebral ischemia or core infarction.      Impression: Normal CT cerebral perfusion exam.     This report was finalized on 5/24/2023 6:49 PM by Dr. Leno Oropeza MD.      Allergies:  is allergic to cephalexin and nsaids.    Tico  Gabapentin and hydrocodone 5/2023 per report, reviewed by me    Discharge Medications:     Discharge Medications      Changes to Medications      Instructions Start Date   atorvastatin 80 MG tablet  Commonly known as: LIPITOR  What changed:   · how much to take  · how to take this  · when to take this   TAKE ONE TABLET BY MOUTH ONCE NIGHTLY      gabapentin 300 MG capsule  Commonly known as: NEURONTIN  What changed:   · when to take this  · Another medication with the same name was removed. Continue  taking this medication, and follow the directions you see here.   300 mg, Oral, Daily         Continue These Medications      Instructions Start Date   aspirin 81 MG chewable tablet   CHEW 1 TABLET DAILY      HYDROcodone-acetaminophen 7.5-325 MG per tablet  Commonly known as: NORCO   1 tablet, Oral, Every 6 Hours PRN      Loratadine 10 MG capsule   10 mg, Oral, Daily      metoprolol tartrate 25 MG tablet  Commonly known as: LOPRESSOR   TAKE 1 TABLET TWICE A DAY      rizatriptan MLT 10 MG disintegrating tablet  Commonly known as: MAXALT-MLT   10 mg, Translingual, Once As Needed         Stop These Medications    amoxicillin 500 MG capsule  Commonly known as: AMOXIL            Last Lab Results:   Lab Results (most recent)     Procedure Component Value Units Date/Time    POC Glucose Once [[id number]]  (Normal) Collected: 05/26/23 0807    Specimen: Blood Updated: 05/26/23 0814     Glucose 98 mg/dL      Comment: Meter: [id number] : [id number] Yanet Melendez NURSING ASSISTANT       TSH [[id number]]  (Normal) Collected: 05/25/23 0337    Specimen: Blood Updated: 05/25/23 1038     TSH 2.510 uIU/mL     [facility] Draw [[id number]] Collected: 05/24/23 1920    Specimen: Blood Updated: 05/25/23 0632    Narrative:      The following orders were created for panel order [facility].  Procedure                               Abnormality         Status                     ---------                               -----------         ------                     Green Top (Gel)[[id number]]                                  Final result               Lavender Top[[id number]]                                                                Gold Top - SST[[id number]]                                                              Light Blue Top[[id number]]                                                                Please view results for these tests on the individual orders.    POC Glucose Once [[id number]]  (Normal) Collected: 05/25/23 0607    Specimen:  Blood Updated: 05/25/23 0613     Glucose 113 mg/dL      Comment: Meter: [id number] : [id number] Yo MEDEROS       Lipid Panel [[id number]]  (Abnormal) Collected: 05/25/23 0337    Specimen: Blood Updated: 05/25/23 0447     Total Cholesterol 185 mg/dL      Triglycerides 51 mg/dL      HDL Cholesterol 55 mg/dL      LDL Cholesterol  120 mg/dL      VLDL Cholesterol 10 mg/dL      LDL/HDL Ratio 2.18    Narrative:      Cholesterol Reference Ranges  (U.S. Department of Health and Human Services ATP III Classifications)    Desirable          <200 mg/dL  Borderline High    200-239 mg/dL  High Risk          >240 mg/dL      Triglyceride Reference Ranges  (U.S. Department of Health and Human Services ATP III Classifications)    Normal           <150 mg/dL  Borderline High  150-199 mg/dL  High             200-499 mg/dL  Very High        >500 mg/dL    HDL Reference Ranges  (U.S. Department of Health and Human Services ATP III Classifications)    Low     <40 mg/dl (major risk factor for CHD)  High    >60 mg/dl ('negative' risk factor for CHD)        LDL Reference Ranges  (U.S. Department of Health and Human Services ATP III Classifications)    Optimal          <100 mg/dL  Near Optimal     100-129 mg/dL  Borderline High  130-159 mg/dL  High             160-189 mg/dL  Very High        >189 mg/dL    Hemoglobin A1c [[id number]]  (Normal) Collected: 05/25/23 0337    Specimen: Blood Updated: 05/25/23 0445     Hemoglobin A1C 5.40 %     Narrative:      Hemoglobin A1C Ranges:    Increased Risk for Diabetes  5.7% to 6.4%  Diabetes                     >= 6.5%  Diabetic Goal                < 7.0%    High Sensitivity Troponin T 2Hr [[id number]] Collected: 05/24/23 2135    Specimen: Blood Updated: 05/24/23 2157     HS Troponin T <6 ng/L      Troponin T Delta --     Comment: Unable to calculate.       Narrative:      High Sensitive Troponin T Reference Range:  <10.0 ng/L- Negative Female for AMI  <15.0 ng/L- Negative Male for AMI  >=10 -  Abnormal Female indicating possible myocardial injury.  >=15 - Abnormal Male indicating possible myocardial injury.   Clinicians would have to utilize clinical acumen, EKG, Troponin, and serial changes to determine if it is an Acute Myocardial Infarction or myocardial injury due to an underlying chronic condition.         Green Top (Gel) [[id number]] Collected: 05/24/23 1920    Specimen: Blood Updated: 05/24/23 2031     Extra Tube Hold for add-ons.     Comment: Auto resulted.       BNP [[id number]]  (Normal) Collected: 05/24/23 1920    Specimen: Blood Updated: 05/24/23 1955     proBNP 139.3 pg/mL     Narrative:      Among patients with dyspnea, NT-proBNP is highly sensitive for the detection of acute congestive heart failure. In addition NT-proBNP of <300 pg/ml effectively rules out acute congestive heart failure with 99% negative predictive value.    Results may be falsely decreased if patient taking Biotin.      High Sensitivity Troponin T [[id number]]  (Normal) Collected: 05/24/23 1920    Specimen: Blood Updated: 05/24/23 1955     HS Troponin T <6 ng/L     Narrative:      High Sensitive Troponin T Reference Range:  <10.0 ng/L- Negative Female for AMI  <15.0 ng/L- Negative Male for AMI  >=10 - Abnormal Female indicating possible myocardial injury.  >=15 - Abnormal Male indicating possible myocardial injury.   Clinicians would have to utilize clinical acumen, EKG, Troponin, and serial changes to determine if it is an Acute Myocardial Infarction or myocardial injury due to an underlying chronic condition.         D-dimer, Quantitative [[id number]]  (Normal) Collected: 05/24/23 1810    Specimen: Blood Updated: 05/24/23 1837     D-Dimer, Quantitative 0.52 MCGFEU/mL     Narrative:      According to the assay 's published package insert, a normal (<0.50 MCGFEU/mL) D-dimer result in conjunction with a non-high clinical probability assessment, excludes deep vein thrombosis (DVT) and pulmonary embolism (PE) with  "high sensitivity.    D-dimer values increase with age and this can make VTE exclusion of an older population difficult. To address this, the [employer], based on best available evidence and recent guidelines, recommends that clinicians use age-adjusted D-dimer thresholds in patients greater than 50 years of age with: a) a low probability of PE who do not meet all Pulmonary Embolism Rule Out Criteria, or b) in those with intermediate probability of PE.   The formula for an age-adjusted D-dimer cut-off is \"age/100\".  For example, a 60 year old patient would have an age-adjusted cut-off of 0.60 MCGFEU/mL and an 80 year old 0.80 MCGFEU/mL.    Single High Sensitivity Troponin T [[id number]]  (Normal) Collected: 05/24/23 1810    Specimen: Blood Updated: 05/24/23 1836     HS Troponin T <6 ng/L     Narrative:      High Sensitive Troponin T Reference Range:  <10.0 ng/L- Negative Female for AMI  <15.0 ng/L- Negative Male for AMI  >=10 - Abnormal Female indicating possible myocardial injury.  >=15 - Abnormal Male indicating possible myocardial injury.   Clinicians would have to utilize clinical acumen, EKG, Troponin, and serial changes to determine if it is an Acute Myocardial Infarction or myocardial injury due to an underlying chronic condition.         Comprehensive Metabolic Panel [[id number]]  (Abnormal) Collected: 05/24/23 1810    Specimen: Blood Updated: 05/24/23 1834     Glucose 106 mg/dL      BUN 6 mg/dL      Creatinine 0.76 mg/dL      Sodium 141 mmol/L      Potassium 4.1 mmol/L      Chloride 108 mmol/L      CO2 25.6 mmol/L      Calcium 9.1 mg/dL      Total Protein 6.7 g/dL      Albumin 3.9 g/dL      ALT (SGPT) 34 U/L      AST (SGOT) 26 U/L      Alkaline Phosphatase 113 U/L      Total Bilirubin 0.2 mg/dL      Globulin 2.8 gm/dL      A/G Ratio 1.4 g/dL      BUN/Creatinine Ratio 7.9     Anion Gap 7.4 mmol/L      eGFR 94.4 mL/min/1.73     Narrative:      GFR Normal >60  Chronic Kidney Disease " <60  Kidney Failure <15      aPTT [[id number]]  (Normal) Collected: 05/24/23 1810    Specimen: Blood Updated: 05/24/23 1827     PTT 26.1 seconds     Narrative:      PTT = The equivalent PTT values for the therapeutic range of heparin levels at 0.1 to 0.7 U/ml are 53 to 110 seconds.      Protime-INR [[id number]]  (Normal) Collected: 05/24/23 1810    Specimen: Blood Updated: 05/24/23 1827     Protime 12.5 Seconds      INR 0.93    Narrative:      Therapeutic Ranges for INR: 2.0-3.0 (PT 20-30)                              2.5-3.5 (PT 25-34)    CBC & Differential [[id number]]  (Abnormal) Collected: 05/24/23 1810    Specimen: Blood Updated: 05/24/23 1813    Narrative:      The following orders were created for panel order CBC & Differential.  Procedure                               Abnormality         Status                     ---------                               -----------         ------                     CBC Auto Differential[[id number]]        Abnormal            Final result                 Please view results for these tests on the individual orders.    CBC Auto Differential [[id number]]  (Abnormal) Collected: 05/24/23 1810    Specimen: Blood Updated: 05/24/23 1813     WBC 6.23 10*3/mm3      RBC 4.24 10*6/mm3      Hemoglobin 12.1 g/dL      Hematocrit 37.1 %      MCV 87.5 fL      MCH 28.5 pg      MCHC 32.6 g/dL      RDW 14.9 %      RDW-SD 46.7 fl      MPV 9.7 fL      Platelets 258 10*3/mm3      Neutrophil % 52.8 %      Lymphocyte % 30.8 %      Monocyte % 14.3 %      Eosinophil % 1.4 %      Basophil % 0.5 %      Immature Grans % 0.2 %      Neutrophils, Absolute 3.29 10*3/mm3      Lymphocytes, Absolute 1.92 10*3/mm3      Monocytes, Absolute 0.89 10*3/mm3      Eosinophils, Absolute 0.09 10*3/mm3      Basophils, Absolute 0.03 10*3/mm3      Immature Grans, Absolute 0.01 10*3/mm3      nRBC 0.0 /100 WBC         Imaging Results (Most Recent)     Procedure Component Value Units Date/Time    MRI Cervical Spine Without  Contrast [[id number]] Resulted: 05/26/23 0710     Updated: 05/26/23 0733    MRI Brain Without Contrast [[id number]] Collected: 05/25/23 0934     Updated: 05/25/23 0939    Narrative:      Exam: MRI of the brain without contrast     INDICATION: Left-sided extremity weakness since yesterday.     COMPARISON: CTA head and neck and CT perfusion study 05/24/2023     TECHNIQUE: Sagittal T1-weighted images and axial T1, T2, FLAIR and  diffusion images were obtained.     FINDINGS: The pituitary gland and foramen magnum region are normal in  appearance. The ventricles and subarachnoid spaces are normal. There is  no abnormal diffusion. There are no masses or extra-axial fluid  collections or hemorrhage. The FLAIR series shows a single small  nonspecific white matter lesion in each cerebral hemisphere measuring up  to 4 mm in diameter.       Impression:      Nonspecific small white matter lesions bilaterally one in  each cerebral hemisphere which may represent old Small vessel ischemic  changes     Otherwise Normal MRI of the brain without contrast. There is no evidence  of recent ischemic change.     This report was finalized on 5/25/2023 9:37 AM by Dr. Paul Serrano MD.       XR Chest 1 View [[id number]] Collected: 05/25/23 0729     Updated: 05/25/23 0732    Narrative:      AP PORTABLE CHEST, 05/25/2023     HISTORY:  Left-sided chest heaviness and pain for 2 days     COMPARISON:  06/26/2022     TECHNIQUE:  AP portable chest x-ray.     FINDINGS:  Heart size and pulmonary vascularity are normal. The lungs are expanded  and clear. No visible pulmonary infiltrate or pleural effusion.           Impression:      Negative single view chest     This report was finalized on 5/25/2023 7:30 AM by Dr. Paul Serrano MD.       CT Angiogram Head w AI Analysis of LVO [[id number]] Collected: 05/24/23 2034     Updated: 05/24/23 2039    Narrative:      CT ANGIOGRAM HEAD W AI ANALYSIS OF LVO-, CT ANGIOGRAM NECK-     HISTORY:  Headache and left-sided  weakness beginning yesterday.     TECHNIQUE:  CT angiogram of the head and neck with IV contrast. 3-D reconstructions  were obtained and reviewed. Evaluation for a significant carotid  arterial stenosis is based on NASCET criteria. Radiation dose reduction  techniques included automated exposure control. Radiation audit for  known CT and nuclear cardiology exams in the last 12 months: 0.     COMPARISON:  CT head 05/24/2023     CTA NECK:  Normal aortic arch branching pattern with no proximal great vessel  stenosis. The vertebral arteries are widely patent and codominant.  Cervical carotid bifurcations demonstrate no evidence of hemodynamically  significant stenoses by NASCET criteria.     Cervical soft tissues are unremarkable. No acute osseous abnormality.     CTA HEAD:  Intracranially, there is symmetric distal vascular contrast distribution  in the anterior, middle and posterior cerebral artery territories.  Bilateral fetal origin of the posterior cerebral arteries with  diminutive basilar artery and bilateral P1 segments. No evidence of  intracranial arterial flow limiting stenosis. No intracranial aneurysm  or vascular malformation is identified. The dural venous sinuses appear  normal. No intracranial mass or abnormal contrast enhancement.       Impression:      Negative CT angiogram of the head and neck. No intracranial or  extracranial arterial flow limiting stenosis or aneurysm.      This report was finalized on 5/24/2023 8:36 PM by Dr. Leno Oropeza MD.       CT Angiogram Neck [[id number]] Collected: 05/24/23 2034     Updated: 05/24/23 2039    Narrative:      CT ANGIOGRAM HEAD W AI ANALYSIS OF LVO-, CT ANGIOGRAM NECK-     HISTORY:  Headache and left-sided weakness beginning yesterday.     TECHNIQUE:  CT angiogram of the head and neck with IV contrast. 3-D reconstructions  were obtained and reviewed. Evaluation for a significant carotid  arterial stenosis is based on NASCET criteria. Radiation dose  reduction  techniques included automated exposure control. Radiation audit for  known CT and nuclear cardiology exams in the last 12 months: 0.     COMPARISON:  CT head 05/24/2023     CTA NECK:  Normal aortic arch branching pattern with no proximal great vessel  stenosis. The vertebral arteries are widely patent and codominant.  Cervical carotid bifurcations demonstrate no evidence of hemodynamically  significant stenoses by NASCET criteria.     Cervical soft tissues are unremarkable. No acute osseous abnormality.     CTA HEAD:  Intracranially, there is symmetric distal vascular contrast distribution  in the anterior, middle and posterior cerebral artery territories.  Bilateral fetal origin of the posterior cerebral arteries with  diminutive basilar artery and bilateral P1 segments. No evidence of  intracranial arterial flow limiting stenosis. No intracranial aneurysm  or vascular malformation is identified. The dural venous sinuses appear  normal. No intracranial mass or abnormal contrast enhancement.       Impression:      Negative CT angiogram of the head and neck. No intracranial or  extracranial arterial flow limiting stenosis or aneurysm.      This report was finalized on 5/24/2023 8:36 PM by Dr. Leno Oropeza MD.       CT CEREBRAL PERFUSION WITH & WITHOUT CONTRAST [[id number]] Collected: 05/24/23 1847     Updated: 05/24/23 1859    Narrative:      CT CEREBRAL PERFUSION W WO CONTRAST-05/24/2023     HISTORY:   Headache. Left-sided weakness beginning yesterday.     TECHNIQUE:   Axial CT images of the brain without and with intravenous contrast using  cerebral perfusion protocol. Post-processing parametric maps were  created using RAPID software and reviewed. Radiation dose reduction  techniques included automated exposure control or exposure modulation  based on body size. CT and nuclear cardiology exams in the last 12  months: 0.     COMPARISON:   CT head 05/24/2023     FINDINGS:   Arterial input function is  optimal.      Perfusion maps are symmetric without evidence of cerebral ischemia or  core infarction.       Impression:      Normal CT cerebral perfusion exam.              This report was finalized on 5/24/2023 6:49 PM by Dr. Leno Oropeza MD.       CT Angiogram Chest [[id number]] Collected: 05/24/23 1849     Updated: 05/24/23 1858    Narrative:      CT ANGIOGRAM CHEST-05/24/2023     INDICATION:   Shortness of air and chest pain beginning yesterday.     TECHNIQUE:   CT angiogram of the chest with IV contrast. 3-D reconstructions were  obtained and reviewed.   Radiation dose reduction techniques included  automated exposure control or exposure modulation based on body size.  Count of known CT and cardiac nuc med studies performed in previous 12  months: 0.      COMPARISON:   None available.     FINDINGS:   Adequate opacification of the pulmonary arteries with no filling  defects. Thoracic aorta normal in course and caliber without dissection.  Heart size is normal. No pericardial effusion.     No pleural effusion. No pneumothorax. No focal pulmonary infiltrates.     Visualized upper abdomen is unremarkable.     No acute osseous abnormality.       Impression:      1. Negative for pulmonary embolus.  2. Negative for thoracic aortic aneurysm/dissection.  3. No acute pulmonary process.     This report was finalized on 5/24/2023 6:51 PM by Dr. Leno Oropeza MD.       CT Head Without Contrast Stroke Protocol [[id number]] Collected: 05/24/23 1817     Updated: 05/24/23 1858    Narrative:      CT HEAD WO CONTRAST STROKE PROTOCOL-05/24/2023     HISTORY:   Headache. Left-sided weakness beginning last night.     TECHNIQUE:   Routine noncontrast head CT. Coronal and sagittal reformatted images.  Radiation dose reduction techniques included automated exposure control  or exposure modulation based on body size. Count of known CT and cardiac  nuc med studies performed in previous 12 months: 0.      COMPARISON:   None available      FINDINGS:   No hemorrhage, acute infarction, mass lesion, or abnormal extra-axial  fluid collection. No midline shift or focal mass effect. Ventricular  system is normal in size and configuration. No acute osseous  abnormality. Visualized paranasal sinuses are clear. Visualized mastoid  air cells are clear.       Impression:      No acute intracranial abnormality.        This report was finalized on 5/24/2023 6:19 PM by Dr. Leno Oropeza MD.           PROCEDURES: None    Condition on Discharge: Stable    Physical Exam at Discharge  Vital Signs  Temp:  [97.8 °F (36.6 °C)-98.5 °F (36.9 °C)] 98.5 °F (36.9 °C)  Heart Rate:  [59-80] 59  Resp:  [14-20] 16  BP: (108-132)/(71-84) 132/84   Body mass index is 28.88 kg/m².    Physical Exam:  Physical Exam   Constitutional: Patient appears well-developed and well-nourished and in no acute distress   HEENT:   Head: Normocephalic and atraumatic.   Eyes:  Pupils are equal, round, and reactive to light. EOM are intact. Sclera are anicteric and non-injected.  Mouth and Throat: Patient has moist mucous membranes. Oropharynx is clear of any erythema or exudate.     Neck: Neck supple. No JVD present. No thyromegaly present. No lymphadenopathy present.  Cardiovascular: Regular rate, regular rhythm, S1 normal and S2 normal.  Exam reveals no gallop and no friction rub.  No murmur heard.  Pulmonary/Chest: Lungs are clear to auscultation bilaterally. No respiratory distress. No wheezes. No rhonchi. No rales.   Abdominal: Soft. Bowel sounds are normal. No distension and no mass. There is no hepatosplenomegaly. There is no tenderness.   Musculoskeletal: Normal Muscle tone  Extremities: No edema. Pulses are palpable in all 4 extremities.  Neurological: Patient is alert and oriented to person, place, and time. Cranial nerves II-XII are grossly intact with no focal deficits.  Skin: Skin is warm. No rash noted. Nails show no clubbing.  No cyanosis or erythema.    Discharge  Disposition  Home    Visiting Nurse:    No     Home PT/OT:  No     Home Safety Evaluation:  No     DME  None new    Discharge Diet:      Dietary Orders (From admission, onward)     Start     Ordered    05/24/23 2121  Diet: Regular/House Diet, Cardiac Diets; Healthy Heart (2-3 Na+); Texture: Regular Texture (IDDSI 7); Fluid Consistency: Thin (IDDSI 0)  Diet Effective Now        References:    Diet Order [place]   Question Answer Comment   Diets: Regular/House Diet    Diets: Cardiac Diets    Cardiac Diet: Healthy Heart (2-3 Na+)    Texture: Regular Texture (IDDSI 7)    Fluid Consistency: Thin (IDDSI 0)        05/24/23 2120                Activity at Discharge:  As tolerated    Pre-discharge education  Cardiac, medications, follow-up    Follow-up Appointments  Future Appointments   Date Time Provider Department Center   2/7/2024 12:45 PM Ag Santiago III, MD [facility]     Additional Instructions for the Follow-ups that You Need to Schedule     Discharge Follow-up with PCP   As directed       Currently Documented PCP:    Sarah Bright MD    PCP Phone Number:    [phone]     Follow Up Details: Next week             Test Results Pending at Discharge: None     Stuart Arango MD  05/26/23  12:07 EDT    Time: Discharge 30 min (if over 30 minutes give explanation as to why it took greater than 30 minutes)

## 2023-05-27 ENCOUNTER — READMISSION MANAGEMENT (OUTPATIENT)
Dept: CALL CENTER | Facility: HOSPITAL | Age: 53
End: 2023-05-27
Payer: COMMERCIAL

## 2023-05-27 NOTE — OUTREACH NOTE
Prep Survey    Flowsheet Row Responses   Confucianism facility patient discharged from? [place]   Is LACE score < 7 ? Yes   Eligibility Readm Mgmt   Discharge diagnosis chest pain   Does the patient have one of the following disease processes/diagnoses(primary or secondary)? Other   Does the patient have Home health ordered? No   Is there a DME ordered? Yes   What DME was ordered? [facility]- [facility]   Prep survey completed? Yes          Reena GRIGGS - Registered Nurse

## 2023-06-01 ENCOUNTER — READMISSION MANAGEMENT (OUTPATIENT)
Dept: CALL CENTER | Facility: HOSPITAL | Age: 53
End: 2023-06-01

## 2023-06-01 NOTE — OUTREACH NOTE
LAG < 7 Survey    Flowsheet Row Responses   Amish facility patient discharged from? [place]   Does the patient have one of the following disease processes/diagnoses(primary or secondary)? Other   BHLAG <7 Attempt successful? No   Unsuccessful attempts Attempt 1          Tracie BOCANEGRA - Registered Nurse

## 2023-06-02 ENCOUNTER — READMISSION MANAGEMENT (OUTPATIENT)
Dept: CALL CENTER | Facility: HOSPITAL | Age: 53
End: 2023-06-02

## 2023-06-02 NOTE — OUTREACH NOTE
LAG < 7 Survey    Flowsheet Row Responses   Taoist facility patient discharged from? [place]   Does the patient have one of the following disease processes/diagnoses(primary or secondary)? Other   BHLAG <7 Attempt successful? No   Unsuccessful attempts Attempt 2          Tracie BOCANEGRA - Registered Nurse

## 2023-06-05 ENCOUNTER — READMISSION MANAGEMENT (OUTPATIENT)
Dept: CALL CENTER | Facility: HOSPITAL | Age: 53
End: 2023-06-05
Payer: COMMERCIAL

## 2023-06-05 NOTE — OUTREACH NOTE
LAG < 7 Survey      Flowsheet Row Responses   Amish facility patient discharged from? [place]   Does the patient have one of the following disease processes/diagnoses(primary or secondary)? Other   BHLAG <7 Attempt successful? No   Unsuccessful attempts Attempt 3            Tracie BOCANEGRA - Registered Nurse

## 2023-08-31 ENCOUNTER — OFFICE VISIT (OUTPATIENT)
Dept: CARDIOLOGY | Facility: CLINIC | Age: 53
End: 2023-08-31
Payer: COMMERCIAL

## 2023-08-31 ENCOUNTER — LAB (OUTPATIENT)
Dept: LAB | Facility: HOSPITAL | Age: 53
End: 2023-08-31
Payer: COMMERCIAL

## 2023-08-31 VITALS
BODY MASS INDEX: 30.3 KG/M2 | DIASTOLIC BLOOD PRESSURE: 82 MMHG | HEIGHT: 63 IN | SYSTOLIC BLOOD PRESSURE: 128 MMHG | OXYGEN SATURATION: 98 % | HEART RATE: 88 BPM | WEIGHT: 171 LBS

## 2023-08-31 DIAGNOSIS — Z95.5 PRESENCE OF DRUG COATED STENT IN LAD CORONARY ARTERY: ICD-10-CM

## 2023-08-31 DIAGNOSIS — I25.118 CORONARY ARTERY DISEASE OF NATIVE ARTERY OF NATIVE HEART WITH STABLE ANGINA PECTORIS: ICD-10-CM

## 2023-08-31 DIAGNOSIS — E78.2 MIXED HYPERLIPIDEMIA: ICD-10-CM

## 2023-08-31 DIAGNOSIS — M79.89 SWELLING OF BOTH LOWER EXTREMITIES: ICD-10-CM

## 2023-08-31 DIAGNOSIS — I25.118 CORONARY ARTERY DISEASE OF NATIVE ARTERY OF NATIVE HEART WITH STABLE ANGINA PECTORIS: Primary | ICD-10-CM

## 2023-08-31 LAB
ALBUMIN SERPL-MCNC: 4 G/DL (ref 3.5–5.2)
ALBUMIN/GLOB SERPL: 1.4 G/DL
ALP SERPL-CCNC: 78 U/L (ref 39–117)
ALT SERPL W P-5'-P-CCNC: 83 U/L (ref 1–33)
ANION GAP SERPL CALCULATED.3IONS-SCNC: 10.1 MMOL/L (ref 5–15)
AST SERPL-CCNC: 35 U/L (ref 1–32)
BILIRUB SERPL-MCNC: 0.2 MG/DL (ref 0–1.2)
BUN SERPL-MCNC: 13 MG/DL (ref 6–20)
BUN/CREAT SERPL: 15.7 (ref 7–25)
CALCIUM SPEC-SCNC: 9.5 MG/DL (ref 8.6–10.5)
CHLORIDE SERPL-SCNC: 107 MMOL/L (ref 98–107)
CO2 SERPL-SCNC: 26.9 MMOL/L (ref 22–29)
CREAT SERPL-MCNC: 0.83 MG/DL (ref 0.57–1)
D DIMER PPP FEU-MCNC: 0.75 MCGFEU/ML (ref 0–0.53)
DEPRECATED RDW RBC AUTO: 43.3 FL (ref 37–54)
EGFRCR SERPLBLD CKD-EPI 2021: 84.4 ML/MIN/1.73
ERYTHROCYTE [DISTWIDTH] IN BLOOD BY AUTOMATED COUNT: 13.8 % (ref 12.3–15.4)
GLOBULIN UR ELPH-MCNC: 2.8 GM/DL
GLUCOSE SERPL-MCNC: 110 MG/DL (ref 65–99)
HCT VFR BLD AUTO: 34.6 % (ref 34–46.6)
HGB BLD-MCNC: 11.4 G/DL (ref 12–15.9)
MCH RBC QN AUTO: 28.4 PG (ref 26.6–33)
MCHC RBC AUTO-ENTMCNC: 32.9 G/DL (ref 31.5–35.7)
MCV RBC AUTO: 86.3 FL (ref 79–97)
NT-PROBNP SERPL-MCNC: 182 PG/ML (ref 0–900)
PLATELET # BLD AUTO: 266 10*3/MM3 (ref 140–450)
PMV BLD AUTO: 10.4 FL (ref 6–12)
POTASSIUM SERPL-SCNC: 5.3 MMOL/L (ref 3.5–5.2)
PROT SERPL-MCNC: 6.8 G/DL (ref 6–8.5)
RBC # BLD AUTO: 4.01 10*6/MM3 (ref 3.77–5.28)
SODIUM SERPL-SCNC: 144 MMOL/L (ref 136–145)
T-UPTAKE NFR SERPL: 1.14 TBI (ref 0.8–1.3)
T4 SERPL-MCNC: 7.39 MCG/DL (ref 4.5–11.7)
TSH SERPL DL<=0.05 MIU/L-ACNC: 0.98 UIU/ML (ref 0.27–4.2)
WBC NRBC COR # BLD: 7.21 10*3/MM3 (ref 3.4–10.8)

## 2023-08-31 PROCEDURE — 85379 FIBRIN DEGRADATION QUANT: CPT

## 2023-08-31 PROCEDURE — 83880 ASSAY OF NATRIURETIC PEPTIDE: CPT

## 2023-08-31 PROCEDURE — 84436 ASSAY OF TOTAL THYROXINE: CPT

## 2023-08-31 PROCEDURE — 80050 GENERAL HEALTH PANEL: CPT

## 2023-08-31 PROCEDURE — 36415 COLL VENOUS BLD VENIPUNCTURE: CPT

## 2023-08-31 PROCEDURE — 99214 OFFICE O/P EST MOD 30 MIN: CPT | Performed by: INTERNAL MEDICINE

## 2023-08-31 PROCEDURE — 84479 ASSAY OF THYROID (T3 OR T4): CPT

## 2023-08-31 RX ORDER — PREGABALIN 100 MG/1
100 CAPSULE ORAL 2 TIMES DAILY
COMMUNITY
Start: 2023-08-24

## 2023-08-31 RX ORDER — ERGOCALCIFEROL 1.25 MG/1
1 CAPSULE ORAL WEEKLY
COMMUNITY
Start: 2023-05-26

## 2023-08-31 NOTE — PROGRESS NOTES
Subjective:     Encounter Date:23        Patient ID: Ortiz Salamanca is a 53 y.o. female.    Chief Complaint: CAD  Coronary Artery Disease  Pertinent negatives include no muscle weakness.     Dear Dr. Chandler,    She has a history of CAD and prior drug-eluting stent.    She comes in today for follow-up.  She comes in early because she feels like she has been having some swelling in her hands and her feet.    She was recently hospitalized in May at [facility].  She had an echocardiogram at that time that was unremarkable, reviewed in detail below.  Laboratory data including NT proBNP level were normal.    She says she is just started noticed that her face seems to be a little swollen, her hands seem to be a swollen, her feet seem to be little bit swollen.  No chest pain or chest discomfort, no shortness of breath.  She has been active without difficulty.  She has noted a little bit of fatigue.     She presented to the ER in [place] on 18 with chest pain and mild SOA. She had the chest pain for 2 days and it was increasing. At times it was worse when lying down and at other times it would radiate into her left arm. It was not worse with cough or deep breathing.  She stated the pain was not sharp and there was no nausea or vomiting. She thought it was indigestion for the first day or so and took Tums and Nexium which did not help.  Her ECG was unremarkable but her initial troponin was indeterminate at 0.040.  She has a strong family history of CAD.  One of her brothers  from a MI in his 40s and another brother has stents.  She has a history of hyperlipidemia and was a daily smoker.  It was felt she needed to be admitted and cardiology was consulted.       She had an echocardiogram that showed normal biventricular size and function, no wall motion abnormalities, and no pericardial effusion.  Her serial troponin continued to increase to 0.046 and 0.072.  She was then transferred to [facility] for a cardiac catheterization.  She had a 99% stenosis of the mid LAD which was treated with angioplasty and EVARISTO 3.5 x 28MM Xience Divina.  She was discharged home on DAPT.   She presented to the [facility] ER again on 12/29/18 with complaints of left sided heart pain that started the previous night around 11 pm.  She did not take anything for the pain.  Her ECG was essentially unchanged from her last ECG on 12/27/18 at discharge. Her troponin was 0.079 which had decreased from 0.083 on previous admission.  She was given the option to be discharged home and to follow-up in the office this coming week or to stay and have repeat cardiac catheterization.  She initially chose to be transferred to [facility] for elective cardiac catheterization.     The following portions of the patient's history were reviewed and updated as appropriate: allergies, current medications, past family history, past medical history, past social history, past surgical history and problem list.    Past Medical History:   Diagnosis Date    Colon polyp     BENIGN    Coronary artery disease involving native coronary artery of native heart without angina pectoris 1/31/2019    DDD (degenerative disc disease), lumbosacral     Elevated cholesterol     Heart attack 12/25/2018    2018 dec 25    Hiatal hernia     History of heart artery stent 12/16/2018    LAD    Hyperlipidemia     Migraine headache     Presence of drug coated stent in LAD coronary artery 1/31/2019    Spinal stenosis        Past Surgical History:   Procedure Laterality Date    BACK SURGERY      CARDIAC CATHETERIZATION N/A 12/26/2018    Procedure: Left Heart Cath;  Surgeon: Hilario Coronado MD;  Location:  [facility] CATH INVASIVE LOCATION;  Service: Cardiovascular    CARDIAC CATHETERIZATION N/A 12/26/2018    Procedure: Left ventriculography;  Surgeon: Hilario Coronado MD;  Location:  [facility] CATH INVASIVE LOCATION;  Service: Cardiovascular    CARDIAC  "CATHETERIZATION N/A 2018    Procedure: Stent EVARISTO coronary;  Surgeon: Hilario Coronado MD;  [facility];  Service: Cardiovascular    CARDIAC CATHETERIZATION N/A 2018    Procedure: Coronary angiography;  Surgeon: Hilario Coronado MD;  Location:  [facility] CATH INVASIVE LOCATION;  Service: Cardiovascular     SECTION      COLONOSCOPY N/A 10/09/2019    Procedure: COLONOSCOPY with polypectomy;  Surgeon: Dung Hutchison MD;  Location: [facility] OR;  Service: Gastroenterology    CORONARY STENT PLACEMENT      ENDOSCOPY N/A 2020    Procedure: ESOPHAGOGASTRODUODENOSCOPY WITH BIOPSY;  Surgeon: Facundo Helm Jr., MD;  [facility];  Service: General;  Laterality: N/A;  PRE- DYSPEPSIA  POST- GASTRITIS    GASTRIC SLEEVE LAPAROSCOPIC N/A 2020    Procedure: GASTRIC SLEEVE LAPAROSCOPIC With Hiatal Hernia Repair;  Surgeon: Facundo Helm Jr., MD;  Location:  [facility] OR [facility];  Service: Bariatric;  Laterality: N/A;    HERNIA REPAIR      HYSTERECTOMY  2008    KNEE SURGERY Left 2014    cyst removal    LUMBAR FUSION  2021           Procedures       Objective:     Vitals:    23 1433   BP: 128/82   BP Location: Left arm   Patient Position: Sitting   Pulse: 88   SpO2: 98%   Weight: 77.6 kg (171 lb)   Height: 160 cm (62.99\")     General Appearance:    Alert, cooperative, in no acute distress   Head:    Normocephalic, without obvious abnormality   Eyes:            Lids and lashes normal, conjunctivae and sclerae normal, no icterus, no pallor, corneas clear   Ears:    Ears appear intact with no abnormalities noted   Throat:   No oral lesions, oral mucosa moist   Neck:   No adenopathy, supple, trachea midline, no thyromegaly, no carotid bruit, no JVD   Back:     No kyphosis present, no erythema or scars, no tenderness to palpation    Lungs:     Clear to auscultation,respirations regular, even and unlabored    Heart:    Regular " rhythm and normal rate, normal S1 and S2, no murmur, no gallop, no rub, no click   Chest Wall:    No abnormalities observed   Abdomen:     Normal bowel sounds, no masses, no organomegaly, soft        non-tender, non-distended, no guarding   Extremities:   Moves all extremities well, no edema, no cyanosis, no redness   Pulses:  Bilateral carotids brisk   Skin:  Psychiatric:   No bleeding or rash    Alert and oriented, normal mood and affect           Lab Review:             Lab Results   Component Value Date    GLUCOSE 106 (H) 05/24/2023    BUN 6 05/24/2023    CREATININE 0.76 05/24/2023    EGFRIFAFRI 93 08/24/2020    BCR 7.9 05/24/2023    K 4.1 05/24/2023    CO2 25.6 05/24/2023    CALCIUM 9.1 05/24/2023    ALBUMIN 3.9 05/24/2023    LABIL2 1.3 10/23/2020    AST 26 05/24/2023    ALT 34 (H) 05/24/2023             Assessment:          Diagnosis Plan   1. Coronary artery disease of native artery of native heart with stable angina pectoris  Thyroid Panel With TSH    BNP    CBC (No Diff)    Comprehensive Metabolic Panel    D-dimer, Quantitative      2. Swelling of both lower extremities  Thyroid Panel With TSH    BNP    CBC (No Diff)    Comprehensive Metabolic Panel    D-dimer, Quantitative      3. Presence of drug coated stent in LAD coronary artery  Thyroid Panel With TSH    BNP    CBC (No Diff)    Comprehensive Metabolic Panel    D-dimer, Quantitative      4. Mixed hyperlipidemia  Thyroid Panel With TSH    BNP    CBC (No Diff)    Comprehensive Metabolic Panel    D-dimer, Quantitative             Plan:       1.  CAD status post non-ST segment elevation myocardial infarction a drug-coated stent.  Doing great, no change on her EKG, continues to be active, continue current medical therapy, AST, ALT reviewed  2.  Tobacco abuse-patient stopped smoking at the time of her myocardial infarction and has not resumed.  3.  HLD-   4.  Patient feels that she has puffiness of her hands feet and face, may be just a trace amount of  puffiness in her ankles and feet but I do not see any overt edema, we will send her down for blood work.    Thank you very much for allowing us to participate in the care of this pleasant patient.  Please don't hesitate to call if I can be of assistance in any way.      Current Outpatient Medications:     aspirin 81 MG chewable tablet, CHEW 1 TABLET DAILY (Patient taking differently: Chew 1 tablet Daily.), Disp: 90 tablet, Rfl: 3    atorvastatin (LIPITOR) 80 MG tablet, TAKE ONE TABLET BY MOUTH ONCE NIGHTLY (Patient taking differently: Take 1 tablet by mouth Every Night. TAKE ONE TABLET BY MOUTH ONCE NIGHTLY), Disp: 90 tablet, Rfl: 1    ergocalciferol (ERGOCALCIFEROL) 1.25 MG (79808 UT) capsule, Take 1 capsule by mouth 1 (One) Time Per Week., Disp: , Rfl:     HYDROcodone-acetaminophen (NORCO) 7.5-325 MG per tablet, Take 1 tablet by mouth Every 6 (Six) Hours As Needed., Disp: , Rfl:     Loratadine 10 MG capsule, Take 1 capsule by mouth As Needed., Disp: , Rfl:     metoprolol tartrate (LOPRESSOR) 25 MG tablet, TAKE 1 TABLET TWICE A DAY (Patient taking differently: Take 1 tablet by mouth 2 (Two) Times a Day.), Disp: 180 tablet, Rfl: 3    pregabalin (LYRICA) 100 MG capsule, Take 1 capsule by mouth 2 (Two) Times a Day. Patient takes two tablets at night., Disp: , Rfl:     rizatriptan MLT (MAXALT-MLT) 10 MG disintegrating tablet, Place 1 tablet on the tongue 1 (One) Time As Needed for Migraine., Disp: , Rfl:

## 2023-09-21 RX ORDER — ASPIRIN 81 MG/1
81 TABLET, CHEWABLE ORAL DAILY
Qty: 90 TABLET | Refills: 3 | Status: SHIPPED | OUTPATIENT
Start: 2023-09-21

## 2023-09-21 RX ORDER — ATORVASTATIN CALCIUM 80 MG/1
TABLET, FILM COATED ORAL
Qty: 90 TABLET | Refills: 1 | Status: SHIPPED | OUTPATIENT
Start: 2023-09-21

## 2024-02-07 ENCOUNTER — TELEPHONE (OUTPATIENT)
Dept: CARDIOLOGY | Facility: CLINIC | Age: 54
End: 2024-02-07
Payer: COMMERCIAL

## 2024-02-07 NOTE — TELEPHONE ENCOUNTER
Received a fax from [facility] pain center, requesting a cardiac clearance for the above patient for an Intrathecal pain pump and catheter implant under MAC anesthesia with Dr.Timothy Munoz at [facility].

## 2024-02-12 ENCOUNTER — TELEPHONE (OUTPATIENT)
Dept: ORTHOPEDIC SURGERY | Facility: CLINIC | Age: 54
End: 2024-02-12
Payer: COMMERCIAL

## 2024-02-14 ENCOUNTER — OFFICE VISIT (OUTPATIENT)
Dept: ORTHOPEDIC SURGERY | Facility: CLINIC | Age: 54
End: 2024-02-14
Payer: COMMERCIAL

## 2024-02-14 VITALS
WEIGHT: 165.6 LBS | HEIGHT: 63 IN | DIASTOLIC BLOOD PRESSURE: 83 MMHG | SYSTOLIC BLOOD PRESSURE: 134 MMHG | BODY MASS INDEX: 29.34 KG/M2 | HEART RATE: 79 BPM

## 2024-02-14 DIAGNOSIS — M17.11 OSTEOARTHRITIS OF RIGHT PATELLOFEMORAL JOINT: Primary | ICD-10-CM

## 2024-02-14 DIAGNOSIS — G89.29 CHRONIC PAIN OF RIGHT KNEE: ICD-10-CM

## 2024-02-14 DIAGNOSIS — M25.561 CHRONIC PAIN OF RIGHT KNEE: ICD-10-CM

## 2024-02-14 RX ORDER — HYDROXYZINE 50 MG/1
50 TABLET, FILM COATED ORAL
COMMUNITY
Start: 2024-01-24 | End: 2024-02-23

## 2024-02-14 RX ORDER — LIDOCAINE HYDROCHLORIDE 10 MG/ML
8 INJECTION, SOLUTION EPIDURAL; INFILTRATION; INTRACAUDAL; PERINEURAL
Status: COMPLETED | OUTPATIENT
Start: 2024-02-14 | End: 2024-02-14

## 2024-02-14 RX ORDER — MAGNESIUM OXIDE 400 MG/1
TABLET ORAL DAILY
COMMUNITY

## 2024-02-14 RX ORDER — TRIAMCINOLONE ACETONIDE 40 MG/ML
80 INJECTION, SUSPENSION INTRA-ARTICULAR; INTRAMUSCULAR
Status: COMPLETED | OUTPATIENT
Start: 2024-02-14 | End: 2024-02-14

## 2024-02-14 RX ORDER — PREGABALIN 100 MG/1
300 CAPSULE ORAL DAILY
COMMUNITY
Start: 2023-11-18

## 2024-02-14 RX ORDER — CYANOCOBALAMIN/FOLIC AC/VIT B6 1-2.5-25MG
1 TABLET ORAL WEEKLY
COMMUNITY

## 2024-02-14 RX ORDER — ALBUTEROL SULFATE 90 UG/1
AEROSOL, METERED RESPIRATORY (INHALATION) EVERY 6 HOURS SCHEDULED
COMMUNITY

## 2024-02-14 RX ORDER — TOPIRAMATE 50 MG/1
50 TABLET, FILM COATED ORAL
COMMUNITY
Start: 2023-04-14 | End: 2024-04-13

## 2024-02-14 RX ORDER — PREGABALIN 100 MG/1
CAPSULE ORAL EVERY 8 HOURS SCHEDULED
COMMUNITY

## 2024-02-14 RX ORDER — HYDROCODONE BITARTRATE AND ACETAMINOPHEN 7.5; 325 MG/1; MG/1
TABLET ORAL EVERY 6 HOURS SCHEDULED
COMMUNITY

## 2024-02-14 RX ORDER — ATORVASTATIN CALCIUM 80 MG/1
TABLET, FILM COATED ORAL EVERY 24 HOURS
COMMUNITY

## 2024-02-14 RX ADMIN — TRIAMCINOLONE ACETONIDE 80 MG: 40 INJECTION, SUSPENSION INTRA-ARTICULAR; INTRAMUSCULAR at 08:56

## 2024-02-14 RX ADMIN — LIDOCAINE HYDROCHLORIDE 8 ML: 10 INJECTION, SOLUTION EPIDURAL; INFILTRATION; INTRACAUDAL; PERINEURAL at 08:56

## 2024-02-28 ENCOUNTER — OFFICE VISIT (OUTPATIENT)
Dept: CARDIOLOGY | Facility: CLINIC | Age: 54
End: 2024-02-28
Payer: COMMERCIAL

## 2024-02-28 VITALS
HEART RATE: 85 BPM | DIASTOLIC BLOOD PRESSURE: 100 MMHG | WEIGHT: 159.6 LBS | OXYGEN SATURATION: 97 % | SYSTOLIC BLOOD PRESSURE: 160 MMHG | BODY MASS INDEX: 28.28 KG/M2 | HEIGHT: 63 IN

## 2024-02-28 DIAGNOSIS — Z95.5 PRESENCE OF DRUG COATED STENT IN LAD CORONARY ARTERY: Chronic | ICD-10-CM

## 2024-02-28 DIAGNOSIS — I25.10 CORONARY ARTERY DISEASE INVOLVING NATIVE CORONARY ARTERY OF NATIVE HEART WITHOUT ANGINA PECTORIS: Primary | ICD-10-CM

## 2024-02-28 PROCEDURE — 99214 OFFICE O/P EST MOD 30 MIN: CPT | Performed by: INTERNAL MEDICINE

## 2024-02-28 PROCEDURE — G2211 COMPLEX E/M VISIT ADD ON: HCPCS | Performed by: INTERNAL MEDICINE

## 2024-02-28 NOTE — PROGRESS NOTES
Subjective:     Encounter Date:24        Patient ID:  JACQUES Salamanca is a 53 y.o. female.    Chief Complaint: CAD  Coronary Artery Disease  Pertinent negatives include no muscle weakness.       Dear Dr. Chandler,    She has a history of CAD and prior drug-eluting stent.    She comes in today for follow-up.  She denies any chest pain, pressure, tightness, squeezing, or heartburn.  She has not experienced any feeling of palpitations, tachycardia or heart racing and no presyncope or syncope.  There has not been any problems with dizziness or lightheadedness.  There has not been any orthopnea or PND, and no problems with lower extremity edema.  She denies any shortness of breath at rest or with activity and has not had any wheezing.  She has continued to perform daily activities of living without any specific problem or change in the level of activity.    She presented to the ER in [place] on 18 with chest pain and mild SOA. She had the chest pain for 2 days and it was increasing. At times it was worse when lying down and at other times it would radiate into her left arm. It was not worse with cough or deep breathing.  She stated the pain was not sharp and there was no nausea or vomiting. She thought it was indigestion for the first day or so and took Tums and Nexium which did not help.  Her ECG was unremarkable but her initial troponin was indeterminate at 0.040.  She has a strong family history of CAD.  One of her brothers  from a MI in his 40s and another brother has stents.  She has a history of hyperlipidemia and was a daily smoker.  It was felt she needed to be admitted and cardiology was consulted.       She had an echocardiogram that showed normal biventricular size and function, no wall motion abnormalities, and no pericardial effusion.  Her serial troponin continued to increase to 0.046 and 0.072.  She was then transferred to [facility] for a cardiac catheterization.  She had a 99%  stenosis of the mid LAD which was treated with angioplasty and EVARISTO 3.5 x 28MM Xience Divina.  She was discharged home on DAPT.   She presented to the [facility] ER again on 12/29/18 with complaints of left sided heart pain that started the previous night around 11 pm.  She did not take anything for the pain.  Her ECG was essentially unchanged from her last ECG on 12/27/18 at discharge. Her troponin was 0.079 which had decreased from 0.083 on previous admission.  She was given the option to be discharged home and to follow-up in the office this coming week or to stay and have repeat cardiac catheterization.  She initially chose to be transferred to [facility] for elective cardiac catheterization.     The following portions of the patient's history were reviewed and updated as appropriate: allergies, current medications, past family history, past medical history, past social history, past surgical history and problem list.    Past Medical History:   Diagnosis Date    Colon polyp     BENIGN    Coronary artery disease involving native coronary artery of native heart without angina pectoris 1/31/2019    DDD (degenerative disc disease), lumbosacral     Elevated cholesterol     Heart attack 12/25/2018    2018 dec 25    Hiatal hernia     History of heart artery stent 12/16/2018    LAD    Hyperlipidemia     Migraine headache     Presence of drug coated stent in LAD coronary artery 1/31/2019    Spinal stenosis        Past Surgical History:   Procedure Laterality Date    BACK SURGERY      CARDIAC CATHETERIZATION N/A 12/26/2018    Procedure: Left Heart Cath;  Surgeon: Hilario Coronado MD;  Location:  [facility] CATH INVASIVE LOCATION;  Service: Cardiovascular    CARDIAC CATHETERIZATION N/A 12/26/2018    Procedure: Left ventriculography;  Surgeon: Hilario Coronado MD;  Location:  [facility] CATH INVASIVE LOCATION;  Service: Cardiovascular    CARDIAC CATHETERIZATION N/A 12/26/2018    Procedure: Stent EVARISTO coronary;  " Surgeon: Hilario Coronado MD;  [facility];  Service: Cardiovascular    CARDIAC CATHETERIZATION N/A 2018    Procedure: Coronary angiography;  Surgeon: Hilario Coronado MD;  [facility];  Service: Cardiovascular     SECTION      COLONOSCOPY N/A 10/09/2019    Procedure: COLONOSCOPY with polypectomy;  Surgeon: Dung Hutchison MD;  Location: [facility] OR;  Service: Gastroenterology    CORONARY STENT PLACEMENT      ENDOSCOPY N/A 2020    Procedure: ESOPHAGOGASTRODUODENOSCOPY WITH BIOPSY;  Surgeon: Facundo Helm Jr., MD;  Location:  [facility] ENDOSCOPY;  Service: General;  Laterality: N/A;  PRE- DYSPEPSIA  POST- GASTRITIS    GASTRIC SLEEVE LAPAROSCOPIC N/A 2020    Procedure: GASTRIC SLEEVE LAPAROSCOPIC With Hiatal Hernia Repair;  Surgeon: Facundo Helm Jr., MD;  Location:  [facility] OR [facility];  Service: Bariatric;  Laterality: N/A;    HERNIA REPAIR      HYSTERECTOMY      KNEE SURGERY Left 2014    cyst removal    LUMBAR FUSION  2021           Procedures       Objective:     Vitals:    24 1431   BP: 160/100   BP Location: Left arm   Pulse: 85   SpO2: 97%   Weight: 72.4 kg (159 lb 9.6 oz)   Height: 160 cm (63\")     General Appearance:    Alert, cooperative, in no acute distress   Head:    Normocephalic, without obvious abnormality   Eyes:            Lids and lashes normal, conjunctivae and sclerae normal, no icterus, no pallor, corneas clear   Ears:    Ears appear intact with no abnormalities noted   Throat:   No oral lesions, oral mucosa moist   Neck:   No adenopathy, supple, trachea midline, no thyromegaly, no carotid bruit, no JVD   Back:     No kyphosis present, no erythema or scars, no tenderness to palpation    Lungs:     Clear to auscultation,respirations regular, even and unlabored    Heart:    Regular rhythm and normal rate, normal S1 and S2, no murmur, no gallop, no rub, no click   Chest " Wall:    No abnormalities observed   Abdomen:     Normal bowel sounds, no masses, no organomegaly, soft        non-tender, non-distended, no guarding   Extremities:   Moves all extremities well, no edema, no cyanosis, no redness   Pulses:  Bilateral carotids brisk   Skin:  Psychiatric:   No bleeding or rash    Alert and oriented, normal mood and affect           Lab Review:             Lab Results   Component Value Date    GLUCOSE 110 (H) 08/31/2023    BUN 13 08/31/2023    CREATININE 0.83 08/31/2023    EGFRIFAFRI >60 11/02/2021    BCR 15.7 08/31/2023    K 5.3 (H) 08/31/2023    CO2 26.9 08/31/2023    CALCIUM 9.5 08/31/2023    ALBUMIN 4.0 08/31/2023    LABIL2 1.1 10/20/2021    AST 35 (H) 08/31/2023    ALT 83 (H) 08/31/2023             Assessment:          Diagnosis Plan   1. Coronary artery disease involving native coronary artery of native heart without angina pectoris        2. Presence of drug coated stent in LAD coronary artery               Plan:       1.  CAD status post non-ST segment elevation myocardial infarction a drug-coated stent.  Doing great, no change on her EKG, continues to be active, continue current medical therapy, AST, ALT reviewed  2.  Tobacco abuse-patient stopped smoking at the time of her myocardial infarction and has not resumed.  3.  HLD-LDL target 70, on atorvastatin 80 mg daily, continue same, AST, ALT reviewed  4.  We discussed longitudinal aspects of care for her CAD, discussed ongoing plans for risk factor modification.      Thank you very much for allowing us to participate in the care of this pleasant patient.  Please don't hesitate to call if I can be of assistance in any way.      Current Outpatient Medications:     albuterol sulfate  (90 Base) MCG/ACT inhaler, Every 6 (Six) Hours., Disp: , Rfl:     aspirin 81 MG chewable tablet, Chew 1 tablet Daily., Disp: 90 tablet, Rfl: 3    atorvastatin (LIPITOR) 80 MG tablet, TAKE ONE TABLET BY MOUTH ONCE NIGHTLY, Disp: 90 tablet, Rfl:  1    atorvastatin (LIPITOR) 80 MG tablet, Daily., Disp: , Rfl:     CBD (cannabidiol) oral oil, See Admin Instructions., Disp: , Rfl:     ergocalciferol (ERGOCALCIFEROL) 1.25 MG (62934 UT) capsule, Take 1 capsule by mouth 1 (One) Time Per Week., Disp: , Rfl:     folic acid-vit B6-vit B12 (Folbee) 2.5-25-1 MG tablet tablet, Take 1 tablet by mouth 1 (One) Time Per Week., Disp: , Rfl:     HYDROcodone-acetaminophen (NORCO) 7.5-325 MG per tablet, Take 1 tablet by mouth Every 6 (Six) Hours As Needed., Disp: , Rfl:     HYDROcodone-acetaminophen (NORCO) 7.5-325 MG per tablet, Every 6 (Six) Hours., Disp: , Rfl:     Loratadine 10 MG capsule, Take 1 capsule by mouth As Needed., Disp: , Rfl:     magnesium oxide (MAG-OX) 400 MG tablet, Take  by mouth Daily., Disp: , Rfl:     metoprolol tartrate (LOPRESSOR) 25 MG tablet, Take 1 tablet by mouth 2 (Two) Times a Day., Disp: 180 tablet, Rfl: 3    pregabalin (LYRICA) 100 MG capsule, Take 1 capsule by mouth 2 (Two) Times a Day. Patient takes two tablets at night., Disp: , Rfl:     pregabalin (LYRICA) 100 MG capsule, Take 3 capsules by mouth Daily., Disp: , Rfl:     pregabalin (LYRICA) 100 MG capsule, Every 8 (Eight) Hours., Disp: , Rfl:     rizatriptan MLT (MAXALT-MLT) 10 MG disintegrating tablet, Place 1 tablet on the tongue 1 (One) Time As Needed for Migraine., Disp: , Rfl:     topiramate (TOPAMAX) 50 MG tablet, Take 1 tablet by mouth., Disp: , Rfl:

## 2024-10-21 RX ORDER — ATORVASTATIN CALCIUM 80 MG/1
80 TABLET, FILM COATED ORAL DAILY
Qty: 90 TABLET | Refills: 1 | Status: SHIPPED | OUTPATIENT
Start: 2024-10-21

## 2024-10-21 RX ORDER — METOPROLOL TARTRATE 25 MG/1
25 TABLET, FILM COATED ORAL 2 TIMES DAILY
Qty: 180 TABLET | Refills: 1 | Status: SHIPPED | OUTPATIENT
Start: 2024-10-21

## 2025-02-07 DIAGNOSIS — Z86.0101 PERSONAL HISTORY OF ADENOMATOUS AND SERRATED COLON POLYPS: Primary | ICD-10-CM

## 2025-03-06 ENCOUNTER — TELEPHONE (OUTPATIENT)
Dept: ORTHOPEDIC SURGERY | Facility: CLINIC | Age: 55
End: 2025-03-06

## 2025-03-06 NOTE — TELEPHONE ENCOUNTER
Caller: Cheikh Sunday JACQUES    Relationship: Self    Best call back number: [phone] (home)     Who are you requesting to speak with (clinical staff, provider,  specific staff member): MANSI HINTON    What was the call regarding: MS AMOS WOULD LIKE A CALL BACK TO DISCUSS HER KNEE PAIN AND POSSIBLY BEING SEEN SOONER THAN APRIL 7TH.     Is it okay if the provider responds through MyChart: CALL

## 2025-03-11 ENCOUNTER — TELEPHONE (OUTPATIENT)
Dept: ORTHOPEDIC SURGERY | Facility: CLINIC | Age: 55
End: 2025-03-11
Payer: COMMERCIAL

## 2025-03-11 NOTE — TELEPHONE ENCOUNTER
CALLED PATIENT TO MAKE APPOINTMENT, PATIENT SENT OVER MESSAGE VIA StyleUp With Provider: MANSI HINTON [[facility]]  Preferred Date Range: 3/7/2025 - 3/7/2025  Preferred Times: Any Time  Reason for Visit: Follow-up  Comments: Left knee     IF PATIENT CALLS BACK ANYONE CAN SCHEDULE. OFFICE DOES NOT HAVE ANY AVAILABILITY UNTIL WEEK OF APRIL 7TH

## 2025-03-12 ENCOUNTER — TELEPHONE (OUTPATIENT)
Dept: GASTROENTEROLOGY | Facility: CLINIC | Age: 55
End: 2025-03-12
Payer: COMMERCIAL

## 2025-03-13 ENCOUNTER — ANESTHESIA EVENT (OUTPATIENT)
Dept: PERIOP | Facility: HOSPITAL | Age: 55
End: 2025-03-13
Payer: COMMERCIAL

## 2025-03-14 ENCOUNTER — HOSPITAL ENCOUNTER (OUTPATIENT)
Facility: HOSPITAL | Age: 55
Setting detail: HOSPITAL OUTPATIENT SURGERY
Discharge: HOME | End: 2025-03-14
Attending: INTERNAL MEDICINE | Admitting: INTERNAL MEDICINE
Payer: COMMERCIAL

## 2025-03-14 ENCOUNTER — ANESTHESIA (OUTPATIENT)
Dept: PERIOP | Facility: HOSPITAL | Age: 55
End: 2025-03-14
Payer: COMMERCIAL

## 2025-03-14 VITALS
BODY MASS INDEX: 30.29 KG/M2 | OXYGEN SATURATION: 97 % | HEART RATE: 63 BPM | WEIGHT: 171 LBS | SYSTOLIC BLOOD PRESSURE: 139 MMHG | RESPIRATION RATE: 12 BRPM | DIASTOLIC BLOOD PRESSURE: 83 MMHG | TEMPERATURE: 98.9 F

## 2025-03-14 PROCEDURE — 45378 DIAGNOSTIC COLONOSCOPY: CPT | Performed by: INTERNAL MEDICINE

## 2025-03-14 PROCEDURE — 25810000003 LACTATED RINGERS PER 1000 ML: Performed by: NURSE ANESTHETIST, CERTIFIED REGISTERED

## 2025-03-14 PROCEDURE — 25010000002 PROPOFOL 200 MG/20ML EMULSION

## 2025-03-14 PROCEDURE — 25010000002 LIDOCAINE 2% SOLUTION

## 2025-03-14 RX ORDER — ONDANSETRON 2 MG/ML
4 INJECTION INTRAMUSCULAR; INTRAVENOUS ONCE AS NEEDED
Status: DISCONTINUED | OUTPATIENT
Start: 2025-03-14 | End: 2025-03-14 | Stop reason: HOSPADM

## 2025-03-14 RX ORDER — LIDOCAINE HYDROCHLORIDE 10 MG/ML
0.5 INJECTION, SOLUTION EPIDURAL; INFILTRATION; INTRACAUDAL; PERINEURAL ONCE AS NEEDED
Status: DISCONTINUED | OUTPATIENT
Start: 2025-03-14 | End: 2025-03-14 | Stop reason: HOSPADM

## 2025-03-14 RX ORDER — SODIUM CHLORIDE 0.9 % (FLUSH) 0.9 %
10 SYRINGE (ML) INJECTION AS NEEDED
Status: DISCONTINUED | OUTPATIENT
Start: 2025-03-14 | End: 2025-03-14 | Stop reason: HOSPADM

## 2025-03-14 RX ORDER — SODIUM CHLORIDE 0.9 % (FLUSH) 0.9 %
10 SYRINGE (ML) INJECTION EVERY 12 HOURS SCHEDULED
Status: DISCONTINUED | OUTPATIENT
Start: 2025-03-14 | End: 2025-03-14 | Stop reason: HOSPADM

## 2025-03-14 RX ORDER — LIDOCAINE HYDROCHLORIDE 20 MG/ML
INJECTION, SOLUTION INFILTRATION; PERINEURAL AS NEEDED
Status: DISCONTINUED | OUTPATIENT
Start: 2025-03-14 | End: 2025-03-14 | Stop reason: SURG

## 2025-03-14 RX ORDER — SODIUM CHLORIDE 9 MG/ML
40 INJECTION, SOLUTION INTRAVENOUS AS NEEDED
Status: DISCONTINUED | OUTPATIENT
Start: 2025-03-14 | End: 2025-03-14 | Stop reason: HOSPADM

## 2025-03-14 RX ORDER — SODIUM CHLORIDE, SODIUM LACTATE, POTASSIUM CHLORIDE, CALCIUM CHLORIDE 600; 310; 30; 20 MG/100ML; MG/100ML; MG/100ML; MG/100ML
100 INJECTION, SOLUTION INTRAVENOUS ONCE
Status: DISCONTINUED | OUTPATIENT
Start: 2025-03-14 | End: 2025-03-14 | Stop reason: HOSPADM

## 2025-03-14 RX ORDER — PROPOFOL 10 MG/ML
INJECTION, EMULSION INTRAVENOUS AS NEEDED
Status: DISCONTINUED | OUTPATIENT
Start: 2025-03-14 | End: 2025-03-14 | Stop reason: SURG

## 2025-03-14 RX ORDER — SODIUM CHLORIDE, SODIUM LACTATE, POTASSIUM CHLORIDE, CALCIUM CHLORIDE 600; 310; 30; 20 MG/100ML; MG/100ML; MG/100ML; MG/100ML
9 INJECTION, SOLUTION INTRAVENOUS CONTINUOUS
Status: DISCONTINUED | OUTPATIENT
Start: 2025-03-14 | End: 2025-03-14 | Stop reason: HOSPADM

## 2025-03-14 RX ADMIN — LIDOCAINE HYDROCHLORIDE 60 MG: 20 INJECTION, SOLUTION INFILTRATION; PERINEURAL at 12:22

## 2025-03-14 RX ADMIN — SODIUM CHLORIDE, POTASSIUM CHLORIDE, SODIUM LACTATE AND CALCIUM CHLORIDE: 600; 310; 30; 20 INJECTION, SOLUTION INTRAVENOUS at 12:17

## 2025-03-14 RX ADMIN — PROPOFOL 200 MG: 10 INJECTION, EMULSION INTRAVENOUS at 12:22

## 2025-03-14 NOTE — BRIEF OP NOTE
COLONOSCOPY  Progress Note    Sunday JACQUES Salamanca  3/14/2025    Pre-op Diagnosis:   Personal history of adenomatous and serrated colon polyps [Z86.0101]       Post-Op Diagnosis Codes:     * Personal history of adenomatous and serrated colon polyps [Z86.0101]    Procedure(s):      Procedure(s):  COLONOSCOPY              Surgeon(s):  Dung Hutchison MD    Anesthesia: Monitored Anesthesia Care    Staff:   Circulator: Chip Rosado RN; Rona Goldstein RN  Scrub Person: Johnathan Coleman Bridget N       Estimated Blood Loss: none    Urine Voided: * No values recorded between 3/14/2025 12:17 PM and 3/14/2025 12:37 PM *    Specimens:                None      Drains: * No LDAs found *    Findings: Colon to Cecum Poor Prep  Normal mucosa thru-out      Complications: none          Dung Hutchison MD     Date: 3/14/2025  Time: 12:38 EDT

## 2025-03-14 NOTE — ANESTHESIA PREPROCEDURE EVALUATION
Anesthesia Evaluation     Patient summary reviewed and Nursing notes reviewed   no history of anesthetic complications:   NPO Solid Status: > 8 hours  NPO Liquid Status: > 8 hours           Airway   Mallampati: I  TM distance: >3 FB  Neck ROM: full  No difficulty expected  Dental      Comment: Filling bottom left       Pulmonary - normal exam    breath sounds clear to auscultation  (+) a smoker (quit 2022) Former,  (-) recent URI  Cardiovascular     ECG reviewed  Patient on routine beta blocker  Rhythm: regular  Rate: normal    (+) past MI (NSTEMI) , CAD, cardiac stents (2018) Drug eluting stent more than 12 months ago , hyperlipidemia (lipitor)    ROS comment: 99% stenosis of the mid LAD which was treated with angioplasty and EVARISTO 3.5 x 28MM Xience Divina.    Saw cardiologist 02/28/2025 Ag Santiago.     Stress test 4/2020:   · Myocardial perfusion imaging indicates a normal myocardial perfusion study with no evidence of ischemia.  · Left ventricular ejection fraction is normal (Calculated EF = 56%).  · Impressions are consistent with a low risk study.    ECHO 05/2023:   ·  Left ventricular systolic function is normal. Left ventricular ejection fraction appears to be 66 - 70%.  ·  Left ventricular diastolic function was normal.  ·  Estimated right ventricular systolic pressure from tricuspid regurgitation is normal (<35 mmHg).  - Trace TR, MR      Neuro/Psych- negative ROS  GI/Hepatic/Renal/Endo    (+) hiatal hernia, GERD    ROS Comment: Lap gastric sleeve 2020    Musculoskeletal     (+) back pain      ROS comment: Lumbar fusion 2021  H/o spinal stenosis  Intrathecal pain pump (back) reports it has morphine in it      Abdominal    Substance History      OB/GYN      Comment: Hysterectomy        Other   arthritis (lumbosacral DDD),                   Anesthesia Plan    ASA 2     MAC       Anesthetic plan, risks, benefits, and alternatives have been provided, discussed and informed consent has been obtained with:  patient.  Pre-procedure education provided  Use of blood products discussed with patient  Consented to blood products.    Plan discussed with CRNA.    CODE STATUS:

## 2025-03-14 NOTE — ANESTHESIA POSTPROCEDURE EVALUATION
Patient: Sunday JACQUES Salamanca    Procedure Summary       Date: 03/14/25 Room / Location: [facility] ENDOSCOPY 1 /  [facility] OR    Anesthesia Start: 1217 Anesthesia Stop: 1240    Procedure: COLONOSCOPY Diagnosis:       Personal history of adenomatous and serrated colon polyps      (Personal history of adenomatous and serrated colon polyps [Z86.0101])    Surgeons: Dung Hutchison MD Provider: Sarah Ly CRNA    Anesthesia Type: MAC ASA Status: 2            Anesthesia Type: MAC    Vitals  Vitals Value Taken Time   /83 03/14/25 13:05   Temp     Pulse 65 03/14/25 13:06   Resp 12 03/14/25 13:05   SpO2 98 % 03/14/25 13:06   Vitals shown include unfiled device data.        Post Anesthesia Care and Evaluation    Patient location during evaluation: bedside  Patient participation: complete - patient participated  Level of consciousness: awake and alert  Pain score: 0  Pain management: adequate    Airway patency: patent  Anesthetic complications: No anesthetic complications  PONV Status: none  Cardiovascular status: acceptable  Respiratory status: acceptable  Hydration status: acceptable    Comments: Late entry  No anesthesia care post op

## 2025-03-18 ENCOUNTER — TELEPHONE (OUTPATIENT)
Dept: GASTROENTEROLOGY | Facility: CLINIC | Age: 55
End: 2025-03-18
Payer: COMMERCIAL

## 2025-03-18 NOTE — TELEPHONE ENCOUNTER
----- Message from Dung Hutchison sent at 3/14/2025 12:47 PM EDT -----  Normal exam but poor prep for PHX polyps so recall in 5 years with 2 Day Prep-Thx

## 2025-04-04 ENCOUNTER — TELEPHONE (OUTPATIENT)
Dept: GASTROENTEROLOGY | Facility: CLINIC | Age: 55
End: 2025-04-04
Payer: COMMERCIAL

## 2025-04-04 NOTE — TELEPHONE ENCOUNTER
LM on pt VM 3/25/25, 4/1/25, and 4/3/25 on pt VM to call me to discuss [place] paperwork we received.

## 2025-04-06 ENCOUNTER — APPOINTMENT (OUTPATIENT)
Dept: GENERAL RADIOLOGY | Facility: HOSPITAL | Age: 55
End: 2025-04-06
Payer: COMMERCIAL

## 2025-04-06 ENCOUNTER — HOSPITAL ENCOUNTER (EMERGENCY)
Facility: HOSPITAL | Age: 55
Discharge: HOME | End: 2025-04-06
Attending: EMERGENCY MEDICINE | Admitting: EMERGENCY MEDICINE
Payer: COMMERCIAL

## 2025-04-06 VITALS
OXYGEN SATURATION: 96 % | HEIGHT: 63 IN | SYSTOLIC BLOOD PRESSURE: 119 MMHG | HEART RATE: 80 BPM | WEIGHT: 167 LBS | RESPIRATION RATE: 16 BRPM | BODY MASS INDEX: 29.59 KG/M2 | TEMPERATURE: 98.2 F | DIASTOLIC BLOOD PRESSURE: 76 MMHG

## 2025-04-06 DIAGNOSIS — M79.669 CALF PAIN, UNSPECIFIED LATERALITY: ICD-10-CM

## 2025-04-06 DIAGNOSIS — M25.469 SUPRAPATELLAR EFFUSION OF KNEE: Primary | ICD-10-CM

## 2025-04-06 PROCEDURE — 25010000002 ENOXAPARIN PER 10 MG: Performed by: EMERGENCY MEDICINE

## 2025-04-06 PROCEDURE — 99283 EMERGENCY DEPT VISIT LOW MDM: CPT | Performed by: EMERGENCY MEDICINE

## 2025-04-06 PROCEDURE — 73562 X-RAY EXAM OF KNEE 3: CPT

## 2025-04-06 PROCEDURE — 96372 THER/PROPH/DIAG INJ SC/IM: CPT

## 2025-04-06 RX ORDER — ENOXAPARIN SODIUM 150 MG/ML
1.5 INJECTION SUBCUTANEOUS ONCE
Status: COMPLETED | OUTPATIENT
Start: 2025-04-06 | End: 2025-04-06

## 2025-04-06 RX ADMIN — ENOXAPARIN SODIUM 120 MG: 120 INJECTION SUBCUTANEOUS at 17:51

## 2025-04-06 NOTE — ED PROVIDER NOTES
Subjective   History of Present Illness  Patient was seen at urgent care today.  She did a lot of walking yesterday and now has pain to the proximal aspect of the posterior calf as well as pain in her knee both on the left side.  She had a similar event some years ago and had an ultrasound which ruled out a DVT and subsequently required a steroid injection in the left knee.  She believes it is the same issue again.  She was sent here because of concern it could be a DVT however.  We do not have ultrasound available after 3 PM.  No chest pain no shortness of breath.  No history of DVTs.  Patient does have a history of hyperlipidemia migraines coronary artery disease stent.        Review of Systems   All other systems reviewed and are negative.      Past Medical History:   Diagnosis Date    Colon polyp     BENIGN    Coronary artery disease involving native coronary artery of native heart without angina pectoris 1/31/2019    DDD (degenerative disc disease), lumbosacral     Elevated cholesterol     Heart attack 12/25/2018    2018 dec 25    Hiatal hernia     History of heart artery stent 12/16/2018    LAD    Hyperlipidemia     Migraine headache     Presence of drug coated stent in LAD coronary artery 1/31/2019    Spinal stenosis        Allergies   Allergen Reactions    Cephalexin Anaphylaxis and Rash     Other reaction(s): Vomiting    Nsaids Other (See Comments)     PER DR. ANDREW BRAND, CARDIOLOGIST        Past Surgical History:   Procedure Laterality Date    BACK SURGERY      CARDIAC CATHETERIZATION N/A 12/26/2018    Procedure: Left Heart Cath;  Surgeon: Hilario Coronado MD;  Location:  [facility] CATH INVASIVE LOCATION;  Service: Cardiovascular    CARDIAC CATHETERIZATION N/A 12/26/2018    Procedure: Left ventriculography;  Surgeon: Hilario Coronado MD;  Location:  [facility] CATH INVASIVE LOCATION;  Service: Cardiovascular    CARDIAC CATHETERIZATION N/A 12/26/2018    Procedure: Stent EVARISTO coronary;  Surgeon: Cliff  Hilario BOO MD;  [facility];  Service: Cardiovascular    CARDIAC CATHETERIZATION N/A 2018    Procedure: Coronary angiography;  Surgeon: Hilario Coronado MD;  [facility];  Service: Cardiovascular     SECTION  1994    COLONOSCOPY N/A 10/09/2019    Procedure: COLONOSCOPY with polypectomy;  Surgeon: Dung Hutchison MD;  Location:  [facility] OR;  Service: Gastroenterology    COLONOSCOPY N/A 3/14/2025    Procedure: COLONOSCOPY;  Surgeon: Dung Hutchison MD;  Location:  [facility] OR;  Service: Gastroenterology;  Laterality: N/A;  Poor prep;    CORONARY STENT PLACEMENT      ENDOSCOPY N/A 2020    Procedure: ESOPHAGOGASTRODUODENOSCOPY WITH BIOPSY;  Surgeon: Facundo Helm Jr., MD;  [facility];  Service: General;  Laterality: N/A;  PRE- DYSPEPSIA  POST- GASTRITIS    GASTRIC SLEEVE LAPAROSCOPIC N/A 2020    Procedure: GASTRIC SLEEVE LAPAROSCOPIC With Hiatal Hernia Repair;  Surgeon: Facundo Helm Jr., MD;  Location:  [facility];  Service: Bariatric;  Laterality: N/A;    HERNIA REPAIR      HYSTERECTOMY  2008    KNEE SURGERY Left 2014    cyst removal    LUMBAR FUSION  2021    PAIN PUMP INSERTION/REVISION      BACK       Family History   Problem Relation Age of Onset    Cancer Mother     Hypertension Mother     Sleep apnea Mother     Heart disease Brother     Heart attack Brother     Heart disease Maternal Grandmother     Hypertension Maternal Grandmother     Heart attack Maternal Grandmother     Hypertension Father     Hypertension Paternal Grandmother     Heart disease Paternal Grandmother     Malig Hyperthermia Neg Hx        Social History     Socioeconomic History    Marital status:    Tobacco Use    Smoking status: Former     Current packs/day: 0.00     Average packs/day: 0.3 packs/day for 5.0 years (1.3 ttl pk-yrs)     Types: Cigarettes     Start date: 2013     Quit  date: 2018     Years since quittin.3    Smokeless tobacco: Never    Tobacco comments:     intermittent caffiene   Vaping Use    Vaping status: Never Used   Substance and Sexual Activity    Alcohol use: Yes     Comment: HOLIDAY DRINKER    Drug use: No    Sexual activity: Defer           Objective   Physical Exam  Constitutional:       General: She is not in acute distress.     Appearance: Normal appearance.   HENT:      Head: Normocephalic and atraumatic.      Nose: No rhinorrhea.      Mouth/Throat:      Pharynx: Oropharynx is clear.   Eyes:      Extraocular Movements: Extraocular movements intact.   Cardiovascular:      Rate and Rhythm: Normal rate and regular rhythm.      Heart sounds: Normal heart sounds.   Pulmonary:      Effort: Pulmonary effort is normal.      Breath sounds: Normal breath sounds.   Abdominal:      Palpations: Abdomen is soft.      Tenderness: There is no abdominal tenderness. There is no guarding or rebound.   Musculoskeletal:      Cervical back: Neck supple. No rigidity.      Comments: Left leg: There is moderate tenderness to the proximal calf muscles.  There is no increase in temperature no change in color no edema.  There is also some very mild generalized tenderness to the knee.  There is some suprapatella bursitis but there is no knee effusion.  I can range the knee fully and there is no instability.   Neurological:      General: No focal deficit present.      Mental Status: She is alert.      Sensory: No sensory deficit.   Psychiatric:         Behavior: Behavior normal.         Procedures           ED Course                                                       Medical Decision Making  What I consider some exacerbation of osteoarthritis of the knee with some suprapatella bursitis as well as some calf strain the more likely diagnoses it is reasonable for us to rule out a DVT.  I will give her an injection of Lovenox and she will get instructions for getting a sonogram tomorrow.   Will get an x-ray today but I have a low level of suspicion for acute bony injury.  X-ray negative for acute fracture or dislocation shows only degenerative disease.  Manage as suprapatella bursitis and likely calf strain but will need to get ultrasound tomorrow and this was organized.    Problems Addressed:  Suprapatellar effusion of knee: complicated acute illness or injury    Amount and/or Complexity of Data Reviewed  Radiology: ordered.    Risk  Prescription drug management.        Final diagnoses:   Suprapatellar effusion of knee       ED Disposition  ED Disposition       ED Disposition   Discharge    Condition   Stable    Comment   --               left leg ultrasound tomorrow as per directions               Medication List      No changes were made to your prescriptions during this visit.            Anjali Gardner MD  04/07/25 0066

## 2025-04-07 ENCOUNTER — HOSPITAL ENCOUNTER (OUTPATIENT)
Dept: ULTRASOUND IMAGING | Facility: HOSPITAL | Age: 55
Discharge: HOME | End: 2025-04-07
Admitting: EMERGENCY MEDICINE
Payer: COMMERCIAL

## 2025-04-07 PROCEDURE — 93971 EXTREMITY STUDY: CPT

## 2025-04-09 ENCOUNTER — OFFICE VISIT (OUTPATIENT)
Dept: ORTHOPEDIC SURGERY | Facility: CLINIC | Age: 55
End: 2025-04-09
Payer: COMMERCIAL

## 2025-04-09 VITALS — HEIGHT: 63 IN | BODY MASS INDEX: 29.59 KG/M2 | WEIGHT: 167 LBS

## 2025-04-09 DIAGNOSIS — M17.12 PRIMARY OSTEOARTHRITIS OF LEFT KNEE: Primary | ICD-10-CM

## 2025-04-09 RX ORDER — TRIAMCINOLONE ACETONIDE 40 MG/ML
80 INJECTION, SUSPENSION INTRA-ARTICULAR; INTRAMUSCULAR
Status: COMPLETED | OUTPATIENT
Start: 2025-04-09 | End: 2025-04-09

## 2025-04-09 RX ORDER — LIDOCAINE HYDROCHLORIDE 10 MG/ML
4 INJECTION, SOLUTION EPIDURAL; INFILTRATION; INTRACAUDAL; PERINEURAL
Status: COMPLETED | OUTPATIENT
Start: 2025-04-09 | End: 2025-04-09

## 2025-04-09 RX ADMIN — TRIAMCINOLONE ACETONIDE 80 MG: 40 INJECTION, SUSPENSION INTRA-ARTICULAR; INTRAMUSCULAR at 09:52

## 2025-04-09 RX ADMIN — LIDOCAINE HYDROCHLORIDE 4 ML: 10 INJECTION, SOLUTION EPIDURAL; INFILTRATION; INTRACAUDAL; PERINEURAL at 09:52

## 2025-04-09 NOTE — PROGRESS NOTES
Subjective: Left knee pain     Patient ID: Ortiz Salamanca is a 54 y.o. female.    Chief Complaint:    History of Present Illness 54-year-old female known to the practice having previously seen Ainsley Coleman and Dr. Peters for knee pain.  She saw Ainsley for right knee pain in the past year or so but saw Dr. Peters back in  for left knee pain that responded to injection.  For the past month she has had left knee pain that she describes as 10 out of 10 a throbbing discomfort with achiness and burning sensation is noted swelling redness stiffness in the knee giving way.  Has trouble standing sitting working walking running and climbing stairs.  Unable to take anti-inflammatories as she has had a cardiac stent.  Was seen in urgent care and then emergency room back on  because she had swelling in the calf and is concerned for DVT she did have a venous Doppler was negative.  X-rays of her knee did show some mild arthritic changes but nothing acute.       Social History     Occupational History    Not on file   Tobacco Use    Smoking status: Former     Current packs/day: 0.00     Average packs/day: 0.3 packs/day for 5.0 years (1.3 ttl pk-yrs)     Types: Cigarettes     Start date: 2013     Quit date: 2018     Years since quittin.3    Smokeless tobacco: Never    Tobacco comments:     intermittent caffiene   Vaping Use    Vaping status: Never Used   Substance and Sexual Activity    Alcohol use: Yes     Comment: [employer]OL[employer]INK[employer]    Drug use: No    Sexual activity: Defer      Review of Systems      Past Medical History:   Diagnosis Date    Colon polyp     BENIGN    Coronary artery disease involving native coronary artery of native heart without angina pectoris 2019    DDD (degenerative disc disease), lumbosacral     Elevated cholesterol     Heart attack 2018    2018 dec 25    Hiatal hernia     History of heart artery stent 2018    LAD    Hyperlipidemia     Migraine headache     Presence  of drug coated stent in LAD coronary artery 2019    Spinal stenosis      Past Surgical History:   Procedure Laterality Date    BACK SURGERY      CARDIAC CATHETERIZATION N/A 2018    Procedure: Left Heart Cath;  Surgeon: Hilario Coronado MD;  Location: [facility] CATH INVASIVE LOCATION;  Service: Cardiovascular    CARDIAC CATHETERIZATION N/A 2018    Procedure: Left ventriculography;  Surgeon: Hilario Coronado MD;  Location: [facility] CATH INVASIVE LOCATION;  Service: Cardiovascular    CARDIAC CATHETERIZATION N/A 2018    Procedure: Stent EVARISTO coronary;  Surgeon: Hilario Coronado MD;  Location: [facility] CATH INVASIVE LOCATION;  Service: Cardiovascular    CARDIAC CATHETERIZATION N/A 2018    Procedure: Coronary angiography;  Surgeon: Hilario Coronado MD;  Location: [facility] CATH INVASIVE LOCATION;  Service: Cardiovascular     SECTION      COLONOSCOPY N/A 10/09/2019    Procedure: COLONOSCOPY with polypectomy;  Surgeon: Dung Hutchison MD;  Location: [facility] OR;  Service: Gastroenterology    COLONOSCOPY N/A 3/14/2025    Procedure: COLONOSCOPY;  Surgeon: Dung Hutchison MD;  Location: [facility] OR;  Service: Gastroenterology;  Laterality: N/A;  Poor prep;    CORONARY STENT PLACEMENT      ENDOSCOPY N/A 2020    Procedure: ESOPHAGOGASTRODUODENOSCOPY WITH BIOPSY;  Surgeon: Facundo Helm Jr., MD;  Location: [facility] ENDOSCOPY;  Service: General;  Laterality: N/A;  PRE- DYSPEPSIA  POST- GASTRITIS    GASTRIC SLEEVE LAPAROSCOPIC N/A 2020    Procedure: GASTRIC SLEEVE LAPAROSCOPIC With Hiatal Hernia Repair;  Surgeon: Facundo Helm Jr., MD;  Location: [facility] OR OSC;  Service: Bariatric;  Laterality: N/A;    HERNIA REPAIR      HYSTERECTOMY  2008    KNEE SURGERY Left 2014    cyst removal    LUMBAR FUSION  2021    PAIN PUMP INSERTION/REVISION      BACK     Family History   Problem Relation Age of Onset    Cancer Mother     Hypertension  Mother     Sleep apnea Mother     Heart disease Brother     Heart attack Brother     Heart disease Maternal Grandmother     Hypertension Maternal Grandmother     Heart attack Maternal Grandmother     Hypertension Father     Hypertension Paternal Grandmother     Heart disease Paternal Grandmother     Malig Hyperthermia Neg Hx          Objective:  There were no vitals filed for this visit.      04/09/25  0935   Weight: 75.8 kg (167 lb)     Body mass index is 29.59 kg/m².  BMI is >= 25 and <30. (Overweight) The following options were offered after discussion;:          Ortho Exam   review of the venous Doppler from 7 April is negative for DVT.  Review of the x-rays of the left knee AP oblique and lateral showed some suprapatellar effusion and some medial arthrosis but no acute changes.  She is alert and oriented x 3.  Left knee shows minimal effusion at this time and is cool to touch.  She has 0 to 130 degrees of motion with minimal patellofemoral crepitus.  Quad hamstring function 5/5.  No instability in flexion extension nor any varus or valgus instability 10 to 30 degrees.  There is joint line tenderness and Marleny Елена's.  The calf is nontender he has no motor or sensory deficit.    Assessment:    US Venous Doppler Lower Extremity Left (duplex)  Result Date: 4/7/2025  Impression: No evidence of thrombus within left lower extremity venous system. Electronically Signed: Rigoberto Stewart MD  4/7/2025 1:09 PM EDT  Workstation ID: [id number]    XR Knee 3 View Left  Result Date: 4/6/2025  Impression: Mild to moderate suprapatellar joint effusion. If there is concern for internal derangement MRI evaluation is recommended. Medial compartment degenerative arthrosis. Electronically Signed: Aurora Hope MD  4/6/2025 5:58 PM EDT  Workstation ID: [id number]            1. Primary osteoarthritis of left knee           Plan: Reviewed with the patient her x-rays and her venous Doppler history and exam.  She has mild arthritic  changes in the knee that is causing the discomfort.  After reviewing treatment options regardlessly with a cortisone injection through the superolateral portal for sterile prepping with 4 cc of lidocaine and 2 cc of Kenalog.  Postinjection instructions given.  Also advised on use of Voltaren gel 4 times a day.  Return in 3 weeks if still symptomatic otherwise as needed.  Answered all questions        Large Joint Arthrocentesis: L knee  Date/Time: 4/9/2025 9:52 AM  Consent given by: patient  Site marked: site marked  Timeout: Immediately prior to procedure a time out was called to verify the correct patient, procedure, equipment, support staff and site/side marked as required   Supporting Documentation  Indications: pain   Procedure Details  Location: knee - L knee  Preparation: Patient was prepped and draped in the usual sterile fashion  Needle size: 22 G  Approach: superolateral.  Medications administered: 80 mg triamcinolone acetonide 40 MG/ML; 4 mL lidocaine PF 1% 1 %  Patient tolerance: patient tolerated the procedure well with no immediate complications          Work Status:    MEL query complete.    Orders:  Orders Placed This Encounter   Procedures    Large Joint Arthrocentesis: L knee       Medications:  No orders of the defined types were placed in this encounter.      Followup:  Return in about 3 weeks (around 4/30/2025).          Dictated utilizing Dragon dictation

## 2025-04-30 ENCOUNTER — TELEPHONE (OUTPATIENT)
Dept: ORTHOPEDIC SURGERY | Facility: CLINIC | Age: 55
End: 2025-04-30
Payer: COMMERCIAL

## 2025-04-30 NOTE — TELEPHONE ENCOUNTER
SECOND ATTEMPT TO REACH PATIENT   DR ARCHIBALD WILL NOT BE IN THE OFFICE AT 11:45 TODAY AND WE NEED TO MOVE PATIENTS APPOINTMENT. IF PATIENT CALLS, PLEASE TRANSFER THEM TO THE OFFICE.

## 2025-04-30 NOTE — TELEPHONE ENCOUNTER
CALLED PATIENT AND LEFT VOICEMAIL. DR ARCHIBALD NEEDS TO LEAVE THE OFFICE AT 11 AND CAN SEE PATIENT ANYTIME BEFORE 11 TODAY. PLEASE TRANSFER THE CALL TO THE OFFICE IF PATIENT CALLS BACK.

## 2025-07-01 RX ORDER — METOPROLOL TARTRATE 25 MG/1
25 TABLET, FILM COATED ORAL 2 TIMES DAILY
Qty: 180 TABLET | Refills: 1 | Status: SHIPPED | OUTPATIENT
Start: 2025-07-01

## 2025-07-28 ENCOUNTER — APPOINTMENT (OUTPATIENT)
Dept: GENERAL RADIOLOGY | Facility: HOSPITAL | Age: 55
End: 2025-07-28
Payer: COMMERCIAL

## 2025-07-28 ENCOUNTER — HOSPITAL ENCOUNTER (OUTPATIENT)
Facility: HOSPITAL | Age: 55
Setting detail: OBSERVATION
Discharge: HOME | End: 2025-07-29
Attending: EMERGENCY MEDICINE | Admitting: EMERGENCY MEDICINE
Payer: COMMERCIAL

## 2025-07-28 DIAGNOSIS — R07.89 CHEST PAIN, ATYPICAL: Primary | ICD-10-CM

## 2025-07-28 LAB
ALBUMIN SERPL-MCNC: 3.8 G/DL (ref 3.5–5.2)
ALBUMIN/GLOB SERPL: 1.3 G/DL
ALP SERPL-CCNC: 106 U/L (ref 39–117)
ALT SERPL W P-5'-P-CCNC: 17 U/L (ref 1–33)
ANION GAP SERPL CALCULATED.3IONS-SCNC: 11.7 MMOL/L (ref 5–15)
AST SERPL-CCNC: 29 U/L (ref 1–32)
BASOPHILS # BLD AUTO: 0.03 10*3/MM3 (ref 0–0.2)
BASOPHILS NFR BLD AUTO: 0.4 % (ref 0–1.5)
BILIRUB SERPL-MCNC: <0.2 MG/DL (ref 0–1.2)
BUN SERPL-MCNC: 8 MG/DL (ref 6–20)
BUN/CREAT SERPL: 13.6 (ref 7–25)
CALCIUM SPEC-SCNC: 9.4 MG/DL (ref 8.6–10.5)
CHLORIDE SERPL-SCNC: 108 MMOL/L (ref 98–107)
CO2 SERPL-SCNC: 23.3 MMOL/L (ref 22–29)
CREAT SERPL-MCNC: 0.59 MG/DL (ref 0.57–1)
DEPRECATED RDW RBC AUTO: 47.7 FL (ref 37–54)
EGFRCR SERPLBLD CKD-EPI 2021: 107.3 ML/MIN/1.73
EOSINOPHIL # BLD AUTO: 0.11 10*3/MM3 (ref 0–0.4)
EOSINOPHIL NFR BLD AUTO: 1.5 % (ref 0.3–6.2)
ERYTHROCYTE [DISTWIDTH] IN BLOOD BY AUTOMATED COUNT: 14.7 % (ref 12.3–15.4)
GEN 5 1HR TROPONIN T REFLEX: 12 NG/L
GLOBULIN UR ELPH-MCNC: 2.9 GM/DL
GLUCOSE SERPL-MCNC: 104 MG/DL (ref 65–99)
HCT VFR BLD AUTO: 36.6 % (ref 34–46.6)
HGB BLD-MCNC: 11.2 G/DL (ref 12–15.9)
HOLD SPECIMEN: NORMAL
HOLD SPECIMEN: NORMAL
IMM GRANULOCYTES # BLD AUTO: 0.02 10*3/MM3 (ref 0–0.05)
IMM GRANULOCYTES NFR BLD AUTO: 0.3 % (ref 0–0.5)
LYMPHOCYTES # BLD AUTO: 2.76 10*3/MM3 (ref 0.7–3.1)
LYMPHOCYTES NFR BLD AUTO: 37.9 % (ref 19.6–45.3)
MCH RBC QN AUTO: 27.2 PG (ref 26.6–33)
MCHC RBC AUTO-ENTMCNC: 30.6 G/DL (ref 31.5–35.7)
MCV RBC AUTO: 88.8 FL (ref 79–97)
MONOCYTES # BLD AUTO: 0.51 10*3/MM3 (ref 0.1–0.9)
MONOCYTES NFR BLD AUTO: 7 % (ref 5–12)
NEUTROPHILS NFR BLD AUTO: 3.85 10*3/MM3 (ref 1.7–7)
NEUTROPHILS NFR BLD AUTO: 52.9 % (ref 42.7–76)
NRBC BLD AUTO-RTO: 0 /100 WBC (ref 0–0.2)
PLATELET # BLD AUTO: 272 10*3/MM3 (ref 140–450)
PMV BLD AUTO: 10.1 FL (ref 6–12)
POTASSIUM SERPL-SCNC: 4.3 MMOL/L (ref 3.5–5.2)
PROT SERPL-MCNC: 6.7 G/DL (ref 6–8.5)
RBC # BLD AUTO: 4.12 10*6/MM3 (ref 3.77–5.28)
SODIUM SERPL-SCNC: 143 MMOL/L (ref 136–145)
TROPONIN T NUMERIC DELTA: 3 NG/L
TROPONIN T SERPL HS-MCNC: 9 NG/L
TROPONIN T SERPL HS-MCNC: 9 NG/L
WBC NRBC COR # BLD AUTO: 7.28 10*3/MM3 (ref 3.4–10.8)
WHOLE BLOOD HOLD COAG: NORMAL
WHOLE BLOOD HOLD SPECIMEN: NORMAL

## 2025-07-28 PROCEDURE — G0378 HOSPITAL OBSERVATION PER HR: HCPCS

## 2025-07-28 PROCEDURE — 93010 ELECTROCARDIOGRAM REPORT: CPT | Performed by: INTERNAL MEDICINE

## 2025-07-28 PROCEDURE — 99285 EMERGENCY DEPT VISIT HI MDM: CPT

## 2025-07-28 PROCEDURE — 36415 COLL VENOUS BLD VENIPUNCTURE: CPT

## 2025-07-28 PROCEDURE — 85025 COMPLETE CBC W/AUTO DIFF WBC: CPT

## 2025-07-28 PROCEDURE — 93005 ELECTROCARDIOGRAM TRACING: CPT | Performed by: EMERGENCY MEDICINE

## 2025-07-28 PROCEDURE — 80053 COMPREHEN METABOLIC PANEL: CPT

## 2025-07-28 PROCEDURE — 25010000002 MORPHINE PER 10 MG: Performed by: PHYSICIAN ASSISTANT

## 2025-07-28 PROCEDURE — 84484 ASSAY OF TROPONIN QUANT: CPT

## 2025-07-28 PROCEDURE — 84484 ASSAY OF TROPONIN QUANT: CPT | Performed by: PHYSICIAN ASSISTANT

## 2025-07-28 PROCEDURE — 93005 ELECTROCARDIOGRAM TRACING: CPT | Performed by: PHYSICIAN ASSISTANT

## 2025-07-28 PROCEDURE — 96374 THER/PROPH/DIAG INJ IV PUSH: CPT

## 2025-07-28 PROCEDURE — 93005 ELECTROCARDIOGRAM TRACING: CPT

## 2025-07-28 PROCEDURE — 71045 X-RAY EXAM CHEST 1 VIEW: CPT

## 2025-07-28 RX ORDER — MORPHINE SULFATE 2 MG/ML
2 INJECTION, SOLUTION INTRAMUSCULAR; INTRAVENOUS
Status: DISCONTINUED | OUTPATIENT
Start: 2025-07-28 | End: 2025-07-29 | Stop reason: HOSPADM

## 2025-07-28 RX ORDER — ACETAMINOPHEN 325 MG/1
650 TABLET ORAL EVERY 4 HOURS PRN
Status: DISCONTINUED | OUTPATIENT
Start: 2025-07-28 | End: 2025-07-29 | Stop reason: HOSPADM

## 2025-07-28 RX ORDER — SODIUM CHLORIDE 0.9 % (FLUSH) 0.9 %
10 SYRINGE (ML) INJECTION AS NEEDED
Status: DISCONTINUED | OUTPATIENT
Start: 2025-07-28 | End: 2025-07-29 | Stop reason: HOSPADM

## 2025-07-28 RX ORDER — NITROGLYCERIN 0.4 MG/1
0.4 TABLET SUBLINGUAL
Status: DISCONTINUED | OUTPATIENT
Start: 2025-07-28 | End: 2025-07-29 | Stop reason: HOSPADM

## 2025-07-28 RX ORDER — ONDANSETRON 2 MG/ML
4 INJECTION INTRAMUSCULAR; INTRAVENOUS EVERY 6 HOURS PRN
Status: DISCONTINUED | OUTPATIENT
Start: 2025-07-28 | End: 2025-07-29 | Stop reason: HOSPADM

## 2025-07-28 RX ORDER — ASPIRIN 325 MG
325 TABLET ORAL ONCE
Status: COMPLETED | OUTPATIENT
Start: 2025-07-28 | End: 2025-07-28

## 2025-07-28 RX ORDER — METOPROLOL TARTRATE 25 MG/1
25 TABLET, FILM COATED ORAL 2 TIMES DAILY
Status: DISCONTINUED | OUTPATIENT
Start: 2025-07-28 | End: 2025-07-29 | Stop reason: HOSPADM

## 2025-07-28 RX ORDER — ASPIRIN 81 MG/1
81 TABLET, CHEWABLE ORAL DAILY
Status: DISCONTINUED | OUTPATIENT
Start: 2025-07-29 | End: 2025-07-29 | Stop reason: HOSPADM

## 2025-07-28 RX ORDER — SODIUM CHLORIDE 9 MG/ML
40 INJECTION, SOLUTION INTRAVENOUS AS NEEDED
Status: DISCONTINUED | OUTPATIENT
Start: 2025-07-28 | End: 2025-07-29 | Stop reason: HOSPADM

## 2025-07-28 RX ORDER — POLYETHYLENE GLYCOL 3350 17 G/17G
17 POWDER, FOR SOLUTION ORAL DAILY PRN
Status: DISCONTINUED | OUTPATIENT
Start: 2025-07-28 | End: 2025-07-29 | Stop reason: HOSPADM

## 2025-07-28 RX ORDER — ONDANSETRON 4 MG/1
4 TABLET, ORALLY DISINTEGRATING ORAL EVERY 6 HOURS PRN
Status: DISCONTINUED | OUTPATIENT
Start: 2025-07-28 | End: 2025-07-29 | Stop reason: HOSPADM

## 2025-07-28 RX ORDER — BISACODYL 10 MG
10 SUPPOSITORY, RECTAL RECTAL DAILY PRN
Status: DISCONTINUED | OUTPATIENT
Start: 2025-07-28 | End: 2025-07-29 | Stop reason: HOSPADM

## 2025-07-28 RX ORDER — SODIUM CHLORIDE 0.9 % (FLUSH) 0.9 %
10 SYRINGE (ML) INJECTION EVERY 12 HOURS SCHEDULED
Status: DISCONTINUED | OUTPATIENT
Start: 2025-07-28 | End: 2025-07-29 | Stop reason: HOSPADM

## 2025-07-28 RX ORDER — ATORVASTATIN CALCIUM 80 MG/1
80 TABLET, FILM COATED ORAL NIGHTLY
Status: DISCONTINUED | OUTPATIENT
Start: 2025-07-28 | End: 2025-07-29 | Stop reason: HOSPADM

## 2025-07-28 RX ORDER — AMOXICILLIN 250 MG
2 CAPSULE ORAL 2 TIMES DAILY PRN
Status: DISCONTINUED | OUTPATIENT
Start: 2025-07-28 | End: 2025-07-29 | Stop reason: HOSPADM

## 2025-07-28 RX ORDER — BISACODYL 5 MG/1
5 TABLET, DELAYED RELEASE ORAL DAILY PRN
Status: DISCONTINUED | OUTPATIENT
Start: 2025-07-28 | End: 2025-07-29 | Stop reason: HOSPADM

## 2025-07-28 RX ADMIN — METOPROLOL TARTRATE 25 MG: 25 TABLET, FILM COATED ORAL at 23:29

## 2025-07-28 RX ADMIN — NITROGLYCERIN 0.4 MG: 0.4 TABLET SUBLINGUAL at 21:12

## 2025-07-28 RX ADMIN — ASPIRIN 325 MG ORAL TABLET 325 MG: 325 PILL ORAL at 18:48

## 2025-07-28 RX ADMIN — Medication 10 ML: at 21:48

## 2025-07-28 RX ADMIN — ATORVASTATIN CALCIUM 80 MG: 80 TABLET, FILM COATED ORAL at 23:29

## 2025-07-28 RX ADMIN — Medication 10 ML: at 21:21

## 2025-07-28 RX ADMIN — MORPHINE SULFATE 2 MG: 2 INJECTION, SOLUTION INTRAMUSCULAR; INTRAVENOUS at 21:47

## 2025-07-28 NOTE — LETTER
July 29, 2025     Patient: Ortiz Salamanca   YOB: 1970   Date of Visit: 7/28/2025       To Whom It May Concern:    Ms. Ortiz Salamanca was a patient in the [facility] [place] operated by [facility] from 7/28-7/29/25 and is cleared to return to work on Monday 8/4/25.      Antonia Jerry, APRN

## 2025-07-28 NOTE — H&P
[facility]   HISTORY AND PHYSICAL    Patient Name: Ortiz Salamanca  : 1970  MRN: [id number]  Primary Care Physician:  Sarah Bright MD  Date of admission: 2025    Subjective   Subjective     Chief Complaint:   Chief Complaint   Patient presents with   • Chest Pain         HPI:    Ortiz Salamanca is a 54 y.o. female comes in complaining of chest pain   For the past week.  Patient states pain will come and go.  Patient states initially she noticed the pain when just sitting at rest however she also experiences some pain with some exertion.  Patient also reports the pain will radiate somewhat into her left arm.  Patient denies any recent illness, fever, chills, cough, vomiting, diarrhea or leg swelling.  Patient does report a history of CAD and has had a stent placed in the past.    In the ED, initial troponin 9, repeat troponin of 12, CBC and CMP largely unremarkable for acute findings.  Chest x-ray shows no acute findings.  EKG shows sinus rhythm 75 bpm, no ST elevation apparent.  Patient is afebrile, pulse in the 60s, on room air oxygen 100% SpO2 and blood pressure normotensive.  Patient given full dose aspirin in ED.    Review of Systems   All systems were reviewed and negative except for: As per HPI    Personal History     Past Medical History:   Diagnosis Date   • Colon polyp     BENIGN   • Coronary artery disease involving native coronary artery of native heart without angina pectoris 2019   • DDD (degenerative disc disease), lumbosacral    • Elevated cholesterol    • Heart attack 2018    2018 dec 25   • Hiatal hernia    • History of heart artery stent 2018    LAD   • Hyperlipidemia    • Migraine headache    • Presence of drug coated stent in LAD coronary artery 2019   • Spinal stenosis        Past Surgical History:   Procedure Laterality Date   • BACK SURGERY     • CARDIAC CATHETERIZATION N/A 2018    Procedure: Left Heart Cath;  Surgeon: Hilario Coronaod  MD EMA;  Location: [facility] CATH INVASIVE LOCATION;  Service: Cardiovascular   • CARDIAC CATHETERIZATION N/A 2018    Procedure: Left ventriculography;  Surgeon: Hilario Coronado MD;  Location: [facility] CATH INVASIVE LOCATION;  Service: Cardiovascular   • CARDIAC CATHETERIZATION N/A 2018    Procedure: Stent EVARISTO coronary;  Surgeon: Hilario Coronado MD;  Location: [facility] CATH INVASIVE LOCATION;  Service: Cardiovascular   • CARDIAC CATHETERIZATION N/A 2018    Procedure: Coronary angiography;  Surgeon: Hliario Coronado MD;  Location: [facility] CATH INVASIVE LOCATION;  Service: Cardiovascular   •  SECTION     • COLONOSCOPY N/A 10/09/2019    Procedure: COLONOSCOPY with polypectomy;  Surgeon: Dung Hutchison MD;  Location: [facility] OR;  Service: Gastroenterology   • COLONOSCOPY N/A 3/14/2025    Procedure: COLONOSCOPY;  Surgeon: Dung Hutchison MD;  Location:  [facility] OR;  Service: Gastroenterology;  Laterality: N/A;  Poor prep;   • CORONARY STENT PLACEMENT     • ENDOSCOPY N/A 2020    Procedure: ESOPHAGOGASTRODUODENOSCOPY WITH BIOPSY;  Surgeon: Facundo Helm Jr., MD;  Location: [facility] ENDOSCOPY;  Service: General;  Laterality: N/A;  PRE- DYSPEPSIA  POST- GASTRITIS   • GASTRIC SLEEVE LAPAROSCOPIC N/A 2020    Procedure: GASTRIC SLEEVE LAPAROSCOPIC With Hiatal Hernia Repair;  Surgeon: Facundo Helm Jr., MD;  Location: [facility] OR OSC;  Service: Bariatric;  Laterality: N/A;   • HERNIA REPAIR     • HYSTERECTOMY     • KNEE SURGERY Left 2014    cyst removal   • LUMBAR FUSION  2021   • PAIN PUMP INSERTION/REVISION      BACK       Family History: family history includes Cancer in her mother; Heart attack in her brother and maternal grandmother; Heart disease in her brother, maternal grandmother, and paternal grandmother; Hypertension in her father, maternal grandmother, mother, and paternal grandmother; Sleep apnea in her mother. Otherwise  pertinent FHx was reviewed and not pertinent to current issue.    Social History:  reports that she quit smoking about 6 years ago. Her smoking use included cigarettes. She started smoking about 11 years ago. She has a 1.3 pack-year smoking history. She has never used smokeless tobacco. She reports current alcohol use. She reports that she does not use drugs.    Home Medications:  Loratadine, aspirin, atorvastatin, ergocalciferol, metoprolol tartrate, and rizatriptan MLT    Allergies:  Allergies   Allergen Reactions   • Cephalexin Anaphylaxis and Rash     Other reaction(s): Vomiting   • Nsaids Other (See Comments)     PER DR. ANDREW BRAND, CARDIOLOGIST        Objective   Objective     Vitals:   Temp:  [97.8 °F (36.6 °C)-98.1 °F (36.7 °C)] 97.8 °F (36.6 °C)  Heart Rate:  [] 75  Resp:  [18] 18  BP: (114-136)/(64-83) 117/64  Physical Exam    Constitutional: Awake, alert   Eyes: PERRLA, sclerae anicteric, no conjunctival injection   HENT: NCAT, mucous membranes moist   Neck: Supple, no thyromegaly, no lymphadenopathy, trachea midline   Respiratory: Clear to auscultation bilaterally, nonlabored respirations    Cardiovascular: RRR, no murmurs, rubs, or gallops, palpable pedal pulses bilaterally   Gastrointestinal: Positive bowel sounds, soft, nontender, nondistended   Musculoskeletal: No bilateral ankle edema, no clubbing or cyanosis to extremities   Psychiatric: Appropriate affect, cooperative   Neurologic: Oriented x 3, strength symmetric in all extremities, Cranial Nerves grossly intact to confrontation, speech clear   Skin: No rashes     Result Review    Result Review:  I have personally reviewed the results from the time of this admission to 7/28/2025 23:26 EDT and agree with these findings:  [x]  Laboratory list / accordion  []  Microbiology  [x]  Radiology  [x]  EKG/Telemetry   []  Cardiology/Vascular   []  Pathology  []  Old records  []  Other:  Most notable findings include: see above      The ASCVD Risk  score (Sivakumar CHAHAL, et al., 2019) failed to calculate for the following reasons:    Risk score cannot be calculated because patient has a medical history suggesting prior/existing ASCVD      Assessment & Plan   Assessment / Plan     Brief Patient Summary:  Ortiz Salamanca is a 54 y.o. female who comes in complaining of chest pain    Active Hospital Problems:  Active Hospital Problems    Diagnosis    • **Chest pain      Plan:       Chest pain  History of CAD status post stent  -initial troponin 9, repeat troponin of 12,   -CBC and CMP largely unremarkable   -Chest x-ray shows no acute findings.    -EKG shows sinus rhythm 75 bpm, no ST elevation apparent.   - Patient is afebrile, pulse in the 60s, on room air oxygen 100% SpO2 and blood pressure normotensive.    -Patient given full dose aspirin in ED.  -Last echo performed on 5/25/2023 shows LVEF of 66 to 70%.  - Cardiology consult, follows with Dr. Santiago  - Repeat troponin, EKG, check lipid panel  -Continue home aspirin  - Continuous cardiac monitoring  - Heart healthy diet till n.p.o. midnight     Essential hypertension, chronic, controlled, continue home Lopressor, hold for possible stress test in a.m.    Hyperlipidemia, continue home statin, check lipid panel      VTE Prophylaxis: SCDs  Mechanical VTE prophylaxis orders are present.        CODE STATUS:    Code Status (Patient has no pulse and is not breathing): CPR (Attempt to Resuscitate)  Medical Interventions (Patient has pulse or is breathing): Full Support    Admission Status:  I believe this patient meets observation status.    78 minutes have been spent by [facility] providers in the care of this patient while under observation status.      Appropriate PPE worn during patient encounter.  Hand hygeine performed before and after seeing the patient.      Electronically signed by RAFAEL Dhillon, 07/28/25, 7:48 PM EDT.
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
## 2025-07-28 NOTE — ED TRIAGE NOTES
"Patient to ED per PV w/ reports of left-sided chest pain for approx 1 week. Patient w/ previous cardiac stent placement; states symptoms feel similar. Patient reports \"heaviness\" in left arm when pain occurs.  "

## 2025-07-29 ENCOUNTER — APPOINTMENT (OUTPATIENT)
Dept: NUCLEAR MEDICINE | Facility: HOSPITAL | Age: 55
End: 2025-07-29
Payer: COMMERCIAL

## 2025-07-29 VITALS
SYSTOLIC BLOOD PRESSURE: 119 MMHG | BODY MASS INDEX: 29.62 KG/M2 | OXYGEN SATURATION: 95 % | HEIGHT: 63 IN | TEMPERATURE: 97.9 F | DIASTOLIC BLOOD PRESSURE: 71 MMHG | RESPIRATION RATE: 16 BRPM | WEIGHT: 167.2 LBS | HEART RATE: 67 BPM

## 2025-07-29 LAB
ANION GAP SERPL CALCULATED.3IONS-SCNC: 7.7 MMOL/L (ref 5–15)
BASOPHILS # BLD MANUAL: 0.04 10*3/MM3 (ref 0–0.2)
BASOPHILS NFR BLD MANUAL: 1 % (ref 0–1.5)
BH CV REST NUCLEAR ISOTOPE DOSE: 5.3 MCI
BH CV STRESS COMMENTS STAGE 1: NORMAL
BH CV STRESS DOSE REGADENOSON STAGE 1: 0.4
BH CV STRESS DURATION MIN STAGE 1: 0
BH CV STRESS DURATION SEC STAGE 1: 10
BH CV STRESS NUCLEAR ISOTOPE DOSE: 26.7 MCI
BH CV STRESS PROTOCOL 1: NORMAL
BH CV STRESS RECOVERY BP: NORMAL MMHG
BH CV STRESS RECOVERY HR: 72 BPM
BH CV STRESS STAGE 1: 1
BUN SERPL-MCNC: 6 MG/DL (ref 6–20)
BUN/CREAT SERPL: 7.8 (ref 7–25)
CALCIUM SPEC-SCNC: 8.7 MG/DL (ref 8.6–10.5)
CHLORIDE SERPL-SCNC: 112 MMOL/L (ref 98–107)
CHOLEST SERPL-MCNC: 137 MG/DL (ref 0–200)
CO2 SERPL-SCNC: 24.3 MMOL/L (ref 22–29)
CREAT SERPL-MCNC: 0.77 MG/DL (ref 0.57–1)
DEPRECATED RDW RBC AUTO: 46 FL (ref 37–54)
EGFRCR SERPLBLD CKD-EPI 2021: 91.8 ML/MIN/1.73
EOSINOPHIL # BLD MANUAL: 0.04 10*3/MM3 (ref 0–0.4)
EOSINOPHIL NFR BLD MANUAL: 1 % (ref 0.3–6.2)
ERYTHROCYTE [DISTWIDTH] IN BLOOD BY AUTOMATED COUNT: 15.1 % (ref 12.3–15.4)
GLUCOSE SERPL-MCNC: 89 MG/DL (ref 65–99)
HCT VFR BLD AUTO: 30.9 % (ref 34–46.6)
HDLC SERPL-MCNC: 41 MG/DL (ref 40–60)
HGB BLD-MCNC: 10.2 G/DL (ref 12–15.9)
LDLC SERPL CALC-MCNC: 83 MG/DL (ref 0–100)
LDLC/HDLC SERPL: 2.04 {RATIO}
LYMPHOCYTES # BLD MANUAL: 1.79 10*3/MM3 (ref 0.7–3.1)
LYMPHOCYTES NFR BLD MANUAL: 8.2 % (ref 5–12)
MAXIMAL PREDICTED HEART RATE: 166 BPM
MCH RBC QN AUTO: 27.9 PG (ref 26.6–33)
MCHC RBC AUTO-ENTMCNC: 33 G/DL (ref 31.5–35.7)
MCV RBC AUTO: 84.4 FL (ref 79–97)
MONOCYTES # BLD: 0.35 10*3/MM3 (ref 0.1–0.9)
NEUTROPHILS # BLD AUTO: 2.01 10*3/MM3 (ref 1.7–7)
NEUTROPHILS NFR BLD MANUAL: 47.4 % (ref 42.7–76)
PERCENT MAX PREDICTED HR: 62.05 %
PLAT MORPH BLD: NORMAL
PLATELET # BLD AUTO: 244 10*3/MM3 (ref 140–450)
PMV BLD AUTO: 10.1 FL (ref 6–12)
POTASSIUM SERPL-SCNC: 4.1 MMOL/L (ref 3.5–5.2)
QT INTERVAL: 415 MS
QT INTERVAL: 452 MS
QT INTERVAL: 470 MS
QTC INTERVAL: 452 MS
QTC INTERVAL: 452 MS
QTC INTERVAL: 464 MS
RBC # BLD AUTO: 3.66 10*6/MM3 (ref 3.77–5.28)
RBC MORPH BLD: NORMAL
SODIUM SERPL-SCNC: 144 MMOL/L (ref 136–145)
SPECT HRT GATED+EF W RNC IV: 73 %
STRESS BASELINE BP: NORMAL MMHG
STRESS BASELINE HR: 58 BPM
STRESS PERCENT HR: 73 %
STRESS POST PEAK BP: NORMAL MMHG
STRESS POST PEAK HR: 103 BPM
STRESS TARGET HR: 141 BPM
TRIGL SERPL-MCNC: 62 MG/DL (ref 0–150)
TROPONIN T SERPL HS-MCNC: 11 NG/L
VARIANT LYMPHS NFR BLD MANUAL: 2.1 % (ref 0–5)
VARIANT LYMPHS NFR BLD MANUAL: 40.2 % (ref 19.6–45.3)
VLDLC SERPL-MCNC: 13 MG/DL (ref 5–40)
WBC MORPH BLD: NORMAL
WBC NRBC COR # BLD AUTO: 4.23 10*3/MM3 (ref 3.4–10.8)

## 2025-07-29 PROCEDURE — 93018 CV STRESS TEST I&R ONLY: CPT | Performed by: INTERNAL MEDICINE

## 2025-07-29 PROCEDURE — 85027 COMPLETE CBC AUTOMATED: CPT | Performed by: PHYSICIAN ASSISTANT

## 2025-07-29 PROCEDURE — 25010000002 REGADENOSON 0.4 MG/5ML SOLUTION: Performed by: EMERGENCY MEDICINE

## 2025-07-29 PROCEDURE — A9502 TC99M TETROFOSMIN: HCPCS | Performed by: EMERGENCY MEDICINE

## 2025-07-29 PROCEDURE — 78452 HT MUSCLE IMAGE SPECT MULT: CPT

## 2025-07-29 PROCEDURE — 80048 BASIC METABOLIC PNL TOTAL CA: CPT | Performed by: PHYSICIAN ASSISTANT

## 2025-07-29 PROCEDURE — 84484 ASSAY OF TROPONIN QUANT: CPT | Performed by: PHYSICIAN ASSISTANT

## 2025-07-29 PROCEDURE — 85007 BL SMEAR W/DIFF WBC COUNT: CPT | Performed by: PHYSICIAN ASSISTANT

## 2025-07-29 PROCEDURE — 93010 ELECTROCARDIOGRAM REPORT: CPT | Performed by: INTERNAL MEDICINE

## 2025-07-29 PROCEDURE — 93005 ELECTROCARDIOGRAM TRACING: CPT | Performed by: PHYSICIAN ASSISTANT

## 2025-07-29 PROCEDURE — 25010000002 MORPHINE PER 10 MG: Performed by: PHYSICIAN ASSISTANT

## 2025-07-29 PROCEDURE — 99214 OFFICE O/P EST MOD 30 MIN: CPT | Performed by: INTERNAL MEDICINE

## 2025-07-29 PROCEDURE — 34310000005 TECHNETIUM TETROFOSMIN KIT: Performed by: EMERGENCY MEDICINE

## 2025-07-29 PROCEDURE — 78452 HT MUSCLE IMAGE SPECT MULT: CPT | Performed by: INTERNAL MEDICINE

## 2025-07-29 PROCEDURE — 96376 TX/PRO/DX INJ SAME DRUG ADON: CPT

## 2025-07-29 PROCEDURE — G0378 HOSPITAL OBSERVATION PER HR: HCPCS

## 2025-07-29 PROCEDURE — 80061 LIPID PANEL: CPT | Performed by: PHYSICIAN ASSISTANT

## 2025-07-29 PROCEDURE — 93016 CV STRESS TEST SUPVJ ONLY: CPT | Performed by: INTERNAL MEDICINE

## 2025-07-29 PROCEDURE — 93017 CV STRESS TEST TRACING ONLY: CPT

## 2025-07-29 RX ORDER — METHOCARBAMOL 500 MG/1
500 TABLET, FILM COATED ORAL 4 TIMES DAILY PRN
Qty: 30 TABLET | Refills: 1 | Status: SHIPPED | OUTPATIENT
Start: 2025-07-29

## 2025-07-29 RX ORDER — REGADENOSON 0.08 MG/ML
0.4 INJECTION, SOLUTION INTRAVENOUS
Status: COMPLETED | OUTPATIENT
Start: 2025-07-29 | End: 2025-07-29

## 2025-07-29 RX ADMIN — Medication 10 ML: at 08:18

## 2025-07-29 RX ADMIN — MORPHINE SULFATE 2 MG: 2 INJECTION, SOLUTION INTRAMUSCULAR; INTRAVENOUS at 00:32

## 2025-07-29 RX ADMIN — TETROFOSMIN 1 DOSE: 1.38 INJECTION, POWDER, LYOPHILIZED, FOR SOLUTION INTRAVENOUS at 09:48

## 2025-07-29 RX ADMIN — REGADENOSON 0.4 MG: 0.08 INJECTION, SOLUTION INTRAVENOUS at 11:28

## 2025-07-29 RX ADMIN — METOPROLOL TARTRATE 25 MG: 25 TABLET, FILM COATED ORAL at 08:17

## 2025-07-29 RX ADMIN — Medication 10 ML: at 00:33

## 2025-07-29 RX ADMIN — TETROFOSMIN 1 DOSE: 1.38 INJECTION, POWDER, LYOPHILIZED, FOR SOLUTION INTRAVENOUS at 11:28

## 2025-07-29 RX ADMIN — ASPIRIN 81 MG CHEWABLE TABLET 81 MG: 81 TABLET CHEWABLE at 08:17

## 2025-07-29 NOTE — PLAN OF CARE
Problem: Adult Inpatient Plan of Care  Goal: Plan of Care Review  Outcome: Progressing  Flowsheets (Taken 7/29/2025 0524)  Progress: improving  Outcome Evaluation: pt admitted for CP. Ongoing midsternal chest pressure and LA pain. Received 1 dose of nitro, pain unresolved. Morpine given overnight. Pending cards consult.  Plan of Care Reviewed With: patient  Goal: Patient-Specific Goal (Individualized)  Outcome: Progressing  Goal: Absence of Hospital-Acquired Illness or Injury  Outcome: Progressing  Intervention: Identify and Manage Fall Risk  Recent Flowsheet Documentation  Taken 7/29/2025 0415 by Janny Enriquez RN  Safety Promotion/Fall Prevention:   safety round/check completed   room organization consistent  Taken 7/29/2025 0330 by Janny Enriquez RN  Safety Promotion/Fall Prevention:   clutter free environment maintained   nonskid shoes/slippers when out of bed   room organization consistent   safety round/check completed  Taken 7/29/2025 0220 by Janny Enriquez RN  Safety Promotion/Fall Prevention:   clutter free environment maintained   nonskid shoes/slippers when out of bed   room organization consistent   safety round/check completed  Taken 7/29/2025 0100 by Janny Enriquez RN  Safety Promotion/Fall Prevention: safety round/check completed  Taken 7/29/2025 0032 by Janny Enriquez RN  Safety Promotion/Fall Prevention:   clutter free environment maintained   fall prevention program maintained   nonskid shoes/slippers when out of bed   room organization consistent   safety round/check completed  Taken 7/28/2025 2330 by Janny Enriquez RN  Safety Promotion/Fall Prevention:   clutter free environment maintained   nonskid shoes/slippers when out of bed   fall prevention program maintained   room organization consistent   safety round/check completed  Taken 7/28/2025 2220 by Janny Enriquez RN  Safety Promotion/Fall Prevention:   clutter free environment maintained   nonskid shoes/slippers when out  of bed   room organization consistent   safety round/check completed  Taken 7/28/2025 2147 by Janny Enriquez RN  Safety Promotion/Fall Prevention:   room organization consistent   safety round/check completed   clutter free environment maintained  Taken 7/28/2025 2114 by Janny Enriquez RN  Safety Promotion/Fall Prevention:   clutter free environment maintained   nonskid shoes/slippers when out of bed   room organization consistent   safety round/check completed  Intervention: Prevent Skin Injury  Recent Flowsheet Documentation  Taken 7/29/2025 0415 by Janny Enriquez RN  Body Position: position changed independently  Taken 7/29/2025 0330 by Janny Enriquez RN  Body Position: position changed independently  Taken 7/29/2025 0220 by Janny Enriquez RN  Body Position: position changed independently  Taken 7/29/2025 0100 by Janny Enriquez RN  Body Position: position changed independently  Taken 7/29/2025 0032 by Janny Enriquez RN  Body Position: position changed independently  Taken 7/28/2025 2330 by Janny Enriquez RN  Body Position: position changed independently  Taken 7/28/2025 2220 by Janny Enriquez RN  Body Position: position changed independently  Taken 7/28/2025 2114 by Janny Enriquez RN  Body Position: position changed independently  Intervention: Prevent and Manage VTE (Venous Thromboembolism) Risk  Recent Flowsheet Documentation  Taken 7/28/2025 2114 by Janny Enriquez RN  VTE Prevention/Management: patient refused intervention  Intervention: Prevent Infection  Recent Flowsheet Documentation  Taken 7/29/2025 0415 by Janny Enriquez RN  Infection Prevention: rest/sleep promoted  Taken 7/29/2025 0330 by Janny Enriquez RN  Infection Prevention:   equipment surfaces disinfected   hand hygiene promoted   personal protective equipment utilized   rest/sleep promoted   single patient room provided  Taken 7/29/2025 0220 by Janny Enriquez RN  Infection Prevention: rest/sleep  promoted  Taken 7/29/2025 0032 by Janny Enriquez RN  Infection Prevention:   rest/sleep promoted   hand hygiene promoted  Taken 7/28/2025 2330 by Janny Enriquez RN  Infection Prevention:   equipment surfaces disinfected   hand hygiene promoted   rest/sleep promoted   personal protective equipment utilized   single patient room provided  Taken 7/28/2025 2220 by Janny Enriquez RN  Infection Prevention:   equipment surfaces disinfected   hand hygiene promoted   personal protective equipment utilized   rest/sleep promoted   single patient room provided  Taken 7/28/2025 2147 by Janny Enriquez RN  Infection Prevention:   rest/sleep promoted   hand hygiene promoted   personal protective equipment utilized   single patient room provided   equipment surfaces disinfected  Taken 7/28/2025 2114 by Janny Enriquez RN  Infection Prevention:   equipment surfaces disinfected   hand hygiene promoted   personal protective equipment utilized   rest/sleep promoted   single patient room provided  Goal: Optimal Comfort and Wellbeing  Outcome: Progressing  Intervention: Monitor Pain and Promote Comfort  Recent Flowsheet Documentation  Taken 7/29/2025 0032 by Janny Enriquez RN  Pain Management Interventions:   care clustered   pain medication given   quiet environment facilitated   relaxation techniques promoted  Taken 7/28/2025 2220 by Janny Enriquez RN  Pain Management Interventions:   diversional activity provided   quiet environment facilitated   relaxation techniques promoted  Taken 7/28/2025 2147 by Janny Enriquez RN  Pain Management Interventions:   care clustered   diversional activity provided   pain management plan reviewed with patient/caregiver   pain medication given   position adjusted   quiet environment facilitated   relaxation techniques promoted  Taken 7/28/2025 2115 by Janny Enriquez RN  Pain Management Interventions:   pain management plan reviewed with patient/caregiver   position adjusted    care clustered   quiet environment facilitated   relaxation techniques promoted  Intervention: Provide Person-Centered Care  Recent Flowsheet Documentation  Taken 7/29/2025 0330 by Janny Enriquez RN  Trust Relationship/Rapport:   care explained   choices provided  Taken 7/29/2025 0032 by Janny Enriquez RN  Trust Relationship/Rapport:   care explained   choices provided   questions encouraged   thoughts/feelings acknowledged  Taken 7/28/2025 2330 by Janny Enriquez RN  Trust Relationship/Rapport:   care explained   choices provided   emotional support provided   empathic listening provided   questions answered   questions encouraged   reassurance provided   thoughts/feelings acknowledged  Taken 7/28/2025 2147 by Janny Enriquez RN  Trust Relationship/Rapport:   care explained   choices provided   emotional support provided   empathic listening provided   questions answered   questions encouraged   reassurance provided   thoughts/feelings acknowledged  Taken 7/28/2025 2114 by Janny Enriquez RN  Trust Relationship/Rapport:   care explained   choices provided   questions answered   questions encouraged   thoughts/feelings acknowledged  Goal: Readiness for Transition of Care  Outcome: Progressing   Goal Outcome Evaluation:  Plan of Care Reviewed With: patient        Progress: improving  Outcome Evaluation: pt admitted for CP. Ongoing midsternal chest pressure and LA pain. Received 1 dose of nitro, pain unresolved. Morpine given overnight. Pending cards consult.
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
## 2025-07-29 NOTE — ED NOTES
Nursing report ED to floor  Sunday JACQUES Salamanca  54 y.o.  female    HPI :  HPI  Stated Reason for Visit: intermittent chest pain for 1 week  History Obtained From: patient    Chief Complaint  Chief Complaint   Patient presents with    Chest Pain       Admitting doctor:   Gissell Packer MD    Admitting diagnosis:   There were no encounter diagnoses.    Code status:   Current Code Status       Date Active Code Status Order ID Comments User Context       7/28/2025 1955 CPR (Attempt to Resuscitate) [id number]  Alex Skinner PA ED        Question Answer    Code Status (Patient has no pulse and is not breathing) CPR (Attempt to Resuscitate)    Medical Interventions (Patient has pulse or is breathing) Full Support                    Allergies:   Cephalexin and Nsaids    Isolation:   No active isolations    Intake and Output  No intake or output data in the 24 hours ending 07/28/25 2000    Weight:       07/28/25  1534   Weight: 74.8 kg (165 lb)       Most recent vitals:   Vitals:    07/28/25 1900 07/28/25 1921 07/28/25 1930 07/28/25 1956   BP:   118/75    BP Location:       Patient Position:       Pulse: 60 68 66 64   Resp:       Temp:       TempSrc:       SpO2: 100%  100% 100%   Weight:       Height:           Active LDAs/IV Access:   Lines, Drains & Airways       Active LDAs       None                    Labs (abnormal labs have a star):   Labs Reviewed   COMPREHENSIVE METABOLIC PANEL - Abnormal; Notable for the following components:       Result Value    Glucose 104 (*)     Chloride 108 (*)     All other components within normal limits    Narrative:     GFR Categories in Chronic Kidney Disease (CKD)              GFR Category          GFR (mL/min/1.73)    Interpretation  G1                    90 or greater        Normal or high (1)  G2                    60-89                Mild decrease (1)  G3a                   45-59                Mild to moderate decrease  G3b                   30-44                Moderate to severe  decrease  G4                    15-29                Severe decrease  G5                    14 or less           Kidney failure    (1)In the absence of evidence of kidney disease, neither GFR category G1 or G2 fulfill the criteria for CKD.    eGFR calculation 2021 CKD-EPI creatinine equation, which does not include race as a factor   CBC WITH AUTO DIFFERENTIAL - Abnormal; Notable for the following components:    Hemoglobin 11.2 (*)     MCHC 30.6 (*)     All other components within normal limits   HIGH SENSITIVITIY TROPONIN T 1HR - Abnormal; Notable for the following components:    Troponin T Numeric Delta 3 (*)     All other components within normal limits    Narrative:     High Sensitive Troponin T Reference Range:  <14.0 ng/L- Negative Female for AMI  <22.0 ng/L- Negative Male for AMI  >=14 - Abnormal Female indicating possible myocardial injury.  >=22 - Abnormal Male indicating possible myocardial injury.   Clinicians would have to utilize clinical acumen, EKG, Troponin, and serial changes to determine if it is an Acute Myocardial Infarction or myocardial injury due to an underlying chronic condition.        TROPONIN - Normal    Narrative:     High Sensitive Troponin T Reference Range:  <14.0 ng/L- Negative Female for AMI  <22.0 ng/L- Negative Male for AMI  >=14 - Abnormal Female indicating possible myocardial injury.  >=22 - Abnormal Male indicating possible myocardial injury.   Clinicians would have to utilize clinical acumen, EKG, Troponin, and serial changes to determine if it is an Acute Myocardial Infarction or myocardial injury due to an underlying chronic condition.        RAINBOW DRAW    Narrative:     The following orders were created for panel order [place] Draw.  Procedure                               Abnormality         Status                     ---------                               -----------         ------                     Green Top (Gel)[[id number]]                                  Final  result               Lavender Top[[id number]]                                     Final result               Gold Top - SST[[id number]]                                   Final result               Light Blue Top[[id number]]                                   Final result                 Please view results for these tests on the individual orders.   CBC AND DIFFERENTIAL    Narrative:     The following orders were created for panel order CBC & Differential.  Procedure                               Abnormality         Status                     ---------                               -----------         ------                     CBC Auto Differential[[id number]]        Abnormal            Final result                 Please view results for these tests on the individual orders.   GREEN TOP   LAVENDER TOP   GOLD TOP - SST   LIGHT BLUE TOP       EKG:   ECG 12 Lead ED Triage Standing Order; Chest Pain   Preliminary Result   HEART RATE=75  bpm   RR Octyqpro=639  ms   MD Avistvge=438  ms   P Horizontal Axis=5  deg   P Front Axis=39  deg   QRSD Interval=75  ms   QT Ljrhxaxb=625  ms   ENzR=658  ms   QRS Axis=7  deg   T Wave Axis=2  deg   - NORMAL ECG -   Sinus rhythm   Date and Time of Study:2025-07-28 15:18:23          Meds given in ED:   Medications   sodium chloride 0.9 % flush 10 mL (has no administration in time range)   nitroglycerin (NITROSTAT) SL tablet 0.4 mg (has no administration in time range)   sodium chloride 0.9 % flush 10 mL (has no administration in time range)   sodium chloride 0.9 % flush 10 mL (has no administration in time range)   sodium chloride 0.9 % infusion 40 mL (has no administration in time range)   acetaminophen (TYLENOL) tablet 650 mg (has no administration in time range)   sennosides-docusate (PERICOLACE) 8.6-50 MG per tablet 2 tablet (has no administration in time range)     And   polyethylene glycol (MIRALAX) packet 17 g (has no administration in time range)     And   bisacodyl (DULCOLAX) EC  tablet 5 mg (has no administration in time range)     And   bisacodyl (DULCOLAX) suppository 10 mg (has no administration in time range)   Potassium Replacement - Follow Nurse / BPA Driven Protocol (has no administration in time range)   Magnesium Standard Dose Replacement - Follow Nurse / BPA Driven Protocol (has no administration in time range)   Phosphorus Replacement - Follow Nurse / BPA Driven Protocol (has no administration in time range)   Calcium Replacement - Follow Nurse / BPA Driven Protocol (has no administration in time range)   ondansetron ODT (ZOFRAN-ODT) disintegrating tablet 4 mg (has no administration in time range)     Or   ondansetron (ZOFRAN) injection 4 mg (has no administration in time range)   aspirin tablet 325 mg (325 mg Oral Given 25 1848)       Imaging results:  XR Chest 1 View  Result Date: 2025  No evidence for active disease in the chest.        Ambulatory status:   - up ad flower    Social issues:   Social History     Socioeconomic History    Marital status:    Tobacco Use    Smoking status: Former     Current packs/day: 0.00     Average packs/day: 0.3 packs/day for 5.0 years (1.3 ttl pk-yrs)     Types: Cigarettes     Start date: 2013     Quit date: 2018     Years since quittin.6    Smokeless tobacco: Never    Tobacco comments:     intermittent caffiene   Vaping Use    Vaping status: Never Used   Substance and Sexual Activity    Alcohol use: Yes     Comment: HOLIDAY DRINKER    Drug use: No    Sexual activity: Defer       Peripheral Neurovascular  Peripheral Neurovascular (Adult)  Peripheral Neurovascular WDL: WDL    Neuro Cognitive  Neuro Cognitive (Adult)  Cognitive/Neuro/Behavioral WDL: WDL    Learning  Learning Assessment  Learning Readiness and Ability: no barriers identified  Education Provided  Person Taught: patient  Teaching Method: verbal instruction  Education Outcome Evaluation: acceptance expressed    Respiratory  Respiratory WDL  Respiratory  "WDL: WDL    Abdominal Pain       Pain Assessments  Pain (Adult)  (0-10) Pain Rating: Rest: 6  (0-10) Pain Rating: Activity: 6  Pain Location: chest  Pain Side/Orientation: left, upper  Pain Onset/Duration: 1 week  Pain Description: intermittent, sharp, throbbing, other (see comments) (\"numb\")  Pain Radiation to: arm, left  Pain Assessment Additional Detail  Pain Onset/Duration: 1 week    NIH Stroke Scale       Marimar Brooks RN  07/28/25 20:00 EDT   "

## 2025-07-29 NOTE — PLAN OF CARE
Goal Outcome Evaluation Patient has been cleared for discharge.  Following up outpatient with PCP and cardiology.  Receiving script for robaxin
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
## 2025-07-30 NOTE — CASE MANAGEMENT/SOCIAL WORK
Case Management Discharge Note      Final Note: Home via private vehicle.         Selected Continued Care - Discharged on 7/29/2025 Admission date: 7/28/2025 - Discharge disposition: Home or Self Care      Destination    No services have been selected for the patient.                Durable Medical Equipment    No services have been selected for the patient.                Dialysis/Infusion    No services have been selected for the patient.                Home Medical Care    No services have been selected for the patient.                Therapy    No services have been selected for the patient.                Community Resources    No services have been selected for the patient.                Community & DME    No services have been selected for the patient.                    Transportation Services  Transportation: Private Transportation  Private: Car    Final Discharge Disposition Code: 01 - home or self-care

## 2025-07-31 ENCOUNTER — APPOINTMENT (OUTPATIENT)
Dept: GENERAL RADIOLOGY | Facility: HOSPITAL | Age: 55
End: 2025-07-31
Payer: COMMERCIAL

## 2025-07-31 ENCOUNTER — APPOINTMENT (OUTPATIENT)
Dept: CT IMAGING | Facility: HOSPITAL | Age: 55
End: 2025-07-31
Payer: COMMERCIAL

## 2025-07-31 ENCOUNTER — APPOINTMENT (OUTPATIENT)
Dept: CARDIOLOGY | Facility: HOSPITAL | Age: 55
End: 2025-07-31
Payer: COMMERCIAL

## 2025-07-31 ENCOUNTER — HOSPITAL ENCOUNTER (INPATIENT)
Facility: HOSPITAL | Age: 55
LOS: 1 days | Discharge: HOME | End: 2025-08-02
Attending: EMERGENCY MEDICINE | Admitting: INTERNAL MEDICINE
Payer: COMMERCIAL

## 2025-07-31 DIAGNOSIS — I21.21 ST ELEVATION MYOCARDIAL INFARCTION INVOLVING LEFT CIRCUMFLEX CORONARY ARTERY: ICD-10-CM

## 2025-07-31 DIAGNOSIS — I20.0 UNSTABLE ANGINA: Primary | ICD-10-CM

## 2025-07-31 LAB
ACT BLD: 273 SECONDS (ref 82–152)
ACT BLD: 273 SECONDS (ref 82–152)
ACT BLD: 372 SECONDS (ref 82–152)
ACT BLD: 671 SECONDS (ref 82–152)
ALBUMIN SERPL-MCNC: 3.7 G/DL (ref 3.5–5.2)
ALBUMIN/GLOB SERPL: 1.2 G/DL
ALP SERPL-CCNC: 109 U/L (ref 39–117)
ALT SERPL W P-5'-P-CCNC: 20 U/L (ref 1–33)
ANION GAP SERPL CALCULATED.3IONS-SCNC: 14 MMOL/L (ref 5–15)
AORTIC ARCH: 2 CM
AORTIC DIMENSIONLESS INDEX: 0.73 (DI)
APTT PPP: 27.4 SECONDS (ref 22.7–35.4)
ASCENDING AORTA: 2.4 CM
AST SERPL-CCNC: 26 U/L (ref 1–32)
AV MEAN PRESS GRAD SYS DOP V1V2: 3.8 MMHG
AV VMAX SYS DOP: 134.1 CM/SEC
BASOPHILS # BLD MANUAL: 0 10*3/MM3 (ref 0–0.2)
BASOPHILS NFR BLD MANUAL: 0 % (ref 0–1.5)
BH CV ECHO MEAS - ACS: 1.59 CM
BH CV ECHO MEAS - AO MAX PG: 7.2 MMHG
BH CV ECHO MEAS - AO ROOT DIAM: 2.8 CM
BH CV ECHO MEAS - AO V2 VTI: 21.3 CM
BH CV ECHO MEAS - AVA(I,D): 1.76 CM2
BH CV ECHO MEAS - EDV(CUBED): 105.2 ML
BH CV ECHO MEAS - EDV(MOD-SP2): 117 ML
BH CV ECHO MEAS - EDV(MOD-SP4): 112 ML
BH CV ECHO MEAS - EF(MOD-SP2): 50.4 %
BH CV ECHO MEAS - EF(MOD-SP4): 46.4 %
BH CV ECHO MEAS - ESV(CUBED): 35.9 ML
BH CV ECHO MEAS - ESV(MOD-SP2): 58 ML
BH CV ECHO MEAS - ESV(MOD-SP4): 60 ML
BH CV ECHO MEAS - FS: 30.1 %
BH CV ECHO MEAS - IVS/LVPW: 1.34 CM
BH CV ECHO MEAS - IVSD: 1.03 CM
BH CV ECHO MEAS - LAT PEAK E' VEL: 10.3 CM/SEC
BH CV ECHO MEAS - LV DIASTOLIC VOL/BSA (35-75): 63.7 CM2
BH CV ECHO MEAS - LV MASS(C)D: 143.1 GRAMS
BH CV ECHO MEAS - LV MAX PG: 2.8 MMHG
BH CV ECHO MEAS - LV MEAN PG: 1.53 MMHG
BH CV ECHO MEAS - LV SYSTOLIC VOL/BSA (12-30): 34.1 CM2
BH CV ECHO MEAS - LV V1 MAX: 84.4 CM/SEC
BH CV ECHO MEAS - LV V1 VTI: 15.6 CM
BH CV ECHO MEAS - LVIDD: 4.7 CM
BH CV ECHO MEAS - LVIDS: 3.3 CM
BH CV ECHO MEAS - LVOT AREA: 2.41 CM2
BH CV ECHO MEAS - LVOT DIAM: 1.75 CM
BH CV ECHO MEAS - LVPWD: 0.77 CM
BH CV ECHO MEAS - MED PEAK E' VEL: 8.8 CM/SEC
BH CV ECHO MEAS - MR MAX PG: 93.3 MMHG
BH CV ECHO MEAS - MR MAX VEL: 483 CM/SEC
BH CV ECHO MEAS - MV A DUR: 0.09 SEC
BH CV ECHO MEAS - MV A MAX VEL: 37.6 CM/SEC
BH CV ECHO MEAS - MV DEC TIME: 0.1 SEC
BH CV ECHO MEAS - MV E MAX VEL: 104 CM/SEC
BH CV ECHO MEAS - MV E/A: 2.8
BH CV ECHO MEAS - PA ACC TIME: 0.06 SEC
BH CV ECHO MEAS - PA V2 MAX: 89.8 CM/SEC
BH CV ECHO MEAS - PULM A REVS DUR: 0.07 SEC
BH CV ECHO MEAS - PULM A REVS VEL: 26.1 CM/SEC
BH CV ECHO MEAS - PULM DIAS VEL: 73.9 CM/SEC
BH CV ECHO MEAS - PULM S/D: 0.44
BH CV ECHO MEAS - PULM SYS VEL: 32.5 CM/SEC
BH CV ECHO MEAS - QP/QS: 0.99
BH CV ECHO MEAS - RAP SYSTOLE: 3 MMHG
BH CV ECHO MEAS - RV MAX PG: 2.06 MMHG
BH CV ECHO MEAS - RV V1 MAX: 71.8 CM/SEC
BH CV ECHO MEAS - RV V1 VTI: 14 CM
BH CV ECHO MEAS - RVOT DIAM: 1.84 CM
BH CV ECHO MEAS - RVSP: 38 MMHG
BH CV ECHO MEAS - SUP REN AO DIAM: 1.5 CM
BH CV ECHO MEAS - SV(LVOT): 37.6 ML
BH CV ECHO MEAS - SV(MOD-SP2): 59 ML
BH CV ECHO MEAS - SV(MOD-SP4): 52 ML
BH CV ECHO MEAS - SV(RVOT): 37.2 ML
BH CV ECHO MEAS - SVI(LVOT): 21.4 ML/M2
BH CV ECHO MEAS - SVI(MOD-SP2): 33.5 ML/M2
BH CV ECHO MEAS - SVI(MOD-SP4): 29.6 ML/M2
BH CV ECHO MEAS - TAPSE (>1.6): 1.57 CM
BH CV ECHO MEAS - TR MAX PG: 34.5 MMHG
BH CV ECHO MEAS - TR MAX VEL: 293.7 CM/SEC
BH CV ECHO MEASUREMENTS AVERAGE E/E' RATIO: 10.89
BH CV XLRA - RV BASE: 3.1 CM
BH CV XLRA - RV LENGTH: 7.1 CM
BH CV XLRA - RV MID: 2.32 CM
BH CV XLRA - TDI S': 9.8 CM/SEC
BILIRUB SERPL-MCNC: 0.2 MG/DL (ref 0–1.2)
BUN SERPL-MCNC: 9 MG/DL (ref 6–20)
BUN/CREAT SERPL: 13.8 (ref 7–25)
BURR CELLS BLD QL SMEAR: ABNORMAL
CALCIUM SPEC-SCNC: 9.1 MG/DL (ref 8.6–10.5)
CHLORIDE SERPL-SCNC: 107 MMOL/L (ref 98–107)
CO2 SERPL-SCNC: 21 MMOL/L (ref 22–29)
CREAT SERPL-MCNC: 0.65 MG/DL (ref 0.57–1)
DEPRECATED RDW RBC AUTO: 45.8 FL (ref 37–54)
EGFRCR SERPLBLD CKD-EPI 2021: 104.8 ML/MIN/1.73
EOSINOPHIL # BLD MANUAL: 0.14 10*3/MM3 (ref 0–0.4)
EOSINOPHIL NFR BLD MANUAL: 2 % (ref 0.3–6.2)
ERYTHROCYTE [DISTWIDTH] IN BLOOD BY AUTOMATED COUNT: 15.1 % (ref 12.3–15.4)
GEN 5 1HR TROPONIN T REFLEX: 24 NG/L
GLOBULIN UR ELPH-MCNC: 3 GM/DL
GLUCOSE BLDC GLUCOMTR-MCNC: 151 MG/DL (ref 65–99)
GLUCOSE SERPL-MCNC: 136 MG/DL (ref 65–99)
HCT VFR BLD AUTO: 38.5 % (ref 34–46.6)
HGB BLD-MCNC: 12.6 G/DL (ref 12–15.9)
HOLD SPECIMEN: NORMAL
HOLD SPECIMEN: NORMAL
INR PPP: 0.97 (ref 0.9–1.1)
LEFT ATRIUM VOLUME INDEX: 28 ML/M2
LIPASE SERPL-CCNC: 20 U/L (ref 13–60)
LV EF BIPLANE MOD: 49.2 %
LYMPHOCYTES # BLD MANUAL: 4.39 10*3/MM3 (ref 0.7–3.1)
LYMPHOCYTES NFR BLD MANUAL: 5 % (ref 5–12)
MCH RBC QN AUTO: 27.7 PG (ref 26.6–33)
MCHC RBC AUTO-ENTMCNC: 32.7 G/DL (ref 31.5–35.7)
MCV RBC AUTO: 84.6 FL (ref 79–97)
MONOCYTES # BLD: 0.34 10*3/MM3 (ref 0.1–0.9)
NEUTROPHILS # BLD AUTO: 1.99 10*3/MM3 (ref 1.7–7)
NEUTROPHILS NFR BLD MANUAL: 29 % (ref 42.7–76)
NRBC BLD AUTO-RTO: 0 /100 WBC (ref 0–0.2)
NT-PROBNP SERPL-MCNC: 218 PG/ML (ref 0–900)
OVALOCYTES BLD QL SMEAR: ABNORMAL
PLAT MORPH BLD: NORMAL
PLATELET # BLD AUTO: 311 10*3/MM3 (ref 140–450)
PMV BLD AUTO: 10 FL (ref 6–12)
POTASSIUM SERPL-SCNC: 3.1 MMOL/L (ref 3.5–5.2)
PROT SERPL-MCNC: 6.7 G/DL (ref 6–8.5)
PROTHROMBIN TIME: 12.8 SECONDS (ref 11.7–14.2)
QT INTERVAL: 324 MS
QT INTERVAL: 328 MS
QT INTERVAL: 423 MS
QT INTERVAL: 426 MS
QT INTERVAL: 437 MS
QTC INTERVAL: 425 MS
QTC INTERVAL: 433 MS
QTC INTERVAL: 446 MS
QTC INTERVAL: 454 MS
QTC INTERVAL: 492 MS
RBC # BLD AUTO: 4.55 10*6/MM3 (ref 3.77–5.28)
SINUS: 2.9 CM
SMUDGE CELLS BLD QL SMEAR: ABNORMAL
SODIUM SERPL-SCNC: 142 MMOL/L (ref 136–145)
STJ: 1.85 CM
TROPONIN T NUMERIC DELTA: 11 NG/L
TROPONIN T SERPL HS-MCNC: 13 NG/L
VARIANT LYMPHS NFR BLD MANUAL: 64 % (ref 19.6–45.3)
WBC NRBC COR # BLD AUTO: 6.86 10*3/MM3 (ref 3.4–10.8)
WHOLE BLOOD HOLD COAG: NORMAL
WHOLE BLOOD HOLD SPECIMEN: NORMAL

## 2025-07-31 PROCEDURE — 92979 ENDOLUMINL IVUS OCT C EA: CPT | Performed by: INTERNAL MEDICINE

## 2025-07-31 PROCEDURE — 83690 ASSAY OF LIPASE: CPT | Performed by: EMERGENCY MEDICINE

## 2025-07-31 PROCEDURE — 93306 TTE W/DOPPLER COMPLETE: CPT

## 2025-07-31 PROCEDURE — 85007 BL SMEAR W/DIFF WBC COUNT: CPT | Performed by: EMERGENCY MEDICINE

## 2025-07-31 PROCEDURE — 83880 ASSAY OF NATRIURETIC PEPTIDE: CPT | Performed by: EMERGENCY MEDICINE

## 2025-07-31 PROCEDURE — 71275 CT ANGIOGRAPHY CHEST: CPT

## 2025-07-31 PROCEDURE — B241ZZ3 ULTRASONOGRAPHY OF MULTIPLE CORONARY ARTERIES, INTRAVASCULAR: ICD-10-PCS | Performed by: INTERNAL MEDICINE

## 2025-07-31 PROCEDURE — C1725 CATH, TRANSLUMIN NON-LASER: HCPCS | Performed by: INTERNAL MEDICINE

## 2025-07-31 PROCEDURE — 93458 L HRT ARTERY/VENTRICLE ANGIO: CPT | Performed by: INTERNAL MEDICINE

## 2025-07-31 PROCEDURE — 85610 PROTHROMBIN TIME: CPT | Performed by: EMERGENCY MEDICINE

## 2025-07-31 PROCEDURE — G0378 HOSPITAL OBSERVATION PER HR: HCPCS

## 2025-07-31 PROCEDURE — 80053 COMPREHEN METABOLIC PANEL: CPT | Performed by: EMERGENCY MEDICINE

## 2025-07-31 PROCEDURE — 93010 ELECTROCARDIOGRAM REPORT: CPT | Performed by: INTERNAL MEDICINE

## 2025-07-31 PROCEDURE — 25010000002 MORPHINE PER 10 MG: Performed by: INTERNAL MEDICINE

## 2025-07-31 PROCEDURE — C1769 GUIDE WIRE: HCPCS | Performed by: INTERNAL MEDICINE

## 2025-07-31 PROCEDURE — 25010000002 LIDOCAINE 2% SOLUTION: Performed by: INTERNAL MEDICINE

## 2025-07-31 PROCEDURE — 25010000002 ONDANSETRON PER 1 MG: Performed by: EMERGENCY MEDICINE

## 2025-07-31 PROCEDURE — 25010000002 FENTANYL CITRATE (PF) 50 MCG/ML SOLUTION: Performed by: INTERNAL MEDICINE

## 2025-07-31 PROCEDURE — 92928 PRQ TCAT PLMT NTRAC ST 1 LES: CPT | Performed by: INTERNAL MEDICINE

## 2025-07-31 PROCEDURE — 93005 ELECTROCARDIOGRAM TRACING: CPT | Performed by: INTERNAL MEDICINE

## 2025-07-31 PROCEDURE — C1874 STENT, COATED/COV W/DEL SYS: HCPCS | Performed by: INTERNAL MEDICINE

## 2025-07-31 PROCEDURE — 92978 ENDOLUMINL IVUS OCT C 1ST: CPT | Performed by: INTERNAL MEDICINE

## 2025-07-31 PROCEDURE — 027135Z DILATION OF CORONARY ARTERY, TWO ARTERIES WITH TWO DRUG-ELUTING INTRALUMINAL DEVICES, PERCUTANEOUS APPROACH: ICD-10-PCS | Performed by: INTERNAL MEDICINE

## 2025-07-31 PROCEDURE — C1753 CATH, INTRAVAS ULTRASOUND: HCPCS | Performed by: INTERNAL MEDICINE

## 2025-07-31 PROCEDURE — 25810000003 SODIUM CHLORIDE 0.9 % SOLUTION: Performed by: INTERNAL MEDICINE

## 2025-07-31 PROCEDURE — 92941 PRQ TRLML REVSC TOT OCCL AMI: CPT | Performed by: INTERNAL MEDICINE

## 2025-07-31 PROCEDURE — C1894 INTRO/SHEATH, NON-LASER: HCPCS | Performed by: INTERNAL MEDICINE

## 2025-07-31 PROCEDURE — 25010000002 HEPARIN (PORCINE) 25000-0.45 UT/250ML-% SOLUTION: Performed by: EMERGENCY MEDICINE

## 2025-07-31 PROCEDURE — 25010000002 ONDANSETRON PER 1 MG: Performed by: INTERNAL MEDICINE

## 2025-07-31 PROCEDURE — 99291 CRITICAL CARE FIRST HOUR: CPT

## 2025-07-31 PROCEDURE — 84484 ASSAY OF TROPONIN QUANT: CPT | Performed by: EMERGENCY MEDICINE

## 2025-07-31 PROCEDURE — 99223 1ST HOSP IP/OBS HIGH 75: CPT | Performed by: INTERNAL MEDICINE

## 2025-07-31 PROCEDURE — 93306 TTE W/DOPPLER COMPLETE: CPT | Performed by: INTERNAL MEDICINE

## 2025-07-31 PROCEDURE — C9600 PERC DRUG-EL COR STENT SING: HCPCS | Performed by: INTERNAL MEDICINE

## 2025-07-31 PROCEDURE — 82948 REAGENT STRIP/BLOOD GLUCOSE: CPT

## 2025-07-31 PROCEDURE — 36415 COLL VENOUS BLD VENIPUNCTURE: CPT

## 2025-07-31 PROCEDURE — 93005 ELECTROCARDIOGRAM TRACING: CPT | Performed by: EMERGENCY MEDICINE

## 2025-07-31 PROCEDURE — B2111ZZ FLUOROSCOPY OF MULTIPLE CORONARY ARTERIES USING LOW OSMOLAR CONTRAST: ICD-10-PCS | Performed by: INTERNAL MEDICINE

## 2025-07-31 PROCEDURE — 85730 THROMBOPLASTIN TIME PARTIAL: CPT | Performed by: EMERGENCY MEDICINE

## 2025-07-31 PROCEDURE — 25010000002 MIDAZOLAM PER 1 MG: Performed by: INTERNAL MEDICINE

## 2025-07-31 PROCEDURE — 25510000001 PERFLUTREN 6.52 MG/ML SUSPENSION 2 ML VIAL: Performed by: INTERNAL MEDICINE

## 2025-07-31 PROCEDURE — 4A023N7 MEASUREMENT OF CARDIAC SAMPLING AND PRESSURE, LEFT HEART, PERCUTANEOUS APPROACH: ICD-10-PCS | Performed by: INTERNAL MEDICINE

## 2025-07-31 PROCEDURE — 25010000002 HEPARIN (PORCINE) PER 1000 UNITS: Performed by: EMERGENCY MEDICINE

## 2025-07-31 PROCEDURE — 85025 COMPLETE CBC W/AUTO DIFF WBC: CPT | Performed by: EMERGENCY MEDICINE

## 2025-07-31 PROCEDURE — 25010000002 MORPHINE PER 10 MG: Performed by: EMERGENCY MEDICINE

## 2025-07-31 PROCEDURE — C9606 PERC D-E COR REVASC W AMI S: HCPCS | Performed by: INTERNAL MEDICINE

## 2025-07-31 PROCEDURE — 71045 X-RAY EXAM CHEST 1 VIEW: CPT

## 2025-07-31 PROCEDURE — 25510000001 IOPAMIDOL PER 1 ML: Performed by: INTERNAL MEDICINE

## 2025-07-31 PROCEDURE — 25510000001 IOPAMIDOL PER 1 ML: Performed by: EMERGENCY MEDICINE

## 2025-07-31 PROCEDURE — B2151ZZ FLUOROSCOPY OF LEFT HEART USING LOW OSMOLAR CONTRAST: ICD-10-PCS | Performed by: INTERNAL MEDICINE

## 2025-07-31 PROCEDURE — 25010000002 HEPARIN (PORCINE) PER 1000 UNITS: Performed by: INTERNAL MEDICINE

## 2025-07-31 PROCEDURE — C1887 CATHETER, GUIDING: HCPCS | Performed by: INTERNAL MEDICINE

## 2025-07-31 PROCEDURE — 85347 COAGULATION TIME ACTIVATED: CPT

## 2025-07-31 DEVICE — XIENCE SKYPOINT™ EVEROLIMUS ELUTING CORONARY STENT SYSTEM 2.50 MM X 15 MM / RAPID-EXCHANGE
Type: IMPLANTABLE DEVICE | Site: CORONARY | Status: FUNCTIONAL
Brand: XIENCE SKYPOINT™

## 2025-07-31 DEVICE — XIENCE SKYPOINT™ EVEROLIMUS ELUTING CORONARY STENT SYSTEM 3.00 MM X 33 MM / RAPID-EXCHANGE
Type: IMPLANTABLE DEVICE | Site: CORONARY | Status: FUNCTIONAL
Brand: XIENCE SKYPOINT™

## 2025-07-31 RX ORDER — MIDAZOLAM HYDROCHLORIDE 1 MG/ML
INJECTION, SOLUTION INTRAMUSCULAR; INTRAVENOUS
Status: DISCONTINUED | OUTPATIENT
Start: 2025-07-31 | End: 2025-07-31 | Stop reason: HOSPADM

## 2025-07-31 RX ORDER — ONDANSETRON 4 MG/1
4 TABLET, ORALLY DISINTEGRATING ORAL EVERY 6 HOURS PRN
Status: DISCONTINUED | OUTPATIENT
Start: 2025-07-31 | End: 2025-08-02 | Stop reason: HOSPADM

## 2025-07-31 RX ORDER — SODIUM CHLORIDE 9 MG/ML
1 INJECTION, SOLUTION INTRAVENOUS CONTINUOUS
Status: ACTIVE | OUTPATIENT
Start: 2025-07-31 | End: 2025-07-31

## 2025-07-31 RX ORDER — ONDANSETRON 2 MG/ML
INJECTION INTRAMUSCULAR; INTRAVENOUS
Status: DISCONTINUED | OUTPATIENT
Start: 2025-07-31 | End: 2025-07-31 | Stop reason: HOSPADM

## 2025-07-31 RX ORDER — SODIUM CHLORIDE 0.9 % (FLUSH) 0.9 %
10 SYRINGE (ML) INJECTION AS NEEDED
Status: DISCONTINUED | OUTPATIENT
Start: 2025-07-31 | End: 2025-08-02 | Stop reason: HOSPADM

## 2025-07-31 RX ORDER — METOPROLOL TARTRATE 25 MG/1
25 TABLET, FILM COATED ORAL ONCE
Status: DISCONTINUED | OUTPATIENT
Start: 2025-07-31 | End: 2025-08-01

## 2025-07-31 RX ORDER — ACETAMINOPHEN 325 MG/1
650 TABLET ORAL EVERY 4 HOURS PRN
Status: DISCONTINUED | OUTPATIENT
Start: 2025-07-31 | End: 2025-08-02 | Stop reason: HOSPADM

## 2025-07-31 RX ORDER — MORPHINE SULFATE 2 MG/ML
2 INJECTION, SOLUTION INTRAMUSCULAR; INTRAVENOUS
Status: DISCONTINUED | OUTPATIENT
Start: 2025-07-31 | End: 2025-08-01

## 2025-07-31 RX ORDER — HEPARIN SODIUM 10000 [USP'U]/100ML
12 INJECTION, SOLUTION INTRAVENOUS
Status: DISCONTINUED | OUTPATIENT
Start: 2025-07-31 | End: 2025-08-01

## 2025-07-31 RX ORDER — METHOCARBAMOL 750 MG/1
750 TABLET, FILM COATED ORAL ONCE
Status: DISCONTINUED | OUTPATIENT
Start: 2025-07-31 | End: 2025-08-01

## 2025-07-31 RX ORDER — MORPHINE SULFATE 2 MG/ML
4 INJECTION, SOLUTION INTRAMUSCULAR; INTRAVENOUS ONCE
Status: COMPLETED | OUTPATIENT
Start: 2025-07-31 | End: 2025-07-31

## 2025-07-31 RX ORDER — LIDOCAINE 4 G/G
1 PATCH TOPICAL ONCE
Status: DISCONTINUED | OUTPATIENT
Start: 2025-07-31 | End: 2025-08-02 | Stop reason: HOSPADM

## 2025-07-31 RX ORDER — HEPARIN SODIUM 1000 [USP'U]/ML
INJECTION, SOLUTION INTRAVENOUS; SUBCUTANEOUS
Status: DISCONTINUED | OUTPATIENT
Start: 2025-07-31 | End: 2025-07-31 | Stop reason: HOSPADM

## 2025-07-31 RX ORDER — IOPAMIDOL 755 MG/ML
100 INJECTION, SOLUTION INTRAVASCULAR
Status: COMPLETED | OUTPATIENT
Start: 2025-07-31 | End: 2025-07-31

## 2025-07-31 RX ORDER — DIPHENHYDRAMINE HYDROCHLORIDE 50 MG/ML
12.5 INJECTION, SOLUTION INTRAMUSCULAR; INTRAVENOUS ONCE
Status: DISCONTINUED | OUTPATIENT
Start: 2025-07-31 | End: 2025-08-02 | Stop reason: HOSPADM

## 2025-07-31 RX ORDER — HEPARIN SODIUM 5000 [USP'U]/ML
30-55.1 INJECTION, SOLUTION INTRAVENOUS; SUBCUTANEOUS EVERY 6 HOURS PRN
Status: DISCONTINUED | OUTPATIENT
Start: 2025-07-31 | End: 2025-08-01

## 2025-07-31 RX ORDER — NITROGLYCERIN 0.4 MG/1
0.4 TABLET SUBLINGUAL
Status: DISCONTINUED | OUTPATIENT
Start: 2025-07-31 | End: 2025-08-02 | Stop reason: HOSPADM

## 2025-07-31 RX ORDER — IOPAMIDOL 755 MG/ML
INJECTION, SOLUTION INTRAVASCULAR
Status: DISCONTINUED | OUTPATIENT
Start: 2025-07-31 | End: 2025-07-31 | Stop reason: HOSPADM

## 2025-07-31 RX ORDER — METOPROLOL TARTRATE 25 MG/1
25 TABLET, FILM COATED ORAL 2 TIMES DAILY
Status: DISCONTINUED | OUTPATIENT
Start: 2025-07-31 | End: 2025-08-01

## 2025-07-31 RX ORDER — SUMATRIPTAN 50 MG/1
50 TABLET, FILM COATED ORAL ONCE AS NEEDED
Status: DISCONTINUED | OUTPATIENT
Start: 2025-07-31 | End: 2025-08-02 | Stop reason: HOSPADM

## 2025-07-31 RX ORDER — VERAPAMIL HYDROCHLORIDE 2.5 MG/ML
INJECTION INTRAVENOUS
Status: DISCONTINUED | OUTPATIENT
Start: 2025-07-31 | End: 2025-07-31 | Stop reason: HOSPADM

## 2025-07-31 RX ORDER — PROCHLORPERAZINE EDISYLATE 5 MG/ML
10 INJECTION INTRAMUSCULAR; INTRAVENOUS ONCE
Status: DISCONTINUED | OUTPATIENT
Start: 2025-07-31 | End: 2025-08-02 | Stop reason: HOSPADM

## 2025-07-31 RX ORDER — POTASSIUM CHLORIDE 1500 MG/1
40 TABLET, EXTENDED RELEASE ORAL EVERY 4 HOURS
Status: DISPENSED | OUTPATIENT
Start: 2025-07-31 | End: 2025-07-31

## 2025-07-31 RX ORDER — PRASUGREL 10 MG/1
TABLET, FILM COATED ORAL
Status: DISCONTINUED | OUTPATIENT
Start: 2025-07-31 | End: 2025-07-31 | Stop reason: HOSPADM

## 2025-07-31 RX ORDER — FENTANYL CITRATE 50 UG/ML
INJECTION, SOLUTION INTRAMUSCULAR; INTRAVENOUS
Status: DISCONTINUED | OUTPATIENT
Start: 2025-07-31 | End: 2025-07-31 | Stop reason: HOSPADM

## 2025-07-31 RX ORDER — HEPARIN SODIUM 5000 [USP'U]/ML
55.1 INJECTION, SOLUTION INTRAVENOUS; SUBCUTANEOUS ONCE
Status: COMPLETED | OUTPATIENT
Start: 2025-07-31 | End: 2025-07-31

## 2025-07-31 RX ORDER — ONDANSETRON 2 MG/ML
4 INJECTION INTRAMUSCULAR; INTRAVENOUS ONCE
Status: COMPLETED | OUTPATIENT
Start: 2025-07-31 | End: 2025-07-31

## 2025-07-31 RX ORDER — ONDANSETRON 2 MG/ML
4 INJECTION INTRAMUSCULAR; INTRAVENOUS EVERY 6 HOURS PRN
Status: DISCONTINUED | OUTPATIENT
Start: 2025-07-31 | End: 2025-08-02 | Stop reason: HOSPADM

## 2025-07-31 RX ORDER — PRASUGREL 10 MG/1
10 TABLET, FILM COATED ORAL DAILY
Status: DISCONTINUED | OUTPATIENT
Start: 2025-08-01 | End: 2025-08-02 | Stop reason: HOSPADM

## 2025-07-31 RX ORDER — ASPIRIN 81 MG/1
81 TABLET, CHEWABLE ORAL DAILY
Status: DISCONTINUED | OUTPATIENT
Start: 2025-08-01 | End: 2025-08-02 | Stop reason: HOSPADM

## 2025-07-31 RX ORDER — ASPIRIN 81 MG/1
324 TABLET, CHEWABLE ORAL ONCE
Status: COMPLETED | OUTPATIENT
Start: 2025-07-31 | End: 2025-07-31

## 2025-07-31 RX ORDER — LIDOCAINE HYDROCHLORIDE 20 MG/ML
INJECTION, SOLUTION INFILTRATION; PERINEURAL
Status: DISCONTINUED | OUTPATIENT
Start: 2025-07-31 | End: 2025-07-31 | Stop reason: HOSPADM

## 2025-07-31 RX ORDER — ATORVASTATIN CALCIUM 80 MG/1
80 TABLET, FILM COATED ORAL DAILY
Status: DISCONTINUED | OUTPATIENT
Start: 2025-07-31 | End: 2025-08-02 | Stop reason: HOSPADM

## 2025-07-31 RX ADMIN — POTASSIUM CHLORIDE 40 MEQ: 1500 TABLET, EXTENDED RELEASE ORAL at 14:23

## 2025-07-31 RX ADMIN — METOPROLOL TARTRATE 25 MG: 25 TABLET, FILM COATED ORAL at 22:16

## 2025-07-31 RX ADMIN — ATORVASTATIN CALCIUM 80 MG: 80 TABLET, FILM COATED ORAL at 10:54

## 2025-07-31 RX ADMIN — IOPAMIDOL 62 ML: 755 INJECTION, SOLUTION INTRAVENOUS at 05:56

## 2025-07-31 RX ADMIN — MORPHINE SULFATE 4 MG: 2 INJECTION, SOLUTION INTRAMUSCULAR; INTRAVENOUS at 05:29

## 2025-07-31 RX ADMIN — HEPARIN SODIUM 4000 UNITS: 5000 INJECTION, SOLUTION INTRAVENOUS; SUBCUTANEOUS at 07:39

## 2025-07-31 RX ADMIN — ONDANSETRON 4 MG: 2 INJECTION, SOLUTION INTRAMUSCULAR; INTRAVENOUS at 11:43

## 2025-07-31 RX ADMIN — HEPARIN SODIUM 12 UNITS/KG/HR: 10000 INJECTION, SOLUTION INTRAVENOUS at 07:43

## 2025-07-31 RX ADMIN — MUPIROCIN 1 APPLICATION: 20 OINTMENT TOPICAL at 20:06

## 2025-07-31 RX ADMIN — MORPHINE SULFATE 2 MG: 2 INJECTION, SOLUTION INTRAMUSCULAR; INTRAVENOUS at 16:49

## 2025-07-31 RX ADMIN — NITROGLYCERIN 1 INCH: 20 OINTMENT TOPICAL at 07:02

## 2025-07-31 RX ADMIN — ASPIRIN 81 MG CHEWABLE TABLET 324 MG: 81 TABLET CHEWABLE at 06:07

## 2025-07-31 RX ADMIN — METOPROLOL TARTRATE 25 MG: 25 TABLET, FILM COATED ORAL at 10:55

## 2025-07-31 RX ADMIN — SODIUM CHLORIDE 1 ML/KG/HR: 9 INJECTION, SOLUTION INTRAVENOUS at 10:55

## 2025-07-31 RX ADMIN — NITROGLYCERIN 0.4 MG: 0.4 TABLET SUBLINGUAL at 06:12

## 2025-07-31 RX ADMIN — PERFLUTREN 2 ML: 6.52 INJECTION, SUSPENSION INTRAVENOUS at 15:06

## 2025-07-31 RX ADMIN — MORPHINE SULFATE 2 MG: 2 INJECTION, SOLUTION INTRAMUSCULAR; INTRAVENOUS at 20:06

## 2025-07-31 RX ADMIN — POTASSIUM CHLORIDE 40 MEQ: 1500 TABLET, EXTENDED RELEASE ORAL at 10:54

## 2025-07-31 RX ADMIN — MORPHINE SULFATE 4 MG: 2 INJECTION, SOLUTION INTRAMUSCULAR; INTRAVENOUS at 07:10

## 2025-07-31 RX ADMIN — MORPHINE SULFATE 2 MG: 2 INJECTION, SOLUTION INTRAMUSCULAR; INTRAVENOUS at 13:24

## 2025-07-31 RX ADMIN — ONDANSETRON 4 MG: 2 INJECTION, SOLUTION INTRAMUSCULAR; INTRAVENOUS at 05:29
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
## 2025-07-31 NOTE — PLAN OF CARE
Goal Outcome Evaluation:      Pt remains in cicu on ra, chest pain resolved with morphine, converted from a-fib to NSR. Family at bedside. A/o x4

## 2025-07-31 NOTE — Clinical Note
First balloon inflation max pressure = 12 alma. First balloon inflation duration = 6 seconds. Second inflation of balloon - Max pressure = 20 alma. 2nd Inflation of balloon - Duration = 6 seconds. Third inflation of balloon - Max pressure = 12 alma. 3rd Inflation of balloon - Duration = 6 seconds.

## 2025-07-31 NOTE — Clinical Note
First balloon inflation max pressure = 12 alma. First balloon inflation duration = 10 seconds. Second inflation of balloon - Max pressure = 20 alma.

## 2025-07-31 NOTE — Clinical Note
First balloon inflation max pressure = 8 alma. First balloon inflation duration = 6 seconds. Second inflation of balloon - Max pressure = 8 alma. 2nd Inflation of balloon - Duration = 6 seconds.

## 2025-07-31 NOTE — ED PROVIDER NOTES
EMERGENCY DEPARTMENT ENCOUNTER    History  Chief Complaint   Patient presents with    Chest Pain       History provided by: Patient    HPI:  Context: Sunday JACQUES Salamanca is a 54 y.o. female with a medical history of CAD, hyperlipidemia, DDD, hiatal hernia who presents to the ED c/o acute chest pain.  Symptoms woke her up from sleep this morning.  She does note recently having been admitted to the hospital for chest pain and the symptoms were not thought to be secondary to her heart.  However, she has these recurrent symptoms and was worried and represented to the emergency department.  She notes associated nausea and vomiting.  She has not had any fever, cough or congestion.  Her chest is tender to palpation.      Past Medical History:  Active Ambulatory Problems     Diagnosis Date Noted    Chest pain 12/25/2018    NSTEMI (non-ST elevated myocardial infarction) 12/26/2018    Anxiety 02/06/2018    Dizziness, nonspecific 12/29/2018    Ganglion cyst 05/08/2014    Primary osteoarthritis of left knee 09/18/2014    Syncope 03/30/2017    Coronary artery disease involving native coronary artery of native heart without angina pectoris 01/31/2019    Presence of drug coated stent in LAD coronary artery 01/31/2019    Screen for colon cancer 08/02/2019    Mechanical knee pain, left 01/27/2020    Insufficiency fracture of tibia 02/21/2020    Pes anserine bursitis 02/24/2020    Chronic fatigue 04/01/2020    Ankle swelling 04/01/2020    Multiple joint pain 04/01/2020    Low back pain 03/14/2019    Edema 05/21/2020    Hyperlipidemia 05/21/2020    History of sleeve gastrectomy 08/18/2020    Slow transit constipation 09/17/2020    Gastroesophageal reflux disease 10/23/2020    Vitamin D deficiency 10/24/2020    Obesity, Class I, BMI 30-34.9 11/20/2020    Subacromial bursitis of left shoulder joint 12/02/2022    AC joint arthropathy 12/02/2022    Paresthesia of hand, bilateral 12/02/2022    Chest pain, unspecified type 05/24/2023     Left-sided weakness 2023    Migraine with aura and without status migrainosus, not intractable 2023    Personal history of adenomatous and serrated colon polyps 2025     Resolved Ambulatory Problems     Diagnosis Date Noted    Angina at rest 2018    Tobacco abuse 2018    Obesity, Class III, BMI 40-49.9 (morbid obesity) 2020    Dyspepsia 2020    Obesity, Class II, BMI 35-39.9 2020    Hiatal hernia 2020     Past Medical History:   Diagnosis Date    Colon polyp     DDD (degenerative disc disease), lumbosacral     Elevated cholesterol     Heart attack 2018    History of heart artery stent 2018    Migraine headache     Spinal stenosis        Past Surgical History:  Past Surgical History:   Procedure Laterality Date    BACK SURGERY      CARDIAC CATHETERIZATION N/A 2018    Procedure: Left Heart Cath;  Surgeon: Hilario Coronado MD;  Location:  [facility]ATH [facility]ASIVE [facility];  Service: Cardiovascular    CARDIAC CATHETERIZATION N/A 2018    Procedure: Left ventriculography;  Surgeon: Hilario Coronado MD;  Location:  [facility];  Service: Cardiovascular    CARDIAC CATHETERIZATION N/A 2018    Procedure: Stent EVARISTO coronary;  Surgeon: Hilario Coronado MD;  Location:  [facility];  Service: Cardiovascular    CARDIAC CATHETERIZATION N/A 2018    Procedure: Coronary angiography;  Surgeon: Hilario Coronado MD;  Location:  [facility] CATH INVASIVE LOCATION;  Service: Cardiovascular     SECTION  1994    COLONOSCOPY N/A 10/09/2019    Procedure: COLONOSCOPY with polypectomy;  Surgeon: Dung Hutchison MD;  Location: [facility] OR;  Service: Gastroenterology    COLONOSCOPY N/A 3/14/2025    Procedure: COLONOSCOPY;  Surgeon: Dung Hutchison MD;  Location:  [facility] OR;  Service: Gastroenterology;  Laterality: N/A;  Poor prep;    CORONARY STENT PLACEMENT      ENDOSCOPY N/A  2020    Procedure: ESOPHAGOGASTRODUODENOSCOPY WITH BIOPSY;  Surgeon: Facundo Helm Jr., MD;  [facility];  Service: General;  Laterality: N/A;  PRE- DYSPEPSIA  POST- GASTRITIS    GASTRIC SLEEVE LAPAROSCOPIC N/A 2020    Procedure: GASTRIC SLEEVE LAPAROSCOPIC With Hiatal Hernia Repair;  Surgeon: Facundo Helm Jr., MD;  Location:  [facility];  Service: Bariatric;  Laterality: N/A;    HERNIA REPAIR      HYSTERECTOMY  2008    KNEE SURGERY Left 2014    cyst removal    LUMBAR FUSION  2021    PAIN PUMP INSERTION/REVISION      BACK         Family History:  Family History   Problem Relation Age of Onset    Cancer Mother     Hypertension Mother     Sleep apnea Mother     Heart disease Brother     Heart attack Brother     Heart disease Maternal Grandmother     Hypertension Maternal Grandmother     Heart attack Maternal Grandmother     Hypertension Father     Hypertension Paternal Grandmother     Heart disease Paternal Grandmother     Malig Hyperthermia Neg Hx          Social History:  Social History     Socioeconomic History    Marital status:    Tobacco Use    Smoking status: Former     Current packs/day: 0.00     Average packs/day: 0.3 packs/day for 5.0 years (1.3 ttl pk-yrs)     Types: Cigarettes     Start date: 2013     Quit date: 2018     Years since quittin.6    Smokeless tobacco: Never    Tobacco comments:     intermittent caffiene   Vaping Use    Vaping status: Never Used   Substance and Sexual Activity    Alcohol use: Yes     Comment: HOLID[employer] DRINKER    Drug use: No    Sexual activity: Defer         Allergies:  Cephalexin and Nsaids        Physical Exam  ED Triage Vitals   Temp Pulse Resp BP SpO2   -- -- -- -- --      Temp src Heart Rate Source Patient Position BP Location FiO2 (%)   -- -- -- -- --     Physical Exam  Constitutional:       Appearance: Normal appearance.   HENT:      Head: Normocephalic and atraumatic.   Eyes:      Pupils: Pupils are  equal, round, and reactive to light.   Cardiovascular:      Rate and Rhythm: Normal rate and regular rhythm.      Heart sounds: No murmur heard.  Chest:       Abdominal:      Tenderness: There is no abdominal tenderness. There is no guarding or rebound.   Skin:     General: Skin is warm.   Neurological:      Mental Status: She is alert and oriented to person, place, and time.           Medications Given in ER:   Medications   sodium chloride 0.9 % flush 10 mL (has no administration in time range)   Lidocaine 4 % 1 patch (1 patch Transdermal Not Given 7/31/25 0532)   methocarbamol (ROBAXIN) tablet 750 mg (750 mg Oral Not Given 7/31/25 0532)   nitroglycerin (NITROSTAT) SL tablet 0.4 mg (0.4 mg Sublingual Given 7/31/25 0612)   prochlorperazine (COMPAZINE) injection 10 mg (has no administration in time range)   diphenhydrAMINE (BENADRYL) injection 12.5 mg (has no administration in time range)   morphine injection 4 mg (has no administration in time range)   potassium chloride (KLOR-CON M20) CR tablet 40 mEq (has no administration in time range)   nitroglycerin (NITROSTAT) ointment 1 inch (has no administration in time range)   metoprolol tartrate (LOPRESSOR) tablet 25 mg (has no administration in time range)   heparin (porcine) 5000 UNIT/ML injection 4,000 Units (has no administration in time range)   heparin 22691 units/250 mL (100 units/mL) in 0.45 % NaCl infusion (has no administration in time range)   heparin (porcine) 5000 UNIT/ML injection 2,200-4,000 Units (has no administration in time range)   aspirin chewable tablet 324 mg (324 mg Oral Given 7/31/25 0607)   morphine injection 4 mg (4 mg Intravenous Given 7/31/25 0529)   ondansetron (ZOFRAN) injection 4 mg (4 mg Intravenous Given 7/31/25 0529)   iopamidol (ISOVUE-370) 76 % injection 100 mL (62 mL Intravenous Given 7/31/25 0556)         Orders Placed:  Orders Placed This Encounter   Procedures    XR Chest 1 View    CT Angiogram Chest Pulmonary Embolism     Burt Draw    Comprehensive Metabolic Panel    High Sensitivity Troponin T    BNP    CBC Auto Differential    Lipase    Manual Differential    High Sensitivity Troponin T 1Hr    Potassium    Protime-INR    aPTT    aPTT    aPTT    NPO Diet NPO Type: Strict NPO    Continuous Pulse Oximetry    Vital Signs    Adjust Heparin Rate Based on aPTT Using Nomogram    Verify All Anticoagulant Orders Are Discontinued Upon Initiation of Heparin Protocol (eg Enoxaparin, Fondaparinux, Apixaban, Dabigatran, Edoxaban, or Rivaroxaban)    RN to release aPTT order 6 hours after Heparin infusion INITIATION & 6 hours after EACH infusion change until 2 consecutive therapeutic aPTTs are achieved. Then switch to daily aPTT orders. If aPTT is outside target range, resume every 6 hour aPTT order schedule until therapeutic x 2    Cardiology (on-call MD unless specified)    Oxygen Therapy- Nasal Cannula; Titrate 1-6 LPM Per SpO2; 90 - 95%    ECG 12 Lead Chest Pain    ECG 12 Lead Chest Pain    ECG 12 Lead Chest Pain    Insert Peripheral IV    Initiate Observation Status    CBC & Differential    Green Top (Gel)    Lavender Top    Gold Top - SST    Light Blue Top    CBC & Differential         Outpatient Medication Management:   Current Facility-Administered Medications Ordered in Epic   Medication Dose Route Frequency Provider Last Rate Last Admin    diphenhydrAMINE (BENADRYL) injection 12.5 mg  12.5 mg Intravenous Once Sarah Blackwood MD        heparin (porcine) 5000 UNIT/ML injection 2,200-4,000 Units  30-55.1 Units/kg Intravenous Q6H PRN Sarah Blackwood MD        heparin (porcine) 5000 UNIT/ML injection 4,000 Units  55.1 Units/kg Intravenous Once Sarah Blackwood MD        heparin 00919 units/250 mL (100 units/mL) in 0.45 % NaCl infusion  12 Units/kg/hr Intravenous Titrated Sarah Blackwood MD        Lidocaine 4 % 1 patch  1 patch Transdermal Once Sarah Blackwood MD        methocarbamol (ROBAXIN) tablet 750 mg  750 mg Oral Once Jaison  Sarah OROSCO MD        metoprolol tartrate (LOPRESSOR) tablet 25 mg  25 mg Oral Once Sarah Blackwood MD        morphine injection 4 mg  4 mg Intravenous Once Sarah Blackwood MD        nitroglycerin (NITROSTAT) ointment 1 inch  1 inch Topical Once Sarah Blackwood MD        nitroglycerin (NITROSTAT) SL tablet 0.4 mg  0.4 mg Sublingual Q5 Min PRN Sarah Blackwood MD   0.4 mg at 07/31/25 0612    potassium chloride (KLOR-CON M20) CR tablet 40 mEq  40 mEq Oral Q4H Sarah Blackwood MD        prochlorperazine (COMPAZINE) injection 10 mg  10 mg Intravenous Once Sarah Blackwood MD        sodium chloride 0.9 % flush 10 mL  10 mL Intravenous PRN Sarah Blackwood MD         Current Outpatient Medications Ordered in Epic   Medication Sig Dispense Refill    aspirin 81 MG chewable tablet Chew 1 tablet Daily. 90 tablet 3    atorvastatin (LIPITOR) 80 MG tablet TAKE ONE TABLET BY MOUTH ONCE NIGHTLY 90 tablet 1    atorvastatin (LIPITOR) 80 MG tablet Take 1 tablet by mouth Daily. 90 tablet 1    ergocalciferol (ERGOCALCIFEROL) 1.25 MG (64557 UT) capsule Take 1 capsule by mouth 1 (One) Time Per Week.      Loratadine 10 MG capsule Take 1 capsule by mouth As Needed.      methocarbamol (ROBAXIN) 500 MG tablet Take 1 tablet by mouth 4 (Four) Times a Day As Needed for Muscle Spasms. 30 tablet 1    metoprolol tartrate (LOPRESSOR) 25 MG tablet TAKE 1 TABLET BY MOUTH 2 TIMES A  tablet 1    rizatriptan MLT (MAXALT-MLT) 10 MG disintegrating tablet Place 1 tablet on the tongue 1 (One) Time As Needed for Migraine.             Medical Decision Making:  All labs have been independently interpreted by me.  All radiology studies have been reviewed by me. All EKGs have been independently viewed and interpreted by me.  Discussion below represents my analysis of pertinent findings related to patient's condition, differential diagnosis, treatment plan and final disposition.    Differential Diagnosis includes but not limited to: ACS,  pneumothorax, pneumonia, musculoskeletal pain, pulmonary edema, pleural effusion, PE      Review of prior external notes (non-ED) -and- Review of prior external test results outside of this encounter: I reviewed the cardiology consult note from 7/29/2025.  Patient was evaluated with a nuclear stress test.  This was negative.  Patient was discharged with anti-inflammatories for concern for  musculoskeletal pain.    Labs Results:  Recent Results (from the past 24 hours)   ECG 12 Lead Chest Pain    Collection Time: 07/31/25  5:34 AM   Result Value Ref Range    QT Interval 423 ms    QTC Interval 425 ms   Comprehensive Metabolic Panel    Collection Time: 07/31/25  5:38 AM    Specimen: Arm, Left; Blood   Result Value Ref Range    Glucose 136 (H) 65 - 99 mg/dL    BUN 9.0 6.0 - 20.0 mg/dL    Creatinine 0.65 0.57 - 1.00 mg/dL    Sodium 142 136 - 145 mmol/L    Potassium 3.1 (L) 3.5 - 5.2 mmol/L    Chloride 107 98 - 107 mmol/L    CO2 21.0 (L) 22.0 - 29.0 mmol/L    Calcium 9.1 8.6 - 10.5 mg/dL    Total Protein 6.7 6.0 - 8.5 g/dL    Albumin 3.7 3.5 - 5.2 g/dL    ALT (SGPT) 20 1 - 33 U/L    AST (SGOT) 26 1 - 32 U/L    Alkaline Phosphatase 109 39 - 117 U/L    Total Bilirubin 0.2 0.0 - 1.2 mg/dL    Globulin 3.0 gm/dL    A/G Ratio 1.2 g/dL    BUN/Creatinine Ratio 13.8 7.0 - 25.0    Anion Gap 14.0 5.0 - 15.0 mmol/L    eGFR 104.8 >60.0 mL/min/1.73   High Sensitivity Troponin T    Collection Time: 07/31/25  5:38 AM    Specimen: Arm, Left; Blood   Result Value Ref Range    HS Troponin T 13 <14 ng/L   BNP    Collection Time: 07/31/25  5:38 AM    Specimen: Arm, Left; Blood   Result Value Ref Range    proBNP 218.0 0.0 - 900.0 pg/mL   Green Top (Gel)    Collection Time: 07/31/25  5:38 AM   Result Value Ref Range    Extra Tube Hold for add-ons.    Lavender Top    Collection Time: 07/31/25  5:38 AM   Result Value Ref Range    Extra Tube hold for add-on    Gold Top - SST    Collection Time: 07/31/25  5:38 AM   Result Value Ref Range    Extra  Tube Hold for add-ons.    Light Blue Top    Collection Time: 07/31/25  5:38 AM   Result Value Ref Range    Extra Tube Hold for add-ons.    CBC Auto Differential    Collection Time: 07/31/25  5:38 AM    Specimen: Arm, Left; Blood   Result Value Ref Range    WBC 6.86 3.40 - 10.80 10*3/mm3    RBC 4.55 3.77 - 5.28 10*6/mm3    Hemoglobin 12.6 12.0 - 15.9 g/dL    Hematocrit 38.5 34.0 - 46.6 %    MCV 84.6 79.0 - 97.0 fL    MCH 27.7 26.6 - 33.0 pg    MCHC 32.7 31.5 - 35.7 g/dL    RDW 15.1 12.3 - 15.4 %    RDW-SD 45.8 37.0 - 54.0 fl    MPV 10.0 6.0 - 12.0 fL    Platelets 311 140 - 450 10*3/mm3    nRBC 0.0 0.0 - 0.2 /100 WBC   Lipase    Collection Time: 07/31/25  5:38 AM    Specimen: Arm, Left; Blood   Result Value Ref Range    Lipase 20 13 - 60 U/L   Manual Differential    Collection Time: 07/31/25  5:38 AM    Specimen: Arm, Left; Blood   Result Value Ref Range    Neutrophil % 29.0 (L) 42.7 - 76.0 %    Lymphocyte % 64.0 (H) 19.6 - 45.3 %    Monocyte % 5.0 5.0 - 12.0 %    Eosinophil % 2.0 0.3 - 6.2 %    Basophil % 0.0 0.0 - 1.5 %    Neutrophils Absolute 1.99 1.70 - 7.00 10*3/mm3    Lymphocytes Absolute 4.39 (H) 0.70 - 3.10 10*3/mm3    Monocytes Absolute 0.34 0.10 - 0.90 10*3/mm3    Eosinophils Absolute 0.14 0.00 - 0.40 10*3/mm3    Basophils Absolute 0.00 0.00 - 0.20 10*3/mm3    Crenated RBC's Mod/2+ None Seen    Ovalocytes Mod/2+ None Seen    Smudge Cells Slight/1+ None Seen    Platelet Morphology Normal Normal   ECG 12 Lead Chest Pain    Collection Time: 07/31/25  6:43 AM   Result Value Ref Range    QT Interval 437 ms    QTC Interval 433 ms     Lab Comments:  My independent interpretation of the above labs: Mild hypokalemia      Radiology:  XR Chest 1 View  XR Chest 1 View, CT Angiogram Chest Pulmonary Embolism  Result Date: 7/31/2025  CT ANGIOGRAM OF THE CHEST. MULTIPLE CORONAL, SAGITTAL, AND 3-D RECONSTRUCTIONS. XR CHEST 1 VIEW  HISTORY: 54-year-old female with acute chest pain. Coronary stent placement in the past.  Gastric sleeve in the past.  TECHNIQUE: Radiation dose reduction techniques were utilized, including automated exposure control and exposure modulation based on body size. CT angiogram of the chest was performed with 2mm images following the administration of IV contrast. Multiple coronal, sagittal, and 3-D reconstruction images were obtained. Compared with chest CTA 05/24/2023.  FINDINGS:  1. No evidence for pulmonary thromboemboli.  2. There are no pulmonary airspace consolidations or evidence for pneumonia. There are no pleural or pericardial effusions. There is no lymphadenopathy. Stable thyroid. There is no hiatal hernia. Stable visualized images of the upper abdomen.  CXR: No acute abnormality is seen.       Radiology Comments:  I ordered the above imaging and reviewed the results.    My independent interpretation of the cxr: No acute process    EKG Interpreted by me: Normal sinus rhythm, inferior lateral scant ST segment depression with T wave inversions, this is an acute change from previous      Rationale:  This is a 54-year-old patient who is presenting with complaints of chest pain.  She looks uncomfortable, but is not toxic.  She presents afebrile with unremarkable vital signs.  Cardiopulmonary exam is unremarkable.    She was evaluated with an EKG, which shows dynamic new EKG changes from previous.  She has new inferior and lateral ST segment depression and T wave inversion.   EKG does not meet STEMI criteria.  First troponin is negative, but symptoms are suspicious for unstable angina.  Will see if we can get her pain under control with nitroglycerin, morphine    Clinical Scores:         HEART Score: 6                         Given that she was previously evaluated with a chest x-ray which did not demonstrate any acute findings in the recurrent nature of her symptoms, she was evaluated with a CT pulmonary angiogram.  This did not demonstrate any evidence of acute intrathoracic process    Chest x-ray  did not demonstrate any other acute cardiopulmonary process as emergent cause of symptoms including pneumomediastinum, widened mediastinum, acute infiltrate or pneumothorax.     Will plan on cardiology consult and patient will require admission for further care and management.    Progress Notes:  6:20 AM EDT: I saw and reevaluated the patient.  She notes the chest pain has markedly resolved, but not completely improved.  Her morphine may have been given through a blown IV so we will redose this.  Discussed my plan for cardiology consultation    6:49 AM EDT: I spoke with the patient about her ED work up and diagnosis. I informed the patient that she will be admitted for further evaluation/treatment. All questions answered.      Consult:  6:47 AM EDT: Discussed case with Dr. Pereira.  Reviewed history, exam, results and treatments.  Will go ahead and give the patient a dose of oral metoprolol and start heparin.  We will place Nitropaste.  She will likely need cardiac catheterization today and can be admitted to the cardiology service             EM_CC: Critical care provider statement:    Critical care time (minutes): 35.   Critical care time was exclusive of:  Separately billable procedures and treating other patients   Critical care was necessary to treat or prevent imminent or life-threatening deterioration of the following conditions:  Cardiac Failure   Critical care was time spent personally by me on the following activities:  Development of treatment plan with patient or surrogate, discussions with consultants, evaluation of patient's response to treatment, examination of patient, obtaining history from patient or surrogate, ordering and performing treatments and interventions, ordering and review of laboratory studies, ordering and review of radiographic studies, pulse oximetry, re-evaluation of patient's condition and review of old charts. Critical Care indicators: Angina, Unstable, Aggressive Management  Others: aminophylline, diazepam, glucagon, heparin, lovenox, morphine, sodium bicarbonate, et al.    Complexity of Care:  The patient requires admission.    Diagnosis:  Final diagnoses:   Unstable angina           Parts of this note may be an electronic transcription/translation of spoken language to printed text using the Dragon dictation system        Provider Note Signed by:     Sarah Blackwood MD  07/31/25 0654

## 2025-07-31 NOTE — CODE DOCUMENTATION
"Patient Name:  Ortiz Salamanca  YOB: 1970  MRN:  [id number]  Admit Date:  7/31/2025    Visit Diagnoses:     ICD-10-CM ICD-9-CM   1. Unstable angina  I20.0 411.1       Reason For Rapid: Increased chest pain & EKG changes    RN Communicated With: Kira cardiology RN & Dr. Peterson.    Rapid Outcome: Transferred to cath lab.    Communication From Rapid Team: Will transfer to CICU after cath lab.       Most Recent Vital Signs  Temp:  [98.4 °F (36.9 °C)] 98.4 °F (36.9 °C)  Heart Rate:  [] 125  Resp:  [12-19] 14  BP: (124-172)/() 172/115  SpO2:  [98 %-100 %] 100 %  on   ;        Labs:      No results found for: \"POCGLU\"  No results found for: \"SITE\", \"ALLENTEST\", \"PHART\", \"YWS0WFM\", \"PO2ART\", \"MBW8ZAQ\", \"BASEEXCESS\", \"I0DBLOOK\", \"HGBBG\", \"HCTABG\", \"OXYHEMOGLOBI\", \"METHHGBN\", \"CARBOXYHGB\", \"CO2CT\", \"BAROMETRIC\", \"MODALITY\", \"FIO2\"  Results from last 7 days   Lab Units 07/31/25  0538 07/29/25  0547 07/28/25  1529   WBC 10*3/mm3 6.86 4.23 7.28   HEMOGLOBIN g/dL 12.6 10.2* 11.2*   PLATELETS 10*3/mm3 311 244 272     Results from last 7 days   Lab Units 07/31/25  0538 07/29/25  0547 07/28/25  1529   SODIUM mmol/L 142 144 143   POTASSIUM mmol/L 3.1* 4.1 4.3   CHLORIDE mmol/L 107 112* 108*   CO2 mmol/L 21.0* 24.3 23.3   BUN mg/dL 9.0 6.0 8.0   CREATININE mg/dL 0.65 0.77 0.59   GLUCOSE mg/dL 136* 89 104*   ALBUMIN g/dL 3.7  --  3.8   BILIRUBIN mg/dL 0.2  --  <0.2   ALK PHOS U/L 109  --  106   AST (SGOT) U/L 26  --  29   ALT (SGPT) U/L 20  --  17   Estimated Creatinine Clearance: 94.5 mL/min (by C-G formula based on SCr of 0.65 mg/dL).  Results from last 7 days   Lab Units 07/31/25  0724 07/31/25  0538 07/29/25  0547 07/28/25  2150 07/28/25  1723   HSTROP T ng/L 24* 13 11 9 12   PROBNP pg/mL  --  218.0  --   --   --          No results found for: \"STREPPNEUAG\", \"LEGANTIGENUR\"        NIH Stroke Scale:                                                         Please refer to full rapid documentation on summary " page under Index / Code Timeline

## 2025-07-31 NOTE — Clinical Note
ACT = 273 (sec). ACT was drawn at 09:53 EDT. ACT result was completed at 09:59 EDT. Clotilde Berg  tolerated treatment well with no complications.      Clotilde Berg is aware of future appt on 10/11/24 at 1430.     AVS Declined by Clotilde Berg

## 2025-08-01 PROBLEM — I21.3: Status: ACTIVE | Noted: 2025-08-01

## 2025-08-01 LAB
ANION GAP SERPL CALCULATED.3IONS-SCNC: 10 MMOL/L (ref 5–15)
APTT PPP: 27.8 SECONDS (ref 22.7–35.4)
BASOPHILS # BLD AUTO: 0.03 10*3/MM3 (ref 0–0.2)
BASOPHILS NFR BLD AUTO: 0.3 % (ref 0–1.5)
BUN SERPL-MCNC: 5 MG/DL (ref 6–20)
BUN/CREAT SERPL: 9.3 (ref 7–25)
CALCIUM SPEC-SCNC: 8.8 MG/DL (ref 8.6–10.5)
CHLORIDE SERPL-SCNC: 107 MMOL/L (ref 98–107)
CO2 SERPL-SCNC: 23 MMOL/L (ref 22–29)
CREAT SERPL-MCNC: 0.54 MG/DL (ref 0.57–1)
DEPRECATED RDW RBC AUTO: 47.1 FL (ref 37–54)
EGFRCR SERPLBLD CKD-EPI 2021: 109.6 ML/MIN/1.73
EOSINOPHIL # BLD AUTO: 0.08 10*3/MM3 (ref 0–0.4)
EOSINOPHIL NFR BLD AUTO: 0.9 % (ref 0.3–6.2)
ERYTHROCYTE [DISTWIDTH] IN BLOOD BY AUTOMATED COUNT: 14.4 % (ref 12.3–15.4)
GLUCOSE BLDC GLUCOMTR-MCNC: 111 MG/DL (ref 65–99)
GLUCOSE SERPL-MCNC: 98 MG/DL (ref 65–99)
HCT VFR BLD AUTO: 34.7 % (ref 34–46.6)
HGB BLD-MCNC: 10.6 G/DL (ref 12–15.9)
IMM GRANULOCYTES # BLD AUTO: 0.03 10*3/MM3 (ref 0–0.05)
IMM GRANULOCYTES NFR BLD AUTO: 0.3 % (ref 0–0.5)
LYMPHOCYTES # BLD AUTO: 3.04 10*3/MM3 (ref 0.7–3.1)
LYMPHOCYTES NFR BLD AUTO: 33 % (ref 19.6–45.3)
MCH RBC QN AUTO: 27.4 PG (ref 26.6–33)
MCHC RBC AUTO-ENTMCNC: 30.5 G/DL (ref 31.5–35.7)
MCV RBC AUTO: 89.7 FL (ref 79–97)
MONOCYTES # BLD AUTO: 0.68 10*3/MM3 (ref 0.1–0.9)
MONOCYTES NFR BLD AUTO: 7.4 % (ref 5–12)
NEUTROPHILS NFR BLD AUTO: 5.34 10*3/MM3 (ref 1.7–7)
NEUTROPHILS NFR BLD AUTO: 58.1 % (ref 42.7–76)
NRBC BLD AUTO-RTO: 0 /100 WBC (ref 0–0.2)
PLATELET # BLD AUTO: 263 10*3/MM3 (ref 140–450)
PMV BLD AUTO: 10.2 FL (ref 6–12)
POTASSIUM SERPL-SCNC: 4.5 MMOL/L (ref 3.5–5.2)
RBC # BLD AUTO: 3.87 10*6/MM3 (ref 3.77–5.28)
SODIUM SERPL-SCNC: 140 MMOL/L (ref 136–145)
WBC NRBC COR # BLD AUTO: 9.2 10*3/MM3 (ref 3.4–10.8)

## 2025-08-01 PROCEDURE — 25010000002 ONDANSETRON PER 1 MG: Performed by: INTERNAL MEDICINE

## 2025-08-01 PROCEDURE — 82948 REAGENT STRIP/BLOOD GLUCOSE: CPT

## 2025-08-01 PROCEDURE — 85025 COMPLETE CBC W/AUTO DIFF WBC: CPT | Performed by: INTERNAL MEDICINE

## 2025-08-01 PROCEDURE — 85730 THROMBOPLASTIN TIME PARTIAL: CPT | Performed by: INTERNAL MEDICINE

## 2025-08-01 PROCEDURE — 80048 BASIC METABOLIC PNL TOTAL CA: CPT | Performed by: INTERNAL MEDICINE

## 2025-08-01 PROCEDURE — 25010000002 MORPHINE PER 10 MG: Performed by: INTERNAL MEDICINE

## 2025-08-01 RX ORDER — METOPROLOL TARTRATE 25 MG/1
12.5 TABLET, FILM COATED ORAL 2 TIMES DAILY
Status: DISCONTINUED | OUTPATIENT
Start: 2025-08-01 | End: 2025-08-02

## 2025-08-01 RX ADMIN — ASPIRIN 81 MG CHEWABLE TABLET 81 MG: 81 TABLET CHEWABLE at 08:10

## 2025-08-01 RX ADMIN — MORPHINE SULFATE 2 MG: 2 INJECTION, SOLUTION INTRAMUSCULAR; INTRAVENOUS at 00:53

## 2025-08-01 RX ADMIN — ATORVASTATIN CALCIUM 80 MG: 80 TABLET, FILM COATED ORAL at 08:11

## 2025-08-01 RX ADMIN — PRASUGREL 10 MG: 10 TABLET, FILM COATED ORAL at 08:10

## 2025-08-01 RX ADMIN — MUPIROCIN 1 APPLICATION: 20 OINTMENT TOPICAL at 08:11

## 2025-08-01 RX ADMIN — ACETAMINOPHEN 650 MG: 325 TABLET ORAL at 20:33

## 2025-08-01 RX ADMIN — MUPIROCIN 1 APPLICATION: 20 OINTMENT TOPICAL at 20:31

## 2025-08-01 RX ADMIN — MORPHINE SULFATE 2 MG: 2 INJECTION, SOLUTION INTRAMUSCULAR; INTRAVENOUS at 06:02

## 2025-08-01 RX ADMIN — ACETAMINOPHEN 650 MG: 325 TABLET ORAL at 12:36

## 2025-08-01 RX ADMIN — METOPROLOL TARTRATE 25 MG: 25 TABLET, FILM COATED ORAL at 08:15

## 2025-08-01 RX ADMIN — ONDANSETRON 4 MG: 2 INJECTION, SOLUTION INTRAMUSCULAR; INTRAVENOUS at 20:31

## 2025-08-01 NOTE — PROGRESS NOTES
Discharge Planning Assessment  [facility]     Patient Name: Ortiz Salamanca  MRN: [id number]  Today's Date: 8/1/2025    Admit Date: 7/31/2025    Plan: Return home - lives alone   Discharge Needs Assessment       Row Name 08/01/25 1030       Living Environment    People in Home alone    Current Living Arrangements apartment    Potentially Unsafe Housing Conditions none    Primary Care Provided by self    Provides Primary Care For no one    Family Caregiver if Needed child(jose luis), adult    Family Caregiver Names Sherry cross [phone]    Quality of Family Relationships helpful    Able to Return to Prior Arrangements yes       Resource/Environmental Concerns    Resource/Environmental Concerns none    Transportation Concerns none       Transition Planning    Patient/Family Anticipates Transition to home    Patient/Family Anticipated Services at Transition none    Transportation Anticipated family or friend will provide       Discharge Needs Assessment    Readmission Within the Last 30 Days no previous admission in last 30 days    Equipment Currently Used at Home none    Concerns to be Addressed denies needs/concerns at this time    Anticipated Changes Related to Illness none    Equipment Needed After Discharge none                   Discharge Plan       Row Name 08/01/25 1447       Plan    Plan Return home - lives alone    Patient/Family in Agreement with Plan yes    Plan Comments Spoke with patient at bedside.  She lives alone in  apartment, is IADL, uses no DME, she has used HH in the past and has never been to SNF.  PCP is Dr. Sarah Bright and pharmacy is [facility] in [place].  Patient drives.  Nick Phillips [phone] is emergency contact and will assist if needed.  She plans to return home at [place].  CCP will follow.  Antionette LIMON                  Continued Care and Services - Admitted Since 7/31/2025    No active coordination exists.       Expected Discharge Date and Time       Expected Discharge Date  Expected Discharge Time    Aug 4, 2025            Demographic Summary       Row Name 08/01/25 1028       General Information    Admission Type inpatient    Arrived From home    Referral Source admission list    Reason for Consult discharge planning    Preferred Language English                   Functional Status       Row Name 08/01/25 1028       Functional Status    Usual Activity Tolerance good    Current Activity Tolerance moderate       Functional Status, IADL    Medications independent    Meal Preparation independent    Housekeeping independent    Laundry independent    Shopping independent;assistive person  Son helps if needed       Mental Status    General Appearance WDL WDL       Mental Status Summary    Recent Changes in Mental Status/Cognitive Functioning no changes                               Becky S. [place], RN

## 2025-08-01 NOTE — PLAN OF CARE
Goal Outcome Evaluation:              Outcome Evaluation: Patient remains in CICU. TR band off with no complications. Pain treated with PRN meds. No acute events overnight.

## 2025-08-01 NOTE — PLAN OF CARE
Goal Outcome Evaluation:              Outcome Evaluation: Received pt from CICU s/p heart cath with intervention. Right radial site c/d/s. BP soft, all other VSS, on RA. No c/o pain or soa. Anticipate d/c tomorrow. Will continue with current POC and update as needed.

## 2025-08-02 VITALS
RESPIRATION RATE: 18 BRPM | OXYGEN SATURATION: 100 % | WEIGHT: 160 LBS | SYSTOLIC BLOOD PRESSURE: 92 MMHG | HEART RATE: 72 BPM | BODY MASS INDEX: 28.35 KG/M2 | DIASTOLIC BLOOD PRESSURE: 66 MMHG | HEIGHT: 63 IN | TEMPERATURE: 98.1 F

## 2025-08-02 LAB
APTT PPP: 27.6 SECONDS (ref 22.7–35.4)
BASOPHILS # BLD AUTO: 0.03 10*3/MM3 (ref 0–0.2)
BASOPHILS NFR BLD AUTO: 0.5 % (ref 0–1.5)
DEPRECATED RDW RBC AUTO: 45.7 FL (ref 37–54)
EOSINOPHIL # BLD AUTO: 0.15 10*3/MM3 (ref 0–0.4)
EOSINOPHIL NFR BLD AUTO: 2.3 % (ref 0.3–6.2)
ERYTHROCYTE [DISTWIDTH] IN BLOOD BY AUTOMATED COUNT: 14.7 % (ref 12.3–15.4)
HCT VFR BLD AUTO: 28.8 % (ref 34–46.6)
HGB BLD-MCNC: 9.1 G/DL (ref 12–15.9)
IMM GRANULOCYTES # BLD AUTO: 0.02 10*3/MM3 (ref 0–0.05)
IMM GRANULOCYTES NFR BLD AUTO: 0.3 % (ref 0–0.5)
LYMPHOCYTES # BLD AUTO: 2.74 10*3/MM3 (ref 0.7–3.1)
LYMPHOCYTES NFR BLD AUTO: 42.2 % (ref 19.6–45.3)
MCH RBC QN AUTO: 27 PG (ref 26.6–33)
MCHC RBC AUTO-ENTMCNC: 31.6 G/DL (ref 31.5–35.7)
MCV RBC AUTO: 85.5 FL (ref 79–97)
MONOCYTES # BLD AUTO: 0.48 10*3/MM3 (ref 0.1–0.9)
MONOCYTES NFR BLD AUTO: 7.4 % (ref 5–12)
NEUTROPHILS NFR BLD AUTO: 3.08 10*3/MM3 (ref 1.7–7)
NEUTROPHILS NFR BLD AUTO: 47.3 % (ref 42.7–76)
NRBC BLD AUTO-RTO: 0 /100 WBC (ref 0–0.2)
PLATELET # BLD AUTO: 227 10*3/MM3 (ref 140–450)
PMV BLD AUTO: 10 FL (ref 6–12)
RBC # BLD AUTO: 3.37 10*6/MM3 (ref 3.77–5.28)
WBC NRBC COR # BLD AUTO: 6.5 10*3/MM3 (ref 3.4–10.8)

## 2025-08-02 PROCEDURE — 99239 HOSP IP/OBS DSCHRG MGMT >30: CPT | Performed by: INTERNAL MEDICINE

## 2025-08-02 PROCEDURE — 36415 COLL VENOUS BLD VENIPUNCTURE: CPT | Performed by: INTERNAL MEDICINE

## 2025-08-02 PROCEDURE — 85025 COMPLETE CBC W/AUTO DIFF WBC: CPT | Performed by: INTERNAL MEDICINE

## 2025-08-02 PROCEDURE — 83695 ASSAY OF LIPOPROTEIN(A): CPT | Performed by: INTERNAL MEDICINE

## 2025-08-02 PROCEDURE — 85730 THROMBOPLASTIN TIME PARTIAL: CPT | Performed by: INTERNAL MEDICINE

## 2025-08-02 RX ORDER — PRASUGREL 10 MG/1
10 TABLET, FILM COATED ORAL DAILY
Qty: 90 TABLET | Refills: 3 | Status: SHIPPED | OUTPATIENT
Start: 2025-08-02

## 2025-08-02 RX ORDER — NITROGLYCERIN 0.4 MG/1
0.4 TABLET SUBLINGUAL
Qty: 100 TABLET | Refills: 0 | Status: SHIPPED | OUTPATIENT
Start: 2025-08-02

## 2025-08-02 RX ADMIN — ATORVASTATIN CALCIUM 80 MG: 80 TABLET, FILM COATED ORAL at 08:14

## 2025-08-02 RX ADMIN — ASPIRIN 81 MG CHEWABLE TABLET 81 MG: 81 TABLET CHEWABLE at 08:14

## 2025-08-02 RX ADMIN — PRASUGREL 10 MG: 10 TABLET, FILM COATED ORAL at 08:15

## 2025-08-02 NOTE — PLAN OF CARE
Goal Outcome Evaluation:  Plan of Care Reviewed With: patient     Outcome Evaluation: Pt s/p heart cath with stent placement. SR on the tele monitor. B/p remains soft with orthostatic b/p obtained. Pt up ambulating in hallway with staff. Possible d/c home today. Will continue with plan of care.                              alert and awake

## 2025-08-02 NOTE — DISCHARGE SUMMARY
Hospital Discharge    Patient Name: Ortiz Salamanca  Age/Sex: 55 y.o. female  : 1970  MRN: [id number]    Encounter Provider: Fidelia Peterson MD  Referring Provider: Lizandro Naylor MD  Place of Service: [facility] CARDIOLOGY  Patient Care Team:  Sarah Bright MD as PCP - General (Internal Medicine)         Date of Discharge:  2025   Date of Admit: 2025    Discharge Condition: Stable  Discharge Diagnosis:    Unstable angina    Acute ST-elevation myocardial infarction      Hospital Course:   Ortiz Salamanca is a 55 y.o. female who presented with acute chest pain, found to have lateral STEMI, the proximal circ was occluded severe mid-distal LAD disease, s/p PCI of each lesion. EF was mildly reduced. She recovered well. BB was held due to hypotension, as was other GDMT for cardiomyopathy. Will follow up with Dr. Santiago in 2 weeks.      Cath 25    Acute lateral STEMI . Acute 100% occlusion of the proximal circumflex, status post PCI of the ostial LM to OM1 using a 3x33mm Xience EVARISTO    Mid Distal LAD stenosis 70% status post PCI using 2.5x15mm Xience EVARISTO    Moderately reduced LV EF    Recommendations: DAPT for one year. Aggressive risk factor modification. Monitor in CICU overnight.      CORONARY ANGIOGRAPHY:  LEFT MAIN:Large-caliber, normal left main coronary artery.  Bifurcates to the LAD and circumflex arteries.  LAD: The LAD is a large-caliber vessel.  The ostium is normal.  There is a previously placed proximal to mid vessel stent which has minimal in-stent restenosis in the midsegment.  First diagonal just distal to that is medium caliber normal.  Distal to that the LAD is tortuous with a 70% mid to distal vessel stenosis.  Ramus: Absent  Circumflex: The circumflex is acutely occluded proximally.  Gives rise to a large caliber, bifurcating first OM.  The mid circumflex is small to medium caliber with a 70% proximal vessel stenosis.  There are 3 small  caliber OM's which arise from that which have minimal irregularities.  RCA: The RCA is a large-caliber vessel with mild proximal 30% disease, otherwise normal.        CORONARY INTERVENTION FINDINGS:  PCI CORONARY SEGMENT: proximal circumflex (LM to OM1)  PRE-STENOSIS:  100%  POST-STENOSIS: 0  LESION TYPE:C  RASHID FLOW PRE/POST: 0/3  CULPRIT LESION: Yes  DISSECTION:   No     PCI CORONARY SEGMENT: mid distal LAD  PRE STENOSIS: 80%   POST STENOSIS 0%   LESION TYPE A   CULPRIT NO   DISSECTION NO    Echo    Left ventricular systolic function is low normal. Left ventricular ejection fraction appears to be 46 - 50%.    The following left ventricular wall segments are hypokinetic: basal anterolateral, mid anterolateral and basal inferolateral.    Left ventricular diastolic function was normal.    Calculated right ventricular systolic pressure from tricuspid regurgitation is 38 mmHg.    Mild mitral valve regurgitation is present.           Objective:  Temp:  [98 °F (36.7 °C)-98.5 °F (36.9 °C)] 98.1 °F (36.7 °C)  Heart Rate:  [63-88] 72  Resp:  [18-20] 18  BP: ()/(50-81) 92/66    Intake/Output Summary (Last 24 hours) at 8/2/2025 1338  Last data filed at 8/1/2025 1827  Gross per 24 hour   Intake 480 ml   Output --   Net 480 ml     Body mass index is 28.34 kg/m².      07/31/25  0507 07/31/25  1505   Weight: 72.6 kg (160 lb) 72.6 kg (160 lb)     Weight change:     Physical Exam:  GEN: Alert, cooperative, no distress  Head: normocephalic, atruamatic  Eyes: Normal conjunctiva and sclera, no icterus, PERRL  Neck: No JVD, normal carotid pulsation without bruits  Lungs: clear to auscultation bilaterally, normal rate and effort  Heart: RRR, no murmurs or gallops. Normal s1 and S2.   Abdomen: Soft, nontender, normal bowel sounds  Extremities: No edema or marked deformities. Normal strength.   Skin: No rash, no cyanosis.   Pscyhciatric: Alert and Oriented x 3  Back: No Kypohsis or scoliosis.       Procedures  Performed  Procedure(s):  Left Heart Cath  Percutaneous Coronary Intervention  Intravascular Ultrasound  Stent EVARISTO coronary  Left ventriculography       Consults:  Consults       Date and Time Order Name Status Description    7/31/2025  6:18 AM Cardiology (on-call MD unless specified)      7/28/2025  7:55 PM Inpatient Cardiology Consult Completed             Pertinent Test Results:  Results from last 7 days   Lab Units 08/01/25  0326 07/31/25  0538 07/29/25  0547 07/28/25  1529   SODIUM mmol/L 140 142 144 143   POTASSIUM mmol/L 4.5 3.1* 4.1 4.3   CHLORIDE mmol/L 107 107 112* 108*   CO2 mmol/L 23.0 21.0* 24.3 23.3   BUN mg/dL 5.0* 9.0 6.0 8.0   CREATININE mg/dL 0.54* 0.65 0.77 0.59   GLUCOSE mg/dL 98 136* 89 104*   CALCIUM mg/dL 8.8 9.1 8.7 9.4   AST (SGOT) U/L  --  26  --  29   ALT (SGPT) U/L  --  20  --  17     Results from last 7 days   Lab Units 07/31/25  0724 07/31/25  0538 07/29/25  0547 07/28/25  2150 07/28/25  1723 07/28/25  1529   HSTROP T ng/L 24* 13 11 9 12 9     Results from last 7 days   Lab Units 08/02/25  0351 08/01/25  0326 07/31/25  0538 07/29/25  0547 07/28/25  1529   WBC 10*3/mm3 6.50 9.20 6.86 4.23 7.28   HEMOGLOBIN g/dL 9.1* 10.6* 12.6 10.2* 11.2*   HEMATOCRIT % 28.8* 34.7 38.5 30.9* 36.6   PLATELETS 10*3/mm3 227 263 311 244 272     Results from last 7 days   Lab Units 08/02/25  0351 08/01/25  0326 07/31/25  0538   INR   --   --  0.97   APTT seconds 27.6 27.8 27.4         Results from last 7 days   Lab Units 07/29/25  0547   CHOLESTEROL mg/dL 137   TRIGLYCERIDES mg/dL 62   HDL CHOL mg/dL 41     Results from last 7 days   Lab Units 07/31/25  0538   PROBNP pg/mL 218.0           Discharge Medications     Discharge Medications        New Medications        Instructions Start Date   nitroglycerin 0.4 MG SL tablet  Commonly known as: NITROSTAT   0.4 mg, Sublingual, Every 5 Minutes PRN, Take no more than 3 doses in 15 minutes.      prasugrel 10 MG tablet  Commonly known as: EFFIENT   10 mg, Oral,  Daily             Changes to Medications        Instructions Start Date   atorvastatin 80 MG tablet  Commonly known as: LIPITOR  What changed: Another medication with the same name was removed. Continue taking this medication, and follow the directions you see here.   80 mg, Oral, Daily             Continue These Medications        Instructions Start Date   aspirin 81 MG chewable tablet   81 mg, Oral, Daily      ergocalciferol 1.25 MG (30286 UT) capsule  Commonly known as: ERGOCALCIFEROL   1 capsule, Weekly      Loratadine 10 MG capsule   10 mg, Oral, Daily PRN      methocarbamol 500 MG tablet  Commonly known as: ROBAXIN   500 mg, Oral, 4 Times Daily PRN      rizatriptan MLT 10 MG disintegrating tablet  Commonly known as: MAXALT-MLT   10 mg, Once As Needed             Stop These Medications      metoprolol tartrate 25 MG tablet  Commonly known as: LOPRESSOR              Discharge Diet:    Dietary Orders (From admission, onward)       Start     Ordered    07/31/25 1027  Diet: Cardiac; Healthy Heart (2-3 Na+); Fluid Consistency: Thin (IDDSI 0)  Diet Effective Now        References:    Diet Order Definitions   Question Answer Comment   Diets: Cardiac    Cardiac Diet: Healthy Heart (2-3 Na+)    Fluid Consistency: Thin (IDDSI 0)        07/31/25 1028                    Activity at Discharge:       Discharge disposition: home     Discharge Instructions and Follow ups:  No future appointments.  Additional Instructions for the Follow-ups that You Need to Schedule       Ambulatory Referral to Cardiac Rehab   As directed             Follow-up Information       [facility] .    Specialty: Cardiac Rehabilitation  Contact information:  [address] [facility] [address] [phone]             Sarah Bright MD .    Specialty: Internal Medicine  Contact information:  [address] [facility] [address] [phone]               Ag Santiago III, MD Follow up.    Specialty:  Cardiology  Why: office will call with follow up appointment  Contact information:  [address]  [address]  [place] [address]  [phone]                             Test Results Pending at Discharge:      Fidelia Peterson MD  08/02/25  13:38 EDT    Time: Discharge 45 min    EMR Dragon/Transcription disclaimer:   Part of this note may be an electronic transcription/translation of spoken language to printed text using the Dragon dictation system.

## 2025-08-03 NOTE — CASE MANAGEMENT/SOCIAL WORK
Case Management Discharge Note      Final Note: home         Selected Continued Care - Discharged on 8/2/2025 Admission date: 7/31/2025 - Discharge disposition: Home or Self Care      Destination    No services have been selected for the patient.                Durable Medical Equipment    No services have been selected for the patient.                Dialysis/Infusion    No services have been selected for the patient.                Home Medical Care    No services have been selected for the patient.                Therapy    No services have been selected for the patient.                Community Resources    No services have been selected for the patient.                Community & DME    No services have been selected for the patient.                    Transportation Services  Transportation: Private Transportation  Private: Car    Final Discharge Disposition Code: 01 - home or self-care

## 2025-08-04 ENCOUNTER — READMISSION MANAGEMENT (OUTPATIENT)
Dept: CALL CENTER | Facility: HOSPITAL | Age: 55
End: 2025-08-04
Payer: COMMERCIAL

## 2025-08-04 LAB — LPA SERPL-SCNC: 200.3 NMOL/L

## 2025-08-04 NOTE — PAYOR COMM NOTE
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 Ref Range    Extra Tube Hold for add-ons.    Light Blue Top    Collection Time: 07/31/25  5:38 AM   Result Value Ref Range    Extra Tube Hold for add-ons.    CBC Auto Differential    Collection Time: 07/31/25  5:38 AM    Specimen: Arm, Left; Blood   Result Value Ref Range    WBC 6.86 3.40 - 10.80 10*3/mm3    RBC 4.55 3.77 - 5.28 10*6/mm3    Hemoglobin 12.6 12.0 - 15.9 g/dL    Hematocrit 38.5 34.0 - 46.6 %    MCV 84.6 79.0 - 97.0 fL    MCH 27.7 26.6 - 33.0 pg    MCHC 32.7 31.5 - 35.7 g/dL    RDW 15.1 12.3 - 15.4 %    RDW-SD 45.8 37.0 - 54.0 fl    MPV 10.0 6.0 - 12.0 fL    Platelets 311 140 - 450 10*3/mm3    nRBC 0.0 0.0 - 0.2 /100 WBC   Lipase    Collection Time: 07/31/25  5:38 AM    Specimen: Arm, Left; Blood   Result Value Ref Range    Lipase  20 13 - 60 U/L   Manual Differential    Collection Time: 07/31/25  5:38 AM    Specimen: Arm, Left; Blood   Result Value Ref Range    Neutrophil % 29.0 (L) 42.7 - 76.0 %    Lymphocyte % 64.0 (H) 19.6 - 45.3 %    Monocyte % 5.0 5.0 - 12.0 %    Eosinophil % 2.0 0.3 - 6.2 %    Basophil % 0.0 0.0 - 1.5 %    Neutrophils Absolute 1.99 1.70 - 7.00 10*3/mm3    Lymphocytes Absolute 4.39 (H) 0.70 - 3.10 10*3/mm3    Monocytes Absolute 0.34 0.10 - 0.90 10*3/mm3    Eosinophils Absolute 0.14 0.00 - 0.40 10*3/mm3    Basophils Absolute 0.00 0.00 - 0.20 10*3/mm3    Crenated RBC's Mod/2+ None Seen    Ovalocytes Mod/2+ None Seen    Smudge Cells Slight/1+ None Seen    Platelet Morphology Normal Normal   ECG 12 Lead Chest Pain    Collection Time: 07/31/25  6:43 AM   Result Value Ref Range    QT Interval 437 ms    QTC Interval 433 ms     Lab Comments:  My independent interpretation of the above labs: Mild hypokalemia      Radiology:  XR Chest 1 View  XR Chest 1 View, CT Angiogram Chest Pulmonary Embolism  Result Date: 7/31/2025  CT ANGIOGRAM OF THE CHEST. MULTIPLE CORONAL, SAGITTAL, AND 3-D RECONSTRUCTIONS. XR CHEST 1 VIEW  HISTORY: 54-year-old female with acute chest pain. Coronary stent placement in the past. Gastric sleeve in the past.  TECHNIQUE: Radiation dose reduction techniques were utilized, including automated exposure control and exposure modulation based on body size. CT angiogram of the chest was performed with 2mm images following the administration of IV contrast. Multiple coronal, sagittal, and 3-D reconstruction images were obtained. Compared with chest CTA 05/24/2023.  FINDINGS:  1. No evidence for pulmonary thromboemboli.  2. There are no pulmonary airspace consolidations or evidence for pneumonia. There are no pleural or pericardial effusions. There is no lymphadenopathy. Stable thyroid. There is no hiatal hernia. Stable visualized images of the upper abdomen.  CXR: No acute abnormality is seen.       Radiology  Comments:  I ordered the above imaging and reviewed the results.    My independent interpretation of the cxr: No acute process    EKG Interpreted by me: Normal sinus rhythm, inferior lateral scant ST segment depression with T wave inversions, this is an acute change from previous      Rationale:  This is a 54-year-old patient who is presenting with complaints of chest pain.  She looks uncomfortable, but is not toxic.  She presents afebrile with unremarkable vital signs.  Cardiopulmonary exam is unremarkable.    She was evaluated with an EKG, which shows dynamic new EKG changes from previous.  She has new inferior and lateral ST segment depression and T wave inversion.   EKG does not meet STEMI criteria.  First troponin is negative, but symptoms are suspicious for unstable angina.  Will see if we can get her pain under control with nitroglycerin, morphine    Clinical Scores:         HEART Score: 6                         Given that she was previously evaluated with a chest x-ray which did not demonstrate any acute findings in the recurrent nature of her symptoms, she was evaluated with a CT pulmonary angiogram.  This did not demonstrate any evidence of acute intrathoracic process    Chest x-ray did not demonstrate any other acute cardiopulmonary process as emergent cause of symptoms including pneumomediastinum, widened mediastinum, acute infiltrate or pneumothorax.     Will plan on cardiology consult and patient will require admission for further care and management.    Progress Notes:  6:20 AM EDT: I saw and reevaluated the patient.  She notes the chest pain has markedly resolved, but not completely improved.  Her morphine may have been given through a blown IV so we will redose this.  Discussed my plan for cardiology consultation    6:49 AM EDT: I spoke with the patient about her ED work up and diagnosis. I informed the patient that she will be admitted for further evaluation/treatment. All questions  answered.      Consult:  6:47 AM EDT: Discussed case with Dr. Pereira.  Reviewed history, exam, results and treatments.  Will go ahead and give the patient a dose of oral metoprolol and start heparin.  We will place Nitropaste.  She will likely need cardiac catheterization today and can be admitted to the cardiology service             EM_CC: Critical care provider statement:    Critical care time (minutes): 35.   Critical care time was exclusive of:  Separately billable procedures and treating other patients   Critical care was necessary to treat or prevent imminent or life-threatening deterioration of the following conditions:  Cardiac Failure   Critical care was time spent personally by me on the following activities:  Development of treatment plan with patient or surrogate, discussions with consultants, evaluation of patient's response to treatment, examination of patient, obtaining history from patient or surrogate, ordering and performing treatments and interventions, ordering and review of laboratory studies, ordering and review of radiographic studies, pulse oximetry, re-evaluation of patient's condition and review of old charts. Critical Care indicators: Angina, Unstable, Aggressive Management Others: aminophylline, diazepam, glucagon, heparin, lovenox, morphine, sodium bicarbonate, et al.    Complexity of Care:  The patient requires admission.    Diagnosis:  Final diagnoses:   Unstable angina           Parts of this note may be an electronic transcription/translation of spoken language to printed text using the Dragon dictation system        Provider Note Signed by:     Sarah Blackwood MD  07/31/25 0654      Electronically signed by Sarah Blackwood MD at 07/31/25 0654       Vital Signs (last 3 days) before discharge       Date/Time Temp Temp src Pulse Resp BP Patient Position SpO2    08/02/25 1019 -- -- -- -- 92/66 Sitting --    08/02/25 1017 -- -- 72 -- 93/73 Sitting  --    Patient Position: after  ambulation at 08/02/25 1017    08/02/25 1003 -- -- 73 -- 81/60 Sitting --    08/02/25 0839 98.1 (36.7) Oral 80 18 82/55 Sitting --    08/02/25 0416 -- -- -- -- 92/67 Lying --    08/02/25 0352 98.1 (36.7) Oral 63 18 81/50 Lying --    08/02/25 0042 -- -- 78 -- 101/63 Standing --    08/02/25 0034 -- -- 71 -- 87/58 Standing --    08/02/25 0033 -- -- 78 -- 93/59 Standing --    08/02/25 0032 -- -- 75 -- 81/56 Lying --    08/01/25 2347 98.5 (36.9) Oral 83 18 87/51 Lying --    08/01/25 1926 98.1 (36.7) Oral 79 18 -- Lying --    08/01/25 1850 -- -- 76 -- 96/60 -- --    08/01/25 1727 -- -- 88 -- 106/81 -- --    08/01/25 1429 98 (36.7) Oral 72 20 74/54 Lying 100    08/01/25 0954 98 (36.7) Oral 68 20 96/68 Lying 100    08/01/25 0830 -- -- 82 -- 104/79  -- --    BP: B/p before metoporolol at 08/01/25 0830    08/01/25 0737 98.2 (36.8) Oral 80 20 -- -- 100    08/01/25 0700 -- -- 74 -- 91/66 -- 100    08/01/25 0600 -- -- 68 -- 76/67 -- 100    08/01/25 0500 -- -- 78 -- 108/73 -- 100    08/01/25 0400 -- -- 70 -- 93/63 -- 100    08/01/25 0330 98.2 (36.8) Oral -- -- -- -- --    08/01/25 0300 -- -- 67 -- 92/67 -- 100    08/01/25 0200 -- -- 68 -- 100/53 -- 100    08/01/25 0100 -- -- 65 -- 105/72 -- 100    08/01/25 0057 -- -- 68 -- 102/73 -- 100    07/31/25 2311 100 (37.8) Oral -- -- -- -- --    07/31/25 2309 -- -- 72 -- 110/72 -- 100    07/31/25 2200 -- -- 85 -- 113/77 -- 100 07/31/25 2130 -- -- 82 -- 118/78 -- 100 07/31/25 2030 -- -- 84 -- 119/68 -- 100    07/31/25 2015 98.7 (37.1) Oral 91 -- 102/67 -- 100 07/31/25 2000 -- -- 90 -- 105/71 -- 100 07/31/25 1900 -- -- 85 -- 100/68 -- 100 07/31/25 1845 -- -- 89 -- 112/75 -- 100 07/31/25 1830 -- -- 91 -- 111/70 -- 100 07/31/25 1815 -- -- 85 -- 107/73 -- 100 07/31/25 1800 -- -- 93 -- 115/70 -- 100 07/31/25 1745 -- -- 96 -- 122/81 -- 100 07/31/25 1730 -- -- 86 -- 112/75 -- 100 07/31/25 1715 -- -- 84 -- 116/64 -- 100 07/31/25 1700 -- -- 81 -- 123/84 -- 100     07/31/25 1645 98.5 (36.9) Oral 84 -- 99/73 -- 100 07/31/25 1630 -- -- 89 -- 109/74 -- 100 07/31/25 1615 -- -- 86 -- 83/68 -- 100 07/31/25 1600 -- -- 80 -- 107/78 -- 100 07/31/25 1505 -- -- 85 -- 102/75 -- --    07/31/25 1445 -- -- 79 -- 90/72 -- 100 07/31/25 1430 -- -- 78 -- 74/61 -- 100 07/31/25 1415 -- -- 87 -- 152/99 -- 100 07/31/25 1400 -- -- 80 -- 115/85 -- 100 07/31/25 1345 -- -- 79 -- 119/88 -- 100 07/31/25 1330 -- -- 80 -- 120/87 -- 100 07/31/25 1315 -- -- 79 -- 112/75 -- 100 07/31/25 1300 -- -- 72 -- 113/79 -- 100 07/31/25 1215 -- -- 72 -- 102/61 -- 100 07/31/25 1200 -- -- 128 -- 121/89 -- 100 07/31/25 1145 -- -- 122 -- 150/86 -- --    07/31/25 1130 -- -- 113 -- 121/90 -- --    07/31/25 1115 -- -- 135 -- 133/100 -- 100 07/31/25 1100 98.3 (36.8) Oral 143 -- 126/81 -- --    07/31/25 1045 -- -- 135 -- 121/78 -- 100 07/31/25 1030 -- -- 136 -- 125/94 -- --    07/31/25 09:49:34 -- -- -- 19 -- -- --    07/31/25 09:43:45 -- -- -- 20 -- -- --    07/31/25 09:39:12 -- -- -- 13 -- -- --    07/31/25 09:34:02 -- -- -- 12 -- -- --    07/31/25 09:29:59 -- -- -- 20 -- -- --    07/31/25 09:25:17 -- -- -- 20 -- -- --    07/31/25 09:19:42 -- -- -- 21 -- -- --    07/31/25 09:14:45 -- -- -- 20 -- -- --    07/31/25 09:08:44 -- -- -- 20 -- -- --    07/31/25 09:03:06 -- -- -- 23 -- -- --    07/31/25 08:57:27 -- -- -- 25 -- -- --    07/31/25 08:52:05 -- -- -- 25 -- -- --    07/31/25 08:45:42 -- -- -- 25 -- -- --    07/31/25 08:40:02 -- -- -- 20 -- -- --    07/31/25 08:34:09 -- -- -- 21 -- -- --    07/31/25 08:28:48 -- -- -- 20 -- -- --    07/31/25 08:26:23 -- -- -- 14 -- -- --    07/31/25 08:20:50 -- -- -- 15 -- -- --    07/31/25 08:15:13 -- -- -- 12 -- -- --    07/31/25 08:10:14 -- -- -- 14 -- -- --    07/31/25 0750 -- -- 125 -- 172/115 -- 100    07/31/25 0719 -- -- 108 -- -- -- 100    07/31/25 0718 -- -- 119 -- 143/110 -- --    07/31/25 0716 -- -- 114 -- -- -- --    07/31/25 0702 --  -- 135 -- 151/119 -- --    07/31/25 0657 -- -- 65 -- -- -- 100    07/31/25 0620 -- -- 65 -- 125/77 -- 100    07/31/25 0612 -- -- 77 -- 156/99 -- --    07/31/25 0606 -- -- 66 -- 156/99 -- --    07/31/25 0507 98.4 (36.9) -- 60 19 124/75 -- 98          Oxygen Therapy (last 3 days) before discharge       Date/Time SpO2 Device (Oxygen Therapy) Flow (L/min) (Oxygen Therapy) Oxygen Concentration (%) ETCO2 (mmHg)    08/02/25 0406 -- room air -- -- --    08/02/25 0000 -- room air -- -- --    08/01/25 2033 -- room air -- -- --    08/01/25 1429 100 -- -- -- --    08/01/25 0954 100 -- -- -- --    08/01/25 0800 -- room air -- -- --    08/01/25 0737 100 -- -- -- --    08/01/25 0700 100 -- -- -- --    08/01/25 0600 100 -- -- -- --    08/01/25 0500 100 -- -- -- --    08/01/25 0400 100 room air -- -- --    08/01/25 0300 100 -- -- -- --    08/01/25 0200 100 -- -- -- --    08/01/25 0100 100 -- -- -- --    08/01/25 0057 100 -- -- -- --    08/01/25 0000 -- room air -- -- --    07/31/25 2309 100 -- -- -- --    07/31/25 2200 100 -- -- -- --    07/31/25 2130 100 -- -- -- --    07/31/25 2030 100 -- -- -- --    07/31/25 2015 100 -- -- -- --    07/31/25 2006 -- room air -- -- --    07/31/25 2000 100 -- -- -- --    07/31/25 1900 100 -- -- -- --    07/31/25 1845 100 -- -- -- --    07/31/25 1830 100 -- -- -- --    07/31/25 1815 100 -- -- -- --    07/31/25 1800 100 -- -- -- --    07/31/25 1745 100 -- -- -- --    07/31/25 1730 100 -- -- -- --    07/31/25 1715 100 -- -- -- --    07/31/25 1700 100 -- -- -- --    07/31/25 1645 100 -- -- -- --    07/31/25 1630 100 -- -- -- --    07/31/25 1615 100 -- -- -- --    07/31/25 1600 100 room air -- -- --    07/31/25 1445 100 -- -- -- --    07/31/25 1430 100 -- -- -- --    07/31/25 1415 100 -- -- -- --    07/31/25 1400 100 -- -- -- --    07/31/25 1345 100 -- -- -- --    07/31/25 1330 100 -- -- -- --    07/31/25 1315 100 -- -- -- --    07/31/25 1300 100 -- -- -- --    07/31/25 1215 100 -- -- -- --    07/31/25  1200 100 room air -- -- --    25 1115 100 -- -- -- --    25 1045 100 -- -- -- --    25 09:43:45 -- -- -- -- 25 09:39:12 -- -- -- -- 25 09:34:02 -- -- -- -- 25 09:29:59 -- -- -- -- 25 09:25:17 -- -- -- -- 25 09:19:42 -- -- -- -- 25 09:14:45 -- -- -- -- 25 09:03:06 -- -- -- -- 25 08:52:05 -- -- -- -- 25 08:45:42 -- -- -- -- 25 08:34:09 -- -- -- -- 25 08:28:48 -- -- -- -- 25 08:26:23 -- -- -- -- 25 08:20:50 -- -- -- -- 25 0750 100 -- -- -- --    25 0719 100 -- -- -- --    25 0657 100 -- -- -- --    2520 100 -- -- -- --    25 0507 98 -- -- -- --          Facility-Administered Medications as of 2025   Medication Dose Route Frequency Provider Last Rate Last Admin    [COMPLETED] aspirin chewable tablet 324 mg  324 mg Oral Once Sarah Blackwood MD   324 mg at 25 0607    [COMPLETED] heparin (porcine) 5000 UNIT/ML injection 4,000 Units  55.1 Units/kg Intravenous Once Sarah Blackwood MD   4,000 Units at 25 0739    [COMPLETED] iopamidol (ISOVUE-370) 76 % injection 100 mL  100 mL Intravenous Once in imaging Sarah Blackwood MD   62 mL at 25 0556    [COMPLETED] morphine injection 4 mg  4 mg Intravenous Once Sarah Blackwood MD   4 mg at 25 0529    [COMPLETED] morphine injection 4 mg  4 mg Intravenous Once Sarah Blackwood MD   4 mg at 25 0710    [COMPLETED] nitroglycerin (NITROSTAT) ointment 1 inch  1 inch Topical Once Sarah Blackwood MD   1 inch at 25 0702    [COMPLETED] ondansetron (ZOFRAN) injection 4 mg  4 mg Intravenous Once Sarah Blackwood MD   4 mg at 25 05    [COMPLETED] perflutren (DEFINITY) 8.476 mg in Sodium Chloride (PF) 0.9 % 10 mL injection  2 mL Intravenous Once in imaging Fidelia Peterson MD   2 mL at 25 1506    [] potassium chloride  (KLOR-CON M20) CR tablet 40 mEq  40 mEq Oral Q4H Sarah Blackwood MD   40 mEq at 25 1423    [] sodium chloride 0.9 % infusion  1 mL/kg/hr Intravenous Continuous Fidelia Peterson MD   Stopped at 25 1423     Orders (all)        Start     Ordered    25 0756  Telemetry Scan  Once         25 0756    25 0726  Discharge patient  Once         25 0727    25 0600  Lipoprotein A (LPA)  Morning Draw         25 1008    25 0600  CBC Auto Differential  PROCEDURE ONCE        Comments: Discontinue After Heparin Stopped      25 0000  nitroglycerin (NITROSTAT) 0.4 MG SL tablet  Every 5 Minutes PRN         25 0727    25 0000  prasugrel (EFFIENT) 10 MG tablet  Daily         25 0727    25 2100  metoprolol tartrate (LOPRESSOR) tablet 12.5 mg  2 Times Daily,   Status:  Discontinued         25 1008    25 0900  aspirin chewable tablet 81 mg  Daily,   Status:  Discontinued         25 1028    25 0900  prasugrel (EFFIENT) tablet 10 mg  Daily,   Status:  Discontinued         25 1028    25 0847  Inpatient Admission  Once         25 0847    25 0834  Transfer Patient  Once         25 0834    25 0739  POC Glucose Once  PROCEDURE ONCE        Comments: Complete no more than 45 minutes prior to patient eating      25 0735    25 0600  Potassium  Morning Draw,   Status:  Canceled         25 0638    25 0600  CBC (No Diff)  Morning Draw,   Status:  Canceled         25 1028    25 0600  Basic Metabolic Panel  Morning Draw         25 1028    25 0600  CBC Auto Differential  PROCEDURE ONCE,   Status:  Canceled        Comments: Discontinue After Heparin Stopped      25 0430  aPTT  Daily,   Status:  Canceled      Comments: Cancel If Patient Has Infusion Changes      25 0647    25 0430  CBC & Differential  Daily,    Status:  Canceled      Comments: Discontinue After Heparin Stopped      07/31/25 0647    08/01/25 0430  CBC Auto Differential  PROCEDURE ONCE        Comments: Discontinue After Heparin Stopped      07/31/25 2030    08/01/25 0302  Telemetry Scan  Once         08/01/25 0302    07/31/25 2308  Telemetry Scan  Once         07/31/25 2308    07/31/25 2045  mupirocin (BACTROBAN) 2 % nasal ointment 1 Application  2 Times Daily,   Status:  Discontinued         07/31/25 1948 07/31/25 1949  Daily CHG Bath While in ICU  Daily,   Status:  Canceled      Comments: Use for admission bath & daily bath for duration of critical care stay    07/31/25 1948 07/31/25 1901  Telemetry Scan  Once         07/31/25 1901 07/31/25 1600  perflutren (DEFINITY) 8.476 mg in Sodium Chloride (PF) 0.9 % 10 mL injection  Once in Imaging         07/31/25 1505    07/31/25 1319  morphine injection 2 mg  Every 2 Hours PRN,   Status:  Discontinued         07/31/25 1320    07/31/25 1209  ECG 12 Lead Chest Pain  STAT         07/31/25 1208    07/31/25 1200  Strict Intake & Output  Every 4 Hours,   Status:  Canceled       07/31/25 1028    07/31/25 1200  Incentive Spirometry  Every 2 Hours While Awake,   Status:  Canceled       07/31/25 1028    07/31/25 1115  atorvastatin (LIPITOR) tablet 80 mg  Daily,   Status:  Discontinued         07/31/25 1028    07/31/25 1115  metoprolol tartrate (LOPRESSOR) tablet 25 mg  2 Times Daily,   Status:  Discontinued         07/31/25 1028    07/31/25 1115  sodium chloride 0.9 % infusion  Continuous         07/31/25 1028    07/31/25 1058  POC Activated Clotting Time  PROCEDURE ONCE,   Status:  Canceled         07/31/25 1016    07/31/25 1032  POC Glucose Once  PROCEDURE ONCE        Comments: Complete no more than 45 minutes prior to patient eating      07/31/25 1029    07/31/25 1028  Adult Transthoracic Echo Complete W/ Cont if Necessary Per Protocol  Once         07/31/25 1028    07/31/25 1027  ECG 12 Lead Chest Pain  Once    "      07/31/25 1028    07/31/25 1027  Discontinue Radial TR Band Per Removal Protocol  Per Order Details,   Status:  Canceled        Comments: If Bleeding Occurs Re-Inflate 2 mL & Then Continue With 2 mL Every 15 Minute Deflations. Gently Roll Band Off Insertion Site After Completely Deflated.    07/31/25 1028    07/31/25 1027  Keep Affected Arm Straight & Elevated  Continuous,   Status:  Canceled         07/31/25 1028    07/31/25 1027  Activity As Tolerated  Until Discontinued,   Status:  Canceled         07/31/25 1028    07/31/25 1027  Diet: Cardiac; Healthy Heart (2-3 Na+); Fluid Consistency: Thin (IDDSI 0)  Diet Effective Now,   Status:  Canceled         07/31/25 1028    07/31/25 1026  ondansetron ODT (ZOFRAN-ODT) disintegrating tablet 4 mg  Every 6 Hours PRN,   Status:  Discontinued        Placed in \"Or\" Linked Group    07/31/25 1028    07/31/25 1026  ondansetron (ZOFRAN) injection 4 mg  Every 6 Hours PRN,   Status:  Discontinued        Placed in \"Or\" Linked Group    07/31/25 1028    07/31/25 1026  Code Status and Medical Interventions: CPR (Attempt to Resuscitate); Full Support  Continuous,   Status:  Canceled         07/31/25 1028    07/31/25 1026  Vital Signs, Site Check & Distal Extremity Check for Warmth, Color, Sensation & Pulses  Per Order Details,   Status:  Canceled         07/31/25 1028    07/31/25 1026  Continuous Cardiac Monitoring  Continuous,   Status:  Canceled        Comments: Follow Standing Orders As Outlined in Process Instructions (Open Order Report to View Full Instructions)    07/31/25 1028    07/31/25 1026  Maintain IV Access  Continuous,   Status:  Canceled         07/31/25 1028    07/31/25 1026  Telemetry - Place Orders & Notify Provider of Results When Patient Experiences Acute Chest Pain, Dysrhythmia or Respiratory Distress  Continuous,   Status:  Canceled        Comments: Open Order Report to View Parameters Requiring Provider Notification    07/31/25 1028    07/31/25 1026  May Be " Off Telemetry for Tests  Continuous,   Status:  Canceled         07/31/25 1028    07/31/25 1026  Encourage Fluids  Until Discontinued,   Status:  Canceled         07/31/25 1028    07/31/25 1026  Continuous Pulse Oximetry  Continuous,   Status:  Canceled         07/31/25 1028    07/31/25 1026  Advance Diet As Tolerated -  Until Discontinued,   Status:  Canceled         07/31/25 1028    07/31/25 1026  Notify MD if platelet count is less than 100,000, is less than 1/2 baseline, or if Hgb drops by more than 3mg/dl.  Until Discontinued,   Status:  Canceled         07/31/25 1028    07/31/25 1026  Notify MD of hypotension (SBP less than 95), bleeding, or dysrythmia and follow Sheath Removal Policy if needed.  Continuous,   Status:  Canceled         07/31/25 1028    07/31/25 1026  Cardiac Rehab Evaluation and Enrollment  Once,   Status:  Canceled        Provider:  (Not yet assigned)    07/31/25 1028    07/31/25 1026  Hold metFORMIN (GLUCOPHAGE) for 48 Hours  Continuous,   Status:  Canceled         07/31/25 1028    07/31/25 1025  nitroglycerin (NITROSTAT) SL tablet 0.4 mg  Every 5 Minutes PRN,   Status:  Discontinued         07/31/25 1028    07/31/25 1025  Change Site Dressing  As Needed,   Status:  Canceled       07/31/25 1028    07/31/25 1025  Oxygen Therapy- Nasal Cannula; Titrate 1-6 LPM Per SpO2; 90 - 95%  Continuous PRN,   Status:  Canceled       07/31/25 1028    07/31/25 1025  acetaminophen (TYLENOL) tablet 650 mg  Every 4 Hours PRN,   Status:  Discontinued         07/31/25 1028    07/31/25 1025  SUMAtriptan (IMITREX) tablet 50 mg  Once As Needed,   Status:  Discontinued         07/31/25 1028    07/31/25 1017  POC Activated Clotting Time  PROCEDURE ONCE         07/31/25 0833    07/31/25 1017  POC Activated Clotting Time  PROCEDURE ONCE         07/31/25 0841    07/31/25 1017  POC Activated Clotting Time  PROCEDURE ONCE         07/31/25 0916    07/31/25 1016  POC Activated Clotting Time  PROCEDURE ONCE,   Status:   Canceled         07/31/25 1016    07/31/25 0958  prasugrel (EFFIENT) tablet  Code / Trauma / Sedation Medication,   Status:  Discontinued         07/31/25 0958    07/31/25 0953  iopamidol (ISOVUE-370) 76 % injection  Code / Trauma / Sedation Medication,   Status:  Discontinued         07/31/25 0954    07/31/25 0941  POC Activated Clotting Time  PROCEDURE ONCE         07/31/25 1016    07/31/25 0933  POC Activated Clotting Time  PROCEDURE ONCE,   Status:  Canceled         07/31/25 1016    07/31/25 0822  ondansetron (ZOFRAN) injection  Code / Trauma / Sedation Medication,   Status:  Discontinued         07/31/25 0822    07/31/25 0822  Intravascular Ultrasound  Once         07/31/25 0821    07/31/25 0811  nitroGLYCERIN 200 mcg/mL 30 mL syringe  Code / Trauma / Sedation Medication,   Status:  Discontinued         07/31/25 0811    07/31/25 0811  verapamil (ISOPTIN) injection  Code / Trauma / Sedation Medication,   Status:  Discontinued         07/31/25 0811    07/31/25 0811  heparin (porcine) injection  Code / Trauma / Sedation Medication,   Status:  Discontinued         07/31/25 0811    07/31/25 0811  fentaNYL citrate (PF) (SUBLIMAZE) injection  Code / Trauma / Sedation Medication,   Status:  Discontinued         07/31/25 0811    07/31/25 0810  midazolam (VERSED) injection  Code / Trauma / Sedation Medication,   Status:  Discontinued         07/31/25 0811    07/31/25 0809  lidocaine (XYLOCAINE) 2% injection  Code / Trauma / Sedation Medication,   Status:  Discontinued         07/31/25 0809    07/31/25 0802  Cardiac Catheterization/Vascular Study  Once         07/31/25 0801    07/31/25 0721  ECG 12 Lead Rhythm Change  Once,   Status:  Canceled         07/31/25 0924    07/31/25 0720  ECG 12 Lead Rhythm Change  STAT         07/31/25 0719    07/31/25 0703  metoprolol tartrate (LOPRESSOR) tablet 25 mg  Once,   Status:  Discontinued         07/31/25 0647    07/31/25 0703  heparin (porcine) 5000 UNIT/ML injection 4,000 Units   Once         07/31/25 0647    07/31/25 0703  heparin 49728 units/250 mL (100 units/mL) in 0.45 % NaCl infusion  Titrated,   Status:  Discontinued         07/31/25 0647    07/31/25 0701  nitroglycerin (NITROSTAT) ointment 1 inch  Once         07/31/25 0645    07/31/25 0654  potassium chloride (KLOR-CON M20) CR tablet 40 mEq  Every 4 Hours         07/31/25 0638    07/31/25 0649  NPO Diet NPO Type: Strict NPO  Diet Effective Now,   Status:  Canceled         07/31/25 0648    07/31/25 0649  Initiate Observation Status  Once         07/31/25 0648 07/31/25 0647  Initiate & Follow Heparin Protocol  Continuous,   Status:  Canceled         07/31/25 0647 07/31/25 0647  Adjust Heparin Rate Based on aPTT Using Nomogram  Continuous,   Status:  Canceled         07/31/25 0647 07/31/25 0647  Verify All Anticoagulant Orders Are Discontinued Upon Initiation of Heparin Protocol (eg Enoxaparin, Fondaparinux, Apixaban, Dabigatran, Edoxaban, or Rivaroxaban)  Once,   Status:  Canceled         07/31/25 0647    07/31/25 0647  Protime-INR  STAT        Comments: Prior to Initial Heparin Bolus      07/31/25 0647 07/31/25 0647  aPTT  STAT        Comments: Prior to Initial Heparin Bolus      07/31/25 0647 07/31/25 0647  RN to release aPTT order 6 hours after Heparin infusion INITIATION & 6 hours after EACH infusion change until 2 consecutive therapeutic aPTTs are achieved. Then switch to daily aPTT orders. If aPTT is outside target range, resume every 6 hour aPTT order schedule until therapeutic x 2  Continuous,   Status:  Canceled         07/31/25 0647    07/31/25 0646  heparin (porcine) 5000 UNIT/ML injection 2,200-4,000 Units  Every 6 Hours PRN,   Status:  Discontinued         07/31/25 0647 07/31/25 0646  aPTT  As Needed,   Status:  Canceled       07/31/25 0647    07/31/25 0638  High Sensitivity Troponin T 1Hr  PROCEDURE ONCE         07/31/25 0611    07/31/25 0636  morphine injection 4 mg  Once         07/31/25 0620     07/31/25 0621  ECG 12 Lead Chest Pain  Once         07/31/25 0620    07/31/25 0619  Cardiology (on-call MD unless specified)  Once,   Status:  Canceled        Specialty:  Cardiology  Provider:  Lizandro Naylor MD    07/31/25 0618    07/31/25 0609  prochlorperazine (COMPAZINE) injection 10 mg  Once,   Status:  Discontinued         07/31/25 0553    07/31/25 0609  diphenhydrAMINE (BENADRYL) injection 12.5 mg  Once,   Status:  Discontinued         07/31/25 0553    07/31/25 0602  iopamidol (ISOVUE-370) 76 % injection 100 mL  Once in Imaging         07/31/25 0546    07/31/25 0553  Manual Differential  Once         07/31/25 0552    07/31/25 0540  nitroglycerin (NITROSTAT) SL tablet 0.4 mg  Every 5 Minutes PRN,   Status:  Discontinued         07/31/25 0541    07/31/25 0538  morphine injection 4 mg  Once         07/31/25 0522    07/31/25 0538  Lidocaine 4 % 1 patch  Once,   Status:  Discontinued         07/31/25 0522    07/31/25 0538  methocarbamol (ROBAXIN) tablet 750 mg  Once,   Status:  Discontinued         07/31/25 0522    07/31/25 0538  ondansetron (ZOFRAN) injection 4 mg  Once         07/31/25 0522    07/31/25 0528  aspirin chewable tablet 324 mg  Once         07/31/25 0512    07/31/25 0523  Lipase  STAT         07/31/25 0522    07/31/25 0523  CT Angiogram Chest Pulmonary Embolism  1 Time Imaging         07/31/25 0522    07/31/25 0513  BNP  Once         07/31/25 0512    07/31/25 0512  Cardiac Monitoring  Continuous,   Status:  Canceled        Comments: Follow Standing Orders As Outlined in Process Instructions (Open Order Report to View Full Instructions)    07/31/25 0512    07/31/25 0512  Continuous Pulse Oximetry  Continuous,   Status:  Canceled         07/31/25 0512    07/31/25 0512  Vital Signs  Per Hospital Policy/Protocol         07/31/25 0512    07/31/25 0512  ECG 12 Lead Chest Pain  Once         07/31/25 0512    07/31/25 0512  XR Chest 1 View  1 Time Imaging         07/31/25 0512 07/31/25 0512  Insert  Peripheral IV  Once,   Status:  Canceled         25 0512    25 0512  [place] Draw  Once         25 0512    25 0512  CBC & Differential  Once         25 0512    25 0512  Comprehensive Metabolic Panel  Once         25 0512    25 0512  High Sensitivity Troponin T  Once         25 0512    25 0512  Green Top (Gel)  PROCEDURE ONCE         25 0512    25 0512  Lavender Top  PROCEDURE ONCE         25 0512    25 0512  Gold Top - SST  PROCEDURE ONCE         25 0512    25 0512  Light Blue Top  PROCEDURE ONCE         25 0512    25 0512  CBC Auto Differential  PROCEDURE ONCE         25 0512    25 0511  Oxygen Therapy- Nasal Cannula; Titrate 1-6 LPM Per SpO2; 90 - 95%  Continuous PRN,   Status:  Canceled       25 0512    25 0511  ECG 12 Lead Chest Pain  As Needed,   Status:  Canceled      Comments: Persistent, Unrelieved or Recurrent Chest Pain    25 0512    25 0511  sodium chloride 0.9 % flush 10 mL  As Needed,   Status:  Discontinued         25 0512    25 0000  Ambulatory Referral to Cardiac Rehab         25 1028                   [facility] CATH LAB  [address] [address] [phone]              Cardiac Catheterization/Vascular Study    Patient Name: Ortiz Salamanca   Patient MRN: [id number]   Patient : 1970 (54 y.o.)   Legal Sex: Female    Accession Number: [id number]   Date of Study: 25   Ordering Provider: Fidelia Peterson MD    Clinical Indications: STEMI       Performing Physician  Performing Staff   Primary: Fidelia Peterson MD     Scrub Person: Aurora Houser    Documenter: Arabella Salazar RN    Invasive Nurse: Liliana Montez RN    Invasive Nurse: Madina Granados RN    Invasive Nurse: Naomi Pizano RN           Procedures    Left Heart Cath   Percutaneous Coronary Intervention   Intravascular Ultrasound    Stent EVARISTO coronary   Left ventriculography       Patient Information    Patient Name   JACQUES Salamanca MRN  [id number] Legal Sex  Female  (Age)  1970 (55 y.o.)     Race Ethnicity Encounter Category   Black or  Not  or  Emergency   White or           [facility] PACS Images     Show images for Cardiac Catheterization/Vascular Study         Printable Vessel Diagram    Printable Vessel Diagram           Pre Procedure Diagnosis    STEMI         Conclusion         Acute lateral STEMI . Acute 100% occlusion of the proximal circumflex, status post PCI of the ostial LM to OM1 using a 3x33mm Xience EVARISTO    Mid Distal LAD stenosis 70% status post PCI using 2.5x15mm Xience EVARISTO    Moderately reduced LV EF    Recommendations: DAPT for one year. Aggressive risk factor modification. Monitor in CICU overnight.     INDICATION FOR PROCEDURE: Acute lateral STEMI     PROCEDURE PERFORMED:  1. Ultrasound guided arterial access   2. Coronary Angiography  3. Left heart catheterization and ventriculography  4. IVUS Left main, circumflex, OM1, LAD  5. PCI mid-distal LAD  6. PCI ostial LM to mid OM1        PROCEDURE COMMENTS:   After informed consent was obtained, the patient was prepped and draped in the usual manner.  Moderate sedation was administered.  Lidocaine was infused in the right wrist for anesthesia.  Access was obtained in the right radial artery using ultrasound guidance and micropuncture technique. A 5/6 Slender Estonian sheath was inserted.  Left main coronary artery was injected using a 5 Estonian JL 3.5.  The circumflex was occluded and therefore based on the findings of the diagnostic coronary angiography, I decided to intervene on the culprit vessel. IV unfractionated heparin was given throughout the intervention to achieve an appropriate ACT.     6 Estonian XB 3 to engage the left main coronary artery.  I placed a short run-through wire in the distal OM1.  I predilated the vessel using a  2.5 x 12 trek balloon.  I then inserted the intravascular ultrasound.  The OM vessel was 2.5 to 2.75 mm in diameter.  The circumflex was 3.5 to 4 mm in diameter proximally and the disease extended into the ostium.  The left main was 5 to 5.3 mm in diameter with minimal calcification.  The IVUS catheter.  I then inserted a short BMW angled wire into the distal LAD.  I decided to proceed with PCI of the mid distal LAD as I knew I was going to have to intervene on the left main.  I used a 2.5 x 12 trek balloon to predilate it and deployed a 2.5 x 15 mm Xience farhan point drug-eluting stent post dilating with a 2.5 x 15 mm NC neoballoon.  I then retracted the balloon.  I then turned my attention back to the circumflex.  I predilated the circumflex proximally with a 3 x 20 mm balloon.  I then deployed a 3 x 33 mm Xience farhan point drug-eluting stent from the ostial left main to the first OM.  I postdilated distally using a 3 x 20 NC throughout the length of the stent followed by a 5 x 15 mm NC balloon in the left main.  I then reinserted the IVUS.  Distally the stent was well-expanded and fully apposed with no evidence of edge dissection.  Proximally the left main was well-expanded and fully apposed.  The proximal circumflex lesion was not fully expanded and the stent was not well opposed.  I then inserted a 4 x 12 NC balloon in the proximal segment and dilated to maximal atmospheres of 20.  At this point I reinserted the IVUS.  The stent was fully apposed in the proximal circumflex and well-expanded.    At this point showed that there was some pinching of the ostial LAD.  I rewired to the LAD using a angled BMW and removed the initial BMW.  I had difficulty inserting the IVUS so I dilated the stent strut using a 2.5 x 8 balloon.  I then inserted the 6 Kinyarwanda IVUS OPTi cross.  The previously placed LAD stent was well-expanded and fully apposed proximally.  There was about a 50% ostial LAD stenosis with an MLA of 6 mm  therefore I decided to defer PCI.     The interventional wires were removed. Final angiography showed no evidence of distal embolization, perforation or dissection. The guiding catheter was removed over the wire.      I then injected the RCA using a 5 Danish JR4 and perform left heart catheterization and ventriculography of the 5 Danish pigtail catheter.  A TR band was used for hemostasis.      HEMODYNAMICS:  LV: 115/8  LVEDP:8-12  AORTIC:105/80  EF: 30-35%  WALL MOTION: Lateral wall hypokinesis     CORONARY ANGIOGRAPHY:  LEFT MAIN:Large-caliber, normal left main coronary artery.  Bifurcates to the LAD and circumflex arteries.  LAD: The LAD is a large-caliber vessel.  The ostium is normal.  There is a previously placed proximal to mid vessel stent which has minimal in-stent restenosis in the midsegment.  First diagonal just distal to that is medium caliber normal.  Distal to that the LAD is tortuous with a 70% mid to distal vessel stenosis.  Ramus: Absent  Circumflex: The circumflex is acutely occluded proximally.  Gives rise to a large caliber, bifurcating first OM.  The mid circumflex is small to medium caliber with a 70% proximal vessel stenosis.  There are 3 small caliber OM's which arise from that which have minimal irregularities.  RCA: The RCA is a large-caliber vessel with mild proximal 30% disease, otherwise normal.        CORONARY INTERVENTION FINDINGS:  PCI CORONARY SEGMENT: proximal circumflex (LM to OM1)  PRE-STENOSIS:  100%  POST-STENOSIS: 0  LESION TYPE:C  RASHID FLOW PRE/POST: 0/3  CULPRIT LESION: Yes  DISSECTION:   No     PCI CORONARY SEGMENT: mid distal LAD  PRE STENOSIS: 80%   POST STENOSIS 0%   LESION TYPE A   CULPRIT NO   DISSECTION NO                  Procedure Narrative    Risks, benefits and alternatives were discussed with the patient and/or family. Plan is for moderate sedation. An immediate assessment was done prior to the administration of moderate sedation. Under my direct supervision,  intravenous moderate sedation sedation was administered during the course of this procedure with continuous monitoring of hemodynamic parameters and level of consciousness by an independent trained observer. Less than 20 mL of estimated blood loss during the case. No specimen was collected during the case.       Time Under Physician Observation    Name Panel Role Time Period   Fidelia Peterson MD Panel 1 Primary 7/31/2025 0804 - 7/31/2025 0953      Total Sedation Time    Moderate sedation event time was not documented.                     Vessel Access    A 6 fr sheath was successfully inserted into the right radial artery.           Sheath Disposition    Hemostasis started on the right radial artery. R-Band was used in achieving hemostasis. Radial compression device applied to vessel. Hemostasis achieved successfully. Closure device additional comment: 14cc air         Stent Inflated    Time Event Details User   8:42 AM Stent Inflated Stnt Cornry Rx Xience/Skypoint Rapdxng 2.95k27yp - First balloon inflation max pressure = 9 alma. IG   8:57 AM Stent Inflated Stnt Cornry Rx Xience/Skypoint Rapdxng 3x33mm - First balloon inflation max pressure = 12 alma. First balloon inflation duration = 6 seconds. IG       Balloon Inflated    Time Event Details User   8:20 AM Balloon Inflated Baln Trek Rx 2.5x12mm - First balloon inflation max pressure = 8 alma. First balloon inflation duration = 6 seconds. Second inflation of balloon - Max pressure = 8 alma. 2nd Inflation of balloon - Duration = 6 seconds. IG   8:39 AM Balloon Inflated Baln Trek Rx 2.5x12mm - First balloon inflation max pressure = 8 alma. First balloon inflation duration = 6 seconds. IG   8:44 AM Balloon Inflated Baln Dil Nctrekneo Rapdxng 2.5x15mm - First balloon inflation max pressure = 20 alma. First balloon inflation duration = 6 seconds. IG   8:50 AM Balloon Inflated Baln Ptca Takerurx Dil 3x20mm - First balloon inflation max pressure = 6 alma. First balloon inflation  "duration = 6 seconds. IG   8:59 AM Balloon Inflated Baln Dil Nctrekneo Rapdxng 5x15mm - First balloon inflation max pressure = 12 alma. First balloon inflation duration = 6 seconds. Second inflation of balloon - Max pressure = 20 alma. 2nd Inflation of balloon - Duration = 6 seconds. Third inflation of balloon - Max pressure = 12 alma. 3rd Inflation of balloon - Duration = 6 seconds. IG   9:09 AM Balloon Inflated Baln Dil Nctrekneo Rapdxng 4x12mm - First balloon inflation max pressure = 12 alma. First balloon inflation duration = 10 seconds. Second inflation of balloon - Max pressure = 20 alma. IG   9:24 AM Balloon Inflated Baln Ptca Takeru Rx 2.50x8mm - First balloon inflation max pressure = 14 alma. First balloon inflation duration = 6 seconds. IG                   Complications      Complications documented before study signed (7/31/2025  3:57 PM)       No complications were associated with this study.   Documented by Fidelia Peterson MD - 7/31/2025  3:54 PM         Radiation Dose Tracking    Panel Radiation (mSv) Cumulative Air Kerma (mGy) Fluoro Time (min) DAP (mGy-cm2) Physician   Panel 1  734.000 23.9 49127.000 Fidelia Peterson MD        Total Contrast        Administrations occurring from 0804 to 1010 on 07/31/25:   Medication Name Total Dose   iopamidol (ISOVUE-370) 76 % injection 247 mL         Patient Hx Of Height, Weight, and Vitals    Height Weight BSA (Calculated - sq m) BMI (Calculated) Retired BMI (kg/m2) Pulse BP   160 cm (63\") 72.6 kg (160 lb) 1.76 sq meters 28.4  85 102/75         Medications  (Filter: Administrations occurring from 0000 to 1557 on 07/31/25)   important  Continuous medications are totaled by the amount administered until 07/31/25 1557.       lidocaine (XYLOCAINE) 2% injection (mL)   Total volume: 5 mL  Date/Time Rate/Dose/Volume Action    07/31/25 0809 5 mL Given      midazolam (VERSED) injection (mg)   Total dose: 2 mg  Date/Time Rate/Dose/Volume Action    07/31/25 0810 1 mg Given    0933 " 1 mg Given      fentaNYL citrate (PF) (SUBLIMAZE) injection (mcg)   Total dose: 50 mcg  Date/Time Rate/Dose/Volume Action    07/31/25 0811 50 mcg Given      heparin (porcine) injection (Units)   Total dose: 10,000 Units  Date/Time Rate/Dose/Volume Action    07/31/25 0811 3,000 Units Given    0814 4,000 Units Given    0917 3,000 Units Given      verapamil (ISOPTIN) injection (mg)   Total dose: 0.5 mg  Date/Time Rate/Dose/Volume Action    07/31/25 0811 0.5 mg Given      nitroGLYCERIN 200 mcg/mL 30 mL syringe (mcg)   Total dose: 800 mcg  Date/Time Rate/Dose/Volume Action    07/31/25 0811 200 mcg Given    0822 200 mcg Given    0910 200 mcg Given    0945 200 mcg Given      ondansetron (ZOFRAN) injection (mg)   Total dose: 4 mg  Date/Time Rate/Dose/Volume Action    07/31/25 0822 4 mg Given      iopamidol (ISOVUE-370) 76 % injection (mL)   Total volume: 247 mL  Date/Time Rate/Dose/Volume Action    07/31/25 0953 247 mL Given      prasugrel (EFFIENT) tablet (mg)   Total dose: 60 mg  Date/Time Rate/Dose/Volume Action    07/31/25 0958 60 mg Given      perflutren (DEFINITY) 8.476 mg in Sodium Chloride (PF) 0.9 % 10 mL injection (mL)   Total volume: 2 mL Dosing weight: 72.6  Date/Time Rate/Dose/Volume Action    07/31/25 1506 2 mL Given      potassium chloride (KLOR-CON M20) CR tablet 40 mEq (mEq)   Total dose: 80 mEq Dosing weight: 72.6  Date/Time Rate/Dose/Volume Action    07/31/25 1054 40 mEq Given    1423 40 mEq Given      sodium chloride 0.9 % infusion (mL/kg/hr)   Total volume: 251.68 mL Dosing weight: 72.6  Date/Time Rate/Dose/Volume Action    07/31/25 1055 1 mL/kg/hr - 72.6 mL/hr New Bag    1423  Stopped      atorvastatin (LIPITOR) tablet 80 mg (mg)   Total dose: 80 mg  Date/Time Rate/Dose/Volume Action    07/31/25 1054 80 mg Given      diphenhydrAMINE (BENADRYL) injection 12.5 mg (mg)   Total dose: Cannot be calculated* Dosing weight: 72.6  *Administration dose not documented  Date/Time Rate/Dose/Volume Action     07/31/25 0612 *12.5 mg Not Given    0804 *Not included in total MAR Hold    1033 *Not included in total MAR Unhold      heparin (porcine) 5000 UNIT/ML injection 2,200-4,000 Units (Units/kg)   Total dose: Cannot be calculated* Dosing weight: 72.6  *Administration dose not documented  Date/Time Rate/Dose/Volume Action    07/31/25 0804 *Not included in total MAR Hold    1033 *Not included in total MAR Unhold      heparin 78956 units/250 mL (100 units/mL) in 0.45 % NaCl infusion (Units/kg/hr)   Total dose: 7,172.88 Units Dosing weight: 72.6  Date/Time Rate/Dose/Volume Action    07/31/25 0743 12 Units/kg/hr - 8.71 mL/hr New Bag    1050 *Not included in total Not Given      Lidocaine 4 % 1 patch (patch)   Total dose: Cannot be calculated* Dosing weight: 72.6  *Administration dose not documented  Date/Time Rate/Dose/Volume Action    07/31/25 0532 *1 patch (over 720 min) Not Given    0804 *Not included in total MAR Hold    1033 *Not included in total MAR Unhold      methocarbamol (ROBAXIN) tablet 750 mg (mg)   Total dose: Cannot be calculated* Dosing weight: 72.6  *Administration dose not documented  Date/Time Rate/Dose/Volume Action    07/31/25 0532 *750 mg Not Given    0804 *Not included in total MAR Hold    1033 *Not included in total MAR Unhold      metoprolol tartrate (LOPRESSOR) tablet 25 mg (mg)   Total dose: 25 mg  Date/Time Rate/Dose/Volume Action    07/31/25 1055 25 mg Given      morphine injection 2 mg (mg)   Total dose: 2 mg Dosing weight: 72.6  Date/Time Rate/Dose/Volume Action    07/31/25 1324 2 mg Given      nitroglycerin (NITROSTAT) SL tablet 0.4 mg (mg)   Total dose: 0.4 mg Dosing weight: 72.6  Date/Time Rate/Dose/Volume Action    07/31/25 0612 0.4 mg Given    0804 *Not included in total MAR Hold    1033 *Not included in total MAR Unhold      prochlorperazine (COMPAZINE) injection 10 mg (mg)   Total dose: Cannot be calculated* Dosing weight: 72.6  *Administration dose not documented  Date/Time  Rate/Dose/Volume Action    07/31/25 0612 *10 mg Not Given    0804 *Not included in total MAR Hold    1033 *Not included in total MAR Unhold      sodium chloride 0.9 % flush 10 mL (mL)   Total dose: Cannot be calculated* Dosing weight: 72.6  *Administration dose not documented  Date/Time Rate/Dose/Volume Action    07/31/25 0804 *Not included in total MAR Hold    1033 *Not included in total MAR Unhold        Implants    Type Not Specified      Stnt Cornry Rx Xience/Skypoint Rapdxng 2.28d21ri - [id number] - [id number] - Implanted    Inventory item: STNT CORNRY RX XIENCE/SKYPOINT RAPDXNG 2.09P52UY Model/Cat number: [id number]   Serial number: [id number] : [employer]   Lot number: [id number]       As of 7/31/2025    Status: Implanted       Stnt Cornry Rx Xience/Skypoint Rapdxng 3x33mm - [id number] - [id number] - Implanted    Inventory item: STNT CORNRY RX XIENCE/SKYPOINT RAPDXNG 3X33MM Model/Cat number: [id number]   Serial number: [id number] : [employer]   Lot number: [id number]       As of 7/31/2025    Status: Implanted         Supplies    Name ID Temporary Type Charge Code Description Charge Code Quantity   KT MANIFLD CARDIAC 302 No Supply HC OR SUPPLY SHELL 272/NO CPT 410129 1   CATH VENT MIV RADL PIG ST TIP 5F 110CM 680 No Diagnostic Catheter HC OR SUPPLY SHELL 272/NO CPT 183522 1   GW RUNTHROUGH NS STR RESHP .014 [id number] 39640 No Guidewire HC OR SUPPLY SHELL 272/ 200816 1   BALN TREK RX 2.5X12MM 85763 No Balloon HC OR SUPPLY SHELL 272/ 273303 1   DEV COMPR RAD TR BND REG 24CM 22288 No Hemostasis Device HC OR SUPPLY SHELL 272/NO CPT 722988 1   INTRO GLIDESHEATH SLENDER SS 21G .021 6F 10CM 45CM 70436 No Supply HC OR SUPPLY SHELL 272/ 782195 1   CATH DIAG IMPULSE FR4 5F 100CM 57488 No Diagnostic Catheter HC OR SUPPLY SHELL 272/NO CPT 512868 1   CATH DIAG IMPULSE FL3.5 5F 100CM 92233 No Diagnostic Catheter HC OR SUPPLY SHELL 272/NO CPT 598082 1   CATH GUIDE VISTABRITE XB3 6F  .07IN 89120 No Guide Catheter HC OR SUPPLY SHELL 272/ 079792 1   CATH GUIDE VISTABRITE XB3.5 6F .07IN 55757 No Guide Catheter HC OR SUPPLY SHELL 272/ 542857 1   GW EMR FIX EXCHG J STD .035 3MM 260CM 72354 No Guidewire HC OR SUPPLY SHELL 272/ 243724 1   GW HITORQUE/BAL MID/WT J W/HCOAT .014 [id number] 15700 No Guidewire HC OR SUPPLY SHELL 272/ 009021 2   DEV INDEFLATOR P/N 835972 85136 No Supply HC OR SUPPLY SHELL 272/NO CPT 475721 1   PK CATH CARD 40 00079 No Supply   1   CATH IMG IVUS CORNRY OPTICROSS/6HD 60MHZ 6FR 135CM 260848 No Diagnostic Catheter HC OR SUPPLY SHELL 272/ 065780 1   BALN PTCA TAKERU RX 2.50X8MM 706900 No Balloon HC OR SUPPLY SHELL 272/ 127301 1   BALN DIL NCTREKNEO RAPDXNG 2.5X15MM 907484 No Balloon HC OR SUPPLY SHELL 272/ 720774 1   BALN DIL NCTREKNEO RAPDXNG 3X25MM 610456 No Balloon HC OR SUPPLY SHELL 272/ 891728 0   BALN DIL NCTREKNEO RAPDXNG 4X12MM 544594 No Balloon HC OR SUPPLY SHELL 272/ 851925 1   BALN DIL NCTREKNEO RAPDXNG 5X15MM 194912 No Balloon HC OR SUPPLY SHELL 272/ 610680 1   BALN PTCA TAKERURX DIL 3X20MM 969950 No Balloon HC OR SUPPLY SHELL 272/ 353895 1       Signed    Electronically signed by Fidelia Peterson MD on 25 at 1557 EDT     Cardiac Catheterization/Vascular Study  Order: [id number]   Status: Final result                Discharge Summary        Fidelia Peterson MD at 25 1150              Hospital Discharge    Patient Name: Ortiz Salamanca  Age/Sex: 55 y.o. female  : 1970  MRN: [id number]    Encounter Provider: Fidelia Peterson MD  Referring Provider: Lizandro Naylor MD  Place of Service: [facility] CARDIOLOGY  Patient Care Team:  Sarah Bright MD as PCP - General (Internal Medicine)         Date of Discharge:  2025   Date of Admit: 2025    Discharge Condition: Stable  Discharge Diagnosis:    Unstable angina    Acute ST-elevation myocardial  infarction      Hospital Course:   Sunday JACQUES Salamanca is a 55 y.o. female who presented with acute chest pain, found to have lateral STEMI, the proximal circ was occluded severe mid-distal LAD disease, s/p PCI of each lesion. EF was mildly reduced. She recovered well. BB was held due to hypotension, as was other GDMT for cardiomyopathy. Will follow up with Dr. Santiago in 2 weeks.      Cath 7/31/25    Acute lateral STEMI . Acute 100% occlusion of the proximal circumflex, status post PCI of the ostial LM to OM1 using a 3x33mm Xience EVARISTO    Mid Distal LAD stenosis 70% status post PCI using 2.5x15mm Xience EVARISTO    Moderately reduced LV EF    Recommendations: DAPT for one year. Aggressive risk factor modification. Monitor in CICU overnight.      CORONARY ANGIOGRAPHY:  LEFT MAIN:Large-caliber, normal left main coronary artery.  Bifurcates to the LAD and circumflex arteries.  LAD: The LAD is a large-caliber vessel.  The ostium is normal.  There is a previously placed proximal to mid vessel stent which has minimal in-stent restenosis in the midsegment.  First diagonal just distal to that is medium caliber normal.  Distal to that the LAD is tortuous with a 70% mid to distal vessel stenosis.  Ramus: Absent  Circumflex: The circumflex is acutely occluded proximally.  Gives rise to a large caliber, bifurcating first OM.  The mid circumflex is small to medium caliber with a 70% proximal vessel stenosis.  There are 3 small caliber OM's which arise from that which have minimal irregularities.  RCA: The RCA is a large-caliber vessel with mild proximal 30% disease, otherwise normal.        CORONARY INTERVENTION FINDINGS:  PCI CORONARY SEGMENT: proximal circumflex (LM to OM1)  PRE-STENOSIS:  100%  POST-STENOSIS: 0  LESION TYPE:C  RASHID FLOW PRE/POST: 0/3  CULPRIT LESION: Yes  DISSECTION:   No     PCI CORONARY SEGMENT: mid distal LAD  PRE STENOSIS: 80%   POST STENOSIS 0%   LESION TYPE A   CULPRIT NO   DISSECTION NO    Echo    Left  ventricular systolic function is low normal. Left ventricular ejection fraction appears to be 46 - 50%.    The following left ventricular wall segments are hypokinetic: basal anterolateral, mid anterolateral and basal inferolateral.    Left ventricular diastolic function was normal.    Calculated right ventricular systolic pressure from tricuspid regurgitation is 38 mmHg.    Mild mitral valve regurgitation is present.           Objective:  Temp:  [98 °F (36.7 °C)-98.5 °F (36.9 °C)] 98.1 °F (36.7 °C)  Heart Rate:  [63-88] 72  Resp:  [18-20] 18  BP: ()/(50-81) 92/66    Intake/Output Summary (Last 24 hours) at 8/2/2025 1338  Last data filed at 8/1/2025 1827  Gross per 24 hour   Intake 480 ml   Output --   Net 480 ml     Body mass index is 28.34 kg/m².      07/31/25  0507 07/31/25  1505   Weight: 72.6 kg (160 lb) 72.6 kg (160 lb)     Weight change:     Physical Exam:  GEN: Alert, cooperative, no distress  Head: normocephalic, atruamatic  Eyes: Normal conjunctiva and sclera, no icterus, PERRL  Neck: No JVD, normal carotid pulsation without bruits  Lungs: clear to auscultation bilaterally, normal rate and effort  Heart: RRR, no murmurs or gallops. Normal s1 and S2.   Abdomen: Soft, nontender, normal bowel sounds  Extremities: No edema or marked deformities. Normal strength.   Skin: No rash, no cyanosis.   Pscyhciatric: Alert and Oriented x 3  Back: No Kypohsis or scoliosis.       Procedures Performed  Procedure(s):  Left Heart Cath  Percutaneous Coronary Intervention  Intravascular Ultrasound  Stent EVARISTO coronary  Left ventriculography       Consults:  Consults       Date and Time Order Name Status Description    7/31/2025  6:18 AM Cardiology (on-call MD unless specified)      7/28/2025  7:55 PM Inpatient Cardiology Consult Completed             Pertinent Test Results:  Results from last 7 days   Lab Units 08/01/25  0326 07/31/25  0538 07/29/25  0547 07/28/25  1529   SODIUM mmol/L 140 142 144 143   POTASSIUM mmol/L  4.5 3.1* 4.1 4.3   CHLORIDE mmol/L 107 107 112* 108*   CO2 mmol/L 23.0 21.0* 24.3 23.3   BUN mg/dL 5.0* 9.0 6.0 8.0   CREATININE mg/dL 0.54* 0.65 0.77 0.59   GLUCOSE mg/dL 98 136* 89 104*   CALCIUM mg/dL 8.8 9.1 8.7 9.4   AST (SGOT) U/L  --  26  --  29   ALT (SGPT) U/L  --  20  --  17     Results from last 7 days   Lab Units 07/31/25  0724 07/31/25  0538 07/29/25  0547 07/28/25  2150 07/28/25  1723 07/28/25  1529   HSTROP T ng/L 24* 13 11 9 12 9     Results from last 7 days   Lab Units 08/02/25  0351 08/01/25  0326 07/31/25  0538 07/29/25  0547 07/28/25  1529   WBC 10*3/mm3 6.50 9.20 6.86 4.23 7.28   HEMOGLOBIN g/dL 9.1* 10.6* 12.6 10.2* 11.2*   HEMATOCRIT % 28.8* 34.7 38.5 30.9* 36.6   PLATELETS 10*3/mm3 227 263 311 244 272     Results from last 7 days   Lab Units 08/02/25  0351 08/01/25 0326 07/31/25  0538   INR   --   --  0.97   APTT seconds 27.6 27.8 27.4         Results from last 7 days   Lab Units 07/29/25  0547   CHOLESTEROL mg/dL 137   TRIGLYCERIDES mg/dL 62   HDL CHOL mg/dL 41     Results from last 7 days   Lab Units 07/31/25  0538   PROBNP pg/mL 218.0           Discharge Medications     Discharge Medications        New Medications        Instructions Start Date   nitroglycerin 0.4 MG SL tablet  Commonly known as: NITROSTAT   0.4 mg, Sublingual, Every 5 Minutes PRN, Take no more than 3 doses in 15 minutes.      prasugrel 10 MG tablet  Commonly known as: EFFIENT   10 mg, Oral, Daily             Changes to Medications        Instructions Start Date   atorvastatin 80 MG tablet  Commonly known as: LIPITOR  What changed: Another medication with the same name was removed. Continue taking this medication, and follow the directions you see here.   80 mg, Oral, Daily             Continue These Medications        Instructions Start Date   aspirin 81 MG chewable tablet   81 mg, Oral, Daily      ergocalciferol 1.25 MG (08709 UT) capsule  Commonly known as: ERGOCALCIFEROL   1 capsule, Weekly      Loratadine 10 MG  capsule   10 mg, Oral, Daily PRN      methocarbamol 500 MG tablet  Commonly known as: ROBAXIN   500 mg, Oral, 4 Times Daily PRN      rizatriptan MLT 10 MG disintegrating tablet  Commonly known as: MAXALT-MLT   10 mg, Once As Needed             Stop These Medications      metoprolol tartrate 25 MG tablet  Commonly known as: LOPRESSOR              Discharge Diet:    Dietary Orders (From admission, onward)       Start     Ordered    07/31/25 1027  Diet: Cardiac; Healthy Heart (2-3 Na+); Fluid Consistency: Thin (IDDSI 0)  Diet Effective Now        References:    Diet Order Definitions   Question Answer Comment   Diets: Cardiac    Cardiac Diet: Healthy Heart (2-3 Na+)    Fluid Consistency: Thin (IDDSI 0)        07/31/25 1028                    Activity at Discharge:       Discharge disposition: home     Discharge Instructions and Follow ups:  No future appointments.  Additional Instructions for the Follow-ups that You Need to Schedule       Ambulatory Referral to Cardiac Rehab   As directed             Follow-up Information       [facility] .    Specialty: Cardiac Rehabilitation  Contact information:  [address] [facility] [address] [phone]             Sarah Bright MD .    Specialty: Internal Medicine  Contact information:  [address] [facility] [address]  [phone]               Ag Santiago III, MD Follow up.    Specialty: Cardiology  Why: office will call with follow up appointment  Contact information:  [address]  [address]  [facility] [address]  [phone]                             Test Results Pending at Discharge:      Fidelia Peterson MD  08/02/25  13:38 EDT    Time: Discharge 45 min    EMR Dragon/Transcription disclaimer:   Part of this note may be an electronic transcription/translation of spoken language to printed text using the Dragon dictation system.      Electronically signed by Fidelia Peterson MD at 08/02/25 3544

## 2025-08-06 ENCOUNTER — READMISSION MANAGEMENT (OUTPATIENT)
Dept: CALL CENTER | Facility: HOSPITAL | Age: 55
End: 2025-08-06
Payer: COMMERCIAL

## 2025-08-15 ENCOUNTER — OFFICE VISIT (OUTPATIENT)
Dept: CARDIOLOGY | Age: 55
End: 2025-08-15
Payer: COMMERCIAL

## 2025-08-15 VITALS
OXYGEN SATURATION: 97 % | HEIGHT: 63 IN | HEART RATE: 67 BPM | SYSTOLIC BLOOD PRESSURE: 132 MMHG | DIASTOLIC BLOOD PRESSURE: 88 MMHG | BODY MASS INDEX: 30.23 KG/M2 | WEIGHT: 170.6 LBS

## 2025-08-15 DIAGNOSIS — I21.4 NSTEMI (NON-ST ELEVATED MYOCARDIAL INFARCTION): ICD-10-CM

## 2025-08-15 DIAGNOSIS — Z95.5 PRESENCE OF DRUG COATED STENT IN LAD CORONARY ARTERY: Chronic | ICD-10-CM

## 2025-08-15 DIAGNOSIS — I25.10 CORONARY ARTERY DISEASE INVOLVING NATIVE CORONARY ARTERY OF NATIVE HEART WITHOUT ANGINA PECTORIS: Primary | ICD-10-CM

## 2025-08-15 RX ORDER — EZETIMIBE 10 MG/1
10 TABLET ORAL DAILY
COMMUNITY
Start: 2025-07-13 | End: 2026-07-13

## 2025-08-15 RX ORDER — ATORVASTATIN CALCIUM 80 MG/1
80 TABLET, FILM COATED ORAL DAILY
Qty: 90 TABLET | Refills: 3 | Status: SHIPPED | OUTPATIENT
Start: 2025-08-15

## 2025-08-15 RX ORDER — METOPROLOL SUCCINATE 25 MG/1
25 TABLET, EXTENDED RELEASE ORAL DAILY
Qty: 90 TABLET | Refills: 3 | Status: SHIPPED | OUTPATIENT
Start: 2025-08-15

## 2025-08-26 ENCOUNTER — OFFICE VISIT (OUTPATIENT)
Dept: CARDIAC REHAB | Facility: HOSPITAL | Age: 55
End: 2025-08-26
Payer: COMMERCIAL

## 2025-08-26 DIAGNOSIS — I21.21 ST ELEVATION MYOCARDIAL INFARCTION INVOLVING LEFT CIRCUMFLEX CORONARY ARTERY: Primary | ICD-10-CM

## 2025-08-26 DIAGNOSIS — Z95.5 STATUS POST INSERTION OF DRUG-ELUTING STENT INTO LEFT ANTERIOR DESCENDING ARTERY: ICD-10-CM

## 2025-08-26 PROCEDURE — 93798 PHYS/QHP OP CAR RHAB W/ECG: CPT

## 2025-08-27 ENCOUNTER — TREATMENT (OUTPATIENT)
Dept: CARDIAC REHAB | Facility: HOSPITAL | Age: 55
End: 2025-08-27
Payer: COMMERCIAL

## 2025-08-27 DIAGNOSIS — Z95.5 STATUS POST INSERTION OF DRUG-ELUTING STENT INTO LEFT ANTERIOR DESCENDING ARTERY: Primary | ICD-10-CM

## 2025-08-27 PROCEDURE — 93798 PHYS/QHP OP CAR RHAB W/ECG: CPT

## 2025-08-29 ENCOUNTER — TREATMENT (OUTPATIENT)
Dept: CARDIAC REHAB | Facility: HOSPITAL | Age: 55
End: 2025-08-29
Payer: COMMERCIAL

## 2025-08-29 DIAGNOSIS — I21.21 ST ELEVATION MYOCARDIAL INFARCTION INVOLVING LEFT CIRCUMFLEX CORONARY ARTERY: Primary | ICD-10-CM

## 2025-08-29 PROCEDURE — 93798 PHYS/QHP OP CAR RHAB W/ECG: CPT

## (undated) DEVICE — VIAL FORMALIN CAP 10P 40ML

## (undated) DEVICE — RUNTHROUGH NS EXTRA FLOPPY PTCA GUIDEWIRE: Brand: RUNTHROUGH

## (undated) DEVICE — KT ORCA ORCAPOD DISP STRL

## (undated) DEVICE — TR BAND RADIAL ARTERY COMPRESSION DEVICE: Brand: TR BAND

## (undated) DEVICE — CORONARY IMAGING CATHETER: Brand: OPTICROSS™ 6 HD

## (undated) DEVICE — NC TREK NEO™ CORONARY DILATATION CATHETER 5.00 MM X 15 MM / RAPID-EXCHANGE: Brand: NC TREK NEO™

## (undated) DEVICE — GLIDESHEATH SLENDER STAINLESS STEEL KIT: Brand: GLIDESHEATH SLENDER

## (undated) DEVICE — KT MANIFLD CARDIAC

## (undated) DEVICE — LINER SURG CANSTR SXN S/RIGD 1500CC

## (undated) DEVICE — DGW .035 FC J3MM 260CM TEF: Brand: EMERALD

## (undated) DEVICE — Device: Brand: DEFENDO AIR/WATER/SUCTION AND BIOPSY VALVE

## (undated) DEVICE — DEV INDEFLATOR P/N 580289

## (undated) DEVICE — CATH VENT MIV RADL PIG ST TIP 5F 110CM

## (undated) DEVICE — VIOLET BRAIDED (POLYGLACTIN 910), SYNTHETIC ABSORBABLE SUTURE: Brand: COATED VICRYL

## (undated) DEVICE — BW-412T DISP COMBO CLEANING BRUSH: Brand: SINGLE USE COMBINATION CLEANING BRUSH

## (undated) DEVICE — APL DUPLOSPRAYER MIS 40CM

## (undated) DEVICE — GLV SURG BIOGEL LTX PF 8 1/2

## (undated) DEVICE — MSK PROC CURAPLEX O2 2/ADAPT 7FT

## (undated) DEVICE — GUIDE CATHETER: Brand: MACH1™

## (undated) DEVICE — NC TREK CORONARY DILATATION CATHETER 3.75 MM X 20 MM / RAPID-EXCHANGE: Brand: NC TREK

## (undated) DEVICE — PK CATH CARD 40

## (undated) DEVICE — SUCTION CANISTER, 3000CC,SAFELINER: Brand: DEROYAL

## (undated) DEVICE — ECHELON FLEX POWERED PLUS LONG ARTICULATING ENDOSCOPIC LINEAR CUTTER, 60MM: Brand: ECHELON FLEX

## (undated) DEVICE — TREK CORONARY DILATATION CATHETER 3.0 MM X 15 MM / RAPID-EXCHANGE: Brand: TREK

## (undated) DEVICE — TUBING, SUCTION, 1/4" X 10', STRAIGHT: Brand: MEDLINE

## (undated) DEVICE — SYRINGE,TOOMEY,IRRIGATION,70CC,STERILE: Brand: MEDLINE

## (undated) DEVICE — BITEBLOCK OMNI BLOC

## (undated) DEVICE — GLV SURG SENSICARE MICRO PF LF 7.5 STRL

## (undated) DEVICE — CATH DIAG IMPULSE FL3.5 5F 100CM

## (undated) DEVICE — GW HITORQUE/BAL MID/WT J W/HCOAT .014 3X190CM

## (undated) DEVICE — NC TREK NEO™ CORONARY DILATATION CATHETER 3.00 MM X 25 MM / RAPID-EXCHANGE: Brand: NC TREK NEO™

## (undated) DEVICE — FRCP BX RADJAW4 NDL 2.8 240CM LG OG BX40

## (undated) DEVICE — CATH DIAG IMPULSE PIG 5F 100CM

## (undated) DEVICE — SPNG GZ WOVN 4X4IN 12PLY 10/BX STRL

## (undated) DEVICE — LAPAROSCOPIC SMOKE FILTRATION SYSTEM: Brand: PALL LAPAROSHIELD® PLUS LAPAROSCOPIC SMOKE FILTRATION SYSTEM

## (undated) DEVICE — DEV INDEFLATOR

## (undated) DEVICE — VISUALIZATION SYSTEM: Brand: CLEARIFY

## (undated) DEVICE — MARKR SKIN W/RULR AND LBL

## (undated) DEVICE — NC TREK NEO™ CORONARY DILATATION CATHETER 4.00 MM X 12 MM / RAPID-EXCHANGE: Brand: NC TREK NEO™

## (undated) DEVICE — GLV SURG SENSICARE PI MIC PF SZ8 LF STRL

## (undated) DEVICE — GOWN ISOL W/THUMB UNIV BLU BX/15

## (undated) DEVICE — BND PRESS RADL COMFRT 14IN STRL

## (undated) DEVICE — JACKT LAB F/R KNIT CUFF/COLR XLG BLU

## (undated) DEVICE — TREK CORONARY DILATATION CATHETER 2.50 MM X 12 MM / RAPID-EXCHANGE: Brand: TREK

## (undated) DEVICE — MASK,FACE,FLUID RESIST,SHLD,EARLOOP: Brand: MEDLINE

## (undated) DEVICE — CLMP STD 22CM DISP

## (undated) DEVICE — 6F .070 XB 3 100CM: Brand: VISTA BRITE TIP

## (undated) DEVICE — CATH DIAG IMPULSE FR5 5F 100CM

## (undated) DEVICE — ADAPT CLN BIOGUARD AIR/H2O DISP

## (undated) DEVICE — NC TREK NEO™ CORONARY DILATATION CATHETER 2.50 X 15 MM / RAPID-EXCHANGE: Brand: NC TREK NEO™

## (undated) DEVICE — VBT GC 6F .070 XB 3 100CM: Brand: VISTA BRITE TIP

## (undated) DEVICE — KTTNER ENDO BLNT DISSCT

## (undated) DEVICE — SYR LL 3CC

## (undated) DEVICE — 6F .070 AL 1 100CM: Brand: CORDIS

## (undated) DEVICE — SENSR O2 OXIMAX FNGR A/ 18IN NONSTR

## (undated) DEVICE — 6F .070 XB 3.5 ECO PK: Brand: VISTA BRITE TIP

## (undated) DEVICE — CATH DIAG IMPULSE FR4 5F 100CM

## (undated) DEVICE — GLV SURG SIGNATURE ESSENTIAL PF LTX SZ8.5

## (undated) DEVICE — PK OSC LAP SLV 40

## (undated) DEVICE — 6F .070 JL3.5 100CM: Brand: CORDIS

## (undated) DEVICE — BALN PTCA TAKERURX DIL 3X20MM

## (undated) DEVICE — Device

## (undated) DEVICE — GW EMR FIX EXCHG J STD .035 3MM 260CM

## (undated) DEVICE — ENDOPATH XCEL BLADELESS TROCARS WITH STABILITY SLEEVES: Brand: ENDOPATH XCEL

## (undated) DEVICE — ENSEAL LAPAROSCOPIC TISSUE SEALER G2 ARTICULATING CURVED JAW FOR USE WITH G2 GENERATOR 5MM DIAMETER 45CM SHAFT LENGTH: Brand: ENSEAL

## (undated) DEVICE — DUAL LUMEN STOMACH TUBE,ANTI-REFLUX VALVE: Brand: SALEM SUMP

## (undated) DEVICE — LN SMPL CO2 SHTRM SD STREAM W/M LUER